# Patient Record
Sex: FEMALE | Race: WHITE | Employment: OTHER | URBAN - METROPOLITAN AREA
[De-identification: names, ages, dates, MRNs, and addresses within clinical notes are randomized per-mention and may not be internally consistent; named-entity substitution may affect disease eponyms.]

---

## 2017-07-10 ENCOUNTER — TELEPHONE (OUTPATIENT)
Dept: CARDIOLOGY CLINIC | Age: 72
End: 2017-07-10

## 2017-07-12 ENCOUNTER — OFFICE VISIT (OUTPATIENT)
Dept: CARDIOLOGY CLINIC | Age: 72
End: 2017-07-12

## 2017-07-12 VITALS
HEART RATE: 119 BPM | BODY MASS INDEX: 20.73 KG/M2 | WEIGHT: 140 LBS | HEIGHT: 69 IN | DIASTOLIC BLOOD PRESSURE: 66 MMHG | SYSTOLIC BLOOD PRESSURE: 118 MMHG

## 2017-07-12 DIAGNOSIS — M25.472 BILATERAL SWELLING OF FEET AND ANKLES: ICD-10-CM

## 2017-07-12 DIAGNOSIS — M25.471 BILATERAL SWELLING OF FEET AND ANKLES: ICD-10-CM

## 2017-07-12 DIAGNOSIS — M25.474 BILATERAL SWELLING OF FEET AND ANKLES: ICD-10-CM

## 2017-07-12 DIAGNOSIS — I48.91 ATRIAL FIBRILLATION, UNSPECIFIED TYPE (HCC): Primary | ICD-10-CM

## 2017-07-12 DIAGNOSIS — R53.83 FATIGUE, UNSPECIFIED TYPE: ICD-10-CM

## 2017-07-12 DIAGNOSIS — M25.475 BILATERAL SWELLING OF FEET AND ANKLES: ICD-10-CM

## 2017-07-12 DIAGNOSIS — R06.02 SOB (SHORTNESS OF BREATH): ICD-10-CM

## 2017-07-12 PROCEDURE — 93000 ELECTROCARDIOGRAM COMPLETE: CPT | Performed by: INTERNAL MEDICINE

## 2017-07-12 PROCEDURE — 99204 OFFICE O/P NEW MOD 45 MIN: CPT | Performed by: INTERNAL MEDICINE

## 2017-07-12 RX ORDER — METOPROLOL SUCCINATE 25 MG/1
25 TABLET, EXTENDED RELEASE ORAL DAILY
Qty: 30 TABLET | Refills: 11 | Status: SHIPPED | OUTPATIENT
Start: 2017-07-12 | End: 2017-07-26

## 2017-07-26 ENCOUNTER — OFFICE VISIT (OUTPATIENT)
Dept: CARDIOLOGY CLINIC | Age: 72
End: 2017-07-26

## 2017-07-26 ENCOUNTER — HOSPITAL ENCOUNTER (OUTPATIENT)
Dept: NON INVASIVE DIAGNOSTICS | Age: 72
Discharge: HOME | End: 2017-07-26
Attending: INTERNAL MEDICINE | Admitting: INTERNAL MEDICINE

## 2017-07-26 VITALS
WEIGHT: 143 LBS | BODY MASS INDEX: 21.18 KG/M2 | HEIGHT: 69 IN | SYSTOLIC BLOOD PRESSURE: 126 MMHG | DIASTOLIC BLOOD PRESSURE: 86 MMHG | HEART RATE: 86 BPM

## 2017-07-26 DIAGNOSIS — I48.91 ATRIAL FIBRILLATION, UNSPECIFIED TYPE (HCC): Chronic | ICD-10-CM

## 2017-07-26 DIAGNOSIS — M25.475 BILATERAL SWELLING OF FEET AND ANKLES: Chronic | ICD-10-CM

## 2017-07-26 DIAGNOSIS — I49.9 IRREGULAR HEART RHYTHM: Chronic | ICD-10-CM

## 2017-07-26 DIAGNOSIS — M25.471 BILATERAL SWELLING OF FEET AND ANKLES: Chronic | ICD-10-CM

## 2017-07-26 DIAGNOSIS — R06.02 SOB (SHORTNESS OF BREATH): Primary | Chronic | ICD-10-CM

## 2017-07-26 DIAGNOSIS — M25.472 BILATERAL SWELLING OF FEET AND ANKLES: Chronic | ICD-10-CM

## 2017-07-26 DIAGNOSIS — M25.474 BILATERAL SWELLING OF FEET AND ANKLES: Chronic | ICD-10-CM

## 2017-07-26 DIAGNOSIS — I42.9 CARDIOMYOPATHY (HCC): ICD-10-CM

## 2017-07-26 DIAGNOSIS — I48.91 ATRIAL FIBRILLATION (HCC): ICD-10-CM

## 2017-07-26 LAB
LV EF: 18 %
LV EF: 24 %
LVEF MODALITY: NORMAL
LVEF MODALITY: NORMAL

## 2017-07-26 PROCEDURE — 99214 OFFICE O/P EST MOD 30 MIN: CPT | Performed by: INTERNAL MEDICINE

## 2017-07-26 RX ORDER — CARVEDILOL 6.25 MG/1
6.25 TABLET ORAL 2 TIMES DAILY WITH MEALS
Qty: 60 TABLET | Refills: 11 | Status: SHIPPED | OUTPATIENT
Start: 2017-07-26 | End: 2017-08-11 | Stop reason: SDUPTHER

## 2017-07-26 RX ORDER — SPIRONOLACTONE 25 MG/1
25 TABLET ORAL DAILY
Qty: 30 TABLET | Refills: 11 | Status: SHIPPED | OUTPATIENT
Start: 2017-07-26 | End: 2017-09-11 | Stop reason: SDUPTHER

## 2017-07-26 RX ORDER — LISINOPRIL 5 MG/1
5 TABLET ORAL DAILY
Qty: 30 TABLET | Refills: 11 | Status: SHIPPED | OUTPATIENT
Start: 2017-07-26 | End: 2017-09-11 | Stop reason: SDUPTHER

## 2017-07-27 ENCOUNTER — TELEPHONE (OUTPATIENT)
Dept: CARDIOLOGY CLINIC | Age: 72
End: 2017-07-27

## 2017-08-04 ENCOUNTER — TELEPHONE (OUTPATIENT)
Dept: CARDIOLOGY CLINIC | Age: 72
End: 2017-08-04

## 2017-08-04 RX ORDER — FUROSEMIDE 20 MG/1
20 TABLET ORAL DAILY
Qty: 30 TABLET | Refills: 2 | Status: SHIPPED | OUTPATIENT
Start: 2017-08-04 | End: 2017-09-11 | Stop reason: SDUPTHER

## 2017-08-11 ENCOUNTER — OFFICE VISIT (OUTPATIENT)
Dept: CARDIOLOGY CLINIC | Age: 72
End: 2017-08-11

## 2017-08-11 ENCOUNTER — HOSPITAL ENCOUNTER (OUTPATIENT)
Dept: OTHER | Age: 72
Discharge: HOME | End: 2017-08-11
Attending: INTERNAL MEDICINE | Admitting: INTERNAL MEDICINE

## 2017-08-11 VITALS
SYSTOLIC BLOOD PRESSURE: 98 MMHG | BODY MASS INDEX: 18.81 KG/M2 | HEIGHT: 69 IN | WEIGHT: 127 LBS | HEART RATE: 72 BPM | DIASTOLIC BLOOD PRESSURE: 60 MMHG

## 2017-08-11 DIAGNOSIS — R06.02 SOB (SHORTNESS OF BREATH): Chronic | ICD-10-CM

## 2017-08-11 DIAGNOSIS — I48.20 CHRONIC ATRIAL FIBRILLATION (HCC): Chronic | ICD-10-CM

## 2017-08-11 DIAGNOSIS — M25.474 BILATERAL SWELLING OF FEET AND ANKLES: Chronic | ICD-10-CM

## 2017-08-11 DIAGNOSIS — M25.471 BILATERAL SWELLING OF FEET AND ANKLES: Chronic | ICD-10-CM

## 2017-08-11 DIAGNOSIS — M25.472 BILATERAL SWELLING OF FEET AND ANKLES: Chronic | ICD-10-CM

## 2017-08-11 DIAGNOSIS — I50.22 SYSTOLIC CHF, CHRONIC (HCC): ICD-10-CM

## 2017-08-11 DIAGNOSIS — I50.22 SYSTOLIC CHF, CHRONIC (HCC): Primary | ICD-10-CM

## 2017-08-11 DIAGNOSIS — I42.9 CARDIOMYOPATHY, IDIOPATHIC (HCC): ICD-10-CM

## 2017-08-11 DIAGNOSIS — M25.475 BILATERAL SWELLING OF FEET AND ANKLES: Chronic | ICD-10-CM

## 2017-08-11 LAB
ANION GAP SERPL CALCULATED.3IONS-SCNC: 13 MMOL/L (ref 3–16)
BUN BLDV-MCNC: 9 MG/DL (ref 7–20)
CALCIUM SERPL-MCNC: 9.5 MG/DL (ref 8.3–10.6)
CHLORIDE BLD-SCNC: 98 MMOL/L (ref 99–110)
CO2: 29 MMOL/L (ref 21–32)
CREAT SERPL-MCNC: 0.9 MG/DL (ref 0.6–1.2)
GFR AFRICAN AMERICAN: >60
GFR NON-AFRICAN AMERICAN: >60
GLUCOSE BLD-MCNC: 82 MG/DL (ref 70–99)
POTASSIUM SERPL-SCNC: 5.1 MMOL/L (ref 3.5–5.1)
PRO-BNP: ABNORMAL PG/ML (ref 0–124)
SODIUM BLD-SCNC: 140 MMOL/L (ref 136–145)

## 2017-08-11 PROCEDURE — 99204 OFFICE O/P NEW MOD 45 MIN: CPT | Performed by: INTERNAL MEDICINE

## 2017-08-11 RX ORDER — DIPHENHYDRAMINE HCL 25 MG
25 TABLET ORAL EVERY 8 HOURS PRN
Status: ON HOLD | COMMUNITY
End: 2018-04-03

## 2017-08-11 RX ORDER — CARVEDILOL 12.5 MG/1
12.5 TABLET ORAL 2 TIMES DAILY WITH MEALS
Qty: 60 TABLET | Refills: 11 | Status: SHIPPED | OUTPATIENT
Start: 2017-08-11 | End: 2017-08-24 | Stop reason: SDUPTHER

## 2017-08-14 ENCOUNTER — NURSE ONLY (OUTPATIENT)
Dept: CARDIOLOGY CLINIC | Age: 72
End: 2017-08-14

## 2017-08-14 DIAGNOSIS — I48.20 CHRONIC ATRIAL FIBRILLATION (HCC): Primary | Chronic | ICD-10-CM

## 2017-08-14 DIAGNOSIS — I48.20 CHRONIC ATRIAL FIBRILLATION (HCC): Primary | ICD-10-CM

## 2017-08-21 PROCEDURE — 93224 XTRNL ECG REC UP TO 48 HRS: CPT | Performed by: INTERNAL MEDICINE

## 2017-08-24 ENCOUNTER — TELEPHONE (OUTPATIENT)
Dept: CARDIOLOGY CLINIC | Age: 72
End: 2017-08-24

## 2017-08-24 ENCOUNTER — HOSPITAL ENCOUNTER (OUTPATIENT)
Dept: OTHER | Age: 72
Discharge: HOME | End: 2017-08-24
Attending: INTERNAL MEDICINE | Admitting: INTERNAL MEDICINE

## 2017-08-24 ENCOUNTER — OFFICE VISIT (OUTPATIENT)
Dept: CARDIOLOGY CLINIC | Age: 72
End: 2017-08-24

## 2017-08-24 VITALS
HEIGHT: 69 IN | HEART RATE: 72 BPM | WEIGHT: 126 LBS | DIASTOLIC BLOOD PRESSURE: 38 MMHG | SYSTOLIC BLOOD PRESSURE: 96 MMHG | BODY MASS INDEX: 18.66 KG/M2

## 2017-08-24 DIAGNOSIS — I50.22 SYSTOLIC CHF, CHRONIC (HCC): Chronic | ICD-10-CM

## 2017-08-24 DIAGNOSIS — I42.9 CARDIOMYOPATHY, IDIOPATHIC (HCC): Chronic | ICD-10-CM

## 2017-08-24 DIAGNOSIS — I48.20 CHRONIC ATRIAL FIBRILLATION (HCC): Chronic | ICD-10-CM

## 2017-08-24 DIAGNOSIS — M25.474 BILATERAL SWELLING OF FEET AND ANKLES: Chronic | ICD-10-CM

## 2017-08-24 DIAGNOSIS — R06.02 SOB (SHORTNESS OF BREATH): Chronic | ICD-10-CM

## 2017-08-24 DIAGNOSIS — M25.472 BILATERAL SWELLING OF FEET AND ANKLES: Chronic | ICD-10-CM

## 2017-08-24 DIAGNOSIS — I50.22 SYSTOLIC CHF, CHRONIC (HCC): Primary | Chronic | ICD-10-CM

## 2017-08-24 DIAGNOSIS — M25.475 BILATERAL SWELLING OF FEET AND ANKLES: Chronic | ICD-10-CM

## 2017-08-24 DIAGNOSIS — M25.471 BILATERAL SWELLING OF FEET AND ANKLES: Chronic | ICD-10-CM

## 2017-08-24 LAB
ANION GAP SERPL CALCULATED.3IONS-SCNC: 15 MMOL/L (ref 3–16)
BUN BLDV-MCNC: 15 MG/DL (ref 7–20)
CALCIUM SERPL-MCNC: 9.5 MG/DL (ref 8.3–10.6)
CHLORIDE BLD-SCNC: 102 MMOL/L (ref 99–110)
CO2: 27 MMOL/L (ref 21–32)
CREAT SERPL-MCNC: 0.7 MG/DL (ref 0.6–1.2)
GFR AFRICAN AMERICAN: >60
GFR NON-AFRICAN AMERICAN: >60
GLUCOSE BLD-MCNC: 91 MG/DL (ref 70–99)
POTASSIUM SERPL-SCNC: 4.9 MMOL/L (ref 3.5–5.1)
PRO-BNP: ABNORMAL PG/ML (ref 0–124)
SODIUM BLD-SCNC: 144 MMOL/L (ref 136–145)

## 2017-08-24 PROCEDURE — 99214 OFFICE O/P EST MOD 30 MIN: CPT | Performed by: INTERNAL MEDICINE

## 2017-08-24 RX ORDER — CARVEDILOL 25 MG/1
25 TABLET ORAL 2 TIMES DAILY WITH MEALS
Qty: 180 TABLET | Refills: 3 | Status: SHIPPED | OUTPATIENT
Start: 2017-08-24 | End: 2018-01-31 | Stop reason: DRUGHIGH

## 2017-09-11 RX ORDER — FUROSEMIDE 20 MG/1
10 TABLET ORAL DAILY
Qty: 30 TABLET | Refills: 2 | Status: SHIPPED | OUTPATIENT
Start: 2017-09-11 | End: 2018-01-09 | Stop reason: ALTCHOICE

## 2017-09-11 RX ORDER — LISINOPRIL 5 MG/1
5 TABLET ORAL DAILY
Qty: 90 TABLET | Refills: 2 | Status: SHIPPED | OUTPATIENT
Start: 2017-09-11 | End: 2017-12-01 | Stop reason: ALTCHOICE

## 2017-09-11 RX ORDER — SPIRONOLACTONE 25 MG/1
25 TABLET ORAL DAILY
Qty: 90 TABLET | Refills: 2 | Status: SHIPPED | OUTPATIENT
Start: 2017-09-11 | End: 2018-08-16 | Stop reason: SDUPTHER

## 2017-09-13 DIAGNOSIS — I48.91 ATRIAL FIBRILLATION, UNSPECIFIED TYPE (HCC): Chronic | ICD-10-CM

## 2017-10-04 ENCOUNTER — OFFICE VISIT (OUTPATIENT)
Dept: FAMILY MEDICINE CLINIC | Age: 72
End: 2017-10-04
Payer: MEDICARE

## 2017-10-04 VITALS
HEIGHT: 69 IN | SYSTOLIC BLOOD PRESSURE: 105 MMHG | RESPIRATION RATE: 14 BRPM | BODY MASS INDEX: 19.55 KG/M2 | DIASTOLIC BLOOD PRESSURE: 56 MMHG | HEART RATE: 51 BPM | WEIGHT: 132 LBS

## 2017-10-04 DIAGNOSIS — Z13.820 SCREENING FOR OSTEOPOROSIS: ICD-10-CM

## 2017-10-04 DIAGNOSIS — Z13.1 SCREENING FOR DIABETES MELLITUS: ICD-10-CM

## 2017-10-04 DIAGNOSIS — Z12.11 SCREEN FOR COLON CANCER: ICD-10-CM

## 2017-10-04 DIAGNOSIS — F32.A DEPRESSION, UNSPECIFIED DEPRESSION TYPE: ICD-10-CM

## 2017-10-04 DIAGNOSIS — I48.91 ATRIAL FIBRILLATION, UNSPECIFIED TYPE (HCC): Chronic | ICD-10-CM

## 2017-10-04 DIAGNOSIS — I42.9 CARDIOMYOPATHY, IDIOPATHIC (HCC): Chronic | ICD-10-CM

## 2017-10-04 DIAGNOSIS — I10 ESSENTIAL HYPERTENSION: ICD-10-CM

## 2017-10-04 DIAGNOSIS — Z13.220 SCREENING CHOLESTEROL LEVEL: ICD-10-CM

## 2017-10-04 DIAGNOSIS — I50.22 SYSTOLIC CHF, CHRONIC (HCC): Primary | Chronic | ICD-10-CM

## 2017-10-04 DIAGNOSIS — Z23 NEED FOR STREPTOCOCCUS PNEUMONIAE VACCINATION: ICD-10-CM

## 2017-10-04 PROCEDURE — 99203 OFFICE O/P NEW LOW 30 MIN: CPT | Performed by: NURSE PRACTITIONER

## 2017-10-04 PROCEDURE — 90670 PCV13 VACCINE IM: CPT | Performed by: NURSE PRACTITIONER

## 2017-10-04 PROCEDURE — G0009 ADMIN PNEUMOCOCCAL VACCINE: HCPCS | Performed by: NURSE PRACTITIONER

## 2017-10-04 ASSESSMENT — PATIENT HEALTH QUESTIONNAIRE - PHQ9
SUM OF ALL RESPONSES TO PHQ QUESTIONS 1-9: 0
2. FEELING DOWN, DEPRESSED OR HOPELESS: 0
SUM OF ALL RESPONSES TO PHQ9 QUESTIONS 1 & 2: 0
1. LITTLE INTEREST OR PLEASURE IN DOING THINGS: 0

## 2017-10-04 ASSESSMENT — ENCOUNTER SYMPTOMS
COUGH: 0
SHORTNESS OF BREATH: 0
RHINORRHEA: 0
NAUSEA: 0
ABDOMINAL PAIN: 0
VOMITING: 0
CONSTIPATION: 0
BACK PAIN: 0
DIARRHEA: 0

## 2017-10-04 NOTE — PATIENT INSTRUCTIONS
your doctor tells you to. Effects of medicines and food on warfarin  · Don't start or stop taking any medicines, vitamins, or natural remedies unless you first talk to your doctor. Many medicines can affect how warfarin works. These include aspirin and other pain relievers, over-the-counter medicines, multivitamins, dietary supplements, and herbal products. · Tell all of your doctors and pharmacists that you take warfarin. Some prescription medicines can affect how warfarin works. · Keep the amount of vitamin K in your diet about the same from day to day. Do not suddenly eat a lot more or a lot less food that is rich in vitamin K than you usually do. Vitamin K affects how warfarin works and how your blood clots. Talk with your doctor before making big changes in your diet. Foods that have a lot of vitamin K include cooked green vegetables, such as:  ¨ Kale, spinach, turnip greens, lizet greens, Swiss chard, and mustard greens. ¨ [place] sprouts, broccoli, and asparagus. · Limit your use of alcohol. Avoid bleeding by preventing falls and injuries  · Wear slippers or shoes with nonskid soles. · Remove throw rugs and clutter. · Rearrange furniture and electrical cords to keep them out of walking paths. · Keep stairways, porches, and outside walkways well lit. Use night-lights in hallways and bathrooms. · Be extra careful when you work with sharp tools or knives. When should you call for help? Call 911 anytime you think you may need emergency care. For example, call if:  · You have a sudden, severe headache that is different from past headaches. Call your doctor now or seek immediate medical care if:  · You have any abnormal bleeding, such as:  ¨ Nosebleeds. ¨ Vaginal bleeding that is different (heavier, more frequent, at a different time of the month) than what you are used to. ¨ Bloody or black stools, or rectal bleeding. ¨ Bloody or pink urine.   Watch closely for changes in your health, and be sure bones, muscles, and joints strong. Being fit lets you do more physical activity. And it lets you work out harder without as much effort. How can you make physical activity part of your life? Get at least 30 minutes of exercise on most days of the week. Walking is a good choice. You also may want to do other activities, such as running, swimming, cycling, or playing tennis or team sports. Pick activities that you like--ones that make your heart beat faster, your muscles stronger, and your muscles and joints more flexible. If you find more than one thing you like doing, do them all. You don't have to do the same thing every day. Get your heart pumping every day. Any activity that makes your heart beat faster and keeps it at that rate for a while counts. Here are some great ways to get your heart beating faster:  · Go for a brisk walk, run, or bike ride. · Go for a hike or swim. · Go in-line skating. · Play a game of touch football, basketball, or soccer. · Ride a bike. · Play tennis or racquetball. · Climb stairs. Even some household chores can be aerobic--just do them at a faster pace. Vacuuming, raking or mowing the lawn, sweeping the garage, and washing and waxing the car all can help get your heart rate up. Strengthen your muscles during the week. You don't have to lift heavy weights or grow big, bulky muscles to get stronger. Doing a few simple activities that make your muscles work against, or \"resist,\" something can help you get stronger. For example, you can:  · Do push-ups or sit-ups, which use your own body weight as resistance. · Lift weights or dumbbells or use stretch bands at home or in a gym or community center. Stretch your muscles often. Stretching will help you as you become more active. It can help you stay flexible, loosen tight muscles, and avoid injury. It can also help improve your balance and posture and can be a great way to relax.   Be sure to stretch the muscles you'll be using when you work out. It's best to warm your muscles slightly before you stretch them. Walk or do some other light aerobic activity for a few minutes, and then start stretching. When you stretch your muscles:  · Do it slowly. Stretching is not about going fast or making sudden movements. · Don't push or bounce during a stretch. · Hold each stretch for at least 15 to 30 seconds, if you can. You should feel a stretch in the muscle, but not pain. · Breathe out as you do the stretch. Then breathe in as you hold the stretch. Don't hold your breath. If you're worried about how more activity might affect your health, have a checkup before you start. Follow any special advice your doctor gives you for getting a smart start. Where can you learn more? Go to https://Trifecta Investment Partnerspavan.DataKraft. org and sign in to your Story[employer] Media account. Enter O666 in the [employer] box to learn more about \"Learning About Physical Activity. \"     If you do not have an account, please click on the \"Sign Up Now\" link. Current as of: March 13, 2017  Content Version: 11.3  © 7794-1668 [employer]. Care instructions adapted under license by [facility] ([facility]). If you have questions about a medical condition or this instruction, always ask your healthcare professional. [address] any warranty or liability for your use of this information.

## 2017-10-04 NOTE — PROGRESS NOTES
[address] [place] [address]  Dept: [phone]  Dept Fax: [phone]    Shiva Butler is a 71 y.o. female who presents today for her medical conditions/complaints as noted below. Shiva Butler is c/o of Established New Doctor (would like order for blood work to check INR) and Referral - General (for cardiology )        HPI:     HPI Warden Palacio is here to establish care. She has recently moved to the area from [facility] near [place]. Last time seen by a PCP: Within past few months . Past Medical History:    She has significant past cardiac history including congestive heart failure, cardiomyopathy, and atrial fibrillation. She has had a history of hypertension. She is on numerous cardiac medications including lisinopril, Aldactone, Lasix, Coreg Coumadin and digoxin. He denies any intolerances or adverse effects associated usage. She was previously followed cardiology In [place] [id number] and [facility]ai , she needs to re-establish with cardiology in this area. Denies any current chest pain or pressure, Shortness of breath, leg swelling, or easy bruising/bleeding. In regards to her Coumadin she takes MWF 5mg then the rest of the week half a tablet. She's had a history of depression she is currently on Zoloft 50mg she denies any intolerances or adverse effects associated usage. Denies suicidal/homicidal ideations or plans. Exercise: Denies ever exercise. Diet: She denies following specific diet  Tobacco:Denies  Alcohol: A small glass of wine at night  Illicit Drugs: Denies  Health Maintenance: She had a mammogram completed in August 2017. She is unsure of her last colonoscopy, she denies any significant bowel habit changes or blood in her stool. Denies unintentional weight loss. She does not wish 3 had DEXA scan. She states she has never had pneumonia vaccination.   She says her last tetanus or influenza vaccination she declines these today. Diffuse his shingles vaccination. Refuses hepatitis C screening. Patient Concerns/Questions: Denies         Past Medical History:   Diagnosis Date    Atrial fibrillation [facility]) Sept., 2012    Alvin Dorman    Other screening mammogram March 6, 2012    Negative      Past Surgical History:   Procedure Laterality Date    COLONOSCOPY  Nov. 2001 ( 2011 )     Dr. Brandin Eaton History   Problem Relation Age of Onset    Adopted: Yes       Social History   Substance Use Topics    Smoking status: Former Smoker     Quit date: 10/4/1987    Smokeless tobacco: Not on file    Alcohol use Yes      Current Outpatient Prescriptions   Medication Sig Dispense Refill    lisinopril (PRINIVIL;ZESTRIL) 5 MG tablet Take 1 tablet by mouth daily 90 tablet 2    spironolactone (ALDACTONE) 25 MG tablet Take 1 tablet by mouth daily 90 tablet 2    furosemide (LASIX) 20 MG tablet Take 0.5 tablets by mouth daily Monday Wednesday friday 30 tablet 2    carvedilol (COREG) 25 MG tablet Take 1 tablet by mouth 2 times daily (with meals) 180 tablet 3    diphenhydrAMINE (BENADRYL) 25 MG tablet Take 25 mg by mouth every 8 hours as needed for Itching      sertraline (ZOLOFT) 50 MG tablet Take 50 mg by mouth daily       ascorbic acid (VITAMIN C) 500 MG tablet Take 500 mg by mouth daily       Vitamin D (CHOLECALCIFEROL) 1000 UNITS CAPS capsule Take 1,000 Units by mouth daily.  warfarin (COUMADIN) 5 MG tablet Once daily (Patient taking differently: Take by mouth 5mg MWF, 10mg all other days. Managed by PCP) 30 tablet 3    digoxin (LANOXIN) 0.125 MG tablet TAKE ONE TABLET BY MOUTH EVERY DAY 90 tablet 3     No current facility-administered medications for this visit.       Allergies   Allergen Reactions    Contrast [Barium-Containing Compounds]      Unknown     Vancomycin        Health Maintenance   Topic Date Due    Lipid screen breath sounds normal. No respiratory distress. She has no wheezes. Abdominal: Soft. Bowel sounds are normal. There is no tenderness. There is no CVA tenderness. Musculoskeletal: Normal range of motion. Lymphadenopathy:     She has no cervical adenopathy. Neurological: She is alert and oriented to person, place, and time. No cranial nerve deficit. Skin: Skin is warm and dry. No rash noted. She is not diaphoretic. Psychiatric: She has a normal mood and affect. Her behavior is normal. Judgment and thought content normal.   Nursing note and vitals reviewed. BP (!) 105/56  Pulse 51  Resp 14  Ht 5' 9\" (1.753 m)  Wt 132 lb (59.9 kg)  BMI 19.49 kg/m2    Assessment:      1. Systolic CHF, chronic ([facility]  Comprehensive Metabolic Panel    [address], MD*    CBC   2. Cardiomyopathy, idiopathic ([facility]  Comprehensive Metabolic Panel    [address], MD*    CBC   3. Atrial fibrillation, unspecified type [facility])  [address], [address] Medication Management (Anticoagulation) - [place]    CBC   4. Essential hypertension  Comprehensive Metabolic Panel    [address], MD*    CBC   5. Depression, unspecified depression type     6. Screening cholesterol level  Lipid Panel   7. Screening for diabetes mellitus  Comprehensive Metabolic Panel   8. Need for Streptococcus pneumoniae vaccination  Pneumococcal conjugate vaccine 13-valent IM (PREVNAR 13)   9. Screening for osteoporosis  DEXA Bone Density 2 Sites   10. Screen for colon cancer         Plan:      Tello Boone is a pleasant 69-year-old female presented the office today to establish care. In regards to her health maintenance screening labs ordered as above. DEXA scan ordered.   Will update mammogram.  She additionally is unsure of her last colon cancer screening will Kishore Sampson to contact or bruits PCP to materials - see patient instructions. Discussed use, benefit, and side effects of prescribed medications. All patient questions answered. Pt voiced understanding. Reviewed health maintenance. Instructed to continue current medications, diet and exercise. Patient agreed with treatment plan. Follow up as directed. Electronically signed by Ngoc Vee CNP on 10/4/2017 at 2:35 PM       Of the 30 minute duration appointment visit, Ngoc Vee CNP spent at least 50% of the face-to-face time in counseling, explanation of diagnosis, planning of further management, and answering all questions.

## 2017-10-04 NOTE — MR AVS SNAPSHOT
How can you make physical activity part of your life? Get at least 30 minutes of exercise on most days of the week. Walking is a good choice. You also may want to do other activities, such as running, swimming, cycling, or playing tennis or team sports. Pick activities that you likeones that make your heart beat faster, your muscles stronger, and your muscles and joints more flexible. If you find more than one thing you like doing, do them all. You don't have to do the same thing every day. Get your heart pumping every day. Any activity that makes your heart beat faster and keeps it at that rate for a while counts. Here are some great ways to get your heart beating faster:  · Go for a brisk walk, run, or bike ride. · Go for a hike or swim. · Go in-line skating. · Play a game of touch football, basketball, or soccer. · Ride a bike. · Play tennis or racquetball. · Climb stairs. Even some household chores can be aerobicjust do them at a faster pace. Vacuuming, raking or mowing the lawn, sweeping the garage, and washing and waxing the car all can help get your heart rate up. Strengthen your muscles during the week. You don't have to lift heavy weights or grow big, bulky muscles to get stronger. Doing a few simple activities that make your muscles work against, or \"resist,\" something can help you get stronger. For example, you can:  · Do push-ups or sit-ups, which use your own body weight as resistance. · Lift weights or dumbbells or use stretch bands at home or in a gym or community center. Stretch your muscles often. Stretching will help you as you become more active. It can help you stay flexible, loosen tight muscles, and avoid injury. It can also help improve your balance and posture and can be a great way to relax. Be sure to stretch the muscles you'll be using when you work out. It's best to warm your muscles slightly before you stretch them.  Walk or do some other light aerobic activity for a few minutes, and then start stretching. When you stretch your muscles:  · Do it slowly. Stretching is not about going fast or making sudden movements. · Don't push or bounce during a stretch. · Hold each stretch for at least 15 to 30 seconds, if you can. You should feel a stretch in the muscle, but not pain. · Breathe out as you do the stretch. Then breathe in as you hold the stretch. Don't hold your breath. If you're worried about how more activity might affect your health, have a checkup before you start. Follow any special advice your doctor gives you for getting a smart start. Where can you learn more? Go to https://[employer] Co.peCar Advisory Networkeweb.Sport Telegram. org and sign in to your BioSTL account. Enter A708 in the PredicSis box to learn more about \"Learning About Physical Activity. \"     If you do not have an account, please click on the \"Sign Up Now\" link. Current as of: March 13, 2017  Content Version: 11.3  © 8110-6571 [employer] and [employer]. Care instructions adapted under license by [facility] ([facility]). If you have questions about a medical condition or this instruction, always ask your healthcare professional. [place] [address] any warranty or liability for your use of this information.               Medications and Orders      Your Current Medications Are              lisinopril (PRINIVIL;ZESTRIL) 5 MG tablet Take 1 tablet by mouth daily    spironolactone (ALDACTONE) 25 MG tablet Take 1 tablet by mouth daily    furosemide (LASIX) 20 MG tablet Take 0.5 tablets by mouth daily Monday Wednesday friday    carvedilol (COREG) 25 MG tablet Take 1 tablet by mouth 2 times daily (with meals)    diphenhydrAMINE (BENADRYL) 25 MG tablet Take 25 mg by mouth every 8 hours as needed for Itching    sertraline (ZOLOFT) 50 MG tablet Take 50 mg by mouth daily     ascorbic acid (VITAMIN C) 500 MG tablet Take 500 mg by mouth daily Vitamin D (CHOLECALCIFEROL) 1000 UNITS CAPS capsule Take 1,000 Units by mouth daily. warfarin (COUMADIN) 5 MG tablet Once daily    digoxin (LANOXIN) 0.125 MG tablet TAKE ONE TABLET BY MOUTH EVERY DAY      Allergies              Contrast [Barium-containing Compounds]     Unknown     Vancomycin       We Ordered/Performed the following           [address], MD*     Scheduling Instructions: [address], [facility], [address] [facility]. [address], [place], [address] [facility]    [place] # [id number]    [address] [facility]  [place], [address]  [phone]    [address] [facility] [address] [facility], [address] [facility] [address]  [phone]    [address] [facility] [address]  [place], [address] [facility] 20 [facility], [address] [place] [address]  [address] [facility], [address]  [phone]    [address]  [phone]    Comments: The patient can be scheduled with any member of the group, including the provider with the first available appointments. [facility] (Anticoagulation) [facility]     Scheduling Instructions:    [address] [facility]  [address]   [place], [address]  [phone]    Comments: The patient can be scheduled with any member of the group, including the provider with the first available appointments.           Additional Information        Basic Information     Date Of Birth Sex Race Ethnicity Preferred Language    1945 Female White Non-/Non  English      Problem List as of 10/4/2017  Date Reviewed: 10/4/2017                Systolic CHF, chronic (HCC) (Chronic)    Cardiomyopathy, idiopathic (HCC) (Chronic)    Bilateral swelling of feet and ankles (Chronic)    SOB (shortness of breath) (Chronic)    Irregular heart rhythm (Chronic)    Pain in lower jaw    Ataxia Atrial fibrillation ([facility] (Chronic)      Preventive Care        Date Due    Cholesterol Screening 5/18/1986    Zoster Vaccine 5/18/2006    Osteoporosis screening or a bone density scan (Dexa) is recommended once at age 72. Based upon the results and risk factors for bone loss, your provider will recommend whether this needs to be repeated. 5/18/2011    Pneumococcal Vaccines (two) for all adults aged 72 and over (1 of 2 - PCV13) 5/18/2011    Colonoscopy 11/1/2011    Mammograms are recommended every 2 years for low/average risk patients aged 48 - 69, and every year for high risk patients per updated national guidelines. However these guidelines can be individualized by your provider. 3/6/2014    Yearly Flu Vaccine (1) 9/1/2017    Tetanus Combination Vaccine (1 - Tdap) 12/31/2017 (Originally 5/18/1964)    Hepatitis C screening is recommended for all adults regardless of risk factors born between [facility] at least once (lifetime) who have never been tested. 12/31/2017 (Originally 1945)            [address] [place] [address] records indicate that you have declined [facility] signup.

## 2017-10-05 ENCOUNTER — TELEPHONE (OUTPATIENT)
Dept: FAMILY MEDICINE CLINIC | Age: 72
End: 2017-10-05

## 2017-10-05 ENCOUNTER — TELEPHONE (OUTPATIENT)
Dept: PHARMACY | Age: 72
End: 2017-10-05

## 2017-10-05 DIAGNOSIS — I48.91 ATRIAL FIBRILLATION, UNSPECIFIED TYPE (HCC): Primary | Chronic | ICD-10-CM

## 2017-10-05 LAB
INR BLD: 2.4
PROTHROMBIN TIME: 24.8 SEC

## 2017-10-06 DIAGNOSIS — N18.3 CKD (CHRONIC KIDNEY DISEASE), STAGE 3 (MODERATE): Primary | ICD-10-CM

## 2017-10-06 PROBLEM — N18.9 CKD (CHRONIC KIDNEY DISEASE): Status: ACTIVE | Noted: 2017-10-06

## 2017-10-06 LAB
ABSOLUTE BASO #: 0.1 K/UL (ref 0–0.1)
ABSOLUTE EOS #: 0.1 K/UL (ref 0.1–0.4)
ABSOLUTE LYMPH #: 1.8 K/UL (ref 0.8–5.2)
ABSOLUTE MONO #: 0.9 K/UL (ref 0.1–0.9)
ABSOLUTE NEUT #: 6.5 K/UL (ref 1.3–9.1)
ALBUMIN SERPL-MCNC: 4.2 G/DL (ref 3.2–5.3)
ALK PHOSPHATASE: 47 IU/L (ref 35–121)
ALT SERPL-CCNC: 12 IU/L (ref 5–59)
ANION GAP SERPL CALCULATED.3IONS-SCNC: 12 MMOL/L
AST SERPL-CCNC: 17 IU/L (ref 10–42)
BASOPHILS RELATIVE PERCENT: 0.7 %
BILIRUB SERPL-MCNC: 0.8 MG/DL (ref 0.2–1.3)
BUN BLDV-MCNC: 20 MG/DL (ref 9–24)
CALCIUM SERPL-MCNC: 9.6 MG/DL (ref 8.7–10.8)
CHLORIDE BLD-SCNC: 100 MMOL/L (ref 95–111)
CHOLESTEROL/HDL RATIO: 2.5
CHOLESTEROL: 191 MG/DL
CO2: 31 MMOL/L (ref 21–32)
CREAT SERPL-MCNC: 1 MG/DL (ref 0.5–1.3)
EGFR AFRICAN AMERICAN: 66
EGFR IF NONAFRICAN AMERICAN: 55
EOSINOPHILS RELATIVE PERCENT: 1 %
GLUCOSE: 91 MG/DL (ref 70–100)
HCT VFR BLD CALC: 45.3 % (ref 36–48)
HDLC SERPL-MCNC: 77 MG/DL (ref 40–60)
HEMOGLOBIN: 15.4 G/DL (ref 12–16)
LDL CHOLESTEROL CALCULATED: 77 MG/DL
LDL/HDL RATIO: 1
LYMPHOCYTE %: 18.9 %
MCH RBC QN AUTO: 31.3 PG (ref 27–34)
MCHC RBC AUTO-ENTMCNC: 34 G/DL (ref 31–36)
MCV RBC AUTO: 92.1 FL (ref 80–100)
MONOCYTES # BLD: 9.3 %
NEUTROPHILS RELATIVE PERCENT: 69.7 %
PDW BLD-RTO: 13 % (ref 10.8–14.8)
PLATELETS: 235 K/UL (ref 150–450)
POTASSIUM SERPL-SCNC: 5.1 MMOL/L (ref 3.5–5.4)
RBC: 4.92 M/UL (ref 4–5.5)
SODIUM BLD-SCNC: 138 MMOL/L (ref 134–147)
TOTAL PROTEIN: 6.7 G/DL (ref 5.8–8)
TRIGL SERPL-MCNC: 186 MG/DL
VLDLC SERPL CALC-MCNC: 37 MG/DL
WBC: 9.4 K/UL (ref 3.7–10.8)

## 2017-10-10 DIAGNOSIS — I48.91 ATRIAL FIBRILLATION, UNSPECIFIED TYPE (HCC): Chronic | ICD-10-CM

## 2017-10-10 LAB
INR BLD: 2.4
PROTIME: 24.8 SECONDS

## 2017-10-13 ENCOUNTER — TELEPHONE (OUTPATIENT)
Dept: PHARMACY | Age: 72
End: 2017-10-13

## 2017-10-16 ENCOUNTER — TELEPHONE (OUTPATIENT)
Dept: FAMILY MEDICINE CLINIC | Age: 72
End: 2017-10-16

## 2017-10-16 NOTE — TELEPHONE ENCOUNTER
Pt came back into the office asking about coumadin therapy. I called the coumadin clinic and they said they are pretty sure we take that and transferred me to the pre-service scheduling. I spoke to Zahra and she checked her \"gird\" and said she should be able to go to the coumadin clinic with her insurance. Called pt insurance and spoke to  Ravin through [employer] she states it has to go through the pharmacy and transferred me to [phone] (ref:[id number]), spoke to chery she couldn't help because she only does medication not the clinic. Transferred me to  [address]  And she said she wasn't sure if she has coverage and apologized. Pt notified and given information in office.

## 2017-10-16 NOTE — TELEPHONE ENCOUNTER
Pt came into the office and said her insurance doesn't cover the coumadin clinic. She has an appointment with the cardiologist next week and will see if they will follow her coumadin. Can you order the labs for one more week? Pt wants to know if she should have the coumadin checked again today. Please advise.

## 2017-10-17 DIAGNOSIS — N18.30 STAGE 3 CHRONIC KIDNEY DISEASE (HCC): Primary | ICD-10-CM

## 2017-10-17 LAB
ALBUMIN SERPL-MCNC: 4.5 G/DL (ref 3.2–5.3)
ANION GAP SERPL CALCULATED.3IONS-SCNC: 19 MMOL/L
BUN BLDV-MCNC: 22 MG/DL (ref 9–24)
CALCIUM SERPL-MCNC: 9.5 MG/DL (ref 8.7–10.8)
CHLORIDE BLD-SCNC: 101 MMOL/L (ref 95–111)
CO2: 26 MMOL/L (ref 21–32)
CREAT SERPL-MCNC: 1.1 MG/DL (ref 0.5–1.3)
EGFR AFRICAN AMERICAN: 59
EGFR IF NONAFRICAN AMERICAN: 49
GLUCOSE: 101 MG/DL (ref 70–100)
PHOSPHORUS: 3.1 MG/DL (ref 2.5–4.7)
POTASSIUM SERPL-SCNC: 5 MMOL/L (ref 3.5–5.4)
SODIUM BLD-SCNC: 141 MMOL/L (ref 134–147)

## 2017-10-20 ENCOUNTER — TELEPHONE (OUTPATIENT)
Dept: PHARMACY | Age: 72
End: 2017-10-20

## 2017-10-23 ENCOUNTER — TELEPHONE (OUTPATIENT)
Dept: FAMILY MEDICINE CLINIC | Age: 72
End: 2017-10-23

## 2017-10-23 NOTE — TELEPHONE ENCOUNTER
Patient states her hair started falling out when brushing hair. No changes in shampoo/soap/etc. No new meds. No bald spots, but noticing a lot more in comb/brush. Thinks it may be stress related. She lost her  in June and just moved here in the last month. Asking if there is anything she can take/do for this? Please advise.

## 2017-10-30 ENCOUNTER — TELEPHONE (OUTPATIENT)
Dept: FAMILY MEDICINE CLINIC | Age: 72
End: 2017-10-30

## 2017-10-30 DIAGNOSIS — N18.30 STAGE 3 CHRONIC KIDNEY DISEASE (HCC): Primary | ICD-10-CM

## 2017-10-30 NOTE — TELEPHONE ENCOUNTER
Patients son called in requesting a new referral to Dr. Santos Haque, nephrologist located at [facility] in [facility]. Insurance did not cover the previos provider pt was referred to.  Please advise

## 2017-10-31 ENCOUNTER — TELEPHONE (OUTPATIENT)
Dept: PHARMACY | Age: 72
End: 2017-10-31

## 2017-11-01 ENCOUNTER — OFFICE VISIT (OUTPATIENT)
Dept: FAMILY MEDICINE CLINIC | Age: 72
End: 2017-11-01
Payer: MEDICARE

## 2017-11-01 VITALS
RESPIRATION RATE: 16 BRPM | DIASTOLIC BLOOD PRESSURE: 67 MMHG | SYSTOLIC BLOOD PRESSURE: 105 MMHG | BODY MASS INDEX: 19.49 KG/M2 | WEIGHT: 132 LBS | HEART RATE: 67 BPM | TEMPERATURE: 98.9 F

## 2017-11-01 DIAGNOSIS — L65.9 HAIR LOSS: ICD-10-CM

## 2017-11-01 DIAGNOSIS — R19.4 BOWEL HABIT CHANGES: Primary | ICD-10-CM

## 2017-11-01 PROCEDURE — 1036F TOBACCO NON-USER: CPT | Performed by: NURSE PRACTITIONER

## 2017-11-01 PROCEDURE — 3017F COLORECTAL CA SCREEN DOC REV: CPT | Performed by: NURSE PRACTITIONER

## 2017-11-01 PROCEDURE — 99213 OFFICE O/P EST LOW 20 MIN: CPT | Performed by: NURSE PRACTITIONER

## 2017-11-01 PROCEDURE — G8484 FLU IMMUNIZE NO ADMIN: HCPCS | Performed by: NURSE PRACTITIONER

## 2017-11-01 PROCEDURE — 1123F ACP DISCUSS/DSCN MKR DOCD: CPT | Performed by: NURSE PRACTITIONER

## 2017-11-01 PROCEDURE — G8400 PT W/DXA NO RESULTS DOC: HCPCS | Performed by: NURSE PRACTITIONER

## 2017-11-01 PROCEDURE — 4040F PNEUMOC VAC/ADMIN/RCVD: CPT | Performed by: NURSE PRACTITIONER

## 2017-11-01 PROCEDURE — G8427 DOCREV CUR MEDS BY ELIG CLIN: HCPCS | Performed by: NURSE PRACTITIONER

## 2017-11-01 PROCEDURE — 1090F PRES/ABSN URINE INCON ASSESS: CPT | Performed by: NURSE PRACTITIONER

## 2017-11-01 PROCEDURE — 3014F SCREEN MAMMO DOC REV: CPT | Performed by: NURSE PRACTITIONER

## 2017-11-01 PROCEDURE — G8420 CALC BMI NORM PARAMETERS: HCPCS | Performed by: NURSE PRACTITIONER

## 2017-11-01 ASSESSMENT — ENCOUNTER SYMPTOMS
NAUSEA: 0
ANAL BLEEDING: 0
COUGH: 0
BLOOD IN STOOL: 0
SHORTNESS OF BREATH: 0
DIARRHEA: 1
RECTAL PAIN: 0
BLOATING: 0
FLATUS: 1
ABDOMINAL PAIN: 0
CONSTIPATION: 0
VOMITING: 0

## 2017-11-01 NOTE — PROGRESS NOTES
Visit Information    Have you changed or started any medications since your last visit including any over-the-counter medicines, vitamins, or herbal medicines? no   Have you stopped taking any of your medications? Is so, why? -  no  Are you having any side effects from any of your medications? - no    Have you seen any other physician or provider since your last visit?  no   Have you had any other diagnostic tests since your last visit?  no   Have you been seen in the emergency room and/or had an admission in a hospital since we last saw you?  no   Have you had your routine dental cleaning in the past 6 months?  yes      Do you have an active MyChart account? If no, what is the barrier?   No declined    Patient Care Team:  Santiago Garcia CNP as PCP - General (Family Nurse Practitioner)  Harinder San MD as Consulting Physician (Cardiology)    Medical History Review  Past Medical, Family, and Social History reviewed and does contribute to the patient presenting condition    Health Maintenance   Topic Date Due    DEXA (modify frequency per FRAX score)  05/18/2011    Colon cancer screen colonoscopy  11/01/2011    Zostavax vaccine  12/31/2017 (Originally 5/18/2006)    DTaP/Tdap/Td vaccine (1 - Tdap) 12/31/2017 (Originally [birth year]18/1965)    Flu vaccine (1) 12/31/2017 (Originally 9/1/2017)    Hepatitis C screen  12/31/2017 (Originally 1945)    Pneumococcal low/med risk (2 of 2 - PPSV23) 10/04/2018    Breast cancer screen  08/29/2019    Lipid screen  10/05/2022
after she recently has moved to the area. She is wondering if stress could be a cause or factor. At this time and like to check labs as above. Encouraged her continue antidiarrheal once a day increase fluids avoid irritants such as caffeine provided information regards to diarrhea. Provided information regards to durable bowel diet. I've encouraged to start probiotic daily. If symptoms persist would consider further evaluation possibly stool cultures. She has no significant abdominal pain denies any constitutional symptoms such as fevers chills or unexplained weight loss. She has a follow-up scheduled next week already for her concern of her hair loss acute his appointment will discuss lab work at that office visit and her symptoms. Return in about 8 days (around 11/9/2017) for follow up symptoms labs. Orders Placed This Encounter   Procedures    CBC     Standing Status:   Future     Standing Expiration Date:   11/1/2018    Basic Metabolic Panel     Standing Status:   Future     Standing Expiration Date:   11/1/2018    TSH with Reflex     Standing Status:   Future     Standing Expiration Date:   11/1/2018         Patient given educational materials - see patient instructions. Discussed use, benefit, and side effects of prescribed medications. All patient questions answered. Pt voiced understanding. Reviewed health maintenance. Instructed to continue current medications, diet and exercise. Patient agreed with treatment plan. Follow up as directed. Electronically signed by Deb Perera CNP on 11/1/2017 at 2:52 PM       Patient given educational materials - see patient instructions. Discussed use, benefit, and side effects of prescribed medications. All patient questions answered. Pt voiced understanding. Reviewed health maintenance. Instructed to continue current medications, diet and exercise. Patient agreed with treatment plan. Follow up as directed.       Electronically

## 2017-11-03 LAB
ABSOLUTE BASO #: 0.1 K/UL (ref 0–0.1)
ABSOLUTE EOS #: 0.2 K/UL (ref 0.1–0.4)
ABSOLUTE LYMPH #: 2.4 K/UL (ref 0.8–5.2)
ABSOLUTE MONO #: 0.8 K/UL (ref 0.1–0.9)
ABSOLUTE NEUT #: 4.8 K/UL (ref 1.3–9.1)
ANION GAP SERPL CALCULATED.3IONS-SCNC: 11 MMOL/L
BASOPHILS RELATIVE PERCENT: 0.8 %
BUN BLDV-MCNC: 24 MG/DL (ref 9–24)
CALCIUM SERPL-MCNC: 9.6 MG/DL (ref 8.7–10.8)
CHLORIDE BLD-SCNC: 102 MMOL/L (ref 95–111)
CO2: 31 MMOL/L (ref 21–32)
CREAT SERPL-MCNC: 0.9 MG/DL (ref 0.5–1.3)
EGFR AFRICAN AMERICAN: 75
EGFR IF NONAFRICAN AMERICAN: 62
EOSINOPHILS RELATIVE PERCENT: 1.9 %
GLUCOSE: 81 MG/DL (ref 70–100)
HCT VFR BLD CALC: 41.3 % (ref 36–48)
HEMOGLOBIN: 14.2 G/DL (ref 12–16)
LYMPHOCYTE %: 29.1 %
MCH RBC QN AUTO: 31.9 PG (ref 27–34)
MCHC RBC AUTO-ENTMCNC: 34.4 G/DL (ref 31–36)
MCV RBC AUTO: 92.8 FL (ref 80–100)
MONOCYTES # BLD: 9.8 %
NEUTROPHILS RELATIVE PERCENT: 57.9 %
PDW BLD-RTO: 14.7 % (ref 10.8–14.8)
PLATELETS: 251 K/UL (ref 150–450)
POTASSIUM SERPL-SCNC: 4.6 MMOL/L (ref 3.5–5.4)
RBC: 4.45 M/UL (ref 4–5.5)
SODIUM BLD-SCNC: 139 MMOL/L (ref 134–147)
TSH SERPL DL<=0.05 MIU/L-ACNC: 1.09 UIU/ML (ref 0.4–4.4)
WBC: 8.4 K/UL (ref 3.7–10.8)

## 2017-11-09 ENCOUNTER — OFFICE VISIT (OUTPATIENT)
Dept: FAMILY MEDICINE CLINIC | Age: 72
End: 2017-11-09
Payer: MEDICARE

## 2017-11-09 VITALS
SYSTOLIC BLOOD PRESSURE: 92 MMHG | HEART RATE: 55 BPM | DIASTOLIC BLOOD PRESSURE: 53 MMHG | BODY MASS INDEX: 20.04 KG/M2 | RESPIRATION RATE: 16 BRPM | HEIGHT: 69 IN | WEIGHT: 135.3 LBS

## 2017-11-09 DIAGNOSIS — R19.7 DIARRHEA, UNSPECIFIED TYPE: Primary | ICD-10-CM

## 2017-11-09 DIAGNOSIS — L65.9 ALOPECIA: ICD-10-CM

## 2017-11-09 PROCEDURE — G8427 DOCREV CUR MEDS BY ELIG CLIN: HCPCS | Performed by: NURSE PRACTITIONER

## 2017-11-09 PROCEDURE — G8400 PT W/DXA NO RESULTS DOC: HCPCS | Performed by: NURSE PRACTITIONER

## 2017-11-09 PROCEDURE — 1036F TOBACCO NON-USER: CPT | Performed by: NURSE PRACTITIONER

## 2017-11-09 PROCEDURE — 1123F ACP DISCUSS/DSCN MKR DOCD: CPT | Performed by: NURSE PRACTITIONER

## 2017-11-09 PROCEDURE — 1090F PRES/ABSN URINE INCON ASSESS: CPT | Performed by: NURSE PRACTITIONER

## 2017-11-09 PROCEDURE — G8484 FLU IMMUNIZE NO ADMIN: HCPCS | Performed by: NURSE PRACTITIONER

## 2017-11-09 PROCEDURE — 99213 OFFICE O/P EST LOW 20 MIN: CPT | Performed by: NURSE PRACTITIONER

## 2017-11-09 PROCEDURE — G8420 CALC BMI NORM PARAMETERS: HCPCS | Performed by: NURSE PRACTITIONER

## 2017-11-09 PROCEDURE — 3017F COLORECTAL CA SCREEN DOC REV: CPT | Performed by: NURSE PRACTITIONER

## 2017-11-09 PROCEDURE — 3014F SCREEN MAMMO DOC REV: CPT | Performed by: NURSE PRACTITIONER

## 2017-11-09 PROCEDURE — 4040F PNEUMOC VAC/ADMIN/RCVD: CPT | Performed by: NURSE PRACTITIONER

## 2017-11-09 NOTE — PROGRESS NOTES
Subjective:  Carlee Pedroza is in for continued evaluation and management. Hermedical problems include the following;  Diarrhea and hair loss. In regards to her diarrhea she was seen in the office last week with complaints of diarrhea for the past few weeks. She stated it was occurring approximately 1-2 times a day but gradually worsening pain. She denied any blood or mucus. Do not awake her from sleep. She was also having increased flatulence. She denied any fever/chills, nausea/vomiting, abdominal pain and bloating unexplained weight loss or upper respiratory type symptoms. She states that she said has noticed it was worse by stress or certain food she ate. She had tried antimotility medication which did seem to help although she was only taking it rarely. She no history of inflammatory bowel disease, Crohn's or ulcerative colitis. We had ran CBC, BMP, TSH which were all unremarkable. She states her symptoms have improved significantly she has stopped her excessive coffee intake and has been using the antimotility drug as needed. She additionally has been having what she explains hair loss the past month. She states it typically occurs when she is brushing her hair shoulder noticed hair in her comb or brush where this has not previously been present. She denies noting clumps falling out her hair feeling very thin or brittle. She denies palpitations. Her only medication change was that she was switched to Eliquis from Coumadin. I do not see alopecia as a adverse reaction for this drug. Review of systems per HPI, otherwise negative. Allergies; medications; past medical, surgical, family, and social history; and problem list reviewed as indicated in this encounter. Objective:  Vitals: Blood pressure (!) 92/53, pulse 55, resp. rate 16, height 5' 9.02\" (1.753 m), weight 135 lb 4.8 oz (61.4 kg), not currently breastfeeding. Constitutional: She is oriented to person, place, and time.   She appears well-developed and well-nourished and in no acute distress. Cardiovascular: Normal rate and regular rhythm, no murmur, rub, or gallop    Pulmonary/Chest: Effort normal and breath sounds normal. No rales or wheezes. No chest retraction. Abdomen: Soft, nontender  Extremities: no clubbing, cyanosis, or edema  Neurological:  CN II - XII grossly intact; no focal neurological deficits  Psychiatric:  Well groomed, well dressed. The patient maintains appropriate eye contact and does not appear to be responding to internal stimuli. No agitation    Lab Results   Component Value Date    WBC 8.4 11/02/2017    HGB 14.2 11/02/2017    HCT 41.3 11/02/2017    MCV 92.8 11/02/2017     11/02/2017     Lab Results   Component Value Date     11/02/2017    K 4.6 11/02/2017     11/02/2017    CO2 31 11/02/2017    BUN 24 11/02/2017    CREATININE 0.9 11/02/2017    GLUCOSE 81 11/02/2017    CALCIUM 9.6 11/02/2017      Lab Results   Component Value Date    TSH 1.090 11/02/2017         Assessment/ Plan / Medical Decision Making  1. Diarrhea, unspecified type     2. Alopecia  GIOVANNY Screen with Reflex    Durga Acosta MD, [facility] [place]           Medications, laboratory testing, imaging, consultation, and follow up as documented in this encounter. Diarrhea-improved continue to avoid irritants, continue to monitor continue antidiarrheal as needed. Notify us if her symptoms are worsening again or return with consider referral to gastroenterology. In addition complete fit test that was present provided. Alopecia-she had a normal TSH, CBC, and BMP. We discussed this could be numerous causes to this at this point we'll check an GIOVANNY and refer her to dermatology. Follow up as planned in January for chronic medical conditions, sooner if needed.     Fernando Grissom received counseling on the following healthy behaviors: medication adherence    Patient given educational materials on diarrhea    Was a

## 2017-11-09 NOTE — PROGRESS NOTES
Visit Information    Have you changed or started any medications since your last visit including any over-the-counter medicines, vitamins, or herbal medicines? no   Are you having any side effects from any of your medications? -  no  Have you stopped taking any of your medications? Is so, why? -  no    Have you seen any other physician or provider since your last visit? No  Have you had any other diagnostic tests since your last visit? No  Have you been seen in the emergency room and/or had an admission to a hospital since we last saw you? No  Have you had your routine dental cleaning in the past 6 months? no    Have you activated your [employer] account?  If not, what are your barriers? no     Patient Care Team:  Kari Paredes CNP as PCP - General (Family Nurse Practitioner)  Sabino Vazquez MD as Consulting Physician (Cardiology)    Medical History Review  Past Medical, Family, and Social History reviewed and does contribute to the patient presenting condition    Health Maintenance   Topic Date Due    DEXA (modify frequency per FRAX score)  05/18/2011    Colon cancer screen colonoscopy  11/01/2011    Zostavax vaccine  12/31/2017 (Originally 5/18/2006)    DTaP/Tdap/Td vaccine (1 - Tdap) 12/31/2017 (Originally [birth year]18/1964)    Flu vaccine (1) 12/31/2017 (Originally 9/1/2017)    Hepatitis C screen  12/31/2017 (Originally 1945)    Pneumococcal low/med risk (2 of 2 - PPSV23) 10/04/2018    Breast cancer screen  08/29/2019    Lipid screen  10/05/2022

## 2017-11-09 NOTE — PATIENT INSTRUCTIONS
Patient Education        Diarrhea: Care Instructions  Your Care Instructions    Diarrhea is loose, watery stools (bowel movements). The exact cause is often hard to find. Sometimes diarrhea is your body's way of getting rid of what caused an upset stomach. Viruses, food poisoning, and many medicines can cause diarrhea. Some people get diarrhea in response to emotional stress, anxiety, or certain foods. Almost everyone has diarrhea now and then. It usually isn't serious, and your stools will return to normal soon. The important thing to do is replace the fluids you have lost, so you can prevent dehydration. The doctor has checked you carefully, but problems can develop later. If you notice any problems or new symptoms, get medical treatment right away. Follow-up care is a key part of your treatment and safety. Be sure to make and go to all appointments, and call your doctor if you are having problems. It's also a good idea to know your test results and keep a list of the medicines you take. How can you care for yourself at home? · Watch for signs of dehydration, which means your body has lost too much water. Dehydration is a serious condition and should be treated right away. Signs of dehydration are:  ¨ Increasing thirst and dry eyes and mouth. ¨ Feeling faint or lightheaded. ¨ Darker urine, and a smaller amount of urine than normal.  · To prevent dehydration, drink plenty of fluids, enough so that your urine is light yellow or clear like water. Choose water and other caffeine-free clear liquids until you feel better. If you have kidney, heart, or liver disease and have to limit fluids, talk with your doctor before you increase the amount of fluids you drink. · Begin eating small amounts of mild foods the next day, if you feel like it. ¨ Try yogurt that has live cultures of Lactobacillus.  (Check the label.)  ¨ Avoid spicy foods, fruits, alcohol, and caffeine until 48 hours after all symptoms are gone.  ¨ Avoid chewing gum that contains sorbitol. ¨ Avoid dairy products (except for yogurt with Lactobacillus) while you have diarrhea and for 3 days after symptoms are gone. · The doctor may recommend that you take over-the-counter medicine, such as loperamide (Imodium), if you still have diarrhea after 6 hours. Read and follow all instructions on the label. Do not use this medicine if you have bloody diarrhea, a high fever, or other signs of serious illness. Call your doctor if you think you are having a problem with your medicine. When should you call for help? Call 911 anytime you think you may need emergency care. For example, call if:  · You passed out (lost consciousness). · Your stools are maroon or very bloody. Call your doctor now or seek immediate medical care if:  · You are dizzy or lightheaded, or you feel like you may faint. · Your stools are black and look like tar, or they have streaks of blood. · You have new or worse belly pain. · You have symptoms of dehydration, such as:  ¨ Dry eyes and a dry mouth. ¨ Passing only a little dark urine. ¨ Feeling thirstier than usual.  · You have a new or higher fever. Watch closely for changes in your health, and be sure to contact your doctor if:  · Your diarrhea is getting worse. · You see pus in the diarrhea. · You are not getting better after 2 days (48 hours). Where can you learn more? Go to https://Tout.[employer] TV. org and sign in to your [employer] account. Enter T578 in the Curbside box to learn more about \"Diarrhea: Care Instructions. \"     If you do not have an account, please click on the \"Sign Up Now\" link. Current as of: March 20, 2017  Content Version: 11.3  © 6765-7139 [employer]. Care instructions adapted under license by [facility] ([facility]).  If you have questions about a medical condition or this instruction, always ask your healthcare professional. Hanane Hanna disclaims any warranty

## 2017-11-14 DIAGNOSIS — R19.5 POSITIVE FIT (FECAL IMMUNOCHEMICAL TEST): Primary | ICD-10-CM

## 2017-11-14 LAB
CONTROL: PRESENT
HEMOCCULT STL QL: POSITIVE

## 2017-12-01 ENCOUNTER — TELEPHONE (OUTPATIENT)
Dept: FAMILY MEDICINE CLINIC | Age: 72
End: 2017-12-01

## 2017-12-01 ENCOUNTER — OFFICE VISIT (OUTPATIENT)
Dept: FAMILY MEDICINE CLINIC | Age: 72
End: 2017-12-01
Payer: MEDICARE

## 2017-12-01 VITALS
SYSTOLIC BLOOD PRESSURE: 90 MMHG | HEART RATE: 79 BPM | BODY MASS INDEX: 19.93 KG/M2 | WEIGHT: 135 LBS | RESPIRATION RATE: 16 BRPM | DIASTOLIC BLOOD PRESSURE: 60 MMHG

## 2017-12-01 DIAGNOSIS — R20.2 PARESTHESIAS: Primary | ICD-10-CM

## 2017-12-01 PROCEDURE — 1090F PRES/ABSN URINE INCON ASSESS: CPT | Performed by: NURSE PRACTITIONER

## 2017-12-01 PROCEDURE — G8427 DOCREV CUR MEDS BY ELIG CLIN: HCPCS | Performed by: NURSE PRACTITIONER

## 2017-12-01 PROCEDURE — 3017F COLORECTAL CA SCREEN DOC REV: CPT | Performed by: NURSE PRACTITIONER

## 2017-12-01 PROCEDURE — 1123F ACP DISCUSS/DSCN MKR DOCD: CPT | Performed by: NURSE PRACTITIONER

## 2017-12-01 PROCEDURE — G8484 FLU IMMUNIZE NO ADMIN: HCPCS | Performed by: NURSE PRACTITIONER

## 2017-12-01 PROCEDURE — 3014F SCREEN MAMMO DOC REV: CPT | Performed by: NURSE PRACTITIONER

## 2017-12-01 PROCEDURE — G8400 PT W/DXA NO RESULTS DOC: HCPCS | Performed by: NURSE PRACTITIONER

## 2017-12-01 PROCEDURE — 1036F TOBACCO NON-USER: CPT | Performed by: NURSE PRACTITIONER

## 2017-12-01 PROCEDURE — 99213 OFFICE O/P EST LOW 20 MIN: CPT | Performed by: NURSE PRACTITIONER

## 2017-12-01 PROCEDURE — 4040F PNEUMOC VAC/ADMIN/RCVD: CPT | Performed by: NURSE PRACTITIONER

## 2017-12-01 PROCEDURE — G8420 CALC BMI NORM PARAMETERS: HCPCS | Performed by: NURSE PRACTITIONER

## 2017-12-01 NOTE — PATIENT INSTRUCTIONS
Patient Education        Numbness and Tingling: Care Instructions  Your Care Instructions  Many things can cause numbness or tingling. Swelling may put pressure on a nerve. This could cause you to lose feeling or have a pins-and-needles sensation on part of your body. Nerves may be damaged from trauma, toxins, or diseases, such as diabetes or multiple sclerosis (MS). Sometimes, though, the cause is not clear. If there is no clear reason for your symptoms, and you are not having any other symptoms, your doctor may suggest watching and waiting for a while to see if the numbness or tingling goes away on its own. Your doctor may want you to have blood or nerve tests to find the cause of your symptoms. Follow-up care is a key part of your treatment and safety. Be sure to make and go to all appointments, and call your doctor if you are having problems. It's also a good idea to know your test results and keep a list of the medicines you take. How can you care for yourself at home? · If your doctor prescribes medicine, take it exactly as directed. Call your doctor if you think you are having a problem with your medicine. · If you have any swelling, put ice or a cold pack on the area for 10 to 20 minutes at a time. Put a thin cloth between the ice and your skin. When should you call for help? Call 911 anytime you think you may need emergency care. For example, call if:  · You have weakness, numbness, or tingling in both legs. · You lose bowel or bladder control. · You have symptoms of a stroke. These may include:  ¨ Sudden numbness, tingling, weakness, or loss of movement in your face, arm, or leg, especially on only one side of your body. ¨ Sudden vision changes. ¨ Sudden trouble speaking. ¨ Sudden confusion or trouble understanding simple statements. ¨ Sudden problems with walking or balance. ¨ A sudden, severe headache that is different from past headaches.   Watch closely for changes in your health, and be sure to contact your doctor if you have any problems, or if:  · You do not get better as expected. Where can you learn more? Go to https://chpepiceweb.[employer]. org and sign in to your [employer] account. Enter J538 in the Gazoob box to learn more about \"Numbness and Tingling: Care Instructions. \"     If you do not have an account, please click on the \"Sign Up Now\" link. Current as of: October 14, 2016  Content Version: 11.3  © 1853-5135 [employer]. Care instructions adapted under license by [facility] ([facility]). If you have questions about a medical condition or this instruction, always ask your healthcare professional. [address] any warranty or liability for your use of this information.

## 2017-12-01 NOTE — PROGRESS NOTES
internal stimuli. No agitation    Lab Results   Component Value Date     11/02/2017    K 4.6 11/02/2017     11/02/2017    CO2 31 11/02/2017    BUN 24 11/02/2017    CREATININE 0.9 11/02/2017    GLUCOSE 81 11/02/2017    CALCIUM 9.6 11/02/2017      Lab Results   Component Value Date    TSH 1.090 11/02/2017           Assessment/ Plan / Medical Decision Making  1. Paresthesias  TSH with Reflex    Vitamin M38    Basic Metabolic Panel    Sedimentation rate, automated    EMG           Medications, laboratory testing, imaging, consultation, and follow up as documented in this encounter. Intermittent paresthesias-she has no focal deficits and no weakness to extremities no unilateral complaints. She is alert and oriented has steady gait. We'll recheck labs as above in addition check a vitamin B. She has an order for an GIOVANNY have asked her have this completed. I ordered an EMG. We discussed numerous possible causes of this. We'll continue to monitor if she would develop any unilateral weakness headache facial droop and slurred speech chest pain pressure or shortness of breath seek emergent care she verbalized understanding and agreeable to this plan. Follow-up as planned in January for chronic medical conditions, sooner if needed. Jesus Rice received counseling on the following healthy behaviors: medication adherence    Patient given educational materials on Paresthesias    Was a self-tracking handout given in paper form or via [employer]? No  If yes, see orders or list here. Discussed use, benefit, and side effects of prescribed medications. Barriers to medication compliance addressed. All patient questions answered. Pt voiced understanding.      This note is created with the assistance of a speech-recognition program.  While intending to generate a document that actually reflects the content of the visit, no guarantees can be provided that every mistake has been identified and corrected by editing. Of the 15 minute duration appointment visit, Francy Bowden CNP spent at least 50% of the face-to-face time in counseling, explanation of diagnosis, planning of further management, and answering all questions.

## 2017-12-02 LAB
ANION GAP SERPL CALCULATED.3IONS-SCNC: 14 MMOL/L
BUN BLDV-MCNC: 23 MG/DL (ref 9–24)
CALCIUM SERPL-MCNC: 9.6 MG/DL (ref 8.7–10.8)
CHLORIDE BLD-SCNC: 102 MMOL/L (ref 95–111)
CO2: 26 MMOL/L (ref 21–32)
CREAT SERPL-MCNC: 1 MG/DL (ref 0.5–1.3)
EGFR AFRICAN AMERICAN: 66
EGFR IF NONAFRICAN AMERICAN: 55
GLUCOSE: 95 MG/DL (ref 70–100)
POTASSIUM SERPL-SCNC: 5.5 MMOL/L (ref 3.5–5.4)
SEDIMENTATION RATE, ERYTHROCYTE: 1 MM/HR (ref 0–20)
SODIUM BLD-SCNC: 136 MMOL/L (ref 134–147)
TSH SERPL DL<=0.05 MIU/L-ACNC: 1.33 UIU/ML (ref 0.4–4.4)
VITAMIN B-12: 159 PG/ML (ref 211–911)

## 2017-12-04 LAB — ANTI-NUCLEAR ANTIBODY (ANA): NORMAL

## 2017-12-05 ENCOUNTER — NURSE ONLY (OUTPATIENT)
Dept: FAMILY MEDICINE CLINIC | Age: 72
End: 2017-12-05
Payer: MEDICARE

## 2017-12-05 VITALS — HEIGHT: 69 IN | WEIGHT: 136.1 LBS | BODY MASS INDEX: 20.16 KG/M2

## 2017-12-05 DIAGNOSIS — E53.8 VITAMIN B 12 DEFICIENCY: Primary | ICD-10-CM

## 2017-12-05 PROBLEM — E53.9 VITAMIN B DEFICIENCY: Status: ACTIVE | Noted: 2017-12-05

## 2017-12-05 PROCEDURE — 96372 THER/PROPH/DIAG INJ SC/IM: CPT | Performed by: NURSE PRACTITIONER

## 2017-12-05 RX ORDER — CYANOCOBALAMIN 1000 UG/ML
1000 INJECTION INTRAMUSCULAR; SUBCUTANEOUS ONCE
Status: COMPLETED | OUTPATIENT
Start: 2017-12-05 | End: 2017-12-05

## 2017-12-05 RX ADMIN — CYANOCOBALAMIN 1000 MCG: 1000 INJECTION INTRAMUSCULAR; SUBCUTANEOUS at 15:21

## 2017-12-05 NOTE — PROGRESS NOTES
Vaccine administered, VSI declined, Pt tolerated vaccine well. Pt was in for B12 injection. Wanted to know if she will need to continue to come in for these or if there was a Rx she could take. If she needs to have her labs rechecked.

## 2017-12-05 NOTE — PROGRESS NOTES
We can recheck in 1 month I will place order and she can have it done prior to her next visit with me ON January 9th

## 2017-12-14 ENCOUNTER — HOSPITAL ENCOUNTER (OUTPATIENT)
Dept: MAMMOGRAPHY | Age: 72
Discharge: HOME | End: 2017-12-14
Payer: MEDICARE

## 2017-12-14 DIAGNOSIS — Z13.820 SCREENING FOR OSTEOPOROSIS: ICD-10-CM

## 2017-12-14 PROBLEM — M85.80 OSTEOPENIA: Status: ACTIVE | Noted: 2017-12-14

## 2017-12-14 PROCEDURE — 77080 DXA BONE DENSITY AXIAL: CPT

## 2017-12-18 PROBLEM — I42.8 CARDIOMYOPATHY, NONISCHEMIC (HCC): Status: ACTIVE | Noted: 2017-07-26

## 2017-12-26 ENCOUNTER — TELEPHONE (OUTPATIENT)
Dept: FAMILY MEDICINE CLINIC | Age: 72
End: 2017-12-26

## 2018-01-09 ENCOUNTER — OFFICE VISIT (OUTPATIENT)
Dept: FAMILY MEDICINE CLINIC | Age: 73
End: 2018-01-09
Payer: MEDICARE

## 2018-01-09 VITALS
WEIGHT: 141 LBS | SYSTOLIC BLOOD PRESSURE: 100 MMHG | HEIGHT: 69 IN | HEART RATE: 65 BPM | DIASTOLIC BLOOD PRESSURE: 65 MMHG | BODY MASS INDEX: 20.88 KG/M2 | RESPIRATION RATE: 16 BRPM

## 2018-01-09 DIAGNOSIS — M16.11 ARTHRITIS OF RIGHT HIP: ICD-10-CM

## 2018-01-09 DIAGNOSIS — I50.22 SYSTOLIC CHF, CHRONIC (HCC): Primary | Chronic | ICD-10-CM

## 2018-01-09 DIAGNOSIS — E53.9 VITAMIN B DEFICIENCY: ICD-10-CM

## 2018-01-09 DIAGNOSIS — N18.30 STAGE 3 CHRONIC KIDNEY DISEASE (HCC): ICD-10-CM

## 2018-01-09 DIAGNOSIS — I48.91 ATRIAL FIBRILLATION, UNSPECIFIED TYPE (HCC): Chronic | ICD-10-CM

## 2018-01-09 DIAGNOSIS — I42.9 CARDIOMYOPATHY, UNSPECIFIED TYPE (HCC): ICD-10-CM

## 2018-01-09 DIAGNOSIS — F32.A DEPRESSION, UNSPECIFIED DEPRESSION TYPE: ICD-10-CM

## 2018-01-09 DIAGNOSIS — M85.80 OSTEOPENIA, UNSPECIFIED LOCATION: ICD-10-CM

## 2018-01-09 PROCEDURE — G8420 CALC BMI NORM PARAMETERS: HCPCS | Performed by: NURSE PRACTITIONER

## 2018-01-09 PROCEDURE — 3017F COLORECTAL CA SCREEN DOC REV: CPT | Performed by: NURSE PRACTITIONER

## 2018-01-09 PROCEDURE — 99214 OFFICE O/P EST MOD 30 MIN: CPT | Performed by: NURSE PRACTITIONER

## 2018-01-09 PROCEDURE — 1036F TOBACCO NON-USER: CPT | Performed by: NURSE PRACTITIONER

## 2018-01-09 PROCEDURE — 1123F ACP DISCUSS/DSCN MKR DOCD: CPT | Performed by: NURSE PRACTITIONER

## 2018-01-09 PROCEDURE — G8399 PT W/DXA RESULTS DOCUMENT: HCPCS | Performed by: NURSE PRACTITIONER

## 2018-01-09 PROCEDURE — 1090F PRES/ABSN URINE INCON ASSESS: CPT | Performed by: NURSE PRACTITIONER

## 2018-01-09 PROCEDURE — G8427 DOCREV CUR MEDS BY ELIG CLIN: HCPCS | Performed by: NURSE PRACTITIONER

## 2018-01-09 PROCEDURE — 4040F PNEUMOC VAC/ADMIN/RCVD: CPT | Performed by: NURSE PRACTITIONER

## 2018-01-09 PROCEDURE — 3014F SCREEN MAMMO DOC REV: CPT | Performed by: NURSE PRACTITIONER

## 2018-01-09 PROCEDURE — G8484 FLU IMMUNIZE NO ADMIN: HCPCS | Performed by: NURSE PRACTITIONER

## 2018-01-09 NOTE — PATIENT INSTRUCTIONS
obsessive-compulsive disorder, panic disorder, anxiety disorders, post-traumatic stress disorder (PTSD), and premenstrual dysphoric disorder (PMDD). Sertraline may also be used for purposes not listed in this medication guide. What should I discuss with my healthcare provider before taking sertraline? You should not use sertraline if you are allergic to it, if you also take pimozide, or if you are being treated with methylene blue injection. Do not use sertraline if you have taken an MAO inhibitor in the past 14 days. A dangerous drug interaction could occur. MAO inhibitors include isocarboxazid, linezolid, phenelzine, rasagiline, selegiline, and tranylcypromine. After you stop taking sertraline, you must wait at least 14 days before you start taking an MAOI. To make sure sertraline is safe for you, tell your doctor if you have:  · liver or kidney disease;  · seizures or epilepsy;  · a bleeding or blood clotting disorder;  · bipolar disorder (manic depression); or  · a history of drug abuse or suicidal thoughts. Some young people have thoughts about suicide when first taking an antidepressant. Your doctor should check your progress at regular visits. Your family or other caregivers should also be alert to changes in your mood or symptoms. Taking an SSRI antidepressant during pregnancy may cause serious lung problems or other complications in the baby. However, you may have a relapse of depression if you stop taking your antidepressant. Tell your doctor right away if you become pregnant. Do not start or stop taking this medicine during pregnancy without your doctor's advice. It is not known whether sertraline passes into breast milk or if it could harm a nursing baby. Tell your doctor if you are breast-feeding a baby. Do not give sertraline to anyone younger than 25years old without the advice of a doctor. Sertraline is FDA-approved for children with obsessive-compulsive disorder (OCD).  It is not approved

## 2018-01-09 NOTE — PROGRESS NOTES
6. Vitamin B deficiency     7. Osteopenia, unspecified location     8. Arthritis of right hip       Quality & Risk Score Accuracy - MEDICARE ADVANTAGE    Visit Dx: Heart failure, systolic, chronic (HCC)  Stable based upon symptoms and exam. Continue current treatment plan and follow up at least yearly. Visit Dx:  Cardiomyopathy, unspecified type ([address]  Stable based upon symptoms and exam. Continue current treatment plan and follow up at least yearly. Visit Dx:  Atrial fibrillation, unspecified type ([address]  Stable based upon review of recent symptoms and physical exam. Continue current treatment plan and follow up at least yearly. Last edited 01/09/18 13:19 EST by Aftab Ma CNP           Medications, laboratory testing, imaging, consultation, and follow up as documented in this encounter. Cardiac issues including CHF cardiomyopathy and atrial fibrillation-appears stable at this time she denies any symptoms at this time. Continue follow cardiology and medications as planned. [id number] as planned with nephrology encourage good blood pressure control. Depression-stable continue Zoloft as directed. Osteopenia-provided information regards to preventing osteoporosis continue multivitamin and calcium. T to monitor. Vitamin B deficiency we discussed taking vitamin B supplement over-the-counter in addition recheck vitamin B level in approximately 1 month if needs additional injection will return to office. Right hip arthritis-at this point she declines further evaluation such as x-ray or physical therapy. We did discuss if symptoms persist consider physical therapy to 3 times a week for the next 4-6 weeks. Follow-up 3 months for chronic issues, sooner if needed.     Sana Juarez received counseling on the following healthy behaviors: nutrition, exercise and medication adherence    Patient given educational materials on vitamin B 12, zoloft, and prevent osteoporis    Was a self-tracking

## 2018-01-10 DIAGNOSIS — E53.9 VITAMIN B DEFICIENCY: Primary | ICD-10-CM

## 2018-01-10 LAB — VITAMIN B-12: 297 PG/ML (ref 211–911)

## 2018-01-31 PROBLEM — N18.30 CKD (CHRONIC KIDNEY DISEASE), STAGE III (HCC): Status: ACTIVE | Noted: 2017-10-06

## 2018-02-09 LAB — VITAMIN B-12: 662 PG/ML (ref 211–911)

## 2018-02-20 ENCOUNTER — OFFICE VISIT (OUTPATIENT)
Dept: GASTROENTEROLOGY | Age: 73
End: 2018-02-20
Payer: MEDICARE

## 2018-02-20 VITALS
DIASTOLIC BLOOD PRESSURE: 103 MMHG | SYSTOLIC BLOOD PRESSURE: 135 MMHG | WEIGHT: 140 LBS | HEIGHT: 69 IN | HEART RATE: 111 BPM | BODY MASS INDEX: 20.73 KG/M2

## 2018-02-20 DIAGNOSIS — R19.5 POSITIVE FIT (FECAL IMMUNOCHEMICAL TEST): Primary | ICD-10-CM

## 2018-02-20 PROCEDURE — 1090F PRES/ABSN URINE INCON ASSESS: CPT | Performed by: INTERNAL MEDICINE

## 2018-02-20 PROCEDURE — 3017F COLORECTAL CA SCREEN DOC REV: CPT | Performed by: INTERNAL MEDICINE

## 2018-02-20 PROCEDURE — 99204 OFFICE O/P NEW MOD 45 MIN: CPT | Performed by: INTERNAL MEDICINE

## 2018-02-20 PROCEDURE — 3014F SCREEN MAMMO DOC REV: CPT | Performed by: INTERNAL MEDICINE

## 2018-02-20 PROCEDURE — G8484 FLU IMMUNIZE NO ADMIN: HCPCS | Performed by: INTERNAL MEDICINE

## 2018-02-20 PROCEDURE — G8427 DOCREV CUR MEDS BY ELIG CLIN: HCPCS | Performed by: INTERNAL MEDICINE

## 2018-02-20 PROCEDURE — 4040F PNEUMOC VAC/ADMIN/RCVD: CPT | Performed by: INTERNAL MEDICINE

## 2018-02-20 PROCEDURE — 1036F TOBACCO NON-USER: CPT | Performed by: INTERNAL MEDICINE

## 2018-02-20 PROCEDURE — G8399 PT W/DXA RESULTS DOCUMENT: HCPCS | Performed by: INTERNAL MEDICINE

## 2018-02-20 PROCEDURE — 1123F ACP DISCUSS/DSCN MKR DOCD: CPT | Performed by: INTERNAL MEDICINE

## 2018-02-20 PROCEDURE — G8420 CALC BMI NORM PARAMETERS: HCPCS | Performed by: INTERNAL MEDICINE

## 2018-02-20 ASSESSMENT — ENCOUNTER SYMPTOMS
ABDOMINAL DISTENTION: 0
BLOOD IN STOOL: 1
DIARRHEA: 1
RECTAL PAIN: 0
NAUSEA: 0
CONSTIPATION: 0
ANAL BLEEDING: 0
SHORTNESS OF BREATH: 1
BACK PAIN: 0
ABDOMINAL PAIN: 0
VOMITING: 0
TROUBLE SWALLOWING: 0

## 2018-02-20 NOTE — PROGRESS NOTES
INITIAL NOTE    HISTORY OF PRESENT ILLNESS: Ms. Aracely Rogers is a 71 y.o. female referred for evaluation of FIT+. She reports no gross bleeding. No abdominal pain. No nausea or vomiting. No change in bowel habits. She lost some weight loss somewhat after the death of her . She gained some weight back. She reports colonoscopy 5 or 10 years ago. She is not sure about the results. She is on Eliquis for atrial fibrillation. Past Medical, Family, and Social History reviewed and does contribute to the patient presenting condition. Patient's PMH/PSH,SH,PSYCH Hx, MEDs, ALLERGIES, and ROS were all reviewed and updated in the appropriate sections.     PAST MEDICAL HISTORY:  Past Medical History:   Diagnosis Date    Atrial fibrillation [facility]) Sept., 2012    Filomena Buiadore    Other screening mammogram March 6, 2012    Negative       Past Surgical History:   Procedure Laterality Date    COLONOSCOPY  Nov. 2001 ( 2011 )     Dr. Yuliana Reeves - hemorrhoids   Deena Bernal:    Current Outpatient Prescriptions:     Multiple Vitamins-Minerals (THERAPEUTIC MULTIVITAMIN-MINERALS) tablet, Take 1 tablet by mouth daily, Disp: , Rfl:     calcium carbonate (OSCAL) 500 MG TABS tablet, Take 500 mg by mouth daily, Disp: , Rfl:     Biotin (BIOTIN 5000) 5 MG CAPS, Take 5,000 mcg by mouth daily, Disp: , Rfl:     carvedilol (COREG) 25 MG tablet, Take 0.5 tablets by mouth 2 times daily (with meals), Disp: 90 tablet, Rfl: 0    apixaban (ELIQUIS) 5 MG TABS tablet, Take 5 mg by mouth 2 times daily, Disp: , Rfl:     spironolactone (ALDACTONE) 25 MG tablet, Take 1 tablet by mouth daily (Patient taking differently: Take 12.5 mg by mouth daily ), Disp: 90 tablet, Rfl: 2    diphenhydrAMINE (BENADRYL) 25 MG tablet, Take 25 mg by mouth every 8 hours as needed for Itching, Disp: , Rfl:     sertraline (ZOLOFT) 50 MG tablet, Take 50 mg by mouth daily , Disp: , Rfl:     ascorbic acid (VITAMIN C) 500 MG tablet, Take 500 mg by mouth daily , Disp: , Rfl:     ALLERGIES:   Allergies   Allergen Reactions    Contrast [Barium-Containing Compounds]      Unknown     Vancomycin        FAMILY HISTORY:       Problem Relation Age of Onset    Adopted: Yes     Adopted. SOCIAL HISTORY:   Social History     Social History    Marital status:      Spouse name: N/A    Number of children: N/A    Years of education: N/A     Occupational History    Not on file. Social History Main Topics    Smoking status: Former Smoker     Quit date: 10/4/1987    Smokeless tobacco: Never Used    Alcohol use Yes    Drug use: No    Sexual activity: Not on file     Other Topics Concern    Not on file     Social History Narrative    No narrative on file       REVIEW OF SYSTEMS: A 12-point review of systems was obtained and pertinent positives and negatives were enumerated above in the history of present illness. All other reviewed systems / symptoms were negative. Review of Systems   Constitutional: Negative for chills, diaphoresis, fatigue, fever and unexpected weight change. HENT: Negative for congestion and trouble swallowing. Eyes: Positive for visual disturbance (glasses). Respiratory: Positive for shortness of breath. Cardiovascular: Negative for chest pain. Gastrointestinal: Positive for blood in stool (pos FIT) and diarrhea. Negative for abdominal distention, abdominal pain, anal bleeding, constipation, nausea, rectal pain and vomiting. Endocrine: Negative. Genitourinary: Negative for difficulty urinating. Musculoskeletal: Negative for arthralgias and back pain. Skin: Negative. Allergic/Immunologic: Negative for environmental allergies and food allergies. Neurological: Negative for dizziness, light-headedness and headaches. Hematological: Does not bruise/bleed easily. Psychiatric/Behavioral: Negative for sleep disturbance.        PHYSICAL EXAMINATION: Vital signs reviewed per the

## 2018-03-05 ENCOUNTER — HOSPITAL ENCOUNTER (OUTPATIENT)
Dept: NEUROLOGY | Age: 73
Discharge: HOME | End: 2018-03-05
Payer: MEDICARE

## 2018-03-05 DIAGNOSIS — R20.2 PARESTHESIAS: ICD-10-CM

## 2018-03-05 PROCEDURE — 95886 MUSC TEST DONE W/N TEST COMP: CPT | Performed by: PHYSICAL MEDICINE & REHABILITATION

## 2018-03-05 PROCEDURE — 95912 NRV CNDJ TEST 11-12 STUDIES: CPT | Performed by: PHYSICAL MEDICINE & REHABILITATION

## 2018-03-08 ENCOUNTER — TELEPHONE (OUTPATIENT)
Dept: FAMILY MEDICINE CLINIC | Age: 73
End: 2018-03-08

## 2018-03-08 DIAGNOSIS — M25.551 RIGHT HIP PAIN: Primary | ICD-10-CM

## 2018-03-08 RX ORDER — TRAMADOL HYDROCHLORIDE 50 MG/1
50 TABLET ORAL EVERY 6 HOURS PRN
Qty: 12 TABLET | Refills: 0 | Status: SHIPPED | OUTPATIENT
Start: 2018-03-08 | End: 2018-03-09 | Stop reason: SDUPTHER

## 2018-03-08 NOTE — TELEPHONE ENCOUNTER
I would suggest  We check an xray as I don't have one on file in addition in her history of renal dysfunction I will provide her a very small supply of tramadol this can cause drowsiness please notify her this on a print her some hip exercises to try. This will not be a long-term prescription this point. I would suggest she schedule follow-up after the x-ray and trial the medication and exercises. I have printed her hip arthritis exercises and information about tramadol please mail both to her. Controlled Substances Monitoring: The Prescription Monitoring Report for this patient was reviewed today. (Ольга Baird, MARIA E)    No signs of potential drug abuse or diversion identified.  (Ольга Baird, MARIA E)

## 2018-03-09 ENCOUNTER — TELEPHONE (OUTPATIENT)
Dept: FAMILY MEDICINE CLINIC | Age: 73
End: 2018-03-09

## 2018-03-09 DIAGNOSIS — M25.551 RIGHT HIP PAIN: ICD-10-CM

## 2018-03-09 RX ORDER — TRAMADOL HYDROCHLORIDE 50 MG/1
50 TABLET ORAL EVERY 8 HOURS PRN
Qty: 20 TABLET | Refills: 0 | Status: SHIPPED | OUTPATIENT
Start: 2018-03-09 | End: 2018-03-16

## 2018-03-09 NOTE — TELEPHONE ENCOUNTER
It appears as though a small quantity was prescribed for the patient yesterday. Please call the patient and clarify.

## 2018-03-12 ENCOUNTER — TELEPHONE (OUTPATIENT)
Dept: FAMILY MEDICINE CLINIC | Age: 73
End: 2018-03-12

## 2018-03-12 NOTE — TELEPHONE ENCOUNTER
Please advise her to discontinue the tramadol and try Tylenol arthritis. Schedule a follow-up appointment with Michael Mckinney.

## 2018-03-15 ENCOUNTER — TELEPHONE (OUTPATIENT)
Dept: FAMILY MEDICINE CLINIC | Age: 73
End: 2018-03-15

## 2018-03-15 DIAGNOSIS — M25.551 RIGHT HIP PAIN: ICD-10-CM

## 2018-03-15 DIAGNOSIS — M16.11 ARTHRITIS OF RIGHT HIP: Primary | ICD-10-CM

## 2018-03-15 PROBLEM — G56.03 BILATERAL CARPAL TUNNEL SYNDROME: Status: ACTIVE | Noted: 2018-03-15

## 2018-03-15 NOTE — TELEPHONE ENCOUNTER
Spoke to patient about her hip x-ray showed moderate to severe arthritis. She was unable tolerate the tramadol she has been taking Tylenol arthritis and doing the hip arthritis stretches I provided her she states that her pain is tolerable at this point. We discussed further evaluation by orthopedics if pain persist or worsen she'll let me know at her upcoming appointment. We discussed her most recent EMG due to her intermittent paresthesias she was having. It appears she may have some bilateral carpal tunnel in her wrist but there is also some abnormalities in regards to a sensory greater than motor peripheral polyneuropathy we discussed this could be related to her cool extremities although I have suggested referral to neurology. She states her symptoms have improved significantly.  She will decide on following with neurology

## 2018-03-26 ENCOUNTER — TELEPHONE (OUTPATIENT)
Dept: GASTROENTEROLOGY | Age: 73
End: 2018-03-26

## 2018-04-03 ENCOUNTER — APPOINTMENT (OUTPATIENT)
Dept: GENERAL RADIOLOGY | Age: 73
DRG: 226 | End: 2018-04-03
Attending: INTERNAL MEDICINE
Payer: MEDICARE

## 2018-04-03 ENCOUNTER — HOSPITAL ENCOUNTER (INPATIENT)
Age: 73
LOS: 6 days | Discharge: HOME HEALTH | DRG: 226 | End: 2018-04-09
Attending: INTERNAL MEDICINE | Admitting: INTERNAL MEDICINE
Payer: MEDICARE

## 2018-04-03 DIAGNOSIS — N18.30 CKD (CHRONIC KIDNEY DISEASE), STAGE III (HCC): ICD-10-CM

## 2018-04-03 DIAGNOSIS — Z95.810 AICD (AUTOMATIC CARDIOVERTER/DEFIBRILLATOR) PRESENT: ICD-10-CM

## 2018-04-03 DIAGNOSIS — I42.8 CARDIOMYOPATHY, NONISCHEMIC (HCC): Primary | ICD-10-CM

## 2018-04-03 PROBLEM — I11.0 CONGESTIVE HEART FAILURE DUE TO HIGH BLOOD PRESSURE (HCC): Status: ACTIVE | Noted: 2018-04-03

## 2018-04-03 PROBLEM — I50.21: Status: ACTIVE | Noted: 2018-04-03

## 2018-04-03 LAB
ANION GAP SERPL CALCULATED.3IONS-SCNC: 13 MMOL/L (ref 9–17)
BNP INTERPRETATION: ABNORMAL
BUN BLDV-MCNC: 31 MG/DL (ref 8–23)
BUN/CREAT BLD: ABNORMAL (ref 9–20)
CALCIUM SERPL-MCNC: 8.7 MG/DL (ref 8.6–10.4)
CHLORIDE BLD-SCNC: 106 MMOL/L (ref 98–107)
CO2: 17 MMOL/L (ref 20–31)
CREAT SERPL-MCNC: 1.03 MG/DL (ref 0.5–0.9)
GFR AFRICAN AMERICAN: >60 ML/MIN
GFR NON-AFRICAN AMERICAN: 53 ML/MIN
GFR SERPL CREATININE-BSD FRML MDRD: ABNORMAL ML/MIN/{1.73_M2}
GFR SERPL CREATININE-BSD FRML MDRD: ABNORMAL ML/MIN/{1.73_M2}
GLUCOSE BLD-MCNC: 95 MG/DL (ref 70–99)
HCT VFR BLD CALC: 48.1 % (ref 36.3–47.1)
HEMOGLOBIN: 14.9 G/DL (ref 11.9–15.1)
MCH RBC QN AUTO: 33.1 PG (ref 25.2–33.5)
MCHC RBC AUTO-ENTMCNC: 31 G/DL (ref 28.4–34.8)
MCV RBC AUTO: 106.9 FL (ref 82.6–102.9)
NRBC AUTOMATED: 0 PER 100 WBC
PDW BLD-RTO: 14.3 % (ref 11.8–14.4)
PLATELET # BLD: 193 K/UL (ref 138–453)
PMV BLD AUTO: 10.6 FL (ref 8.1–13.5)
POTASSIUM SERPL-SCNC: 4.6 MMOL/L (ref 3.7–5.3)
PRO-BNP: ABNORMAL PG/ML
RBC # BLD: 4.5 M/UL (ref 3.95–5.11)
SODIUM BLD-SCNC: 136 MMOL/L (ref 135–144)
TSH SERPL DL<=0.05 MIU/L-ACNC: 2.74 MIU/L (ref 0.3–5)
WBC # BLD: 6.5 K/UL (ref 3.5–11.3)

## 2018-04-03 PROCEDURE — 83880 ASSAY OF NATRIURETIC PEPTIDE: CPT

## 2018-04-03 PROCEDURE — 84443 ASSAY THYROID STIM HORMONE: CPT

## 2018-04-03 PROCEDURE — 85027 COMPLETE CBC AUTOMATED: CPT

## 2018-04-03 PROCEDURE — 36415 COLL VENOUS BLD VENIPUNCTURE: CPT

## 2018-04-03 PROCEDURE — 80048 BASIC METABOLIC PNL TOTAL CA: CPT

## 2018-04-03 PROCEDURE — 6370000000 HC RX 637 (ALT 250 FOR IP): Performed by: STUDENT IN AN ORGANIZED HEALTH CARE EDUCATION/TRAINING PROGRAM

## 2018-04-03 PROCEDURE — 2580000003 HC RX 258: Performed by: STUDENT IN AN ORGANIZED HEALTH CARE EDUCATION/TRAINING PROGRAM

## 2018-04-03 PROCEDURE — 93005 ELECTROCARDIOGRAM TRACING: CPT

## 2018-04-03 PROCEDURE — 71045 X-RAY EXAM CHEST 1 VIEW: CPT

## 2018-04-03 PROCEDURE — 1200000000 HC SEMI PRIVATE

## 2018-04-03 PROCEDURE — 6360000002 HC RX W HCPCS: Performed by: STUDENT IN AN ORGANIZED HEALTH CARE EDUCATION/TRAINING PROGRAM

## 2018-04-03 RX ORDER — FAMOTIDINE 20 MG/1
20 TABLET, FILM COATED ORAL 2 TIMES DAILY
Status: DISCONTINUED | OUTPATIENT
Start: 2018-04-03 | End: 2018-04-09 | Stop reason: HOSPADM

## 2018-04-03 RX ORDER — FUROSEMIDE 10 MG/ML
40 INJECTION INTRAMUSCULAR; INTRAVENOUS 2 TIMES DAILY
Status: DISCONTINUED | OUTPATIENT
Start: 2018-04-03 | End: 2018-04-04

## 2018-04-03 RX ORDER — MAGNESIUM SULFATE 1 G/100ML
1 INJECTION INTRAVENOUS PRN
Status: DISCONTINUED | OUTPATIENT
Start: 2018-04-03 | End: 2018-04-09 | Stop reason: HOSPADM

## 2018-04-03 RX ORDER — POTASSIUM CHLORIDE 20 MEQ/1
40 TABLET, EXTENDED RELEASE ORAL PRN
Status: DISCONTINUED | OUTPATIENT
Start: 2018-04-03 | End: 2018-04-09 | Stop reason: HOSPADM

## 2018-04-03 RX ORDER — POTASSIUM CHLORIDE 20MEQ/15ML
40 LIQUID (ML) ORAL PRN
Status: DISCONTINUED | OUTPATIENT
Start: 2018-04-03 | End: 2018-04-09 | Stop reason: HOSPADM

## 2018-04-03 RX ORDER — ACETAMINOPHEN 325 MG/1
650 TABLET ORAL EVERY 4 HOURS PRN
Status: DISCONTINUED | OUTPATIENT
Start: 2018-04-03 | End: 2018-04-07

## 2018-04-03 RX ORDER — SODIUM CHLORIDE 0.9 % (FLUSH) 0.9 %
10 SYRINGE (ML) INJECTION PRN
Status: DISCONTINUED | OUTPATIENT
Start: 2018-04-03 | End: 2018-04-09 | Stop reason: HOSPADM

## 2018-04-03 RX ORDER — POTASSIUM CHLORIDE 7.45 MG/ML
10 INJECTION INTRAVENOUS PRN
Status: DISCONTINUED | OUTPATIENT
Start: 2018-04-03 | End: 2018-04-09 | Stop reason: HOSPADM

## 2018-04-03 RX ORDER — ONDANSETRON 2 MG/ML
4 INJECTION INTRAMUSCULAR; INTRAVENOUS EVERY 6 HOURS PRN
Status: DISCONTINUED | OUTPATIENT
Start: 2018-04-03 | End: 2018-04-09 | Stop reason: HOSPADM

## 2018-04-03 RX ORDER — CALCIUM CARBONATE 200(500)MG
500 TABLET,CHEWABLE ORAL DAILY
Status: DISCONTINUED | OUTPATIENT
Start: 2018-04-03 | End: 2018-04-09 | Stop reason: HOSPADM

## 2018-04-03 RX ORDER — SODIUM CHLORIDE 0.9 % (FLUSH) 0.9 %
10 SYRINGE (ML) INJECTION EVERY 12 HOURS SCHEDULED
Status: DISCONTINUED | OUTPATIENT
Start: 2018-04-03 | End: 2018-04-09 | Stop reason: HOSPADM

## 2018-04-03 RX ADMIN — FUROSEMIDE 40 MG: 10 INJECTION, SOLUTION INTRAMUSCULAR; INTRAVENOUS at 20:38

## 2018-04-03 RX ADMIN — APIXABAN 5 MG: 5 TABLET, FILM COATED ORAL at 20:33

## 2018-04-03 RX ADMIN — FAMOTIDINE 20 MG: 20 TABLET, FILM COATED ORAL at 20:32

## 2018-04-03 RX ADMIN — METOPROLOL TARTRATE 25 MG: 25 TABLET ORAL at 20:32

## 2018-04-03 RX ADMIN — ANTACID TABLETS 500 MG: 500 TABLET, CHEWABLE ORAL at 20:33

## 2018-04-03 RX ADMIN — Medication 10 ML: at 20:43

## 2018-04-04 LAB
ANION GAP SERPL CALCULATED.3IONS-SCNC: 11 MMOL/L (ref 9–17)
BUN BLDV-MCNC: 32 MG/DL (ref 8–23)
BUN/CREAT BLD: ABNORMAL (ref 9–20)
CALCIUM SERPL-MCNC: 9.2 MG/DL (ref 8.6–10.4)
CHLORIDE BLD-SCNC: 105 MMOL/L (ref 98–107)
CO2: 24 MMOL/L (ref 20–31)
CREAT SERPL-MCNC: 1.18 MG/DL (ref 0.5–0.9)
EKG ATRIAL RATE: 102 BPM
EKG ATRIAL RATE: 41 BPM
EKG Q-T INTERVAL: 332 MS
EKG Q-T INTERVAL: 364 MS
EKG QRS DURATION: 80 MS
EKG QRS DURATION: 90 MS
EKG QTC CALCULATION (BAZETT): 444 MS
EKG QTC CALCULATION (BAZETT): 483 MS
EKG R AXIS: -106 DEGREES
EKG R AXIS: -107 DEGREES
EKG T AXIS: -102 DEGREES
EKG T AXIS: 141 DEGREES
EKG VENTRICULAR RATE: 106 BPM
EKG VENTRICULAR RATE: 108 BPM
GFR AFRICAN AMERICAN: 55 ML/MIN
GFR NON-AFRICAN AMERICAN: 45 ML/MIN
GFR SERPL CREATININE-BSD FRML MDRD: ABNORMAL ML/MIN/{1.73_M2}
GFR SERPL CREATININE-BSD FRML MDRD: ABNORMAL ML/MIN/{1.73_M2}
GLUCOSE BLD-MCNC: 101 MG/DL (ref 70–99)
HCT VFR BLD CALC: 49.1 % (ref 36.3–47.1)
HEMOGLOBIN: 15.3 G/DL (ref 11.9–15.1)
MAGNESIUM: 1.9 MG/DL (ref 1.6–2.6)
MCH RBC QN AUTO: 32.6 PG (ref 25.2–33.5)
MCHC RBC AUTO-ENTMCNC: 31.2 G/DL (ref 28.4–34.8)
MCV RBC AUTO: 104.5 FL (ref 82.6–102.9)
NRBC AUTOMATED: 0 PER 100 WBC
PARTIAL THROMBOPLASTIN TIME: 26.4 SEC (ref 20.5–30.5)
PARTIAL THROMBOPLASTIN TIME: 49.9 SEC (ref 20.5–30.5)
PDW BLD-RTO: 14.5 % (ref 11.8–14.4)
PLATELET # BLD: 196 K/UL (ref 138–453)
PMV BLD AUTO: 10.5 FL (ref 8.1–13.5)
POTASSIUM SERPL-SCNC: 4.6 MMOL/L (ref 3.7–5.3)
RBC # BLD: 4.7 M/UL (ref 3.95–5.11)
SODIUM BLD-SCNC: 140 MMOL/L (ref 135–144)
WBC # BLD: 6.8 K/UL (ref 3.5–11.3)

## 2018-04-04 PROCEDURE — 1200000000 HC SEMI PRIVATE

## 2018-04-04 PROCEDURE — 80048 BASIC METABOLIC PNL TOTAL CA: CPT

## 2018-04-04 PROCEDURE — 85027 COMPLETE CBC AUTOMATED: CPT

## 2018-04-04 PROCEDURE — 85730 THROMBOPLASTIN TIME PARTIAL: CPT

## 2018-04-04 PROCEDURE — 94762 N-INVAS EAR/PLS OXIMTRY CONT: CPT

## 2018-04-04 PROCEDURE — 2580000003 HC RX 258: Performed by: STUDENT IN AN ORGANIZED HEALTH CARE EDUCATION/TRAINING PROGRAM

## 2018-04-04 PROCEDURE — 2500000003 HC RX 250 WO HCPCS: Performed by: INTERNAL MEDICINE

## 2018-04-04 PROCEDURE — 6370000000 HC RX 637 (ALT 250 FOR IP): Performed by: STUDENT IN AN ORGANIZED HEALTH CARE EDUCATION/TRAINING PROGRAM

## 2018-04-04 PROCEDURE — 36415 COLL VENOUS BLD VENIPUNCTURE: CPT

## 2018-04-04 PROCEDURE — 6360000002 HC RX W HCPCS: Performed by: INTERNAL MEDICINE

## 2018-04-04 PROCEDURE — 6360000002 HC RX W HCPCS: Performed by: STUDENT IN AN ORGANIZED HEALTH CARE EDUCATION/TRAINING PROGRAM

## 2018-04-04 PROCEDURE — 93005 ELECTROCARDIOGRAM TRACING: CPT

## 2018-04-04 PROCEDURE — 83735 ASSAY OF MAGNESIUM: CPT

## 2018-04-04 RX ORDER — HEPARIN SODIUM 1000 [USP'U]/ML
4000 INJECTION, SOLUTION INTRAVENOUS; SUBCUTANEOUS ONCE
Status: COMPLETED | OUTPATIENT
Start: 2018-04-04 | End: 2018-04-04

## 2018-04-04 RX ORDER — ZOLPIDEM TARTRATE 5 MG/1
5 TABLET ORAL NIGHTLY PRN
Status: DISCONTINUED | OUTPATIENT
Start: 2018-04-03 | End: 2018-04-09 | Stop reason: HOSPADM

## 2018-04-04 RX ORDER — ZOLPIDEM TARTRATE 5 MG/1
5 TABLET ORAL NIGHTLY PRN
Status: DISCONTINUED | OUTPATIENT
Start: 2018-04-04 | End: 2018-04-04

## 2018-04-04 RX ORDER — BUMETANIDE 0.25 MG/ML
1 INJECTION, SOLUTION INTRAMUSCULAR; INTRAVENOUS 2 TIMES DAILY
Status: DISCONTINUED | OUTPATIENT
Start: 2018-04-04 | End: 2018-04-07

## 2018-04-04 RX ORDER — SENNOSIDES 8.6 MG
1300 CAPSULE ORAL 2 TIMES DAILY
Status: DISCONTINUED | OUTPATIENT
Start: 2018-04-04 | End: 2018-04-06

## 2018-04-04 RX ORDER — HEPARIN SODIUM 10000 [USP'U]/100ML
12 INJECTION, SOLUTION INTRAVENOUS CONTINUOUS
Status: DISCONTINUED | OUTPATIENT
Start: 2018-04-04 | End: 2018-04-06

## 2018-04-04 RX ORDER — HEPARIN SODIUM 1000 [USP'U]/ML
4000 INJECTION, SOLUTION INTRAVENOUS; SUBCUTANEOUS PRN
Status: DISCONTINUED | OUTPATIENT
Start: 2018-04-04 | End: 2018-04-06 | Stop reason: ALTCHOICE

## 2018-04-04 RX ORDER — HEPARIN SODIUM 1000 [USP'U]/ML
2000 INJECTION, SOLUTION INTRAVENOUS; SUBCUTANEOUS PRN
Status: DISCONTINUED | OUTPATIENT
Start: 2018-04-04 | End: 2018-04-06 | Stop reason: ALTCHOICE

## 2018-04-04 RX ADMIN — BUMETANIDE 1 MG: 0.25 INJECTION INTRAMUSCULAR; INTRAVENOUS at 15:10

## 2018-04-04 RX ADMIN — FAMOTIDINE 20 MG: 20 TABLET, FILM COATED ORAL at 20:22

## 2018-04-04 RX ADMIN — HEPARIN SODIUM 2000 UNITS: 1000 INJECTION, SOLUTION INTRAVENOUS; SUBCUTANEOUS at 23:41

## 2018-04-04 RX ADMIN — FAMOTIDINE 20 MG: 20 TABLET, FILM COATED ORAL at 09:23

## 2018-04-04 RX ADMIN — Medication 10 ML: at 09:24

## 2018-04-04 RX ADMIN — ZOLPIDEM TARTRATE 5 MG: 5 TABLET ORAL at 01:29

## 2018-04-04 RX ADMIN — APIXABAN 5 MG: 5 TABLET, FILM COATED ORAL at 09:24

## 2018-04-04 RX ADMIN — BUMETANIDE 1 MG: 0.25 INJECTION INTRAMUSCULAR; INTRAVENOUS at 20:22

## 2018-04-04 RX ADMIN — HEPARIN SODIUM 4000 UNITS: 1000 INJECTION, SOLUTION INTRAVENOUS; SUBCUTANEOUS at 15:12

## 2018-04-04 RX ADMIN — FUROSEMIDE 40 MG: 10 INJECTION, SOLUTION INTRAMUSCULAR; INTRAVENOUS at 09:23

## 2018-04-04 RX ADMIN — ZOLPIDEM TARTRATE 5 MG: 5 TABLET ORAL at 23:23

## 2018-04-04 RX ADMIN — METOPROLOL TARTRATE 25 MG: 25 TABLET ORAL at 09:23

## 2018-04-04 RX ADMIN — HEPARIN SODIUM AND DEXTROSE 894 UNITS/HR: 10000; 5 INJECTION INTRAVENOUS at 15:13

## 2018-04-04 RX ADMIN — Medication 1300 MG: at 23:27

## 2018-04-04 RX ADMIN — ANTACID TABLETS 500 MG: 500 TABLET, CHEWABLE ORAL at 09:23

## 2018-04-04 ASSESSMENT — PAIN SCALES - GENERAL
PAINLEVEL_OUTOF10: 0
PAINLEVEL_OUTOF10: 0

## 2018-04-04 NOTE — H&P
Attestation signed by      Attending Physician Statement:    I have discussed the care of  Alicia Costello , including pertinent history and exam findings, with the Cardiology fellow/resident. I have seen and examined the patient and the key elements of all parts of the encounter have been performed by me. I agree with the assessment, plan and orders as documented by the fellow/resident, after I modified exam findings and plan of treatments, and the final version is my approved version of the assessment. Additional Comments: Ac on Chr Sys HF, NIDCM  - diurese with iv bumex  - AICD potentially during this admission  - continue BB, no ACE/ARB due to RAO  - on heparin drip for Afib    Thank you for allowing me to participate in the care of this patient, please do not hesitate to call if you have any questions. Ann Salcido DO, [address] [facility]  [facility] [employer]  [phone]                [facility]iology    H&P               Today's Date: 4/4/2018  Patient Name: Alicia Costello  Date of admission: 4/3/2018  5:05 PM  Patient's age: 71 y.o., 1945  Admission Dx: Congestive heart failure, NYHA class 4, acute, systolic (HCC) [E68.35]  Congestive heart failure due to high blood pressure ([address] [I11.0]    Reason for Consult:  Cardiac evaluation    Requesting Physician: oCry Granger MD    CHIEF COMPLAINT:  Dyspnea    History Obtained From:  patient, electronic medical record    HISTORY OF PRESENT ILLNESS:      The patient is a 71 y.o. female who is admitted to the hospital for acute on chronic systolic heart failure. Developed dyspnea over the past 2 weeks which has been progressing. Abdomen bloated, severe LE edema. Has been taking Torsemide at home with worsening edema. Still able to lie flat and uses only 1 pillow. Scheduled for outpatient ICD placement next week. Past Medical History:   has a past medical history of Atrial fibrillation ([address]  Chronic kidney memory, mentation, behavior. · Psychiatric: No anxiety, or depression. · Endocrine: No temperature intolerance. No excessive thirst, fluid intake, or urination. No tremor. · Hematologic/Lymphatic: No abnormal bruising or bleeding, blood clots or swollen lymph nodes. · Allergic/Immunologic: No nasal congestion or hives. PHYSICAL EXAM:      BP 91/65   Pulse 87   Temp 97.9 °F (36.6 °C) (Oral)   Resp 16   Ht 5' 9\" (1.753 m)   Wt 164 lb 4 oz (74.5 kg)   SpO2 97%   BMI 24.26 kg/m²    Constitutional and General Appearance: alert, cooperative, no distress and appears stated age  HEENT: PERRL, no cervical lymphadenopathy. No masses palpable. Normal oral mucosa  Respiratory:  · Normal excursion and expansion without use of accessory muscles  · Resp Auscultation: Good respiratory effort. No for increased work of breathing. On auscultation: coarse to auscultation bilaterally  Cardiovascular:  · The apical impulse is not displaced  · Heart tones are crisp and normal. regular S1 and S2.  · Jugular venous pulsation Normal  · Peripheral pulses are symmetrical and full   Abdomen:   · No masses or tenderness  · Bowel sounds present  Extremities:  ·  No Cyanosis or Clubbing  ·  Lower extremity edema: Yes  ·  Skin: Warm and dry  Neurological:  · Alert and oriented. · Moves all extremities well  · No abnormalities of mood, affect, memory, mentation, or behavior are noted    DATA:    Diagnostics:      EKG:   Afib. STRESS 7/26/17: Neg. EF 24%     CATH 8/04/17: Normal cors. EF 20%.      TTE 11/10/17: EF 20%. Mod MR. Mild to mod AI. Mild TR. Segmental WMA. Labs:     CBC:   Recent Labs      04/03/18 1944   WBC  6.5   HGB  14.9   HCT  48.1*   PLT  193     BMP:   Recent Labs      04/03/18 1944 04/04/18   0614   NA  136  140   K  4.6  4.6   CO2  17*  24   BUN  31*  32*   CREATININE  1.03*  1.18*   LABGLOM  53*  45*   GLUCOSE  95  101*     BNP: No results for input(s): BNP in the last 72 hours.   PT/INR: No results for input(s): PROTIME, INR in the last 72 hours. APTT:No results for input(s): APTT in the last 72 hours. CARDIAC ENZYMES:No results for input(s): CKTOTAL, CKMB, CKMBINDEX, TROPONINI in the last 72 hours. FASTING LIPID PANEL:  Lab Results   Component Value Date    HDL 77 10/05/2017    LDLCALC 77 10/05/2017    TRIG 186 10/05/2017     LIVER PROFILE:No results for input(s): AST, ALT, LABALBU in the last 72 hours. IMPRESSION:    1. Nonischemic cardiomyopathy (LVEF ~ 20%)  2. Acute on chronic systolic heart failure  3. Afib with RVR with h/o permanent Afib on Eliquis  4. Holter in 8/2017 revealed an average heart rate of 102, lowest heart rate of 54, highest heart rate of 178, frequent PVCs, bigeminy and nonsustained VT. 5. Hypertension. 6. Dyslipidemia. 7. Echocardiogram on 11/10/2017: LV dilated, EF 20%, moderate mitral regurge, mild to moderate aortic insufficiency, mild tricuspid regurgitation. 8. CKD 3  9. Unable to take ACE inhibitor and Entresto, as per nephrology. RECOMMENDATIONS:  1. Presented with acute systolic heart failure, will switch to IV Bumex BID  2. CXR showed RT effusion, will consider Thoracentesis if no improvement  3. Ace wraps to legs  4. Stop Eliquis and start on Heparin infusion. Will need ICD (scheduled on Monday as an outpatient)    Will discuss with rounding attending Dr. Radha Fontanez for final recommendations.     Aren Randall MD  Cardiology Fellow

## 2018-04-04 NOTE — PROGRESS NOTES
Nutrition Assessment    Type and Reason for Visit: Initial, Consult    Nutrition Recommendations: Continue to monitor and encourage adequate intake. Supplements available as needed. Malnutrition Assessment:  · Malnutrition Status: No malnutrition  · Context: Acute illness or injury  · Findings of the 6 clinical characteristics of malnutrition (Minimum of 2 out of 6 clinical characteristics is required to make the diagnosis of moderate or severe Protein Calorie Malnutrition based on AND/ASPEN Guidelines):  1. Energy Intake-51% to 75%, greater than or equal to 5 days    2. Weight Loss-No significant weight loss,    3. Fat Loss-Unable to assess,    4. Muscle Loss-Unable to assess,    5. Fluid Accumulation-Moderate to severe fluid accumulation, Extremities  6.  Strength-Not measured    Nutrition Diagnosis:   · Problem: Inadequate oral intake  · Etiology: related to Catabolic illness     Signs and symptoms:  as evidenced by Intake 25-50%, Intake 50-75%    Nutrition Assessment:  · Subjective Assessment: Received consult for diet education, reviewed low sodium diet with patient and provided with handout. No significant weight loss noted-per weights in EPIC-pt showed weight gain. Tolerating diet. · Nutrition-Focused Physical Findings: +3 edema LE  · Wound Type: None  · Current Nutrition Therapies:  · Oral Diet Orders: Carb Control 4 Carbs/Meal, Cardiac   · Oral Diet intake: 26-50%, 51-75%  · Oral Nutrition Supplement (ONS) Orders: None  · Anthropometric Measures:  · Ht: 5' 9\" (175.3 cm)   · Current Body Wt: 164 lb 3.9 oz (74.5 kg)     · Ideal Body Wt: 145 lb 8.1 oz (66 kg), % Ideal Body 113%  · BMI Classification: BMI 18.5 - 24.9 Normal Weight  · Comparative Standards (Estimated Nutrition Needs):  · Estimated Daily Total Kcal: 4620-9883 kcal  · Estimated Daily Protein (g): 70-90 gm    Estimated Intake vs Estimated Needs: Intake Meets Needs    Nutrition Risk Level:  Moderate    Nutrition Interventions:   Continue

## 2018-04-04 NOTE — CONSULTS
mentation, behavior. · Psychiatric: No anxiety, or depression. · Endocrine: No temperature intolerance. No excessive thirst, fluid intake, or urination. No tremor. · Hematologic/Lymphatic: No abnormal bruising or bleeding, blood clots or swollen lymph nodes. · Allergic/Immunologic: No nasal congestion or hives. PHYSICAL EXAM:      BP 91/65   Pulse 87   Temp 97.9 °F (36.6 °C) (Oral)   Resp 16   Ht 5' 9\" (1.753 m)   Wt 164 lb 4 oz (74.5 kg)   SpO2 97%   BMI 24.26 kg/m²    Constitutional and General Appearance: alert, cooperative, no distress and appears stated age  HEENT: PERRL, no cervical lymphadenopathy. No masses palpable. Normal oral mucosa  Respiratory:  · Normal excursion and expansion without use of accessory muscles  · Resp Auscultation: Good respiratory effort. No for increased work of breathing. On auscultation: clear to auscultation bilaterally  Cardiovascular:  · The apical impulse is not displaced  · Heart tones are crisp and normal. regular S1 and S2.  · Jugular venous pulsation Normal  · The carotid upstroke is normal in amplitude and contour without delay or bruit  · Peripheral pulses are symmetrical and full   Abdomen:   · No masses or tenderness  · Bowel sounds present  Extremities:  ·  No Cyanosis or Clubbing  ·  Lower extremity edema: No  ·  Skin: Warm and dry  Neurological:  · Alert and oriented. · Moves all extremities well  · No abnormalities of mood, affect, memory, mentation, or behavior are noted    DATA:    Diagnostics:      EKG: (04/03/2017)  Atrial fibrillation with rapid ventricular response  Right superior axis deviation  Right ventricular hypertrophy  Cannot rule out Anteroseptal infarct , age undetermined  Abnormal ECG  No previous ECGs available    ECHO:   TTE 11/10/17: EF 20%. Mod MR. Mild to mod AI. Mild TR. Segmental WMA. Stress Test:   STRESS 7/26/17: Neg. EF 24%    Cardiac Angiography:  CATH 8/04/17: Normal cors. EF 20%.     Labs:     CBC:   Recent Labs

## 2018-04-04 NOTE — PROGRESS NOTES
Cardiac Testing:    STRESS 7/26/17: Neg. EF 24%    CATH 8/04/17: Normal cors. EF 20%. TTE 11/10/17: EF 20%. Mod MR. Mild to mod AI. Mild TR. Segmental WMA. 1. Nonischemic cardiomyopathy, initially diagnosed in 7/2017 on nuclear stress test.   2. Status post cardiac catheterization in 8/2017 that revealed no coronary artery disease, EF of 27%. Right heart catheterization showed a mean PA of 28, wedge pressure of 18, cardiac output of 2.8, cardiac index of 1.6. 3. Holter in 8/2017 revealed an average heart rate of 102, lowest heart rate of 54, highest heart rate of 178, frequent PVCs, bigeminy and nonsustained VT. 4. Hypertension. 5. Dyslipidemia. 6. Permanent atrial fibrillation, on Eliquis. 7. Echocardiogram on 11/10/2017: LV dilated, EF 20%, moderate mitral regurge, mild to moderate aortic insufficiency, mild tricuspid regurgitation. 8. Unable to take ACE inhibitor and Entresto, as per nephrology. 9. Patient was scheduled for AICD next week. 10. Worsening of CHF by gaining 23 pounds.

## 2018-04-05 LAB
ANION GAP SERPL CALCULATED.3IONS-SCNC: 12 MMOL/L (ref 9–17)
BILIRUBIN URINE: NEGATIVE
BUN BLDV-MCNC: 39 MG/DL (ref 8–23)
BUN/CREAT BLD: ABNORMAL (ref 9–20)
CALCIUM SERPL-MCNC: 8.5 MG/DL (ref 8.6–10.4)
CHLORIDE BLD-SCNC: 104 MMOL/L (ref 98–107)
CO2: 25 MMOL/L (ref 20–31)
COLOR: YELLOW
COMMENT UA: NORMAL
CREAT SERPL-MCNC: 1.65 MG/DL (ref 0.5–0.9)
CREATININE URINE: 38.2 MG/DL (ref 28–217)
GFR AFRICAN AMERICAN: 37 ML/MIN
GFR NON-AFRICAN AMERICAN: 31 ML/MIN
GFR SERPL CREATININE-BSD FRML MDRD: ABNORMAL ML/MIN/{1.73_M2}
GFR SERPL CREATININE-BSD FRML MDRD: ABNORMAL ML/MIN/{1.73_M2}
GLUCOSE BLD-MCNC: 108 MG/DL (ref 70–99)
GLUCOSE URINE: NEGATIVE
KETONES, URINE: NEGATIVE
LEUKOCYTE ESTERASE, URINE: NEGATIVE
MAGNESIUM: 1.9 MG/DL (ref 1.6–2.6)
NITRITE, URINE: NEGATIVE
PARTIAL THROMBOPLASTIN TIME: 105.1 SEC (ref 20.5–30.5)
PARTIAL THROMBOPLASTIN TIME: 42.9 SEC (ref 20.5–30.5)
PH UA: 5 (ref 5–8)
POTASSIUM SERPL-SCNC: 4.3 MMOL/L (ref 3.7–5.3)
PROTEIN UA: NEGATIVE
SODIUM BLD-SCNC: 141 MMOL/L (ref 135–144)
SODIUM,UR: 79 MMOL/L
SPECIFIC GRAVITY UA: 1.01 (ref 1–1.03)
TOTAL PROTEIN, URINE: 12 MG/DL
TURBIDITY: CLEAR
URINE HGB: NEGATIVE
UROBILINOGEN, URINE: NORMAL

## 2018-04-05 PROCEDURE — 83735 ASSAY OF MAGNESIUM: CPT

## 2018-04-05 PROCEDURE — 36415 COLL VENOUS BLD VENIPUNCTURE: CPT

## 2018-04-05 PROCEDURE — 1200000000 HC SEMI PRIVATE

## 2018-04-05 PROCEDURE — 85730 THROMBOPLASTIN TIME PARTIAL: CPT

## 2018-04-05 PROCEDURE — 2500000003 HC RX 250 WO HCPCS: Performed by: INTERNAL MEDICINE

## 2018-04-05 PROCEDURE — 80048 BASIC METABOLIC PNL TOTAL CA: CPT

## 2018-04-05 PROCEDURE — 6370000000 HC RX 637 (ALT 250 FOR IP): Performed by: STUDENT IN AN ORGANIZED HEALTH CARE EDUCATION/TRAINING PROGRAM

## 2018-04-05 PROCEDURE — 82570 ASSAY OF URINE CREATININE: CPT

## 2018-04-05 PROCEDURE — 94762 N-INVAS EAR/PLS OXIMTRY CONT: CPT

## 2018-04-05 PROCEDURE — 84300 ASSAY OF URINE SODIUM: CPT

## 2018-04-05 PROCEDURE — 84156 ASSAY OF PROTEIN URINE: CPT

## 2018-04-05 PROCEDURE — 81003 URINALYSIS AUTO W/O SCOPE: CPT

## 2018-04-05 PROCEDURE — 2580000003 HC RX 258: Performed by: STUDENT IN AN ORGANIZED HEALTH CARE EDUCATION/TRAINING PROGRAM

## 2018-04-05 PROCEDURE — 6360000002 HC RX W HCPCS: Performed by: INTERNAL MEDICINE

## 2018-04-05 RX ADMIN — ZOLPIDEM TARTRATE 5 MG: 5 TABLET ORAL at 23:17

## 2018-04-05 RX ADMIN — BUMETANIDE 1 MG: 0.25 INJECTION INTRAMUSCULAR; INTRAVENOUS at 12:21

## 2018-04-05 RX ADMIN — Medication 1300 MG: at 08:03

## 2018-04-05 RX ADMIN — BUMETANIDE 1 MG: 0.25 INJECTION INTRAMUSCULAR; INTRAVENOUS at 20:34

## 2018-04-05 RX ADMIN — FAMOTIDINE 20 MG: 20 TABLET, FILM COATED ORAL at 08:03

## 2018-04-05 RX ADMIN — METOPROLOL TARTRATE 25 MG: 25 TABLET ORAL at 20:33

## 2018-04-05 RX ADMIN — ANTACID TABLETS 500 MG: 500 TABLET, CHEWABLE ORAL at 08:03

## 2018-04-05 RX ADMIN — METOPROLOL TARTRATE 25 MG: 25 TABLET ORAL at 08:03

## 2018-04-05 RX ADMIN — HEPARIN SODIUM 2000 UNITS: 1000 INJECTION, SOLUTION INTRAVENOUS; SUBCUTANEOUS at 15:27

## 2018-04-05 RX ADMIN — HEPARIN SODIUM AND DEXTROSE 9.96 UNITS/KG/HR: 10000; 5 INJECTION INTRAVENOUS at 07:58

## 2018-04-05 RX ADMIN — Medication 1300 MG: at 20:32

## 2018-04-05 RX ADMIN — FAMOTIDINE 20 MG: 20 TABLET, FILM COATED ORAL at 20:33

## 2018-04-05 RX ADMIN — Medication 10 ML: at 20:35

## 2018-04-05 ASSESSMENT — PAIN SCALES - GENERAL
PAINLEVEL_OUTOF10: 2
PAINLEVEL_OUTOF10: 0

## 2018-04-05 NOTE — PROGRESS NOTES
[place] [facility]   Progress Note                   Date:   4/5/2018  Patient name: Kashmir Rodriguez  Date of admission:  4/3/2018  5:05 PM  MRN:   [id number]  YOB: 1945  PCP: Rogerio Garcia CNP    Reason for Admission: Congestive heart failure, NYHA class 4, acute, systolic (HCC) [U84.01]  Congestive heart failure due to high blood pressure ([facility] [address] [I11.0]    Subjective:       Clinical Changes / Abnormalities: Patient seen & examined lying flat in bed. Denies CP. States SOB is \"not much different but maybe a little better. \" States she only becomes SOB with activity and has been trying to just rest and not do much. Denies difficulties sleeping. Again patient lying completely flat in bed and holding conversation without distress.    -737ml since discharge but states she did \"go pee\" a couple of times that she does not think were recorded. ROS    Medications:   Scheduled Meds:   bumetanide  1 mg Intravenous BID    acetaminophen  1,300 mg Oral BID    calcium carbonate  500 mg Oral Daily    sodium chloride flush  10 mL Intravenous 2 times per day    famotidine  20 mg Oral BID    metoprolol tartrate  25 mg Oral BID     Continuous Infusions:   heparin (porcine) 9.96 Units/kg/hr (04/05/18 0758)     CBC:   Recent Labs      04/03/18 1944 04/04/18   1121   WBC  6.5  6.8   HGB  14.9  15.3*   PLT  193  196     BMP:    Recent Labs      04/03/18 1944 04/04/18   0614  04/05/18   0532   NA  136  140  141   K  4.6  4.6  4.3   CL  106  105  104   CO2  17*  24  25   BUN  31*  32*  39*   CREATININE  1.03*  1.18*  1.65*   GLUCOSE  95  101*  108*     Hepatic: No results for input(s): AST, ALT, ALB, BILITOT, ALKPHOS in the last 72 hours. Troponin: No results for input(s): TROPONINI in the last 72 hours. BNP: No results for input(s): BNP in the last 72 hours. Lipids: No results for input(s): CHOL, HDL in the last 72 hours.     Invalid input(s): LDLCALCU  INR: No results for input(s): INR in

## 2018-04-05 NOTE — CARE COORDINATION
Discharge planning-talked with patient about home care-not wanting any home care at this time. List given if she changes her mind.

## 2018-04-06 ENCOUNTER — APPOINTMENT (OUTPATIENT)
Dept: CARDIAC CATH/INVASIVE PROCEDURES | Age: 73
DRG: 226 | End: 2018-04-06
Attending: INTERNAL MEDICINE
Payer: MEDICARE

## 2018-04-06 ENCOUNTER — APPOINTMENT (OUTPATIENT)
Dept: GENERAL RADIOLOGY | Age: 73
DRG: 226 | End: 2018-04-06
Attending: INTERNAL MEDICINE
Payer: MEDICARE

## 2018-04-06 PROBLEM — G93.41 METABOLIC ENCEPHALOPATHY: Status: ACTIVE | Noted: 2018-04-06

## 2018-04-06 PROBLEM — F10.20 ALCOHOLISM (HCC): Status: ACTIVE | Noted: 2018-04-06

## 2018-04-06 PROBLEM — I13.10 CARDIORENAL SYNDROME: Status: ACTIVE | Noted: 2018-04-06

## 2018-04-06 PROBLEM — Z95.810 AICD (AUTOMATIC CARDIOVERTER/DEFIBRILLATOR) PRESENT: Status: ACTIVE | Noted: 2018-04-06

## 2018-04-06 LAB
ANION GAP SERPL CALCULATED.3IONS-SCNC: 15 MMOL/L (ref 9–17)
BUN BLDV-MCNC: 39 MG/DL (ref 8–23)
BUN/CREAT BLD: ABNORMAL (ref 9–20)
CALCIUM SERPL-MCNC: 8.6 MG/DL (ref 8.6–10.4)
CHLORIDE BLD-SCNC: 101 MMOL/L (ref 98–107)
CO2: 21 MMOL/L (ref 20–31)
CREAT SERPL-MCNC: 1.46 MG/DL (ref 0.5–0.9)
GFR AFRICAN AMERICAN: 43 ML/MIN
GFR NON-AFRICAN AMERICAN: 35 ML/MIN
GFR SERPL CREATININE-BSD FRML MDRD: ABNORMAL ML/MIN/{1.73_M2}
GFR SERPL CREATININE-BSD FRML MDRD: ABNORMAL ML/MIN/{1.73_M2}
GLUCOSE BLD-MCNC: 104 MG/DL (ref 70–99)
MAGNESIUM: 2 MG/DL (ref 1.6–2.6)
PARTIAL THROMBOPLASTIN TIME: 44.8 SEC (ref 20.5–30.5)
PARTIAL THROMBOPLASTIN TIME: 52.7 SEC (ref 20.5–30.5)
PLATELET # BLD: 189 K/UL (ref 138–453)
POTASSIUM SERPL-SCNC: 3.6 MMOL/L (ref 3.7–5.3)
SODIUM BLD-SCNC: 137 MMOL/L (ref 135–144)

## 2018-04-06 PROCEDURE — 71045 X-RAY EXAM CHEST 1 VIEW: CPT

## 2018-04-06 PROCEDURE — 2580000003 HC RX 258: Performed by: INTERNAL MEDICINE

## 2018-04-06 PROCEDURE — 6370000000 HC RX 637 (ALT 250 FOR IP): Performed by: INTERNAL MEDICINE

## 2018-04-06 PROCEDURE — 2500000003 HC RX 250 WO HCPCS

## 2018-04-06 PROCEDURE — 80076 HEPATIC FUNCTION PANEL: CPT

## 2018-04-06 PROCEDURE — 02HK3KZ INSERTION OF DEFIBRILLATOR LEAD INTO RIGHT VENTRICLE, PERCUTANEOUS APPROACH: ICD-10-PCS | Performed by: INTERNAL MEDICINE

## 2018-04-06 PROCEDURE — C1894 INTRO/SHEATH, NON-LASER: HCPCS

## 2018-04-06 PROCEDURE — 6370000000 HC RX 637 (ALT 250 FOR IP): Performed by: STUDENT IN AN ORGANIZED HEALTH CARE EDUCATION/TRAINING PROGRAM

## 2018-04-06 PROCEDURE — 82746 ASSAY OF FOLIC ACID SERUM: CPT

## 2018-04-06 PROCEDURE — 2500000003 HC RX 250 WO HCPCS: Performed by: INTERNAL MEDICINE

## 2018-04-06 PROCEDURE — 82607 VITAMIN B-12: CPT

## 2018-04-06 PROCEDURE — 2720000010 HC SURG SUPPLY STERILE

## 2018-04-06 PROCEDURE — 80048 BASIC METABOLIC PNL TOTAL CA: CPT

## 2018-04-06 PROCEDURE — 1200000000 HC SEMI PRIVATE

## 2018-04-06 PROCEDURE — C1777 LEAD, AICD, ENDO SINGLE COIL: HCPCS

## 2018-04-06 PROCEDURE — 83921 ORGANIC ACID SINGLE QUANT: CPT

## 2018-04-06 PROCEDURE — 85049 AUTOMATED PLATELET COUNT: CPT

## 2018-04-06 PROCEDURE — 33249 INSJ/RPLCMT DEFIB W/LEAD(S): CPT | Performed by: INTERNAL MEDICINE

## 2018-04-06 PROCEDURE — C1722 AICD, SINGLE CHAMBER: HCPCS

## 2018-04-06 PROCEDURE — 6360000002 HC RX W HCPCS: Performed by: INTERNAL MEDICINE

## 2018-04-06 PROCEDURE — 6360000002 HC RX W HCPCS

## 2018-04-06 PROCEDURE — 0JH608Z INSERTION OF DEFIBRILLATOR GENERATOR INTO CHEST SUBCUTANEOUS TISSUE AND FASCIA, OPEN APPROACH: ICD-10-PCS | Performed by: INTERNAL MEDICINE

## 2018-04-06 PROCEDURE — 94762 N-INVAS EAR/PLS OXIMTRY CONT: CPT

## 2018-04-06 PROCEDURE — 36415 COLL VENOUS BLD VENIPUNCTURE: CPT

## 2018-04-06 PROCEDURE — 83735 ASSAY OF MAGNESIUM: CPT

## 2018-04-06 PROCEDURE — 85730 THROMBOPLASTIN TIME PARTIAL: CPT

## 2018-04-06 RX ORDER — SODIUM CHLORIDE 0.9 % (FLUSH) 0.9 %
10 SYRINGE (ML) INJECTION EVERY 12 HOURS SCHEDULED
Status: DISCONTINUED | OUTPATIENT
Start: 2018-04-06 | End: 2018-04-08

## 2018-04-06 RX ORDER — POTASSIUM CHLORIDE 20 MEQ/1
40 TABLET, EXTENDED RELEASE ORAL ONCE
Status: COMPLETED | OUTPATIENT
Start: 2018-04-06 | End: 2018-04-06

## 2018-04-06 RX ORDER — ALPRAZOLAM 0.25 MG/1
0.5 TABLET ORAL NIGHTLY PRN
Status: DISCONTINUED | OUTPATIENT
Start: 2018-04-06 | End: 2018-04-06

## 2018-04-06 RX ORDER — SODIUM CHLORIDE 0.9 % (FLUSH) 0.9 %
10 SYRINGE (ML) INJECTION PRN
Status: DISCONTINUED | OUTPATIENT
Start: 2018-04-06 | End: 2018-04-09 | Stop reason: HOSPADM

## 2018-04-06 RX ORDER — ACETAMINOPHEN 325 MG/1
650 TABLET ORAL EVERY 4 HOURS PRN
Status: DISCONTINUED | OUTPATIENT
Start: 2018-04-06 | End: 2018-04-06

## 2018-04-06 RX ORDER — LORAZEPAM 1 MG/1
1 TABLET ORAL
Status: DISCONTINUED | OUTPATIENT
Start: 2018-04-06 | End: 2018-04-08

## 2018-04-06 RX ORDER — ACETAMINOPHEN 325 MG/1
650 TABLET ORAL EVERY 4 HOURS PRN
Status: DISCONTINUED | OUTPATIENT
Start: 2018-04-06 | End: 2018-04-06 | Stop reason: SDUPTHER

## 2018-04-06 RX ORDER — LORAZEPAM 2 MG/ML
4 INJECTION INTRAMUSCULAR
Status: DISCONTINUED | OUTPATIENT
Start: 2018-04-06 | End: 2018-04-06

## 2018-04-06 RX ORDER — LORAZEPAM 2 MG/ML
1 INJECTION INTRAMUSCULAR
Status: DISCONTINUED | OUTPATIENT
Start: 2018-04-06 | End: 2018-04-08

## 2018-04-06 RX ORDER — THIAMINE MONONITRATE (VIT B1) 100 MG
100 TABLET ORAL DAILY
Status: DISCONTINUED | OUTPATIENT
Start: 2018-04-07 | End: 2018-04-09 | Stop reason: HOSPADM

## 2018-04-06 RX ORDER — FOLIC ACID 1 MG/1
1 TABLET ORAL DAILY
Status: DISCONTINUED | OUTPATIENT
Start: 2018-04-07 | End: 2018-04-09 | Stop reason: HOSPADM

## 2018-04-06 RX ORDER — ALPRAZOLAM 0.25 MG/1
0.5 TABLET ORAL NIGHTLY PRN
Status: DISCONTINUED | OUTPATIENT
Start: 2018-04-06 | End: 2018-04-08

## 2018-04-06 RX ORDER — LORAZEPAM 1 MG/1
4 TABLET ORAL
Status: DISCONTINUED | OUTPATIENT
Start: 2018-04-06 | End: 2018-04-06

## 2018-04-06 RX ORDER — LORAZEPAM 2 MG/ML
2 INJECTION INTRAMUSCULAR
Status: DISCONTINUED | OUTPATIENT
Start: 2018-04-06 | End: 2018-04-08

## 2018-04-06 RX ORDER — LORAZEPAM 1 MG/1
3 TABLET ORAL
Status: DISCONTINUED | OUTPATIENT
Start: 2018-04-06 | End: 2018-04-06

## 2018-04-06 RX ORDER — LORAZEPAM 2 MG/ML
3 INJECTION INTRAMUSCULAR
Status: DISCONTINUED | OUTPATIENT
Start: 2018-04-06 | End: 2018-04-06

## 2018-04-06 RX ORDER — LORAZEPAM 1 MG/1
2 TABLET ORAL
Status: DISCONTINUED | OUTPATIENT
Start: 2018-04-06 | End: 2018-04-08

## 2018-04-06 RX ADMIN — ALPRAZOLAM 0.5 MG: 0.25 TABLET ORAL at 01:54

## 2018-04-06 RX ADMIN — FAMOTIDINE 20 MG: 20 TABLET, FILM COATED ORAL at 20:45

## 2018-04-06 RX ADMIN — BUMETANIDE 1 MG: 0.25 INJECTION INTRAMUSCULAR; INTRAVENOUS at 23:02

## 2018-04-06 RX ADMIN — ANTACID TABLETS 500 MG: 500 TABLET, CHEWABLE ORAL at 07:55

## 2018-04-06 RX ADMIN — METOPROLOL TARTRATE 25 MG: 25 TABLET ORAL at 07:55

## 2018-04-06 RX ADMIN — Medication 1300 MG: at 07:55

## 2018-04-06 RX ADMIN — Medication 10 ML: at 20:52

## 2018-04-06 RX ADMIN — CEFAZOLIN SODIUM 1 G: 1 INJECTION, SOLUTION INTRAVENOUS at 13:00

## 2018-04-06 RX ADMIN — METOPROLOL TARTRATE 25 MG: 25 TABLET ORAL at 20:45

## 2018-04-06 RX ADMIN — ALPRAZOLAM 0.5 MG: 0.25 TABLET ORAL at 18:57

## 2018-04-06 RX ADMIN — HEPARIN SODIUM 2000 UNITS: 1000 INJECTION, SOLUTION INTRAVENOUS; SUBCUTANEOUS at 01:03

## 2018-04-06 RX ADMIN — ACETAMINOPHEN 650 MG: 325 TABLET ORAL at 18:57

## 2018-04-06 RX ADMIN — FAMOTIDINE 20 MG: 20 TABLET, FILM COATED ORAL at 07:55

## 2018-04-06 RX ADMIN — SODIUM CHLORIDE 50000 UNITS: 900 IRRIGANT IRRIGATION at 14:00

## 2018-04-06 RX ADMIN — POTASSIUM CHLORIDE 40 MEQ: 1500 TABLET, EXTENDED RELEASE ORAL at 17:50

## 2018-04-06 RX ADMIN — Medication 10 ML: at 20:47

## 2018-04-06 ASSESSMENT — PAIN SCALES - GENERAL
PAINLEVEL_OUTOF10: 6
PAINLEVEL_OUTOF10: 3
PAINLEVEL_OUTOF10: 6

## 2018-04-06 NOTE — PROGRESS NOTES
Renal Progress Note    Patient :  Asya Donohue; 71 y.o. MRN# [id number]  Location:  2007/2007-01  Attending:  Mariely Durham MD  Admit Date:  4/3/2018   Hospital Day: 3      Subjective:       BP stable. 1650 ml urine output in 24 hours, net -457. Dyspnea and pedal edema improved. Patient did not require supplemental O2 overnight. Maintaining saturations on room air.   AICD placement planned for 1pm.      Outpatient Medications:     Prescriptions Prior to Admission: torsemide (DEMADEX) 20 MG tablet, Take one half tablet three times weekly  vitamin B-12 (CYANOCOBALAMIN) 1000 MCG tablet, Take 1,000 mcg by mouth daily  Multiple Vitamins-Minerals (THERAPEUTIC MULTIVITAMIN-MINERALS) tablet, Take 1 tablet by mouth daily  calcium carbonate (OSCAL) 500 MG TABS tablet, Take 500 mg by mouth daily  Biotin (BIOTIN 5000) 5 MG CAPS, Take 5,000 mcg by mouth daily  carvedilol (COREG) 25 MG tablet, Take 0.5 tablets by mouth 2 times daily (with meals)  apixaban (ELIQUIS) 5 MG TABS tablet, Take 5 mg by mouth 2 times daily  spironolactone (ALDACTONE) 25 MG tablet, Take 1 tablet by mouth daily (Patient taking differently: Take 12.5 mg by mouth daily )  sertraline (ZOLOFT) 50 MG tablet, Take 50 mg by mouth daily   ascorbic acid (VITAMIN C) 500 MG tablet, Take 500 mg by mouth daily   [DISCONTINUED] diphenhydrAMINE (BENADRYL) 25 MG tablet, Take 25 mg by mouth every 8 hours as needed for Itching    Current Medications:     Scheduled Meds:    bumetanide  1 mg Intravenous BID    acetaminophen  1,300 mg Oral BID    calcium carbonate  500 mg Oral Daily    sodium chloride flush  10 mL Intravenous 2 times per day    famotidine  20 mg Oral BID    metoprolol tartrate  25 mg Oral BID     Continuous Infusions:    heparin (porcine) 14 Units/kg/hr (04/06/18 0109)     PRN Meds:  ALPRAZolam, zolpidem, heparin (porcine), heparin (porcine), sodium chloride flush, acetaminophen, magnesium hydroxide, ondansetron, potassium chloride **OR** potassium

## 2018-04-06 NOTE — PROGRESS NOTES
Patient admitted, consent signed and questions answered. 2% Chlorhexidine cloths used to prep site. Patient ready for procedure. Call light to reach with side rails up 2 of 2. Family at bedside with patient. History and physical complete. Heparin gtt turned off after speaking with Dr Ирина White.

## 2018-04-06 NOTE — PLAN OF CARE
Problem: Falls - Risk of  Goal: Absence of falls  Outcome: Ongoing      Problem: SAFETY  Goal: Free from accidental physical injury  Outcome: Ongoing    Goal: Free from intentional harm  Outcome: Ongoing      Problem: DAILY CARE  Goal: Daily care needs are met  Outcome: Ongoing      Problem: PAIN  Goal: Patient's pain/discomfort is manageable  Outcome: Ongoing      Problem: SKIN INTEGRITY  Goal: Skin integrity is maintained or improved  Outcome: Ongoing

## 2018-04-06 NOTE — PLAN OF CARE
Problem: Falls - Risk of  Goal: Absence of falls  Outcome: Ongoing      Problem: SAFETY  Goal: Free from accidental physical injury  Outcome: Ongoing    Goal: Free from intentional harm  Outcome: Ongoing      Problem: DAILY CARE  Goal: Daily care needs are met  Outcome: Ongoing

## 2018-04-06 NOTE — FLOWSHEET NOTE
Visit:  rounding on unit. Patient's son, Monica Gutierrez, arrived not long after . Assessment: Patient was removing her blood pressure cuff and it was unclear if she understood what she was doing. When  asked, patient stated, \"I take my blood pressure a couple times a day. \"  Patient did not seem worried about today's procedure. Patient's son expressed hope that this procedure would be what patient needs to feel better. Intervention:  provided a listening and supportive presence. Patient lost her  in June of 2017 and recently moved to the area with her son. Patient asked about synagogues and  shared what she knows about the area synagogues.  assured patient of the support and prayers of the spiritual care staff. Plan: Chaplains to remain available for continued support as needed. 04/06/18 1125   Encounter Summary   Services provided to: Patient and family together   Referral/Consult From: [address] [facility] nONE   Continue Visiting (4/6/18)   Complexity of Encounter Moderate   Length of Encounter 30 minutes   Spiritual Assessment Completed Yes   Spiritual/Yazdanism   Type Spiritual support   Assessment Calm; Approachable   Intervention Active listening;Explored feelings, thoughts, concerns; Discussed belief system/Lutheran practices/mercedes   Outcome Expressed gratitude;Engaged in conversation;Expressed feelings/needs/concerns;Receptive

## 2018-04-06 NOTE — PROGRESS NOTES
RN contacted Dr Paige Griffith. Shaq Blake about pt request for \"something to help fall asleep. \" Pt had already received PRN order of Mary. Dr Lewis Monday . 5 Xanax Night PRN.

## 2018-04-07 ENCOUNTER — APPOINTMENT (OUTPATIENT)
Dept: GENERAL RADIOLOGY | Age: 73
DRG: 226 | End: 2018-04-07
Attending: INTERNAL MEDICINE
Payer: MEDICARE

## 2018-04-07 LAB
ALBUMIN SERPL-MCNC: 3.3 G/DL (ref 3.5–5.2)
ALBUMIN/GLOBULIN RATIO: 1.3 (ref 1–2.5)
ALP BLD-CCNC: 109 U/L (ref 35–104)
ALT SERPL-CCNC: 77 U/L (ref 5–33)
ANION GAP SERPL CALCULATED.3IONS-SCNC: 19 MMOL/L (ref 9–17)
AST SERPL-CCNC: 102 U/L
BILIRUB SERPL-MCNC: 0.92 MG/DL (ref 0.3–1.2)
BILIRUBIN DIRECT: 0.33 MG/DL
BILIRUBIN, INDIRECT: 0.59 MG/DL (ref 0–1)
BUN BLDV-MCNC: 34 MG/DL (ref 8–23)
BUN/CREAT BLD: ABNORMAL (ref 9–20)
CALCIUM SERPL-MCNC: 9.1 MG/DL (ref 8.6–10.4)
CHLORIDE BLD-SCNC: 104 MMOL/L (ref 98–107)
CO2: 21 MMOL/L (ref 20–31)
CREAT SERPL-MCNC: 1.27 MG/DL (ref 0.5–0.9)
FOLATE: >20 NG/ML
GFR AFRICAN AMERICAN: 50 ML/MIN
GFR NON-AFRICAN AMERICAN: 41 ML/MIN
GFR SERPL CREATININE-BSD FRML MDRD: ABNORMAL ML/MIN/{1.73_M2}
GFR SERPL CREATININE-BSD FRML MDRD: ABNORMAL ML/MIN/{1.73_M2}
GLOBULIN: ABNORMAL G/DL (ref 1.5–3.8)
GLUCOSE BLD-MCNC: 99 MG/DL (ref 70–99)
MAGNESIUM: 1.7 MG/DL (ref 1.6–2.6)
POTASSIUM SERPL-SCNC: 3.7 MMOL/L (ref 3.7–5.3)
SODIUM BLD-SCNC: 144 MMOL/L (ref 135–144)
TOTAL PROTEIN: 5.9 G/DL (ref 6.4–8.3)
VITAMIN B-12: >2000 PG/ML (ref 232–1245)

## 2018-04-07 PROCEDURE — 80048 BASIC METABOLIC PNL TOTAL CA: CPT

## 2018-04-07 PROCEDURE — 71045 X-RAY EXAM CHEST 1 VIEW: CPT

## 2018-04-07 PROCEDURE — 6370000000 HC RX 637 (ALT 250 FOR IP): Performed by: STUDENT IN AN ORGANIZED HEALTH CARE EDUCATION/TRAINING PROGRAM

## 2018-04-07 PROCEDURE — 83735 ASSAY OF MAGNESIUM: CPT

## 2018-04-07 PROCEDURE — 6360000002 HC RX W HCPCS: Performed by: STUDENT IN AN ORGANIZED HEALTH CARE EDUCATION/TRAINING PROGRAM

## 2018-04-07 PROCEDURE — 6360000002 HC RX W HCPCS: Performed by: INTERNAL MEDICINE

## 2018-04-07 PROCEDURE — 6370000000 HC RX 637 (ALT 250 FOR IP): Performed by: INTERNAL MEDICINE

## 2018-04-07 PROCEDURE — 2580000003 HC RX 258: Performed by: STUDENT IN AN ORGANIZED HEALTH CARE EDUCATION/TRAINING PROGRAM

## 2018-04-07 PROCEDURE — 99221 1ST HOSP IP/OBS SF/LOW 40: CPT | Performed by: INTERNAL MEDICINE

## 2018-04-07 PROCEDURE — 36415 COLL VENOUS BLD VENIPUNCTURE: CPT

## 2018-04-07 PROCEDURE — 94762 N-INVAS EAR/PLS OXIMTRY CONT: CPT

## 2018-04-07 PROCEDURE — 2060000000 HC ICU INTERMEDIATE R&B

## 2018-04-07 PROCEDURE — 2500000003 HC RX 250 WO HCPCS: Performed by: INTERNAL MEDICINE

## 2018-04-07 RX ORDER — HYDROCODONE BITARTRATE AND ACETAMINOPHEN 5; 325 MG/1; MG/1
1 TABLET ORAL EVERY 6 HOURS PRN
Status: DISCONTINUED | OUTPATIENT
Start: 2018-04-07 | End: 2018-04-09 | Stop reason: HOSPADM

## 2018-04-07 RX ORDER — BUMETANIDE 0.25 MG/ML
2 INJECTION, SOLUTION INTRAMUSCULAR; INTRAVENOUS EVERY 12 HOURS
Status: DISCONTINUED | OUTPATIENT
Start: 2018-04-08 | End: 2018-04-08

## 2018-04-07 RX ORDER — SPIRONOLACTONE 25 MG/1
25 TABLET ORAL DAILY
Status: DISCONTINUED | OUTPATIENT
Start: 2018-04-08 | End: 2018-04-09 | Stop reason: HOSPADM

## 2018-04-07 RX ORDER — HYDROCODONE BITARTRATE AND ACETAMINOPHEN 5; 325 MG/1; MG/1
1 TABLET ORAL EVERY 6 HOURS PRN
Qty: 20 TABLET | Refills: 0 | Status: SHIPPED | OUTPATIENT
Start: 2018-04-07 | End: 2018-04-09 | Stop reason: HOSPADM

## 2018-04-07 RX ORDER — METOPROLOL TARTRATE 5 MG/5ML
5 INJECTION INTRAVENOUS ONCE
Status: DISCONTINUED | OUTPATIENT
Start: 2018-04-07 | End: 2018-04-09 | Stop reason: HOSPADM

## 2018-04-07 RX ORDER — MAGNESIUM SULFATE 1 G/100ML
1 INJECTION INTRAVENOUS ONCE
Status: COMPLETED | OUTPATIENT
Start: 2018-04-07 | End: 2018-04-07

## 2018-04-07 RX ADMIN — LORAZEPAM 2 MG: 2 INJECTION INTRAMUSCULAR; INTRAVENOUS at 14:54

## 2018-04-07 RX ADMIN — ANTACID TABLETS 500 MG: 500 TABLET, CHEWABLE ORAL at 10:20

## 2018-04-07 RX ADMIN — FAMOTIDINE 20 MG: 20 TABLET, FILM COATED ORAL at 10:19

## 2018-04-07 RX ADMIN — MAGNESIUM SULFATE HEPTAHYDRATE 1 G: 1 INJECTION, SOLUTION INTRAVENOUS at 12:26

## 2018-04-07 RX ADMIN — METOPROLOL TARTRATE 25 MG: 25 TABLET ORAL at 10:19

## 2018-04-07 RX ADMIN — HYDROCODONE BITARTRATE AND ACETAMINOPHEN 1 TABLET: 5; 325 TABLET ORAL at 12:27

## 2018-04-07 RX ADMIN — BUMETANIDE 1 MG: 0.25 INJECTION INTRAMUSCULAR; INTRAVENOUS at 15:54

## 2018-04-07 RX ADMIN — Medication 10 ML: at 22:00

## 2018-04-07 RX ADMIN — FOLIC ACID 1 MG: 1 TABLET ORAL at 10:19

## 2018-04-07 RX ADMIN — APIXABAN 5 MG: 5 TABLET, FILM COATED ORAL at 10:19

## 2018-04-07 RX ADMIN — Medication 100 MG: at 10:19

## 2018-04-07 ASSESSMENT — PAIN SCALES - GENERAL: PAINLEVEL_OUTOF10: 5

## 2018-04-07 NOTE — CONSULTS
DR Joselin Cat COLONOSCOPY  Nov. 2001 ( 2011 )     Dr. Shaneka Sanchez - hemorrhoids   Ronald Cuba TRANSESOPHAGEAL ECHOCARDIOGRAM  11/10/2017    EF 20%. Mod MR. Mild to mod AI. Mild TR. Segmental WMA. Allergies     Allergies   Allergen Reactions    Contrast [Barium-Containing Compounds]      Unknown     Vancomycin          Home Meds:      Prior to Admission medications    Medication Sig Start Date End Date Taking? Authorizing Provider   torsemide (DEMADEX) 20 MG tablet Take one half tablet three times weekly 3/28/18  Yes Giovanni Angel, MD   vitamin B-12 (CYANOCOBALAMIN) 1000 MCG tablet Take 1,000 mcg by mouth daily   Yes Historical Provider, MD   Multiple Vitamins-Minerals (THERAPEUTIC MULTIVITAMIN-MINERALS) tablet Take 1 tablet by mouth daily   Yes Historical Provider, MD   calcium carbonate (OSCAL) 500 MG TABS tablet Take 500 mg by mouth daily   Yes Historical Provider, MD   Biotin (BIOTIN 5000) 5 MG CAPS Take 5,000 mcg by mouth daily   Yes Historical Provider, MD   carvedilol (COREG) 25 MG tablet Take 0.5 tablets by mouth 2 times daily (with meals) 1/31/18  Yes Giovanni Angel MD   apixaban (ELIQUIS) 5 MG TABS tablet Take 5 mg by mouth 2 times daily   Yes Historical Provider, MD   spironolactone (ALDACTONE) 25 MG tablet Take 1 tablet by mouth daily  Patient taking differently: Take 12.5 mg by mouth daily  9/11/17  Yes Tacho Grey MD   sertraline (ZOLOFT) 50 MG tablet Take 50 mg by mouth daily  7/21/17  Yes Historical Provider, MD   ascorbic acid (VITAMIN C) 500 MG tablet Take 500 mg by mouth daily    Yes Historical Provider, MD       Social History:      TOBACCO:   reports that she quit smoking about 30 years ago. She has never used smokeless tobacco.  ETOH:   reports that she drinks alcohol.      FH         Problem Relation Age of Onset    Adopted: Yes       ROS     General ROS: negative No  Psychological ROS: negative  Ophthalmic ROS: negative  ENT ROS: negative  Allergy and Immunology ROS: index is 23.64 kg/m². General: Patient seems to be clinically stable and in no immediate distress. AAO x2   HEENT: NC/AT  Heart: S1 S2 present, RRR, no audible rubs or murmurs heard  Lungs: Bilateral air entry noted, normal breath sounds and no additional sounds heard  Abdomen: Soft, non tender and no masses noted. Bowel sounds were normal. Moderately distended. + shifting dullness. Extremities: +2 pedal edema; no calf tenderness  Skin: No obvious skin rashes   Neurologic: Seems oriented.  No obvious abnormality of cranial nerves, motor system noted    Current Medication      Scheduled Meds:   sodium chloride flush  10 mL Intravenous 2 times per day    sodium chloride flush  10 mL Intravenous 2 times per day    apixaban  5 mg Oral BID    sodium chloride flush  10 mL Intravenous 2 times per day    sodium chloride flush  10 mL Intravenous 2 times per day    [START ON 4/7/2018] thiamine  100 mg Oral Daily    [START ON 2/0/0810] folic acid  1 mg Oral Daily    bumetanide  1 mg Intravenous BID    acetaminophen  1,300 mg Oral BID    calcium carbonate  500 mg Oral Daily    sodium chloride flush  10 mL Intravenous 2 times per day    famotidine  20 mg Oral BID    metoprolol tartrate  25 mg Oral BID     Continuous Infusions:  PRN Meds:ALPRAZolam, sodium chloride flush, acetaminophen, sodium chloride flush, sodium chloride flush, sodium chloride flush, LORazepam **OR** LORazepam **OR** LORazepam **OR** LORazepam **OR** LORazepam **OR** LORazepam **OR** LORazepam **OR** LORazepam, zolpidem, sodium chloride flush, acetaminophen, magnesium hydroxide, ondansetron, potassium chloride **OR** potassium chloride **OR** potassium chloride, magnesium sulfate    Labs     CBC:   Recent Labs      04/04/18   1121  04/06/18   0608   WBC  6.8   --    HGB  15.3*   --    PLT  196  189     BMP:  Recent Labs      04/04/18   0614  04/05/18   0532  04/06/18   0608   NA  140  141  137   K  4.6  4.3  3.6*   CL  105  104  101   CO2 24  25  21   BUN  32*  39*  39*   CREATININE  1.18*  1.65*  1.46*   GLUCOSE  101*  108*  104*     S. Calcium:  Recent Labs      04/06/18   0608   CALCIUM  8.6     S. Ionized Calcium:No results for input(s): IONCA in the last 72 hours. S. Magnesium:  Recent Labs      04/06/18   0608   MG  2.0     S. Phosphorus:No results for input(s): PHOS in the last 72 hours. S. Glucose:No results for input(s): POCGLU in the last 72 hours. Glycosylated hemoglobin A1C: No results for input(s): LABA1C in the last 72 hours. INR: No results for input(s): INR in the last 72 hours. Hepatic functions: No results for input(s): ALKPHOS, ALT, AST, PROT, BILITOT, BILIDIR, LABALBU in the last 72 hours. Pancreatic functions:No results for input(s): LACTA, AMYLASE in the last 72 hours. S. Lactic Acid: No results for input(s): LACTA in the last 72 hours. Cardiac enzymes:No results for input(s): CKTOTAL, CKMB, CKMBINDEX, TROPONINI in the last 72 hours. BNP:No results for input(s): BNP in the last 72 hours. Lipid profile: No results for input(s): CHOL, TRIG, HDL, LDLCALC in the last 72 hours.     Invalid input(s): LDL  Blood Gases: No results found for: PH, PCO2, PO2, HCO3, O2SAT  Thyroid functions:   Lab Results   Component Value Date    TSH 2.74 04/03/2018      Urinalysis: Color, UA   Date Value Ref Range Status   04/05/2018 YELLOW YEL Final     pH, UA   Date Value Ref Range Status   04/05/2018 5.0 5.0 - 8.0 Final     Specific Gravity, UA   Date Value Ref Range Status   04/05/2018 1.010 1.005 - 1.030 Final     Protein, UA   Date Value Ref Range Status   04/05/2018 NEGATIVE NEG Final     Nitrite, Urine   Date Value Ref Range Status   04/05/2018 NEGATIVE NEG Final     Leukocyte Esterase, Urine   Date Value Ref Range Status   04/05/2018 NEGATIVE NEG Final     Glucose, Ur   Date Value Ref Range Status   04/05/2018 NEGATIVE NEG Final     Bilirubin Urine   Date Value Ref Range Status   04/05/2018 NEGATIVE NEG Final       Imaging       Xr Chest Portable    Result Date: 4/6/2018    1. Placement of left-sided subclavian approach pacemaker device as above. No obvious pneumothorax or mediastinal shift. 2. Otherwise, stable portable chest as detailed above when compared with 04/03/2018, 2243 hours. Xr Chest Portable    Result Date: 4/4/2018  1. Findings suggestive of pulmonary edema and bilateral pleural effusions. Underlying consolidations cannot be excluded. 2. Enlarged cardiomediastinal silhouette. Assessment and Plan      Principal Problem:    AICD (automatic cardioverter/defibrillator) Implant  Active Problems:    Atrial fibrillation (HCC)    Cardiomyopathy, nonischemic (HCC)    CKD (chronic kidney disease), stage III    Positive FIT (fecal immunochemical test)    Congestive heart failure, NYHA class 4, acute, systolic (HCC)    Alcoholism (HCC)    Metabolic encephalopathy    Cardiorenal syndrome type 1  Resolved Problems:    * No resolved hospital problems. *    - Alcoholism drinking 3 gabriela daily. Last time 7 days ago. Consulted for concern about alcohol withdrawal. On CIWA protocol. Start B1 and B9.   - portal HTN related to above. Will need paracentesis eventually. Hold tylenol till we check LFT  - Afib, rate controlled with BB. On eliquis. - dilated non ischemic alcoholic systolic HF on IV diuretics managed by Nephrology. - CKD due to HTN with baseline Cr of 1.1. Developing CRS type 1. Discharge planning:   Activity: as tolerated  Disposition: home/SNF   PO intake: DIET CARDIAC; Carb Control: 4 carb choices  (60 gms)/meal; Low Sodium (2 GM)  Anderson: No    Electronically signed by Yissel Rainey MD on 4/6/2018 at 11:29 PM

## 2018-04-07 NOTE — PROGRESS NOTES
Pt ' to 120's and in afib. Pt had 5 beat run of VTACH. Cardiology notified. Orders for IV lopressor. On assessment HR 80's to 90's. Lopressor held.

## 2018-04-07 NOTE — PLAN OF CARE
Problem: Falls - Risk of  Goal: Absence of falls  Patient free of falls this shift. Patient given call light with bedside table at reach. Bed alarm on and hourly rounding in place. Fall risk visual posted. Patient room kept free from clutter. Patient instructed to call out for help and up with assistance. Will continue to monitor.

## 2018-04-07 NOTE — PROGRESS NOTES
Pulse 108   Temp 98.7 °F (37.1 °C) (Oral)   Resp 24   Ht 5' 9\" (1.753 m)   Wt 160 lb 1.6 oz (72.6 kg)   SpO2 98%   BMI 23.64 kg/m²   General appearance: alert and cooperative with exam  HEENT: Head: Normocephalic, no lesions, without obvious abnormality. Neck: no JVD, trachea midline, no adenopathy  Lungs: Clear to auscultation throughout. Very slightly diminished RLL but no audible rales/rhonchi/wheezing. On RA  Heart: Irregular rate and rhythm, s1/s2 auscultated, no murmurs. Afib rate 80-90  Abdomen: soft, non-tender, bowel sounds active  Extremities: +2 b/l LE pitting edema edema  Neurologic: not done    STRESS 7/26/17: Neg. EF 24%     CATH 8/04/17: Normal cors. EF 20%.      TTE 11/10/17: EF 20%. Mod MR. Mild to mod AI. Mild TR. Segmental WMA. Assessment / Acute Cardiac Problems:   1. Non-ischemic dilated CMP, last EF 20%  2. Chronic Afib on Eliquis  3. Acute on Chronic systolic CHF  4. RAO on CKD 2-3 with baseline creat 0.9-1.1    Patient Active Problem List:     Irregular heart rhythm     Pain in lower jaw     Ataxia     Atrial fibrillation (HCC)     Bilateral swelling of feet and ankles     SOB (shortness of breath)     Cardiomyopathy, nonischemic (HCC)     Systolic CHF, chronic (HCC)     CKD (chronic kidney disease), stage III     Positive FIT (fecal immunochemical test)     Vitamin B deficiency     Osteopenia     Arthritis of right hip     Bilateral carpal tunnel syndrome     Congestive heart failure, NYHA class 4, acute, systolic (HCC)     Congestive heart failure due to high blood pressure (HCC)      Plan of Treatment:   1.  HFrEF exacerbation - On IV Bumex per Nephrology. Monitor Is and Os. Continue PO BB (changed to Lopressor from Coreg for better rate control on admission). No ACE d/t CKD. 2. Mild RAO on CKD2/3 - Creat trending down. Nephrology on board. On IV Bumex. 3.  Hypertension - stable. Avoid hypotension with history of CKD. 4. Chronic Afib - rate controlled.  Continue PO BB.

## 2018-04-07 NOTE — PROGRESS NOTES
Renal Progress Note    Patient :  Jesús Valdivia; 71 y.o. MRN# [id number]  Location:  2007/2007-01  Attending:  Nii Santiago MD  Admit Date:  4/3/2018   Hospital Day: 4      Subjective:       Patient is seen and examined. S/p AICD placement yesterday. She had significant SOB and high respiratory rate earlier today, given Ativan and IV Bumex and eventually fell asleep. Chest x-ray repeated showed peristent CHF with pulmonary vascular congestion and bilateral pleural effusions. BP relatively stable. 550 ml urine output in 24 hours, net -340 in 24 hours, net -1500 since admission. Only evening dose of IV bumex administered yesterday as patient in procedure. Patient denies SOB, chest pain, nausea, vomiting, fever or chills. Leg swelling improved some.       Outpatient Medications:     Prescriptions Prior to Admission: torsemide (DEMADEX) 20 MG tablet, Take one half tablet three times weekly  vitamin B-12 (CYANOCOBALAMIN) 1000 MCG tablet, Take 1,000 mcg by mouth daily  Multiple Vitamins-Minerals (THERAPEUTIC MULTIVITAMIN-MINERALS) tablet, Take 1 tablet by mouth daily  calcium carbonate (OSCAL) 500 MG TABS tablet, Take 500 mg by mouth daily  Biotin (BIOTIN 5000) 5 MG CAPS, Take 5,000 mcg by mouth daily  carvedilol (COREG) 25 MG tablet, Take 0.5 tablets by mouth 2 times daily (with meals)  apixaban (ELIQUIS) 5 MG TABS tablet, Take 5 mg by mouth 2 times daily  spironolactone (ALDACTONE) 25 MG tablet, Take 1 tablet by mouth daily (Patient taking differently: Take 12.5 mg by mouth daily )  sertraline (ZOLOFT) 50 MG tablet, Take 50 mg by mouth daily   ascorbic acid (VITAMIN C) 500 MG tablet, Take 500 mg by mouth daily   [DISCONTINUED] diphenhydrAMINE (BENADRYL) 25 MG tablet, Take 25 mg by mouth every 8 hours as needed for Itching    Current Medications:     Scheduled Meds:    metoprolol  5 mg Intravenous Once    magnesium sulfate  1 g Intravenous Once    sodium chloride flush  10 mL Intravenous 2 times per day    sodium chloride flush  10 mL Intravenous 2 times per day    apixaban  5 mg Oral BID    sodium chloride flush  10 mL Intravenous 2 times per day    sodium chloride flush  10 mL Intravenous 2 times per day    thiamine  100 mg Oral Daily    folic acid  1 mg Oral Daily    bumetanide  1 mg Intravenous BID    calcium carbonate  500 mg Oral Daily    sodium chloride flush  10 mL Intravenous 2 times per day    famotidine  20 mg Oral BID    metoprolol tartrate  25 mg Oral BID     Continuous Infusions:     PRN Meds:  ALPRAZolam, sodium chloride flush, sodium chloride flush, sodium chloride flush, sodium chloride flush, LORazepam **OR** LORazepam **OR** LORazepam **OR** LORazepam **OR** [DISCONTINUED] LORazepam **OR** [DISCONTINUED] LORazepam **OR** [DISCONTINUED] LORazepam **OR** [DISCONTINUED] LORazepam, zolpidem, sodium chloride flush, magnesium hydroxide, ondansetron, potassium chloride **OR** potassium chloride **OR** potassium chloride, magnesium sulfate    Input/Output:       I/O last 3 completed shifts: In: 210 [P.O.:200; I.V.:10]  Out: 550 [Urine:550]. Patient Vitals for the past 96 hrs (Last 3 readings):   Weight   18 0600 160 lb 1.6 oz (72.6 kg)   18 1141 160 lb 3.2 oz (72.7 kg)   18 1842 164 lb 4 oz (74.5 kg)       Vital Signs:   Temperature:  Temp: 97.2 °F (36.2 °C)  TMax:   Temp (24hrs), Av.1 °F (36.7 °C), Min:97.2 °F (36.2 °C), Max:98.7 °F (37.1 °C)    Respirations:  Resp: 29  Pulse:   Pulse: 90  BP:    BP: 134/87  BP Range: Systolic (15EUD), NYR:769 , Min:94 , QRX:484       Diastolic (51STC), OUP:84, Min:43, Max:94      Physical Examination:     General:  Sleeping soundly after 2 mg of IV Ativan given  HEENT: Atraumatic, normocephalic  Chest:   Fair air entry, scattered rales and wheezes with decreased breath sounds in the lung bases  Cardiac:  Irregularly irregular with no rubs  Abdomen: Soft, BS audible. Neuro:  Awake and alert, No FND.   SKIN:  No rashes, good skin turgor. Extremities:  1+-2+ pitting pretibial and pedal edema bilaterally, improved some compared to yesterday. Labs:       Recent Labs      04/04/18   1121  04/06/18   0608   WBC  6.8   --    RBC  4.70   --    HGB  15.3*   --    HCT  49.1*   --    MCV  104.5*   --    MCH  32.6   --    MCHC  31.2   --    RDW  14.5*   --    PLT  196  189   MPV  10.5   --       BMP:   Recent Labs      04/05/18   0532  04/06/18   0608  04/07/18   0633   NA  141  137  144   K  4.3  3.6*  3.7   CL  104  101  104   CO2  25  21  21   BUN  39*  39*  34*   CREATININE  1.65*  1.46*  1.27*   GLUCOSE  108*  104*  99   CALCIUM  8.5*  8.6  9.1      Phosphorus:   No results for input(s): PHOS in the last 72 hours.   Magnesium:    Recent Labs      04/05/18   0532  04/06/18   0608  04/07/18   0633   MG  1.9  2.0  1.7     Albumin:    Recent Labs      04/07/18   0003   LABALBU  3.3*     BNP:    No results found for: BNP  GIOVANNY:      Lab Results   Component Value Date    GIOVANNY NEGATIVE AT 1:80 12/01/2017     SPEP:  Lab Results   Component Value Date    PROT 5.9 04/07/2018    ALBPCT 62 02/01/2018    LABALPH 0.3 02/01/2018    LABALPH 0.6 02/01/2018    A1PCT 5 02/01/2018    A2PCT 9 02/01/2018    BETAPCT 11 02/01/2018    GGPCT 14 02/01/2018     UPEP:   No results found for: LABPE  C3:     Lab Results   Component Value Date    C3 112 02/01/2018     C4:     Lab Results   Component Value Date    C4 24 02/01/2018     MPO ANCA:   No results found for: MPO  PR3 ANCA:   No results found for: PR3  Anti-GBM:   No results found for: GBMABIGG  Hep BsAg:       No results found for: HEPBSAG  Hep C AB:        No results found for: HEPCAB    Urinalysis/Chemistries:      Lab Results   Component Value Date    NITRU NEGATIVE 04/05/2018    COLORU YELLOW 04/05/2018    PHUR 5.0 04/05/2018    SPECGRAV 1.010 04/05/2018    LEUKOCYTESUR NEGATIVE 04/05/2018    UROBILINOGEN Normal 04/05/2018    BILIRUBINUR NEGATIVE 04/05/2018    GLUCOSEU NEGATIVE 04/05/2018    KETUA NEGATIVE 04/05/2018     Urine Sodium:     Lab Results   Component Value Date    DARIEL 79 04/05/2018     Urine Potassium:  No results found for: KUR  Urine Chloride:  No results found for: CLUR  Urine Osmolarity: No results found for: OSMOU  Urine Protein:   No components found for: TOTALPROTEIN, URINE   Urine Creatinine:     Lab Results   Component Value Date    LABCREA 38.2 04/05/2018     Urine Eosinophils:  No components found for: UEOS    Radiology:        Renal US 2/18/18  No acute findings     CXR 4/3/18  Impression   1. Findings suggestive of pulmonary edema and bilateral pleural effusions. Underlying consolidations cannot be excluded.       2. Enlarged cardiomediastinal silhouette.      ECHO 7/2017   Summary   Normal left ventricular wall thickness.   Left ventricular size is mildly increased.   The left ventricular systolic function is severely reduced with an   ejection fraction of 15-20 %.  Mild to moderate mitral regurgitation is present.   Mild aortic regurgitation is present.  Sp [place] is mild-moderate tricuspid regurgitation with RVSP estimated at 35   mmHg.   Trivial pulmonic regurgitation present.   Trivial pericardial effusion noted.   Pleural effusion noted.   Biatrial enlargement. Assessment:     1. RAO secondary to hypotension and ischemic ATN in setting of acute decompensation chronic systolic CHF and has been off Entresto for some time which is when the decompensation in CHF occurred  2. Chronic kidney disease secondary to ischemic nephrosclerosis from cardiorenal syndrome with baseline creatinine  0.9-1.1 USS reveals Rt Kidney 9.2cm and left Kidney 10.0 cm. Serologies negative in February of 2018. GIOVANNY negative in past.  3. Severe non-ischemic cardiomyopathy causing chronic CHF with EF 15-20% s/p AICD placement 4/6/18  4. HTN, currently with low normal BP  5. A Fib on BB and eliquis  6. Peripheral neuropathy  7. Still with decompensated CHF  8.   Sedated and sleeping with Ativan 2 mg IV    Plan:

## 2018-04-07 NOTE — PROGRESS NOTES
[address] [place] [address]     Department of Internal Medicine - Staff Internal Medicine Service     DAILY PROGRESS NOTE - Torrey Oviedo    Patient - Kendra Rivera  Patient's YOB: 1945  Medical Record Number: [id number]  Acct: [de-identified]   Room and Bed Number -     Date of Evaluation -  2018  Hospital Day - 4    Age: 71 y.o.  female    CHIEF COMPLAINT : ADHF   On  4/3/2018  5:05 PM    Admitting Diagnosis: AICD (automatic cardioverter/defibrillator) present    Subjective:   Pt seen and Chart reviewed. No issue overnight. Temp (24hrs), Av.1 °F (36.7 °C), Min:97.2 °F (36.2 °C), Max:98.7 °F (81.8 °C)    Systolic (37BWT), KCW:594 , Min:94 , YHE:549     Diastolic (01VUT), QSK:56, Min:65, Max:92    Did not require any ativan overnight. Intermittent confusion. Alert and oriented to place and person. Review of Systems -  General ROS: negative for - chills, fatigue, fever or weight loss  ENT ROS: negative for - headaches, oral lesions or sore throat  Cardiovascular ROS: no chest pain or dyspnea on exertion  Gastrointestinal ROS: no abdominal pain, nausea, change in bowel habits, or black or bloody stools  Skin - no rash   msk - no jt tenderness or swelling     Objective:   Temperature:  Temp: 97.2 °F (36.2 °C)  Respirations:  Resp: 29  Pulse:   Pulse: 90  BP:    BP: 134/87    General: Patient seems to be clinically stable and in no immediate distress. AAO x2   HEENT: NC/AT  Heart: S1 S2 present, RRR, no audible rubs or murmurs heard  Lungs: Bilateral air entry noted, normal breath sounds and no additional sounds heard  Abdomen: Soft, non tender and no masses noted. Bowel sounds were normal.   Extremities: +2 pedal edema; no calf tenderness  Skin: No obvious skin rashes. Acrocyanosis. Neurologic: Seems oriented.  No obvious abnormality of cranial nerves, motor system noted      Intake/Output Summary (Last 24 hours) at 18 1049  Last data filed at 18 0877   Gross per 24 hour   Intake              210 ml   Output              550 ml   Net             -340 ml       Medications:      metoprolol  5 mg Intravenous Once    magnesium sulfate  1 g Intravenous Once    sodium chloride flush  10 mL Intravenous 2 times per day    sodium chloride flush  10 mL Intravenous 2 times per day    apixaban  5 mg Oral BID    sodium chloride flush  10 mL Intravenous 2 times per day    sodium chloride flush  10 mL Intravenous 2 times per day    thiamine  100 mg Oral Daily    folic acid  1 mg Oral Daily    bumetanide  1 mg Intravenous BID    calcium carbonate  500 mg Oral Daily    sodium chloride flush  10 mL Intravenous 2 times per day    famotidine  20 mg Oral BID    metoprolol tartrate  25 mg Oral BID       Diagnostic Labs   CBC: Recent Labs      04/04/18   1121  04/06/18   0608   WBC  6.8   --    RBC  4.70   --    HGB  15.3*   --    HCT  49.1*   --    MCV  104.5*   --    RDW  14.5*   --    PLT  196  189     BMP: Recent Labs      04/05/18   0532  04/06/18   0608  04/07/18   0633   NA  141  137  144   K  4.3  3.6*  3.7   CL  104  101  104   CO2  25  21  21   BUN  39*  39*  34*   CREATININE  1.65*  1.46*  1.27*     BNP: No results for input(s): BNP in the last 72 hours. PT/INR: No results for input(s): PROTIME, INR in the last 72 hours. APTT:   Recent Labs      04/05/18   1418  04/05/18   2249  04/06/18   0608   APTT  42.9*  44.8*  52.7*     CARDIAC ENZYMES: No results for input(s): CKMB, CKMBINDEX, TROPONINI in the last 72 hours.     Invalid input(s): CKTOTAL;3    Imaging    Xr Chest Portable    Result Date: 4/6/2018  EXAMINATION: SINGLE VIEW OF THE CHEST 4/6/2018 5:41 pm COMPARISON: 04/03/2018, 2243 hours HISTORY: ORDERING SYSTEM PROVIDED HISTORY: Pacemaker placement and rule out pneumothorax TECHNOLOGIST PROVIDED HISTORY: Reason for exam:->Pacemaker placement and rule out pneumothorax 70-year-old female with pacemaker placement ; rule out pneumothorax FINDINGS: Portable upright

## 2018-04-07 NOTE — DISCHARGE SUMMARY
[facility]  Discharge Note                 Name:  Emmer Goldmann  YOB: 1945  Social Security Number:  [id number]  Medical Record Number:  [id number]    Date of Admission:  4/3/2018  Date of Discharge:  04/09/18.      Admitting physician: Mo Alanis MD    Discharge Attending: Dr. Nishant Guzman  Primary Care Physician: Davie Restrepo CNP  Consultants: None  Discharge to [address] [place] [address] LIST:  Patient Active Problem List   Diagnosis    Irregular heart rhythm    Pain in lower jaw    Ataxia    Atrial fibrillation (HCC)    Bilateral swelling of feet and ankles    SOB (shortness of breath)    Cardiomyopathy, nonischemic ([address]    Systolic CHF, chronic ([address]    CKD (chronic kidney disease), stage III    Positive FIT (fecal immunochemical test)    Vitamin B deficiency    Osteopenia    Arthritis of right hip    Bilateral carpal tunnel syndrome    Congestive heart failure, NYHA class 4, acute, systolic ([address]    AICD (automatic cardioverter/defibrillator) Implant    Alcoholism ([address]    Metabolic encephalopathy    Cardiorenal syndrome type 1         Procedures: [address] :   The patient was admitted for: HFreEF 15-20%. AICD was placed. Eliquis restarted on discharge for Afib. Chest XRay did not show any pneumo. Hospital Procedures if any: AICD  Medications changes recommendation: As per discharge list   Follow Up Plan: 1 weeks as outpatient       Discharge exam:   Vitals:    04/07/18 0820   BP: 134/87   Pulse: 90   Resp: 29   Temp: 97.2 °F (36.2 °C)   SpO2: 98%       Patient is feeling much better, denies any chest pain,k dyspnea, orthopnea or palpitations. Neuro: normal  Chest: Clear to ausculation. No wheezing.    Cardiac: Cor RRR  Abdomen/groin: soft, non-tender, without masses or organomegaly  Lower extremity edema: none     Follow up with primary care provider 1 week  Follow up with cardiology 4 weeks  Follow up with

## 2018-04-07 NOTE — FLOWSHEET NOTE
Pt states she can't breathe. O2sats 90's, RR 30's 's. Pt states she feels anxious. 2mg IV Ativan given . Dr. Cesario Oswald notified.

## 2018-04-08 LAB
ABSOLUTE EOS #: 0.1 K/UL (ref 0–0.44)
ABSOLUTE IMMATURE GRANULOCYTE: <0.03 K/UL (ref 0–0.3)
ABSOLUTE LYMPH #: 2.61 K/UL (ref 1.1–3.7)
ABSOLUTE MONO #: 0.85 K/UL (ref 0.1–1.2)
ANION GAP SERPL CALCULATED.3IONS-SCNC: 15 MMOL/L (ref 9–17)
BASOPHILS # BLD: 1 % (ref 0–2)
BASOPHILS ABSOLUTE: 0.05 K/UL (ref 0–0.2)
BUN BLDV-MCNC: 31 MG/DL (ref 8–23)
BUN/CREAT BLD: ABNORMAL (ref 9–20)
CALCIUM SERPL-MCNC: 9.1 MG/DL (ref 8.6–10.4)
CHLORIDE BLD-SCNC: 104 MMOL/L (ref 98–107)
CO2: 24 MMOL/L (ref 20–31)
CREAT SERPL-MCNC: 1.11 MG/DL (ref 0.5–0.9)
DIFFERENTIAL TYPE: ABNORMAL
EOSINOPHILS RELATIVE PERCENT: 2 % (ref 1–4)
GFR AFRICAN AMERICAN: 59 ML/MIN
GFR NON-AFRICAN AMERICAN: 48 ML/MIN
GFR SERPL CREATININE-BSD FRML MDRD: ABNORMAL ML/MIN/{1.73_M2}
GFR SERPL CREATININE-BSD FRML MDRD: ABNORMAL ML/MIN/{1.73_M2}
GLUCOSE BLD-MCNC: 101 MG/DL (ref 70–99)
HCT VFR BLD CALC: 48.1 % (ref 36.3–47.1)
HEMOGLOBIN: 15 G/DL (ref 11.9–15.1)
IMMATURE GRANULOCYTES: 0 %
LYMPHOCYTES # BLD: 40 % (ref 24–43)
MAGNESIUM: 2 MG/DL (ref 1.6–2.6)
MCH RBC QN AUTO: 32.4 PG (ref 25.2–33.5)
MCHC RBC AUTO-ENTMCNC: 31.2 G/DL (ref 28.4–34.8)
MCV RBC AUTO: 103.9 FL (ref 82.6–102.9)
MONOCYTES # BLD: 13 % (ref 3–12)
NRBC AUTOMATED: 0 PER 100 WBC
PDW BLD-RTO: 14.1 % (ref 11.8–14.4)
PHOSPHORUS: 3.8 MG/DL (ref 2.6–4.5)
PLATELET # BLD: 161 K/UL (ref 138–453)
PLATELET ESTIMATE: ABNORMAL
PMV BLD AUTO: 10.6 FL (ref 8.1–13.5)
POTASSIUM SERPL-SCNC: 4.1 MMOL/L (ref 3.7–5.3)
RBC # BLD: 4.63 M/UL (ref 3.95–5.11)
RBC # BLD: ABNORMAL 10*6/UL
SEG NEUTROPHILS: 44 % (ref 36–65)
SEGMENTED NEUTROPHILS ABSOLUTE COUNT: 2.96 K/UL (ref 1.5–8.1)
SODIUM BLD-SCNC: 143 MMOL/L (ref 135–144)
WBC # BLD: 6.6 K/UL (ref 3.5–11.3)
WBC # BLD: ABNORMAL 10*3/UL

## 2018-04-08 PROCEDURE — 2500000003 HC RX 250 WO HCPCS: Performed by: INTERNAL MEDICINE

## 2018-04-08 PROCEDURE — 84100 ASSAY OF PHOSPHORUS: CPT

## 2018-04-08 PROCEDURE — 2500000003 HC RX 250 WO HCPCS

## 2018-04-08 PROCEDURE — 6370000000 HC RX 637 (ALT 250 FOR IP): Performed by: STUDENT IN AN ORGANIZED HEALTH CARE EDUCATION/TRAINING PROGRAM

## 2018-04-08 PROCEDURE — 85025 COMPLETE CBC W/AUTO DIFF WBC: CPT

## 2018-04-08 PROCEDURE — 83735 ASSAY OF MAGNESIUM: CPT

## 2018-04-08 PROCEDURE — 99232 SBSQ HOSP IP/OBS MODERATE 35: CPT | Performed by: INTERNAL MEDICINE

## 2018-04-08 PROCEDURE — 6370000000 HC RX 637 (ALT 250 FOR IP): Performed by: INTERNAL MEDICINE

## 2018-04-08 PROCEDURE — 2580000003 HC RX 258: Performed by: INTERNAL MEDICINE

## 2018-04-08 PROCEDURE — 94762 N-INVAS EAR/PLS OXIMTRY CONT: CPT

## 2018-04-08 PROCEDURE — 2060000000 HC ICU INTERMEDIATE R&B

## 2018-04-08 PROCEDURE — 36430 TRANSFUSION BLD/BLD COMPNT: CPT

## 2018-04-08 PROCEDURE — 36415 COLL VENOUS BLD VENIPUNCTURE: CPT

## 2018-04-08 PROCEDURE — 80048 BASIC METABOLIC PNL TOTAL CA: CPT

## 2018-04-08 RX ORDER — BUMETANIDE 0.25 MG/ML
INJECTION, SOLUTION INTRAMUSCULAR; INTRAVENOUS
Status: COMPLETED
Start: 2018-04-08 | End: 2018-04-08

## 2018-04-08 RX ORDER — BUMETANIDE 0.25 MG/ML
2 INJECTION, SOLUTION INTRAMUSCULAR; INTRAVENOUS EVERY 12 HOURS
Status: DISCONTINUED | OUTPATIENT
Start: 2018-04-08 | End: 2018-04-09

## 2018-04-08 RX ORDER — SPIRONOLACTONE 25 MG/1
25 TABLET ORAL DAILY
Qty: 30 TABLET | Refills: 6 | Status: SHIPPED | OUTPATIENT
Start: 2018-04-09 | End: 2018-04-08 | Stop reason: HOSPADM

## 2018-04-08 RX ORDER — BUMETANIDE 0.25 MG/ML
INJECTION, SOLUTION INTRAMUSCULAR; INTRAVENOUS
Status: DISPENSED
Start: 2018-04-08 | End: 2018-04-09

## 2018-04-08 RX ORDER — BUMETANIDE 0.25 MG/ML
INJECTION, SOLUTION INTRAMUSCULAR; INTRAVENOUS
Status: DISPENSED
Start: 2018-04-08 | End: 2018-04-08

## 2018-04-08 RX ORDER — TORSEMIDE 20 MG/1
20 TABLET ORAL EVERY OTHER DAY
Status: DISCONTINUED | OUTPATIENT
Start: 2018-04-09 | End: 2018-04-09

## 2018-04-08 RX ADMIN — FAMOTIDINE 20 MG: 20 TABLET, FILM COATED ORAL at 09:45

## 2018-04-08 RX ADMIN — Medication 100 MG: at 09:45

## 2018-04-08 RX ADMIN — Medication 10 ML: at 19:53

## 2018-04-08 RX ADMIN — ZOLPIDEM TARTRATE 5 MG: 5 TABLET ORAL at 22:25

## 2018-04-08 RX ADMIN — SODIUM CHLORIDE, PRESERVATIVE FREE 10 ML: 5 INJECTION INTRAVENOUS at 20:00

## 2018-04-08 RX ADMIN — FOLIC ACID 1 MG: 1 TABLET ORAL at 09:45

## 2018-04-08 RX ADMIN — BUMETANIDE 2 MG: 0.25 INJECTION, SOLUTION INTRAMUSCULAR; INTRAVENOUS at 19:59

## 2018-04-08 RX ADMIN — ANTACID TABLETS 500 MG: 500 TABLET, CHEWABLE ORAL at 09:45

## 2018-04-08 RX ADMIN — BUMETANIDE 2 MG: 0.25 INJECTION, SOLUTION INTRAMUSCULAR; INTRAVENOUS at 07:48

## 2018-04-08 RX ADMIN — HYDROCODONE BITARTRATE AND ACETAMINOPHEN 1 TABLET: 5; 325 TABLET ORAL at 13:37

## 2018-04-08 RX ADMIN — BUMETANIDE 2 MG: 0.25 INJECTION INTRAMUSCULAR; INTRAVENOUS at 07:48

## 2018-04-08 RX ADMIN — FAMOTIDINE 20 MG: 20 TABLET, FILM COATED ORAL at 19:53

## 2018-04-08 RX ADMIN — APIXABAN 5 MG: 5 TABLET, FILM COATED ORAL at 19:53

## 2018-04-08 RX ADMIN — APIXABAN 5 MG: 5 TABLET, FILM COATED ORAL at 09:45

## 2018-04-08 RX ADMIN — Medication 10 ML: at 09:49

## 2018-04-08 RX ADMIN — SPIRONOLACTONE 25 MG: 25 TABLET ORAL at 09:45

## 2018-04-08 RX ADMIN — METOPROLOL TARTRATE 25 MG: 25 TABLET ORAL at 09:45

## 2018-04-08 ASSESSMENT — PAIN SCALES - GENERAL
PAINLEVEL_OUTOF10: 0
PAINLEVEL_OUTOF10: 2

## 2018-04-08 NOTE — PROGRESS NOTES
[address] [place] [address]     Department of Internal Medicine - Staff Internal Medicine Service     DAILY PROGRESS NOTE - René Job    Patient - Trena Siu  Patient's YOB: 1945  Medical Record Number: [id number]  Acct: [de-identified]   Room and Bed Number -  0632/5097-01   Date of Evaluation -  2018  Hospital Day - 5    Age: 71 y.o.  female    CHIEF COMPLAINT : ADHF   On  4/3/2018  5:05 PM    Admitting Diagnosis: AICD (automatic cardioverter/defibrillator) present    Subjective:   Pt seen and Chart reviewed. No issue overnight. Temp (24hrs), Av.6 °F (36.4 °C), Min:97.2 °F (36.2 °C), Max:98.4 °F (96.0 °C)    Systolic (28FVM), VNL:877 , Min:96 , XNB:384     Diastolic (60TQK), XGI:37, Min:57, Max:77    Did not require any ativan overnight. Intermittent confusion. Alert and oriented to place and person. s/p bumex 2 mg this am   Switched to PO torsemide   Discharge was delayed yesterday due to resp distress. Review of Systems -  General ROS: negative for - chills, fatigue, fever or weight loss  ENT ROS: negative for - headaches, oral lesions or sore throat  Cardiovascular ROS: no chest pain or dyspnea on exertion  Gastrointestinal ROS: no abdominal pain, nausea, change in bowel habits, or black or bloody stools  Skin - no rash   msk - no jt tenderness or swelling     Objective:   Temperature:  Temp: 97.5 °F (36.4 °C)  Respirations:  Resp: 21  Pulse:   Pulse: 98  BP:    BP: 110/77    General: Patient seems to be clinically stable and in no immediate distress. AAO x2   HEENT: NC/AT  Heart: S1 S2 present, RRR, no audible rubs or murmurs heard  Lungs: Bilateral air entry noted, normal breath sounds and no additional sounds heard  Abdomen: Soft, non tender and no masses noted. Bowel sounds were normal.   Extremities: +2 pedal edema; no calf tenderness  Skin: No obvious skin rashes. Acrocyanosis. Neurologic: Seems oriented.  No obvious abnormality of cranial nerves, motor system HISTORY: Reason for exam:->Pacemaker placement and rule out pneumothorax 29-year-old female with pacemaker placement ; rule out pneumothorax FINDINGS: Portable upright view of the chest. No obvious pneumothorax or mediastinal shift. Left-sided subclavian approach cardiac pacemaker device has been placed with distal lead tip overlying the apex of the right ventricle. Atherosclerotic calcification of the aortic knob. Trachea midline. Stable mild cardiomegaly. Bibasilar airspace consolidation and small bilateral pleural effusions, similar to the prior study. Stable mild pulmonary vascular congestion. Visualized osseous structures remain unchanged. 1. Placement of left-sided subclavian approach pacemaker device as above. No obvious pneumothorax or mediastinal shift. 2. Otherwise, stable portable chest as detailed above when compared with 04/03/2018, 2243 hours. Xr Chest Portable    Result Date: 4/4/2018  EXAMINATION: SINGLE VIEW OF THE CHEST 4/3/2018 10:55 pm COMPARISON: None HISTORY: ORDERING SYSTEM PROVIDED HISTORY: FOR CHF TECHNOLOGIST PROVIDED HISTORY: Reason for exam:->FOR CHF FINDINGS: Cardiac and mediastinal contours are enlarged. Increased perihilar markings with prominence of the interstitial lung markings and bilateral pleural effusions. Underlying consolidation cannot be excluded. No evidence of pneumothorax. 1. Findings suggestive of pulmonary edema and bilateral pleural effusions. Underlying consolidations cannot be excluded. 2. Enlarged cardiomediastinal silhouette.        Assessment and Plan:   Principal Problem:    AICD (automatic cardioverter/defibrillator) Implant  Active Problems:    Atrial fibrillation (HCC)    Cardiomyopathy, nonischemic (HCC)    CKD (chronic kidney disease), stage III    Positive FIT (fecal immunochemical test)    Congestive heart failure, NYHA class 4, acute, systolic (HCC)    Alcoholism (HCC)    Metabolic encephalopathy    Cardiorenal syndrome type 1  Resolved Problems:    * No resolved hospital problems. *    - Alcoholism drinking 3 gabriela daily. Last time 8 days ago. Consulted for concern about alcohol withdrawal (NOT in DT at this point). cont B1 and B9.   - portal HTN related to above. Will need paracentesis eventually. Hold tylenol due to transaminitis   - Afib, rate controlled with BB. On eliquis. - dilated non ischemic alcoholic systolic HF on IV diuretics managed by Nephrology. - CKD due to HTN with baseline Cr of 1.1.      Discharge planning:   Activity: as tolerated  Disposition: home/SNF   PO intake: DIET CARDIAC; Carb Control: 4 carb choices  (60 gms)/meal; Low Sodium (2 GM)  Anderson: Velia Og, PGY-3, Internal Medicine Resident  [facility]

## 2018-04-08 NOTE — PROGRESS NOTES
10 mL Intravenous 2 times per day    apixaban  5 mg Oral BID    sodium chloride flush  10 mL Intravenous 2 times per day    sodium chloride flush  10 mL Intravenous 2 times per day    thiamine  100 mg Oral Daily    folic acid  1 mg Oral Daily    calcium carbonate  500 mg Oral Daily    sodium chloride flush  10 mL Intravenous 2 times per day    famotidine  20 mg Oral BID    metoprolol tartrate  25 mg Oral BID     Continuous Infusions:     PRN Meds:  HYDROcodone 5 mg - acetaminophen, sodium chloride flush, sodium chloride flush, sodium chloride flush, sodium chloride flush, zolpidem, sodium chloride flush, magnesium hydroxide, ondansetron, potassium chloride **OR** potassium chloride **OR** potassium chloride, magnesium sulfate    Input/Output:       I/O last 3 completed shifts: In: 450 [P.O.:450]  Out: 2250 [Urine:2250]. Patient Vitals for the past 96 hrs (Last 3 readings):   Weight   18 0600 160 lb 1.6 oz (72.6 kg)   18 1141 160 lb 3.2 oz (72.7 kg)       Vital Signs:   Temperature:  Temp: 97.5 °F (36.4 °C)  TMax:   Temp (24hrs), Av.7 °F (36.5 °C), Min:97.5 °F (36.4 °C), Max:98.4 °F (36.9 °C)    Respirations:  Resp: 21  Pulse:   Pulse: 102  BP:    BP: (!) 95/59  BP Range: Systolic (20NWX), BFC:357 , Min:95 , EMV:244       Diastolic (45XPL), QSG:15, Min:57, Max:77      Physical Examination:     General:  A & O X3  HEENT: Atraumatic, normocephalic  Chest:   Diminished in bases R >left  Cardiac:  Irregularly irregular rhythm with no rubs  Abdomen: Distended, + BS  SKIN:  No rashes, good skin turgor. Extremities:  Trace to 1+ pitting pretibial lower extremity edema, much improved.     Labs:       Recent Labs      18   0608  18   0713   WBC   --   6.6   RBC   --   4.63   HGB   --   15.0   HCT   --   48.1*   MCV   --   103.9*   MCH   --   32.4   MCHC   --   31.2   RDW   --   14.1   PLT  189  161   MPV   --   10.6      BMP:   Recent Labs      18   0608  18   8771 pneumothorax.          ECHO 7/2017   Summary   Normal left ventricular wall thickness.   Left ventricular size is mildly increased.   The left ventricular systolic function is severely reduced with an   ejection fraction of 15-20 %.  Mild to moderate mitral regurgitation is present.   Mild aortic regurgitation is present.  Dali Kinnier is mild-moderate tricuspid regurgitation with RVSP estimated at 35   mmHg.   Trivial pulmonic regurgitation present.   Trivial pericardial effusion noted.   Pleural effusion noted.   Biatrial enlargement. Assessment:     1. RAO secondary to hypotension and ischemic ATN in setting of acute decompensation chronic systolic CHF and has been off Entresto for some time which is when the decompensation in CHF occurred  2. Chronic kidney disease secondary to ischemic nephrosclerosis from cardiorenal syndrome with baseline creatinine  0.9-1.1 USS reveals Rt Kidney 9.2cm and left Kidney 10.0 cm. Serologies negative in February of 2018. GIOVANNY negative in past.  3. Severe non-ischemic cardiomyopathy causing chronic CHF with EF 15-20% s/p AICD placement 4/6/18  4. HTN, currently with low normal BP  5. A Fib on BB and Eliquis  6. Peripheral neuropathy  7. ETOH Abuse    Plan:     1. Possible home tomorrow  2. Fluid restriction/salt restriction  3. Continue spironolactone 25 mg oral twice daily  4. Consider adding back Entresto back in the future as the patient was thriving on that medicine per the family report, before it was stopped. 5. Continue Bumex 2 mg IV every 12 hours for diuresis    Nutrition   Please ensure that patient is on a renal diet/TF. Avoid nephrotoxic drugs/contrast exposure. We will continue to follow along with you.      Alvarez Gaines MD  [facility]

## 2018-04-08 NOTE — PROGRESS NOTES
[facility]                 Progress Note      Date:  4/8/2018  Patient: Wil Barker  Admission:  4/3/2018  5:05 PM  Admit DX: Congestive heart failure, NYHA class 4, acute, systolic ([facility]) [I26.83]  Congestive heart failure due to high blood pressure ([facility] [address] [I11.0]  Age:  71 y.o., 1945     LOS: 5 days            SUBJECTIVE:     The patient was seen and examined. Notes and labs reviewed. Patient's cardiac review of systems: positive for tachypnea. Had dyspnea and tachycardia last night. Feels better today. OBJECTIVE:    Telemetry: Atrial fibrillation  /77   Pulse 98   Temp 97.5 °F (36.4 °C) (Oral)   Resp 21   Ht 5' 9\" (1.753 m)   Wt 160 lb 1.6 oz (72.6 kg)   SpO2 98%   BMI 23.64 kg/m²     EXAM:  CONSTITUTIONAL:  awake, alert, no apparent distress, and appears stated age. HEENT: Normal jugular venous pulsations, no carotid bruits. Head is atraumatic, normocephalic. Eyes and oral mucosa are normal.  LUNGS: Good respiratory effort. No for increased work of breathing. On auscultation: clear to auscultation bilaterally and rales bibasilar  CARDIOVASCULAR:  Normal apical impulse, irregular rate and rhythm, no s3 or new murmur  ABDOMEN: Soft, nontender, nondistended. Bowel sounds present. No masses or tenderness. SKIN: Warm and dry. EXTREMITIES: No lower extremity edema. Motor movement grossly intact. No cyanosis or clubbing.     Current Inpatient Medications:   bumetanide        metoprolol  5 mg Intravenous Once    bumetanide  2 mg Intravenous Q12H    spironolactone  25 mg Oral Daily    sodium chloride flush  10 mL Intravenous 2 times per day    sodium chloride flush  10 mL Intravenous 2 times per day    apixaban  5 mg Oral BID    sodium chloride flush  10 mL Intravenous 2 times per day    sodium chloride flush  10 mL Intravenous 2 times per day    thiamine  100 mg Oral Daily    folic acid  1 mg Oral Daily    calcium carbonate  500 mg Oral Daily    sodium

## 2018-04-09 ENCOUNTER — HOSPITAL ENCOUNTER (OUTPATIENT)
Dept: CARDIAC CATH/INVASIVE PROCEDURES | Age: 73
Discharge: HOME | End: 2018-04-09
Payer: MEDICARE

## 2018-04-09 VITALS
OXYGEN SATURATION: 99 % | RESPIRATION RATE: 18 BRPM | WEIGHT: 160.1 LBS | TEMPERATURE: 97.7 F | DIASTOLIC BLOOD PRESSURE: 81 MMHG | BODY MASS INDEX: 23.71 KG/M2 | HEIGHT: 69 IN | HEART RATE: 112 BPM | SYSTOLIC BLOOD PRESSURE: 110 MMHG

## 2018-04-09 LAB
ABSOLUTE EOS #: 0.1 K/UL (ref 0–0.44)
ABSOLUTE IMMATURE GRANULOCYTE: <0.03 K/UL (ref 0–0.3)
ABSOLUTE LYMPH #: 1.91 K/UL (ref 1.1–3.7)
ABSOLUTE MONO #: 0.81 K/UL (ref 0.1–1.2)
ALBUMIN SERPL-MCNC: 3.5 G/DL (ref 3.5–5.2)
ALBUMIN/GLOBULIN RATIO: 1.7 (ref 1–2.5)
ALP BLD-CCNC: 109 U/L (ref 35–104)
ALT SERPL-CCNC: 66 U/L (ref 5–33)
ANION GAP SERPL CALCULATED.3IONS-SCNC: 17 MMOL/L (ref 9–17)
AST SERPL-CCNC: 58 U/L
BASOPHILS # BLD: 1 % (ref 0–2)
BASOPHILS ABSOLUTE: 0.06 K/UL (ref 0–0.2)
BILIRUB SERPL-MCNC: 0.89 MG/DL (ref 0.3–1.2)
BILIRUBIN DIRECT: 0.45 MG/DL
BILIRUBIN, INDIRECT: 0.44 MG/DL (ref 0–1)
BUN BLDV-MCNC: 28 MG/DL (ref 8–23)
CALCIUM SERPL-MCNC: 9.1 MG/DL (ref 8.6–10.4)
CHLORIDE BLD-SCNC: 98 MMOL/L (ref 98–107)
CO2: 27 MMOL/L (ref 20–31)
CREAT SERPL-MCNC: 1.19 MG/DL (ref 0.5–0.9)
DIFFERENTIAL TYPE: ABNORMAL
EOSINOPHILS RELATIVE PERCENT: 1 % (ref 1–4)
GFR AFRICAN AMERICAN: 54 ML/MIN
GFR NON-AFRICAN AMERICAN: 45 ML/MIN
GFR SERPL CREATININE-BSD FRML MDRD: ABNORMAL ML/MIN/{1.73_M2}
GFR SERPL CREATININE-BSD FRML MDRD: ABNORMAL ML/MIN/{1.73_M2}
GLUCOSE BLD-MCNC: 135 MG/DL (ref 70–99)
HCT VFR BLD CALC: 47.7 % (ref 36.3–47.1)
HEMOGLOBIN: 14.8 G/DL (ref 11.9–15.1)
IMMATURE GRANULOCYTES: 0 %
INR BLD: 1.2
LYMPHOCYTES # BLD: 26 % (ref 24–43)
MCH RBC QN AUTO: 32.7 PG (ref 25.2–33.5)
MCHC RBC AUTO-ENTMCNC: 31 G/DL (ref 28.4–34.8)
MCV RBC AUTO: 105.3 FL (ref 82.6–102.9)
MONOCYTES # BLD: 11 % (ref 3–12)
NRBC AUTOMATED: 0 PER 100 WBC
PARTIAL THROMBOPLASTIN TIME: 25.8 SEC (ref 20.5–30.5)
PDW BLD-RTO: 14.4 % (ref 11.8–14.4)
PLATELET # BLD: 147 K/UL (ref 138–453)
PLATELET ESTIMATE: ABNORMAL
PMV BLD AUTO: 11 FL (ref 8.1–13.5)
POTASSIUM SERPL-SCNC: 3.7 MMOL/L (ref 3.7–5.3)
PROTHROMBIN TIME: 12.8 SEC (ref 9–12)
RBC # BLD: 4.53 M/UL (ref 3.95–5.11)
RBC # BLD: ABNORMAL 10*6/UL
SEG NEUTROPHILS: 61 % (ref 36–65)
SEGMENTED NEUTROPHILS ABSOLUTE COUNT: 4.51 K/UL (ref 1.5–8.1)
SODIUM BLD-SCNC: 142 MMOL/L (ref 135–144)
TOTAL PROTEIN: 5.6 G/DL (ref 6.4–8.3)
WBC # BLD: 7.4 K/UL (ref 3.5–11.3)
WBC # BLD: ABNORMAL 10*3/UL

## 2018-04-09 PROCEDURE — 99232 SBSQ HOSP IP/OBS MODERATE 35: CPT | Performed by: INTERNAL MEDICINE

## 2018-04-09 PROCEDURE — 6370000000 HC RX 637 (ALT 250 FOR IP): Performed by: INTERNAL MEDICINE

## 2018-04-09 PROCEDURE — 36415 COLL VENOUS BLD VENIPUNCTURE: CPT

## 2018-04-09 PROCEDURE — 6370000000 HC RX 637 (ALT 250 FOR IP): Performed by: STUDENT IN AN ORGANIZED HEALTH CARE EDUCATION/TRAINING PROGRAM

## 2018-04-09 PROCEDURE — 85610 PROTHROMBIN TIME: CPT

## 2018-04-09 PROCEDURE — 6360000002 HC RX W HCPCS: Performed by: NURSE PRACTITIONER

## 2018-04-09 PROCEDURE — 94762 N-INVAS EAR/PLS OXIMTRY CONT: CPT

## 2018-04-09 PROCEDURE — 85730 THROMBOPLASTIN TIME PARTIAL: CPT

## 2018-04-09 PROCEDURE — 85025 COMPLETE CBC W/AUTO DIFF WBC: CPT

## 2018-04-09 PROCEDURE — 82248 BILIRUBIN DIRECT: CPT

## 2018-04-09 PROCEDURE — 2580000003 HC RX 258: Performed by: STUDENT IN AN ORGANIZED HEALTH CARE EDUCATION/TRAINING PROGRAM

## 2018-04-09 PROCEDURE — 80053 COMPREHEN METABOLIC PANEL: CPT

## 2018-04-09 RX ORDER — DIGOXIN 125 MCG
125 TABLET ORAL DAILY
Status: DISCONTINUED | OUTPATIENT
Start: 2018-04-10 | End: 2018-04-09 | Stop reason: HOSPADM

## 2018-04-09 RX ORDER — TORSEMIDE 20 MG/1
20 TABLET ORAL DAILY
Qty: 30 TABLET | Refills: 3 | Status: SHIPPED | OUTPATIENT
Start: 2018-04-10 | End: 2018-06-13

## 2018-04-09 RX ORDER — DIGOXIN 125 MCG
125 TABLET ORAL DAILY
Qty: 30 TABLET | Refills: 3 | Status: SHIPPED | OUTPATIENT
Start: 2018-04-10 | End: 2018-06-13

## 2018-04-09 RX ORDER — TORSEMIDE 20 MG/1
20 TABLET ORAL DAILY
Status: DISCONTINUED | OUTPATIENT
Start: 2018-04-10 | End: 2018-04-09 | Stop reason: HOSPADM

## 2018-04-09 RX ORDER — DIGOXIN 0.25 MG/ML
250 INJECTION INTRAMUSCULAR; INTRAVENOUS ONCE
Status: COMPLETED | OUTPATIENT
Start: 2018-04-09 | End: 2018-04-09

## 2018-04-09 RX ADMIN — SPIRONOLACTONE 25 MG: 25 TABLET ORAL at 09:48

## 2018-04-09 RX ADMIN — DIGOXIN 250 MCG: 0.25 INJECTION INTRAMUSCULAR; INTRAVENOUS at 12:10

## 2018-04-09 RX ADMIN — METOPROLOL TARTRATE 25 MG: 25 TABLET ORAL at 09:48

## 2018-04-09 RX ADMIN — APIXABAN 5 MG: 5 TABLET, FILM COATED ORAL at 09:48

## 2018-04-09 RX ADMIN — ANTACID TABLETS 500 MG: 500 TABLET, CHEWABLE ORAL at 09:49

## 2018-04-09 RX ADMIN — Medication 100 MG: at 09:48

## 2018-04-09 RX ADMIN — FOLIC ACID 1 MG: 1 TABLET ORAL at 09:48

## 2018-04-09 RX ADMIN — FAMOTIDINE 20 MG: 20 TABLET, FILM COATED ORAL at 09:48

## 2018-04-09 RX ADMIN — TORSEMIDE 20 MG: 20 TABLET ORAL at 09:48

## 2018-04-09 RX ADMIN — Medication 10 ML: at 12:12

## 2018-04-09 ASSESSMENT — PAIN SCALES - GENERAL: PAINLEVEL_OUTOF10: 0

## 2018-04-09 NOTE — PROGRESS NOTES
Nutrition Assessment    Type and Reason for Visit: Reassess    Nutrition Recommendations:   -Continue current diet  -Continue to monitor/encourage po intakes as tolerated  -Monitor need of ONS    Malnutrition Assessment:  · Malnutrition Status: No malnutrition  · Context: Acute illness or injury  · Findings of the 6 clinical characteristics of malnutrition (Minimum of 2 out of 6 clinical characteristics is required to make the diagnosis of moderate or severe Protein Calorie Malnutrition based on AND/ASPEN Guidelines):  1. Energy Intake-51% to 75%, greater than or equal to 5 days    2. Weight Loss-No significant weight loss,    3. Fat Loss-Unable to assess,    4. Muscle Loss-Unable to assess,    5. Fluid Accumulation-Moderate to severe fluid accumulation, Extremities  6.  Strength-Not measured    Nutrition Diagnosis:   · Problem: Inadequate oral intake  · Etiology: related to Catabolic illness     Signs and symptoms:  as evidenced by Intake 25-50%, Intake 50-75%    Nutrition Assessment:  · Subjective Assessment: Pt appetite reported below baseline- per nursing po intake documentation pt taking >75% of meals in the past (4/5). No other po intakes recorded in chart. Low appetite reported this morning. Pt currently is eating lunch. Pt has been educated on low sodium diet.    · Nutrition-Focused Physical Findings: +3 edema LE  · Wound Type: None  · Current Nutrition Therapies:  · Oral Diet Orders: Carb Control 4 Carbs/Meal, Cardiac   · Oral Diet intake: 51-75%  · Oral Nutrition Supplement (ONS) Orders: None  · Anthropometric Measures:  · Ht: 5' 9\" (175.3 cm)   · Current Body Wt: 160 lb (72.6 kg)  · Ideal Body Wt: 145 lb 8.1 oz (66 kg)   ·  % Ideal Body 113%   · BMI Classification: BMI 18.5 - 24.9 Normal Weight  · Comparative Standards (Estimated Nutrition Needs):  · Estimated Daily Total Kcal: 9291-1142 kcal  · Estimated Daily Protein (g): 70-90 gm    Estimated Intake vs Estimated Needs: Intake Improving    Nutrition Risk Level: Moderate    Nutrition Interventions:   Continue current diet  Continued Inpatient Monitoring, Education Completed    Nutrition Evaluation:   · Evaluation: Progressing toward goals   · Goals: Meet 50% of estimated nutrition needs    · Monitoring: Meal Intake, Diet Tolerance, Weight, Comparative Standards, Pertinent Labs, Ascites/Edema    See Adult Nutrition Doc Flowsheet for more detail.      Electronically signed by Fuentes Vergara DTR on 4/9/18 at 3:45 PM    Contact Number: [phone]

## 2018-04-09 NOTE — CARE COORDINATION
Discharge [facility] Case Management Department  Written by: Nicolas Borja RN    Patient Name: Leonidas Hendrickson  Attending Provider: Davi Arevalo MD  Admit Date: 4/3/2018  5:05 PM  MRN: [id number]  Account: [de-identified]                     : 1945  Discharge Date:       Disposition: home with [address] [facility]   Faxed JENNA and home care order     Nicolas Borja RN

## 2018-04-09 NOTE — PROGRESS NOTES
[facility]   Progress Note                   Date:   4/9/2018  Patient name: Yumi Norris  Date of admission:  4/3/2018  5:05 PM  MRN:   [id number]  YOB: 1945  PCP: Kandy Echavarria CNP    Reason for Admission: Congestive heart failure, NYHA class 4, acute, systolic (HCC) [T96.87]  Congestive heart failure due to high blood pressure ([facility] [address] [I11.0]    Subjective:       Clinical Changes / Abnormalities: Pt seen and examined. Pt denies any chest pain or shortness of breath. She remains in afib, rate 110's. Pt had 11 beat run of VT overnight. Per RN, pt fell last night, not witnessed. Pt states she did not hit her head. Son in room. Medications:   Scheduled Meds:   torsemide  20 mg Oral Every Other Day    bumetanide  2 mg Intravenous Q12H    metoprolol  5 mg Intravenous Once    spironolactone  25 mg Oral Daily    apixaban  5 mg Oral BID    thiamine  100 mg Oral Daily    folic acid  1 mg Oral Daily    calcium carbonate  500 mg Oral Daily    sodium chloride flush  10 mL Intravenous 2 times per day    famotidine  20 mg Oral BID    metoprolol tartrate  25 mg Oral BID     Continuous Infusions:  CBC:   Recent Labs      04/08/18   0713  04/09/18   0734   WBC  6.6  7.4   HGB  15.0  14.8   PLT  161  147     BMP:    Recent Labs      04/07/18   0633  04/08/18   0713  04/09/18   0556   NA  144  143  142   K  3.7  4.1  3.7   CL  104  104  98   CO2  21  24  27   BUN  34*  31*  28*   CREATININE  1.27*  1.11*  1.19*   GLUCOSE  99  101*  135*     Hepatic:   Recent Labs      04/07/18   0003  04/09/18   0556   AST  102*  58*   ALT  77*  66*   BILITOT  0.92  0.89   ALKPHOS  109*  109*     Troponin: No results for input(s): TROPONINI in the last 72 hours. BNP: No results for input(s): BNP in the last 72 hours. Lipids: No results for input(s): CHOL, HDL in the last 72 hours.     Invalid input(s): LDLCALCU  INR:   Recent Labs      04/09/18   1014   INR  1.2     AICD placement 4/6/18    Objective:   Vitals: /81   Pulse 112   Temp 97.7 °F (36.5 °C) (Oral)   Resp 18   Ht 5' 9\" (1.753 m)   Wt 160 lb 1.6 oz (72.6 kg)   SpO2 99%   BMI 23.64 kg/m²   General appearance: alert and cooperative with exam  HEENT: Head: Normocephalic, no lesions, without obvious abnormality. Neck: no JVD, trachea midline, no adenopathy  Lungs: Clear to auscultation  Heart: irregular rate and rhythm, s1/s2 auscultated, no murmurs, afib. AICD site intact with staples. Abdomen: soft, non-tender, bowel sounds active  Extremities: trace bilateral  Edema, left elbow steri-strips intact. Neurologic: not done        Assessment / Acute Cardiac Problems:   1. Chronic Afib on Eliquis  2. Acute on Chronic systolic CHF, Non-ischemic dilated CMP, last EF 20%  3. RAO on CKD 2-3 with baseline creat 0.9-1.1  4. Moderate MR  5. MILD-MODERATE AI  6. MILD TR    Patient Active Problem List:     Irregular heart rhythm     Pain in lower jaw     Ataxia     Atrial fibrillation (HCC)     Bilateral swelling of feet and ankles     SOB (shortness of breath)     Cardiomyopathy, nonischemic (HCC)     Systolic CHF, chronic (HCC)     CKD (chronic kidney disease), stage III     Positive FIT (fecal immunochemical test)     Vitamin B deficiency     Osteopenia     Arthritis of right hip     Bilateral carpal tunnel syndrome     Congestive heart failure, NYHA class 4, acute, systolic (HCC)     AICD (automatic cardioverter/defibrillator) Implant     Alcoholism ([address]     Metabolic encephalopathy     Cardiorenal syndrome type 1      Plan of Treatment:   1. VT- 11 beat run overnight. Keep K >4 and Mag > 2. Continue BB   2. Afib- Rate 110's. Will continue BB and start on digoxin. Continue eliquis. Discussed with patient and son risks of falls with eliquis. Pt states she has no history of falls. Will get PT consult. 3. Discussed d/c plan with son. He is planning staying with patient for a few days once discharged.     4. Possible d/c later today if HR controlled and ok with nephrology.       Electronically signed by Arben Patel CNP on 4/9/2018 at [facility].  [phone]

## 2018-04-09 NOTE — PROGRESS NOTES
regurgitation is present.  Cecilia Canavan is mild-moderate tricuspid regurgitation with RVSP estimated at 35   mmHg.   Trivial pulmonic regurgitation present.   Trivial pericardial effusion noted.   Pleural effusion noted.   Biatrial enlargement. Assessment:     1. RAO secondary to hypotension and ischemic ATN in setting of acute decompensation chronic systolic CHF and has been off Entresto for some time which is when the decompensation in CHF occurred. Resolved. 2. Chronic kidney disease secondary to ischemic nephrosclerosis from cardiorenal syndrome with baseline creatinine  0.9-1.1 USS reveals Rt Kidney 9.2cm and left Kidney 10.0 cm. Serologies negative in February of 2018. GIOVANNY negative in past.  3. Severe non-ischemic cardiomyopathy causing chronic CHF with EF 15-20% s/p AICD placement 4/6/18  4. HTN, currently with low normal BP  5. A Fib on BB and Eliquis  6. Peripheral neuropathy  7. ETOH Abuse    Plan:     1. Fluid restriction/salt restriction  2. Continue spironolactone 25 mg oral daily  3. PO Torsemide 20 mg daily. Hold Enteresto till the time she follows up with Dr Debra Martin  4. Outpatient BMP and follow up as instructed    Nutrition   Please ensure that patient is on a renal diet/TF. Avoid nephrotoxic drugs/contrast exposure. We will continue to follow along with you. Lexii Theodore MD  Internal Medicine, PGY2    [facility]    Attending Physician Statement  I have discussed the care of [facility], including pertinent history and exam findings,  with the Fellow/Residentt. I have reviewed the key elements of all parts of the encounter with the Fellow/ Resident. I agree with the assessment, plan and orders as documented by the resident.     Elvira Chisholm MD, MRCP Tameka Vee, FACP   4/9/2018 2:25 PM    Nephrology [address] [place] [address]

## 2018-04-10 ENCOUNTER — HOSPITAL ENCOUNTER (INPATIENT)
Age: 73
LOS: 1 days | Discharge: HOME HEALTH | DRG: 226 | End: 2018-04-11
Attending: EMERGENCY MEDICINE | Admitting: INTERNAL MEDICINE
Payer: MEDICARE

## 2018-04-10 ENCOUNTER — APPOINTMENT (OUTPATIENT)
Dept: GENERAL RADIOLOGY | Age: 73
DRG: 226 | End: 2018-04-10
Payer: MEDICARE

## 2018-04-10 ENCOUNTER — TELEPHONE (OUTPATIENT)
Dept: GASTROENTEROLOGY | Age: 73
End: 2018-04-10

## 2018-04-10 DIAGNOSIS — I50.9 ACUTE ON CHRONIC CONGESTIVE HEART FAILURE, UNSPECIFIED CONGESTIVE HEART FAILURE TYPE: Primary | ICD-10-CM

## 2018-04-10 DIAGNOSIS — R94.31 EKG ABNORMALITIES: ICD-10-CM

## 2018-04-10 DIAGNOSIS — I48.91 ATRIAL FIBRILLATION WITH RVR (HCC): ICD-10-CM

## 2018-04-10 LAB
ABSOLUTE EOS #: 0.06 K/UL (ref 0–0.44)
ABSOLUTE IMMATURE GRANULOCYTE: <0.03 K/UL (ref 0–0.3)
ABSOLUTE LYMPH #: 1 K/UL (ref 1.1–3.7)
ABSOLUTE MONO #: 1.02 K/UL (ref 0.1–1.2)
ANION GAP SERPL CALCULATED.3IONS-SCNC: 18 MMOL/L (ref 9–17)
BASOPHILS # BLD: 1 % (ref 0–2)
BASOPHILS ABSOLUTE: 0.04 K/UL (ref 0–0.2)
BNP INTERPRETATION: ABNORMAL
BUN BLDV-MCNC: 23 MG/DL (ref 8–23)
BUN/CREAT BLD: ABNORMAL (ref 9–20)
CALCIUM SERPL-MCNC: 8.9 MG/DL (ref 8.6–10.4)
CHLORIDE BLD-SCNC: 97 MMOL/L (ref 98–107)
CO2: 28 MMOL/L (ref 20–31)
CREAT SERPL-MCNC: 0.91 MG/DL (ref 0.5–0.9)
DIFFERENTIAL TYPE: ABNORMAL
EOSINOPHILS RELATIVE PERCENT: 1 % (ref 1–4)
GFR AFRICAN AMERICAN: >60 ML/MIN
GFR NON-AFRICAN AMERICAN: >60 ML/MIN
GFR SERPL CREATININE-BSD FRML MDRD: ABNORMAL ML/MIN/{1.73_M2}
GFR SERPL CREATININE-BSD FRML MDRD: ABNORMAL ML/MIN/{1.73_M2}
GLUCOSE BLD-MCNC: 98 MG/DL (ref 70–99)
HCT VFR BLD CALC: 46.3 % (ref 36.3–47.1)
HEMOGLOBIN: 14.9 G/DL (ref 11.9–15.1)
IMMATURE GRANULOCYTES: 0 %
LYMPHOCYTES # BLD: 14 % (ref 24–43)
MCH RBC QN AUTO: 32.6 PG (ref 25.2–33.5)
MCHC RBC AUTO-ENTMCNC: 32.2 G/DL (ref 28.4–34.8)
MCV RBC AUTO: 101.3 FL (ref 82.6–102.9)
MONOCYTES # BLD: 15 % (ref 3–12)
NRBC AUTOMATED: 0 PER 100 WBC
PDW BLD-RTO: 13.9 % (ref 11.8–14.4)
PLATELET # BLD: ABNORMAL K/UL (ref 138–453)
PLATELET ESTIMATE: ABNORMAL
PLATELET, FLUORESCENCE: 125 K/UL (ref 138–453)
PLATELET, IMMATURE FRACTION: 2.7 % (ref 1.1–10.3)
PMV BLD AUTO: ABNORMAL FL (ref 8.1–13.5)
POC TROPONIN I: 0.01 NG/ML (ref 0–0.1)
POC TROPONIN I: 0.02 NG/ML (ref 0–0.1)
POC TROPONIN INTERP: NORMAL
POC TROPONIN INTERP: NORMAL
POTASSIUM SERPL-SCNC: 4 MMOL/L (ref 3.7–5.3)
PRO-BNP: ABNORMAL PG/ML
RBC # BLD: 4.57 M/UL (ref 3.95–5.11)
RBC # BLD: ABNORMAL 10*6/UL
SEG NEUTROPHILS: 69 % (ref 36–65)
SEGMENTED NEUTROPHILS ABSOLUTE COUNT: 4.82 K/UL (ref 1.5–8.1)
SODIUM BLD-SCNC: 143 MMOL/L (ref 135–144)
WBC # BLD: 7 K/UL (ref 3.5–11.3)
WBC # BLD: ABNORMAL 10*3/UL

## 2018-04-10 PROCEDURE — 6370000000 HC RX 637 (ALT 250 FOR IP): Performed by: STUDENT IN AN ORGANIZED HEALTH CARE EDUCATION/TRAINING PROGRAM

## 2018-04-10 PROCEDURE — 6370000000 HC RX 637 (ALT 250 FOR IP): Performed by: EMERGENCY MEDICINE

## 2018-04-10 PROCEDURE — 96374 THER/PROPH/DIAG INJ IV PUSH: CPT

## 2018-04-10 PROCEDURE — 84484 ASSAY OF TROPONIN QUANT: CPT

## 2018-04-10 PROCEDURE — 1200000000 HC SEMI PRIVATE

## 2018-04-10 PROCEDURE — 85025 COMPLETE CBC W/AUTO DIFF WBC: CPT

## 2018-04-10 PROCEDURE — 2500000003 HC RX 250 WO HCPCS: Performed by: EMERGENCY MEDICINE

## 2018-04-10 PROCEDURE — 83880 ASSAY OF NATRIURETIC PEPTIDE: CPT

## 2018-04-10 PROCEDURE — 85055 RETICULATED PLATELET ASSAY: CPT

## 2018-04-10 PROCEDURE — 71046 X-RAY EXAM CHEST 2 VIEWS: CPT

## 2018-04-10 PROCEDURE — 80048 BASIC METABOLIC PNL TOTAL CA: CPT

## 2018-04-10 PROCEDURE — 99284 EMERGENCY DEPT VISIT MOD MDM: CPT

## 2018-04-10 PROCEDURE — 6360000002 HC RX W HCPCS: Performed by: EMERGENCY MEDICINE

## 2018-04-10 PROCEDURE — 96375 TX/PRO/DX INJ NEW DRUG ADDON: CPT

## 2018-04-10 PROCEDURE — 93005 ELECTROCARDIOGRAM TRACING: CPT

## 2018-04-10 RX ORDER — SODIUM CHLORIDE 0.9 % (FLUSH) 0.9 %
10 SYRINGE (ML) INJECTION PRN
Status: DISCONTINUED | OUTPATIENT
Start: 2018-04-10 | End: 2018-04-11 | Stop reason: HOSPADM

## 2018-04-10 RX ORDER — THIAMINE MONONITRATE (VIT B1) 100 MG
100 TABLET ORAL DAILY
Status: DISCONTINUED | OUTPATIENT
Start: 2018-04-11 | End: 2018-04-11 | Stop reason: HOSPADM

## 2018-04-10 RX ORDER — LORAZEPAM 1 MG/1
2 TABLET ORAL
Status: DISCONTINUED | OUTPATIENT
Start: 2018-04-10 | End: 2018-04-11

## 2018-04-10 RX ORDER — FOLIC ACID 1 MG/1
1 TABLET ORAL DAILY
Status: DISCONTINUED | OUTPATIENT
Start: 2018-04-11 | End: 2018-04-11 | Stop reason: HOSPADM

## 2018-04-10 RX ORDER — DIGOXIN 125 MCG
125 TABLET ORAL DAILY
Status: DISCONTINUED | OUTPATIENT
Start: 2018-04-11 | End: 2018-04-11 | Stop reason: HOSPADM

## 2018-04-10 RX ORDER — DIPHENHYDRAMINE HYDROCHLORIDE 50 MG/ML
25 INJECTION INTRAMUSCULAR; INTRAVENOUS ONCE
Status: COMPLETED | OUTPATIENT
Start: 2018-04-10 | End: 2018-04-10

## 2018-04-10 RX ORDER — LORAZEPAM 2 MG/ML
3 INJECTION INTRAMUSCULAR
Status: DISCONTINUED | OUTPATIENT
Start: 2018-04-10 | End: 2018-04-11

## 2018-04-10 RX ORDER — ASCORBIC ACID 500 MG
500 TABLET ORAL DAILY
Status: DISCONTINUED | OUTPATIENT
Start: 2018-04-11 | End: 2018-04-11 | Stop reason: HOSPADM

## 2018-04-10 RX ORDER — ACETAMINOPHEN 325 MG/1
650 TABLET ORAL EVERY 4 HOURS PRN
Status: DISCONTINUED | OUTPATIENT
Start: 2018-04-10 | End: 2018-04-11 | Stop reason: HOSPADM

## 2018-04-10 RX ORDER — TORSEMIDE 20 MG/1
20 TABLET ORAL DAILY
Status: DISCONTINUED | OUTPATIENT
Start: 2018-04-11 | End: 2018-04-11 | Stop reason: HOSPADM

## 2018-04-10 RX ORDER — CHOLECALCIFEROL (VITAMIN D3) 125 MCG
1000 CAPSULE ORAL DAILY
Status: DISCONTINUED | OUTPATIENT
Start: 2018-04-11 | End: 2018-04-11 | Stop reason: HOSPADM

## 2018-04-10 RX ORDER — LORAZEPAM 2 MG/ML
2 INJECTION INTRAMUSCULAR
Status: DISCONTINUED | OUTPATIENT
Start: 2018-04-10 | End: 2018-04-11

## 2018-04-10 RX ORDER — LORAZEPAM 1 MG/1
3 TABLET ORAL
Status: DISCONTINUED | OUTPATIENT
Start: 2018-04-10 | End: 2018-04-11

## 2018-04-10 RX ORDER — LORAZEPAM 0.5 MG/1
0.5 TABLET ORAL ONCE
Status: COMPLETED | OUTPATIENT
Start: 2018-04-10 | End: 2018-04-10

## 2018-04-10 RX ORDER — LORAZEPAM 1 MG/1
1 TABLET ORAL
Status: DISCONTINUED | OUTPATIENT
Start: 2018-04-10 | End: 2018-04-11 | Stop reason: HOSPADM

## 2018-04-10 RX ORDER — ONDANSETRON 2 MG/ML
4 INJECTION INTRAMUSCULAR; INTRAVENOUS EVERY 6 HOURS PRN
Status: DISCONTINUED | OUTPATIENT
Start: 2018-04-10 | End: 2018-04-11 | Stop reason: HOSPADM

## 2018-04-10 RX ORDER — LORAZEPAM 1 MG/1
4 TABLET ORAL
Status: DISCONTINUED | OUTPATIENT
Start: 2018-04-10 | End: 2018-04-11

## 2018-04-10 RX ORDER — SPIRONOLACTONE 25 MG/1
25 TABLET ORAL DAILY
Status: DISCONTINUED | OUTPATIENT
Start: 2018-04-11 | End: 2018-04-11 | Stop reason: HOSPADM

## 2018-04-10 RX ORDER — SODIUM CHLORIDE 0.9 % (FLUSH) 0.9 %
10 SYRINGE (ML) INJECTION EVERY 12 HOURS SCHEDULED
Status: DISCONTINUED | OUTPATIENT
Start: 2018-04-10 | End: 2018-04-11 | Stop reason: HOSPADM

## 2018-04-10 RX ORDER — BUMETANIDE 0.25 MG/ML
1 INJECTION, SOLUTION INTRAMUSCULAR; INTRAVENOUS ONCE
Status: COMPLETED | OUTPATIENT
Start: 2018-04-10 | End: 2018-04-10

## 2018-04-10 RX ORDER — METOPROLOL TARTRATE 5 MG/5ML
5 INJECTION INTRAVENOUS ONCE
Status: COMPLETED | OUTPATIENT
Start: 2018-04-10 | End: 2018-04-10

## 2018-04-10 RX ORDER — DIPHENHYDRAMINE HYDROCHLORIDE 25 MG/1
5000 TABLET ORAL DAILY
Status: DISCONTINUED | OUTPATIENT
Start: 2018-04-11 | End: 2018-04-11 | Stop reason: HOSPADM

## 2018-04-10 RX ORDER — M-VIT,TX,IRON,MINS/CALC/FOLIC 27MG-0.4MG
1 TABLET ORAL DAILY
Status: DISCONTINUED | OUTPATIENT
Start: 2018-04-11 | End: 2018-04-11 | Stop reason: HOSPADM

## 2018-04-10 RX ORDER — LORAZEPAM 2 MG/ML
4 INJECTION INTRAMUSCULAR
Status: DISCONTINUED | OUTPATIENT
Start: 2018-04-10 | End: 2018-04-11

## 2018-04-10 RX ORDER — ASPIRIN 81 MG/1
324 TABLET, CHEWABLE ORAL ONCE
Status: DISCONTINUED | OUTPATIENT
Start: 2018-04-10 | End: 2018-04-10

## 2018-04-10 RX ORDER — LORAZEPAM 2 MG/ML
1 INJECTION INTRAMUSCULAR
Status: DISCONTINUED | OUTPATIENT
Start: 2018-04-10 | End: 2018-04-11 | Stop reason: HOSPADM

## 2018-04-10 RX ORDER — METOPROLOL TARTRATE 50 MG/1
25 TABLET, FILM COATED ORAL 2 TIMES DAILY
Status: DISCONTINUED | OUTPATIENT
Start: 2018-04-10 | End: 2018-04-11 | Stop reason: HOSPADM

## 2018-04-10 RX ORDER — CALCIUM CARBONATE 500(1250)
500 TABLET ORAL DAILY
Status: DISCONTINUED | OUTPATIENT
Start: 2018-04-11 | End: 2018-04-11 | Stop reason: HOSPADM

## 2018-04-10 RX ADMIN — LORAZEPAM 0.5 MG: 0.5 TABLET ORAL at 21:16

## 2018-04-10 RX ADMIN — APIXABAN 5 MG: 5 TABLET, FILM COATED ORAL at 23:21

## 2018-04-10 RX ADMIN — DIPHENHYDRAMINE HYDROCHLORIDE 25 MG: 50 INJECTION, SOLUTION INTRAMUSCULAR; INTRAVENOUS at 19:17

## 2018-04-10 RX ADMIN — METOROPROLOL TARTRATE 5 MG: 5 INJECTION, SOLUTION INTRAVENOUS at 20:21

## 2018-04-10 RX ADMIN — BUMETANIDE 1 MG: 0.25 INJECTION INTRAMUSCULAR; INTRAVENOUS at 17:16

## 2018-04-10 RX ADMIN — METOPROLOL TARTRATE 25 MG: 50 TABLET, FILM COATED ORAL at 21:11

## 2018-04-10 RX ADMIN — LORAZEPAM 1 MG: 1 TABLET ORAL at 23:21

## 2018-04-10 ASSESSMENT — ENCOUNTER SYMPTOMS
COUGH: 0
NAUSEA: 0
RHINORRHEA: 0
SHORTNESS OF BREATH: 0
BACK PAIN: 0
VOMITING: 0
ABDOMINAL PAIN: 0

## 2018-04-10 NOTE — ED PROVIDER NOTES
[facility]. 2  Emergency Department Encounter  Emergency Medicine Resident     Pt Name: Asya Donohue  MRN: [id number]  Donnagfnicholas 1945  Date of evaluation: 4/10/18  PCP:  oBby Chery 1221       Chief Complaint   Patient presents with    Leg Swelling     pt d/c'd yesterday from hospital. today, pt has swelling to her ankles and feet that was not present yesterday at time of discharge. HISTORY OF PRESENT ILLNESS  (Location/Symptom, Timing/Onset, Context/Setting, Quality, Duration, Modifying Factors, Severity.)      Asya Donohue is a 71 y.o. female who presents With complaints of lower extremity swelling. Patient was just discharged yesterday from hospital after CHF exacerbation, significant leg swelling, AICD implantation. Patient was discharged home at 7 PM and was walking around a lot and then was on her feet this morning and son noted a large amount of swelling to her lower extremities. Patient is not wearing compression stockings. Patient denies any shortness of breath, fever, chills, nausea, vomiting, chest pain, shortness of breath, abdominal pain or any other complaints. Otherwise feels well. Patient is on diuresis as well as digoxin. They called into their cardiologist who instructed him to come to the ER. REVIEW OF SYSTEMS    (2-9 systems for level 4, 10 or more for level 5)      Review of Systems   Constitutional: Negative for chills and fever. HENT: Negative for congestion and rhinorrhea. Respiratory: Negative for cough and shortness of breath. Cardiovascular: Positive for leg swelling. Negative for chest pain. Gastrointestinal: Negative for abdominal pain, nausea and vomiting. Genitourinary: Negative for difficulty urinating and dysuria. Musculoskeletal: Negative for back pain. Skin: Negative for rash and wound. Neurological: Negative for light-headedness and headaches. Hematological: Negative for adenopathy.  Does not bruise/bleed easily. Psychiatric/Behavioral: Negative for behavioral problems and confusion. PAST MEDICAL / SURGICAL / SOCIAL / FAMILY HISTORY      has a past medical history of Atrial fibrillation ([facility] [address] Chronic kidney disease; H/O cardiovascular stress test; Hx of long term use of blood thinners; Nonischemic dilated cardiomyopathy ([facility] [address] and Other screening mammogram.       has a past surgical history that includes knee surgery; Colonoscopy (Nov. 2001 ( 2011 ) ); Cardiac catheterization (08/2017); Cardiac defibrillator placement (04/06/2018); and transesophageal echocardiogram (11/10/2017). Social History     Social History    Marital status:      Spouse name: N/A    Number of children: N/A    Years of education: N/A     Occupational History    Not on file. Social History Main Topics    Smoking status: Former Smoker     Quit date: 10/4/1987    Smokeless tobacco: Never Used    Alcohol use Yes    Drug use: No    Sexual activity: Not on file     Other Topics Concern    Not on file     Social History Narrative    No narrative on file         Family History   Problem Relation Age of Onset    Adopted: Yes       Portions of the past medical history, surgical history, social history, and family history were discussed and reviewed with the patient/family and is included here or in HPI if pertinent. ALLERGIES / [facility] / HOME MEDICATIONS       Allergies:  Contrast [barium-containing compounds] and Vancomycin      IMMUNIZATIONS    Immunization History   Administered Date(s) Administered    Pneumococcal 13-valent Conjugate ([facility] [place]) 10/04/2017         Home Medications:  Prior to Admission medications    Medication Sig Start Date End Date Taking?  Authorizing Provider   digoxin (LANOXIN) 125 MCG tablet Take 1 tablet by mouth daily 4/10/18  Yes Baljit Boinlla CNP   torsemide (DEMADEX) 20 MG tablet Take 1 tablet by mouth daily 4/10/18  Yes Baljit Bonilla CNP   metoprolol tartrate (LOPRESSOR) 25 MG tablet Take 1 tablet by mouth 2 times daily 4/7/18  Yes Darlene Hi MD   vitamin B-12 (CYANOCOBALAMIN) 1000 MCG tablet Take 1,000 mcg by mouth daily   Yes Historical Provider, MD   Multiple Vitamins-Minerals (THERAPEUTIC MULTIVITAMIN-MINERALS) tablet Take 1 tablet by mouth daily   Yes Historical Provider, MD   calcium carbonate (OSCAL) 500 MG TABS tablet Take 500 mg by mouth daily   Yes Historical Provider, MD   Biotin (BIOTIN 5000) 5 MG CAPS Take 5,000 mcg by mouth daily   Yes Historical Provider, MD   apixaban (ELIQUIS) 5 MG TABS tablet Take 5 mg by mouth 2 times daily   Yes Historical Provider, MD   spironolactone (ALDACTONE) 25 MG tablet Take 1 tablet by mouth daily  Patient taking differently: Take 12.5 mg by mouth daily  9/11/17  Yes Michelle Kirby MD   sertraline (ZOLOFT) 50 MG tablet Take 50 mg by mouth daily  7/21/17  Yes Historical Provider, MD   ascorbic acid (VITAMIN C) 500 MG tablet Take 500 mg by mouth daily    Yes Historical Provider, MD         PHYSICAL EXAM   (up to 7 for level 4, 8 or more for level 5)      INITIAL VITALS:    weight is 145 lb (65.8 kg). Her oral temperature is 97.7 °F (36.5 °C). Her blood pressure is 104/76 and her pulse is 112. Her respiration is 19 and oxygen saturation is 99%. Physical Exam   Constitutional: She is oriented to person, place, and time. She appears well-developed and well-nourished. No distress. HENT:   Head: Normocephalic and atraumatic. Eyes: Conjunctivae and EOM are normal.   Neck: Normal range of motion. Neck supple. Cardiovascular: Normal rate, regular rhythm, normal heart sounds and intact distal pulses. Pulmonary/Chest: Effort normal and breath sounds normal. No respiratory distress. Abdominal: Soft. She exhibits no distension. There is no tenderness. Musculoskeletal: Normal range of motion. She exhibits edema (2-3+ pitting edema up to the knees bilaterally). She exhibits no tenderness.    Neurological: She is alert and Notable for the following:     CREATININE 0.91 (*)     Chloride 97 (*)     Anion Gap 18 (*)     All other components within normal limits   BRAIN NATRIURETIC PEPTIDE - Abnormal; Notable for the following:     Pro-BNP 34,841 (*)     All other components within normal limits   IMMATURE PLATELET FRACTION - Abnormal; Notable for the following:     Platelet, Fluorescence 125 (*)     All other components within normal limits   POCT TROPONIN   POCT TROPONIN   POCT TROPONIN   POCT TROPONIN       RADIOLOGY:  Xr Chest Standard (2 Vw)    Result Date: 4/10/2018  EXAMINATION: TWO VIEWS OF THE CHEST, 4/10/2018 2:38 pm COMPARISON: 04/07/2018 HISTORY: ORDERING SYSTEM PROVIDED HISTORY: Increased swelling, crackles at bases TECHNOLOGIST PROVIDED HISTORY: Reason for exam:->Increased swelling, crackles at bases FINDINGS: Left chest cardiac defibrillator device is stable in position. Mild cardiomegaly is unchanged. Vascular congestion appears to be slightly improved. Small pleural effusions persist.  No pneumothorax. No new airspace consolidation. No evidence of acute osseous abnormality. Mildly improved vascular congestion with persistent small pleural effusions. Xr Chest Portable    Result Date: 4/7/2018  EXAMINATION: SINGLE VIEW OF THE CHEST 4/7/2018 6:00 pm COMPARISON: 04/06/2018. HISTORY: ORDERING SYSTEM PROVIDED HISTORY: Acute CHF TECHNOLOGIST PROVIDED HISTORY: Reason for exam:->Acute CHF Initial evaluation. FINDINGS: A left-sided single lead cardiac AICD is unchanged in position. There is persistent enlargement of the cardiac silhouette with prominence of the pulmonary vasculature. Small bilateral pleural effusions with bibasilar atelectasis. No pneumothorax. 1. Persistent enlargement of the cardiac silhouette with prominence of the pulmonary vasculature. 2. Bilateral pleural effusions with bibasilar atelectasis. 3. No evidence of a pneumothorax.      Xr Chest Portable    Result Date: 4/6/2018  EXAMINATION: T-wave abnormalities, new inversions in V5 and V6  Q Waves: none    Clinical Impression: Atrial fibrillation with incomplete right bundle-branch block and right axis deviation with nonspecific ST and T-wave abnormalities and new T-wave inversions in V5 and V6 concerning for lateral ischemia compared to EKG from 4/4/2018    All EKG's are interpreted by the Emergency Department Physician who either signs or Co-signs this chart in the absence of a cardiologist.    ED BEDSIDE ULTRASOUND:   Not indicated      DIFFERENTIAL DX / [facility] / [facility]     DIFFERENTIAL DIAGNOSIS:  Myocardial ischemia, angina, DVT, PE, pneumonia, dissection, CHF exacerbation, fluid overload    EMERGENCY DEPARTMENT COURSE/[facility]  Patient presents with lower extremity swelling. On exam patient resting calmly, normal vitals, afebrile. Abdomen soft nontender, heart irregularly irregular with normal rate. Patient atrial fibrillation. Lungs clear to auscultation though mild crackles noted at the bases. Otherwise benign physical exam.  EKG shows atrial fibrillation with new T-wave inversion in V5 and V6. We'll obtain chest pain workup, plan on cardiac consultation to discuss management. BNP elevated. Cardiology consulted. Spoke with cardiology about admission given EKG changes as well as fluid overload and CHF exacerbation. I recommended 1 mg of Bumex now and then twice a day. They recommended admission to [facility]. Spoke with [facility] he stated that internal medicine saw the patient on most recent admission and that this should go to them. Spoke with internal medicine who agreed with admission to [facility] 5. For continued diuresis. Awaiting bed upstairs at 550. While awaiting bed upstairs patient became tachycardic into the 130s after fibrillation with RVR.   Given 5 mg of Lopressor as well as her dose of 25 mg of Lopressor nightly oral.  Tachycardia mildly improved though patient was also very fidgety and son related fact that she has been a chronic drinker of alcohol at night for his long as he has known her. States she may be going through some withdrawal issues. Patient given 0.5 mg of Ativan. Patient admitted upstairs around 9:30 PM.        PROCEDURES:  Procedures    CONSULTS:  IP CONSULT TO CARDIOLOGY  IP CONSULT TO INTERNAL MEDICINE  IP CONSULT TO NEPHROLOGY    CRITICAL CARE:  See Attending note    FINAL IMPRESSION      1. Acute on chronic congestive heart failure, unspecified congestive heart failure type ([facility]    2. EKG abnormalities    3.  Atrial fibrillation with RVR ([facility]              DISPOSITION / PLAN     DISPOSITION Admitted 04/10/2018 05:14:15 PM          PATIENT REFERRED TO:  Juan Frank [address]  [facility] [address] [facility]          Rodo Whyte, [address] Joana Ramos [address] [facility].  [phone]            DISCHARGE MEDICATIONS:  Current Discharge Medication List          Lilian Berry DO  Emergency Medicine Resident    (Please note that portions of this note were completed with a voice recognition program.  Efforts were made to edit the dictations but occasionally words are mis-transcribed.)        Lilian Berry DO  04/10/18 [id number]

## 2018-04-10 NOTE — PROGRESS NOTES
Cardiac Testing:     STRESS 7/26/17: Neg. EF 24%     CATH 8/04/17: Normal cors. EF 20%.      TTE 11/10/17: EF 20%. Mod MR. Mild to mod AI. Mild TR. Segmental WMA. ICD 4/6/18: [employer]. ([place])     1. Nonischemic cardiomyopathy, initially diagnosed in 7/2017 on nuclear stress test.   2. Status post cardiac catheterization in 8/2017 that revealed no coronary artery disease, EF of 27%. Right heart catheterization showed a mean PA of 28, wedge pressure of 18, cardiac output of 2.8, cardiac index of 1.6. 3. Holter in 8/2017 revealed an average heart rate of 102, lowest heart rate of 54, highest heart rate of 178, frequent PVCs, bigeminy and nonsustained VT. 4. Hypertension. 5. Dyslipidemia. 6. Permanent atrial fibrillation, on Eliquis. 7. Echocardiogram on 11/10/2017: LV dilated, EF 20%, moderate mitral regurge, mild to moderate aortic insufficiency, mild tricuspid regurgitation. 8. Unable to take ACE inhibitor and Entresto, as per nephrology. 9. Patient was scheduled for AICD next week.    10. Worsening of CHF by gaining 23 pounds.

## 2018-04-10 NOTE — ED NOTES
Took report from Renetta alford. Pt has rash to bilateral arms and back w/ pruritus. Resident alerted and pt medicated. Pt denies any other symptoms of allergic reaction at this time, also states its been going on \"for awhile\". Will continue to monitor. Pt in NAD and on monitor.      Michell Baca, RN  04/10/18 [id number]

## 2018-04-10 NOTE — ED PROVIDER NOTES
[facility]     Emergency Department     Faculty Attestation    I performed a history and physical examination of the patient and discussed management with the resident. I have reviewed and agree with the residents findings including all diagnostic interpretations, and treatment plans as written. Any areas of disagreement are noted on the chart. I was personally present for the key portions of any procedures. I have documented in the chart those procedures where I was not present during the key portions. I have reviewed the emergency nurses triage note. I agree with the chief complaint, past medical history, past surgical history, allergies, medications, social and family history as documented unless otherwise noted below. Documentation of the HPI, Physical Exam and Medical Decision Making performed by scribemeka is based on my personal performance of the HPI, PE and MDM. For Physician Assistant/ Nurse Practitioner cases/documentation I have personally evaluated this patient and have completed at least one if not all key elements of the E/M (history, physical exam, and MDM). Additional findings are as noted. Primary Care Physician: Myra Whittington CNP    History: This is a 71 y.o. female who presents to the Emergency Department with complaint of Edema. Patient was discharged last night after admission for similar complaints. Went home and has been up and moving about more than usual. This morning they noted an increase edema in the lower extremities and called the doctor and was told to come to the emergency department for further evaluation. Patient denies any chest pain or shortness of breath. The patient denies any nausea vomiting or abdominal pain. Physical:   weight is 145 lb (65.8 kg). Her oral temperature is 97.7 °F (36.5 °C). Her blood pressure is 99/60 and her pulse is 94. Her respiration is 18 and oxygen saturation is 99%.   Lungs show some

## 2018-04-11 ENCOUNTER — APPOINTMENT (OUTPATIENT)
Dept: ULTRASOUND IMAGING | Age: 73
DRG: 226 | End: 2018-04-11
Payer: MEDICARE

## 2018-04-11 VITALS
SYSTOLIC BLOOD PRESSURE: 108 MMHG | HEIGHT: 69 IN | DIASTOLIC BLOOD PRESSURE: 66 MMHG | BODY MASS INDEX: 22.22 KG/M2 | TEMPERATURE: 97.5 F | HEART RATE: 102 BPM | RESPIRATION RATE: 20 BRPM | WEIGHT: 150 LBS | OXYGEN SATURATION: 98 %

## 2018-04-11 PROBLEM — M25.475 BILATERAL SWELLING OF FEET AND ANKLES: Chronic | Status: RESOLVED | Noted: 2017-07-12 | Resolved: 2018-04-11

## 2018-04-11 PROBLEM — R06.02 SOB (SHORTNESS OF BREATH): Chronic | Status: RESOLVED | Noted: 2017-07-12 | Resolved: 2018-04-11

## 2018-04-11 PROBLEM — I50.21: Status: RESOLVED | Noted: 2018-04-03 | Resolved: 2018-04-11

## 2018-04-11 PROBLEM — M25.474 BILATERAL SWELLING OF FEET AND ANKLES: Chronic | Status: RESOLVED | Noted: 2017-07-12 | Resolved: 2018-04-11

## 2018-04-11 PROBLEM — I42.8 CARDIOMYOPATHY, NONISCHEMIC (HCC): Status: RESOLVED | Noted: 2017-07-26 | Resolved: 2018-04-11

## 2018-04-11 PROBLEM — M25.472 BILATERAL SWELLING OF FEET AND ANKLES: Chronic | Status: RESOLVED | Noted: 2017-07-12 | Resolved: 2018-04-11

## 2018-04-11 PROBLEM — Z95.810 AICD (AUTOMATIC CARDIOVERTER/DEFIBRILLATOR) PRESENT: Status: RESOLVED | Noted: 2018-04-06 | Resolved: 2018-04-11

## 2018-04-11 PROBLEM — R60.0 BILATERAL LEG EDEMA: Status: ACTIVE | Noted: 2018-04-10

## 2018-04-11 PROBLEM — I13.10 CARDIORENAL SYNDROME: Status: RESOLVED | Noted: 2018-04-06 | Resolved: 2018-04-11

## 2018-04-11 PROBLEM — M25.471 BILATERAL SWELLING OF FEET AND ANKLES: Chronic | Status: RESOLVED | Noted: 2017-07-12 | Resolved: 2018-04-11

## 2018-04-11 LAB
ABSOLUTE EOS #: 0.37 K/UL (ref 0–0.44)
ABSOLUTE IMMATURE GRANULOCYTE: 0.03 K/UL (ref 0–0.3)
ABSOLUTE LYMPH #: 1.12 K/UL (ref 1.1–3.7)
ABSOLUTE MONO #: 0.91 K/UL (ref 0.1–1.2)
ALBUMIN SERPL-MCNC: 3.2 G/DL (ref 3.5–5.2)
ALBUMIN/GLOBULIN RATIO: 1.6 (ref 1–2.5)
ALP BLD-CCNC: 91 U/L (ref 35–104)
ALT SERPL-CCNC: 41 U/L (ref 5–33)
ANION GAP SERPL CALCULATED.3IONS-SCNC: 15 MMOL/L (ref 9–17)
AST SERPL-CCNC: 43 U/L
BASOPHILS # BLD: 0 % (ref 0–2)
BASOPHILS ABSOLUTE: <0.03 K/UL (ref 0–0.2)
BILIRUB SERPL-MCNC: 1.61 MG/DL (ref 0.3–1.2)
BUN BLDV-MCNC: 25 MG/DL (ref 8–23)
BUN/CREAT BLD: ABNORMAL (ref 9–20)
CALCIUM SERPL-MCNC: 8.8 MG/DL (ref 8.6–10.4)
CHLORIDE BLD-SCNC: 96 MMOL/L (ref 98–107)
CO2: 27 MMOL/L (ref 20–31)
CREAT SERPL-MCNC: 1.19 MG/DL (ref 0.5–0.9)
DIFFERENTIAL TYPE: ABNORMAL
EKG ATRIAL RATE: 133 BPM
EKG ATRIAL RATE: 94 BPM
EKG Q-T INTERVAL: 328 MS
EKG Q-T INTERVAL: 380 MS
EKG QRS DURATION: 86 MS
EKG QRS DURATION: 94 MS
EKG QTC CALCULATION (BAZETT): 469 MS
EKG QTC CALCULATION (BAZETT): 496 MS
EKG R AXIS: -102 DEGREES
EKG R AXIS: -150 DEGREES
EKG T AXIS: -113 DEGREES
EKG T AXIS: 23 DEGREES
EKG VENTRICULAR RATE: 138 BPM
EKG VENTRICULAR RATE: 92 BPM
EOSINOPHILS RELATIVE PERCENT: 6 % (ref 1–4)
GFR AFRICAN AMERICAN: 54 ML/MIN
GFR NON-AFRICAN AMERICAN: 45 ML/MIN
GFR SERPL CREATININE-BSD FRML MDRD: ABNORMAL ML/MIN/{1.73_M2}
GFR SERPL CREATININE-BSD FRML MDRD: ABNORMAL ML/MIN/{1.73_M2}
GLUCOSE BLD-MCNC: 96 MG/DL (ref 70–99)
HCT VFR BLD CALC: 45.2 % (ref 36.3–47.1)
HEMOGLOBIN: 14.3 G/DL (ref 11.9–15.1)
IMMATURE GRANULOCYTES: 1 %
LYMPHOCYTES # BLD: 17 % (ref 24–43)
MCH RBC QN AUTO: 32.1 PG (ref 25.2–33.5)
MCHC RBC AUTO-ENTMCNC: 31.6 G/DL (ref 28.4–34.8)
MCV RBC AUTO: 101.3 FL (ref 82.6–102.9)
METHYLMALONIC ACID: 0.45 UMOL/L (ref 0–0.4)
MONOCYTES # BLD: 14 % (ref 3–12)
NRBC AUTOMATED: 0 PER 100 WBC
PDW BLD-RTO: 14.3 % (ref 11.8–14.4)
PLATELET # BLD: 150 K/UL (ref 138–453)
PLATELET ESTIMATE: ABNORMAL
PMV BLD AUTO: 11.2 FL (ref 8.1–13.5)
POTASSIUM SERPL-SCNC: 3.9 MMOL/L (ref 3.7–5.3)
RBC # BLD: 4.46 M/UL (ref 3.95–5.11)
RBC # BLD: ABNORMAL 10*6/UL
SEG NEUTROPHILS: 62 % (ref 36–65)
SEGMENTED NEUTROPHILS ABSOLUTE COUNT: 4.19 K/UL (ref 1.5–8.1)
SODIUM BLD-SCNC: 138 MMOL/L (ref 135–144)
TOTAL PROTEIN: 5.2 G/DL (ref 6.4–8.3)
TROPONIN INTERP: NORMAL
TROPONIN INTERP: NORMAL
TROPONIN T: <0.03 NG/ML
TROPONIN T: <0.03 NG/ML
WBC # BLD: 6.6 K/UL (ref 3.5–11.3)
WBC # BLD: ABNORMAL 10*3/UL

## 2018-04-11 PROCEDURE — 2580000003 HC RX 258: Performed by: STUDENT IN AN ORGANIZED HEALTH CARE EDUCATION/TRAINING PROGRAM

## 2018-04-11 PROCEDURE — 6370000000 HC RX 637 (ALT 250 FOR IP): Performed by: STUDENT IN AN ORGANIZED HEALTH CARE EDUCATION/TRAINING PROGRAM

## 2018-04-11 PROCEDURE — 84484 ASSAY OF TROPONIN QUANT: CPT

## 2018-04-11 PROCEDURE — 76705 ECHO EXAM OF ABDOMEN: CPT

## 2018-04-11 PROCEDURE — 6360000002 HC RX W HCPCS: Performed by: STUDENT IN AN ORGANIZED HEALTH CARE EDUCATION/TRAINING PROGRAM

## 2018-04-11 PROCEDURE — 85025 COMPLETE CBC W/AUTO DIFF WBC: CPT

## 2018-04-11 PROCEDURE — 93970 EXTREMITY STUDY: CPT

## 2018-04-11 PROCEDURE — 36415 COLL VENOUS BLD VENIPUNCTURE: CPT

## 2018-04-11 PROCEDURE — 94762 N-INVAS EAR/PLS OXIMTRY CONT: CPT

## 2018-04-11 PROCEDURE — 80053 COMPREHEN METABOLIC PANEL: CPT

## 2018-04-11 PROCEDURE — 93923 UPR/LXTR ART STDY 3+ LVLS: CPT

## 2018-04-11 PROCEDURE — 99223 1ST HOSP IP/OBS HIGH 75: CPT | Performed by: INTERNAL MEDICINE

## 2018-04-11 PROCEDURE — 99220 PR INITIAL OBSERVATION CARE/DAY 70 MINUTES: CPT | Performed by: INTERNAL MEDICINE

## 2018-04-11 RX ORDER — DIAPER,BRIEF,INFANT-TODD,DISP
EACH MISCELLANEOUS 2 TIMES DAILY
Status: DISCONTINUED | OUTPATIENT
Start: 2018-04-11 | End: 2018-04-11 | Stop reason: HOSPADM

## 2018-04-11 RX ORDER — FOLIC ACID 1 MG/1
1 TABLET ORAL DAILY
Qty: 30 TABLET | Refills: 3 | Status: SHIPPED | OUTPATIENT
Start: 2018-04-12 | End: 2018-06-13

## 2018-04-11 RX ORDER — DIPHENHYDRAMINE HCL 25 MG
25 TABLET ORAL EVERY 6 HOURS PRN
Status: DISCONTINUED | OUTPATIENT
Start: 2018-04-11 | End: 2018-04-11 | Stop reason: HOSPADM

## 2018-04-11 RX ORDER — LANOLIN ALCOHOL/MO/W.PET/CERES
100 CREAM (GRAM) TOPICAL DAILY
Qty: 30 TABLET | Refills: 3 | Status: SHIPPED | OUTPATIENT
Start: 2018-04-12

## 2018-04-11 RX ADMIN — SERTRALINE 50 MG: 50 TABLET, FILM COATED ORAL at 10:05

## 2018-04-11 RX ADMIN — Medication 500 MG: at 10:06

## 2018-04-11 RX ADMIN — DIGOXIN 125 MCG: 125 TABLET ORAL at 10:07

## 2018-04-11 RX ADMIN — CALCIUM 500 MG: 500 TABLET ORAL at 10:08

## 2018-04-11 RX ADMIN — SPIRONOLACTONE 25 MG: 25 TABLET ORAL at 10:06

## 2018-04-11 RX ADMIN — HYDROCORTISONE: 10 CREAM TOPICAL at 10:06

## 2018-04-11 RX ADMIN — METOPROLOL TARTRATE 25 MG: 50 TABLET, FILM COATED ORAL at 10:06

## 2018-04-11 RX ADMIN — APIXABAN 5 MG: 5 TABLET, FILM COATED ORAL at 10:05

## 2018-04-11 RX ADMIN — LORAZEPAM 1 MG: 2 INJECTION INTRAMUSCULAR; INTRAVENOUS at 03:01

## 2018-04-11 RX ADMIN — DIPHENHYDRAMINE HCL 25 MG: 25 TABLET ORAL at 10:06

## 2018-04-11 RX ADMIN — Medication 100 MG: at 10:06

## 2018-04-11 RX ADMIN — Medication 10 ML: at 10:09

## 2018-04-11 RX ADMIN — MULTIPLE VITAMINS W/ MINERALS TAB 1 TABLET: TAB at 10:06

## 2018-04-11 RX ADMIN — CYANOCOBALAMIN TAB 500 MCG 1000 MCG: 500 TAB at 12:12

## 2018-04-11 RX ADMIN — FOLIC ACID 1 MG: 1 TABLET ORAL at 10:05

## 2018-04-11 RX ADMIN — Medication 10 ML: at 00:17

## 2018-04-11 RX ADMIN — TORSEMIDE 20 MG: 20 TABLET ORAL at 10:10

## 2018-04-11 NOTE — H&P
11/10/2017    EF 20%. Mod MR. Mild to mod AI. Mild TR. Segmental WMA. Allergies: Allergies   Allergen Reactions    Contrast [Barium-Containing Compounds]      Unknown     Vancomycin          Home Meds:   Prior to Admission medications    Medication Sig Start Date End Date Taking? Authorizing Provider   digoxin (LANOXIN) 125 MCG tablet Take 1 tablet by mouth daily 4/10/18  Yes Rodo Sylvester CNP   torsemide (DEMADEX) 20 MG tablet Take 1 tablet by mouth daily 4/10/18  Yes Rodo Sylvester CNP   metoprolol tartrate (LOPRESSOR) 25 MG tablet Take 1 tablet by mouth 2 times daily 4/7/18  Yes Isauro Rabago MD   vitamin B-12 (CYANOCOBALAMIN) 1000 MCG tablet Take 1,000 mcg by mouth daily   Yes Historical Provider, MD   Multiple Vitamins-Minerals (THERAPEUTIC MULTIVITAMIN-MINERALS) tablet Take 1 tablet by mouth daily   Yes Historical Provider, MD   calcium carbonate (OSCAL) 500 MG TABS tablet Take 500 mg by mouth daily   Yes Historical Provider, MD   Biotin (BIOTIN 5000) 5 MG CAPS Take 5,000 mcg by mouth daily   Yes Historical Provider, MD   apixaban (ELIQUIS) 5 MG TABS tablet Take 5 mg by mouth 2 times daily   Yes Historical Provider, MD   spironolactone (ALDACTONE) 25 MG tablet Take 1 tablet by mouth daily  Patient taking differently: Take 12.5 mg by mouth daily  9/11/17  Yes Ezra Ayala MD   sertraline (ZOLOFT) 50 MG tablet Take 50 mg by mouth daily  7/21/17  Yes Historical Provider, MD   ascorbic acid (VITAMIN C) 500 MG tablet Take 500 mg by mouth daily    Yes Historical Provider, MD       Social History:   TOBACCO:   reports that she quit smoking about 30 years ago. She has never used smokeless tobacco.  ETOH:   reports that she drinks alcohol.     Family History:       Problem Relation Age of Onset    Adopted: Yes       REVIEW OF SYSTEMS:   Constitutional: negative fever, negative chills  HEENT: negative visual disturbances, negative headaches  Respiratory: negative dyspnea, negative NEGATIVE NEG Final     Glucose, Ur   Date Value Ref Range Status   04/05/2018 NEGATIVE NEG Final     Bilirubin Urine   Date Value Ref Range Status   04/05/2018 NEGATIVE NEG Final       -----------------------------------------------------------------  Diagnostic Testing:  Xr Chest Standard (2 Vw)    Result Date: 4/10/2018  EXAMINATION: TWO VIEWS OF THE CHEST, 4/10/2018 2:38 pm COMPARISON: 04/07/2018 HISTORY: ORDERING SYSTEM PROVIDED HISTORY: Increased swelling, crackles at bases TECHNOLOGIST PROVIDED HISTORY: Reason for exam:->Increased swelling, crackles at bases FINDINGS: Left chest cardiac defibrillator device is stable in position. Mild cardiomegaly is unchanged. Vascular congestion appears to be slightly improved. Small pleural effusions persist.  No pneumothorax. No new airspace consolidation. No evidence of acute osseous abnormality. Mildly improved vascular congestion with persistent small pleural effusions. Xr Chest Portable    Result Date: 4/7/2018  EXAMINATION: SINGLE VIEW OF THE CHEST 4/7/2018 6:00 pm COMPARISON: 04/06/2018. HISTORY: ORDERING SYSTEM PROVIDED HISTORY: Acute CHF TECHNOLOGIST PROVIDED HISTORY: Reason for exam:->Acute CHF Initial evaluation. FINDINGS: A left-sided single lead cardiac AICD is unchanged in position. There is persistent enlargement of the cardiac silhouette with prominence of the pulmonary vasculature. Small bilateral pleural effusions with bibasilar atelectasis. No pneumothorax. 1. Persistent enlargement of the cardiac silhouette with prominence of the pulmonary vasculature. 2. Bilateral pleural effusions with bibasilar atelectasis. 3. No evidence of a pneumothorax.      Xr Chest Portable    Result Date: 4/6/2018  EXAMINATION: SINGLE VIEW OF THE CHEST 4/6/2018 5:41 pm COMPARISON: 04/03/2018, 2243 hours HISTORY: ORDERING SYSTEM PROVIDED HISTORY: Pacemaker placement and rule out pneumothorax TECHNOLOGIST PROVIDED HISTORY: Reason for exam:->Pacemaker placement Cr stable 0.91    EtOH abuse    - Will obtain liver US to evaluate for cirrhosis     Patient Active Problem List   Diagnosis    Irregular heart rhythm    Pain in lower jaw    Ataxia    Atrial fibrillation (HCC)    Bilateral swelling of feet and ankles    SOB (shortness of breath)    Cardiomyopathy, nonischemic (HCC)    Systolic CHF, chronic (HCC)    CKD (chronic kidney disease), stage III    Positive FIT (fecal immunochemical test)    Vitamin B deficiency    Osteopenia    Arthritis of right hip    Bilateral carpal tunnel syndrome    Congestive heart failure, NYHA class 4, acute, systolic (HCC)    AICD (automatic cardioverter/defibrillator) Implant    Alcoholism ([facility] [address]    Metabolic encephalopathy    Cardiorenal syndrome type 1    Congestive heart failure of unknown etiology ([facility] [address]         Christina Garcia DO  Resident  Pager: 25 [facility], [address]  4/10/2018, 8:29 PM

## 2018-04-11 NOTE — CONSULTS
Attestation signed by      Attending Physician Statement:    I have discussed the care of  Suzan Herzog , including pertinent history and exam findings, with the Cardiology fellow/resident. I have seen and examined the patient and the key elements of all parts of the encounter have been performed by me. I agree with the assessment, plan and orders as documented by the fellow/resident, after I modified exam findings and plan of treatments, and the final version is my approved version of the assessment. Additional Comments: D/W PT AND SON NEED FOR MULTIDISCIPLINARY   CARE FOR HER SEVERE HEART FAILURE INCLUDING SUPPORT STOCKINGS. F/U AS OP    Dianna Song MD                 [facility]    Consult               Today's Date: 4/11/2018  Patient Name: Suzan Herzog  Date of admission: 4/10/2018  1:07 PM  Patient's age: 71 y.o., 1945  Admission Dx: Congestive heart failure of unknown etiology ([facility] [I50.9]    Reason for Consult:  Cardiac evaluation    Requesting Physician: Anahi Ceron MD    CHIEF COMPLAINT:  Dyspnea, LE edema    History Obtained From:  patient, electronic medical record    HISTORY OF PRESENT ILLNESS:      The patient is a 71 y.o. female who is admitted to the hospital for LE edema. She was discharged on 4/9/18 after being treated for acute CHF and had ICD placement on 4/6/18. She went to bed feeling fine but woke up with significant B/L LE edema. She denies any chest pain, dyspnea, palpitations, lightheadedness or dizziness. Past Medical History:   has a past medical history of Atrial fibrillation ([address] Chronic kidney disease; H/O cardiovascular stress test; Hx of long term use of blood thinners; Nonischemic dilated cardiomyopathy ([address] and Other screening mammogram.    Past Surgical History:   has a past surgical history that includes knee surgery; Colonoscopy (Nov. 2001 ( 2011 ) ); Cardiac catheterization (08/2017);  Cardiac defibrillator placement Oral, Daily  sertraline (ZOLOFT) tablet 50 mg, 50 mg, Oral, Daily  torsemide (DEMADEX) tablet 20 mg, 20 mg, Oral, Daily  vitamin B-12 (CYANOCOBALAMIN) tablet 1,000 mcg, 1,000 mcg, Oral, Daily  sodium chloride flush 0.9 % injection 10 mL, 10 mL, Intravenous, 2 times per day  sodium chloride flush 0.9 % injection 10 mL, 10 mL, Intravenous, PRN  acetaminophen (TYLENOL) tablet 650 mg, 650 mg, Oral, Q4H PRN  magnesium hydroxide (MILK OF MAGNESIA) 400 MG/5ML suspension 30 mL, 30 mL, Oral, Daily PRN  ondansetron (ZOFRAN) injection 4 mg, 4 mg, Intravenous, Q6H PRN  spironolactone (ALDACTONE) tablet 25 mg, 25 mg, Oral, Daily  sodium chloride flush 0.9 % injection 10 mL, 10 mL, Intravenous, 2 times per day  sodium chloride flush 0.9 % injection 10 mL, 10 mL, Intravenous, PRN  folic acid (FOLVITE) tablet 1 mg, 1 mg, Oral, Daily  vitamin B-1 (THIAMINE) tablet 100 mg, 100 mg, Oral, Daily  LORazepam (ATIVAN) tablet 1 mg, 1 mg, Oral, Q1H PRN **OR** LORazepam (ATIVAN) injection 1 mg, 1 mg, Intravenous, Q1H PRN **OR** [DISCONTINUED] LORazepam (ATIVAN) tablet 2 mg, 2 mg, Oral, Q1H PRN **OR** [DISCONTINUED] LORazepam (ATIVAN) injection 2 mg, 2 mg, Intravenous, Q1H PRN **OR** [DISCONTINUED] LORazepam (ATIVAN) tablet 3 mg, 3 mg, Oral, Q1H PRN **OR** [DISCONTINUED] LORazepam (ATIVAN) injection 3 mg, 3 mg, Intravenous, Q1H PRN **OR** [DISCONTINUED] LORazepam (ATIVAN) tablet 4 mg, 4 mg, Oral, Q1H PRN **OR** [DISCONTINUED] LORazepam (ATIVAN) injection 4 mg, 4 mg, Intravenous, Q1H PRN    Allergies:  Contrast [barium-containing compounds] and Vancomycin    Social History:   reports that she quit smoking about 30 years ago. She has never used smokeless tobacco. She reports that she drinks alcohol. She reports that she does not use drugs. Family History: family history is not on file. She was adopted. No h/o sudden cardiac death. No for premature CAD    REVIEW OF SYSTEMS:    · Constitutional: there has been no unanticipated weight loss. There's been No change in energy level, No change in activity level. · Eyes: No visual changes or diplopia. No scleral icterus. · ENT: No Headaches  · Cardiovascular: non-ischemic cardiomyopathy  · Respiratory: No previous pulmonary problems, No cough  · Gastrointestinal: No abdominal pain. No change in bowel or bladder habits. · Genitourinary: No dysuria, trouble voiding, or hematuria. · Musculoskeletal:  No gait disturbance, No weakness or joint complaints. · Integumentary: No rash or pruritis. · Neurological: No headache, diplopia, change in muscle strength, numbness or tingling. No change in gait, balance, coordination, mood, affect, memory, mentation, behavior. · Psychiatric: No anxiety, or depression. · Endocrine: No temperature intolerance. No excessive thirst, fluid intake, or urination. No tremor. · Hematologic/Lymphatic: No abnormal bruising or bleeding, blood clots or swollen lymph nodes. · Allergic/Immunologic: No nasal congestion or hives. PHYSICAL EXAM:      BP (!) 110/57   Pulse 122   Temp 98.2 °F (36.8 °C) (Oral)   Resp 18   Ht 5' 9\" (1.753 m)   Wt 150 lb (68 kg)   SpO2 100%   BMI 22.15 kg/m²    Constitutional and General Appearance: alert, cooperative, no distress and appears stated age  HEENT: PERRL, no cervical lymphadenopathy. No masses palpable. Normal oral mucosa  Respiratory:  · Normal excursion and expansion without use of accessory muscles  · Resp Auscultation: Good respiratory effort. No for increased work of breathing.  On auscultation: clear to auscultation bilaterally  Cardiovascular:  · The apical impulse is not displaced  · Heart tones are crisp and normal. regular S1 and S2.  · Jugular venous pulsation Normal  · The carotid upstroke is normal in amplitude and contour without delay or bruit  · Peripheral pulses are symmetrical and full   Abdomen:   · No masses or tenderness  · Bowel sounds present  Extremities:  ·  No Cyanosis or Clubbing  ·  Lower extremity

## 2018-04-11 NOTE — CARE COORDINATION
Consult received this date re: patient has history of alcoholism  Chart reviewed  Patient is a 71year old female present with LE edema, dyspnea  Attempted to see patient this date, however was asleep. Son Fany Berger at bedside advised writer to come back later this afternoon.   Insurance is [facility]

## 2018-04-11 NOTE — PLAN OF CARE
Problem: Falls - Risk of  Goal: Absence of falls  Outcome: Met This Shift  Free of injury  Continue to monitor  Continue plan of care  High fall prevention protocol in place

## 2018-04-11 NOTE — CONSULTS
 KNEE SURGERY      TRANSESOPHAGEAL ECHOCARDIOGRAM  11/10/2017    EF 20%. Mod MR. Mild to mod AI. Mild TR. Segmental WMA. Allergies: Allergies   Allergen Reactions    Contrast [Barium-Containing Compounds]      Unknown     Vancomycin          Home Meds:   Prior to Admission medications    Medication Sig Start Date End Date Taking? Authorizing Provider   vitamin B-1 100 MG tablet Take 1 tablet by mouth daily 4/12/18  Yes Shayla Arellano DO   folic acid (FOLVITE) 1 MG tablet Take 1 tablet by mouth daily 4/12/18  Yes Shayla Arellano DO   digoxin (LANOXIN) 125 MCG tablet Take 1 tablet by mouth daily 4/10/18  Yes Laquita Green CNP   torsemide (DEMADEX) 20 MG tablet Take 1 tablet by mouth daily 4/10/18  Yes Laquita Green CNP   metoprolol tartrate (LOPRESSOR) 25 MG tablet Take 1 tablet by mouth 2 times daily 4/7/18  Yes Bronwyn Hu MD   vitamin B-12 (CYANOCOBALAMIN) 1000 MCG tablet Take 1,000 mcg by mouth daily   Yes Historical Provider, MD   Multiple Vitamins-Minerals (THERAPEUTIC MULTIVITAMIN-MINERALS) tablet Take 1 tablet by mouth daily   Yes Historical Provider, MD   calcium carbonate (OSCAL) 500 MG TABS tablet Take 500 mg by mouth daily   Yes Historical Provider, MD   Biotin (BIOTIN 5000) 5 MG CAPS Take 5,000 mcg by mouth daily   Yes Historical Provider, MD   apixaban (ELIQUIS) 5 MG TABS tablet Take 5 mg by mouth 2 times daily   Yes Historical Provider, MD   spironolactone (ALDACTONE) 25 MG tablet Take 1 tablet by mouth daily  Patient taking differently: Take 12.5 mg by mouth daily  9/11/17  Yes David Sterling MD   ascorbic acid (VITAMIN C) 500 MG tablet Take 500 mg by mouth daily    Yes Historical Provider, MD   sertraline (ZOLOFT) 50 MG tablet Take 50 mg by mouth daily  7/21/17   Historical Provider, MD       Family History:       Problem Relation Age of Onset    Adopted: Yes         Social History:   TOBACCO:   reports that she quit smoking about 30 years ago.  She has never Labs      04/09/18   0556  04/10/18   1411  04/11/18   0721   NA  142  143  138   K  3.7  4.0  3.9   CL  98  97*  96*   CO2  27  28  27   BUN  28*  23  25*   CREATININE  1.19*  0.91*  1.19*   GLUCOSE  135*  98  96     S. Calcium:  Recent Labs      04/11/18   0721   CALCIUM  8.8     S. Ionized Calcium:No results for input(s): IONCA in the last 72 hours. S. Magnesium:No results for input(s): MG in the last 72 hours. S. Phosphorus:No results for input(s): PHOS in the last 72 hours. S. Glucose:No results for input(s): POCGLU in the last 72 hours. Glycosylated hemoglobin A1C: No results for input(s): LABA1C in the last 72 hours. INR:   Recent Labs      04/09/18   1014   INR  1.2     Hepatic functions: Recent Labs      04/09/18   0556  04/11/18   0721   ALKPHOS  109*  91   ALT  66*  41*   AST  58*  43*   PROT  5.6*  5.2*   BILITOT  0.89  1.61*   BILIDIR  0.45*   --    LABALBU  3.5  3.2*     Pancreatic functions:No results for input(s): LACTA, AMYLASE in the last 72 hours. S. Lactic Acid: No results for input(s): LACTA in the last 72 hours. Cardiac enzymes:  Recent Labs      04/10/18   1406  04/10/18   1603   TROPONINI  0.02  0.01     BNP:No results for input(s): BNP in the last 72 hours. Lipid profile: No results for input(s): CHOL, TRIG, HDL, LDLCALC in the last 72 hours.     Invalid input(s): LDL  Blood Gases: No results found for: PH, PCO2, PO2, HCO3, O2SAT  Thyroid functions:   Lab Results   Component Value Date    TSH 2.74 04/03/2018            Microbiology:    Cultures during this admission:     Blood cultures:      [] None drawn      [] Negative             []  Positive (Details:  )  Urine Culture:        [] None drawn      [] Negative             []  Positive (Details:  )  Sputum Culture:   [] None drawn       [] Negative             []  Positive (Details:  )         -----------------------------------------------------------------  Pulmonary Functions Testing Results:    No results found for: FEV1, FVC,

## 2018-04-12 ENCOUNTER — CARE COORDINATION (OUTPATIENT)
Dept: CASE MANAGEMENT | Age: 73
End: 2018-04-12

## 2018-04-12 DIAGNOSIS — I50.22 SYSTOLIC CHF, CHRONIC (HCC): Primary | Chronic | ICD-10-CM

## 2018-04-12 PROCEDURE — 1111F DSCHRG MED/CURRENT MED MERGE: CPT | Performed by: NURSE PRACTITIONER

## 2018-04-13 ENCOUNTER — TELEPHONE (OUTPATIENT)
Dept: PRIMARY CARE CLINIC | Age: 73
End: 2018-04-13

## 2018-04-14 NOTE — DISCHARGE SUMMARY
Internal Medicine Discharge Summary    Patient Identification:  Elias Agrawal is a 71 y.o. female. :  1945  MRN: [id number]     Acct: [de-identified]   Admit Date:  4/10/2018    Discharge Date: 18  Attending Provider: No att. providers found                                   PCP: Bo Chandler CNP    Admission Diagnoses:   Congestive heart failure of unknown etiology ([facility] [address] [I50.9]    Discharge Diagnoses:   Patient Active Problem List   Diagnosis    Ataxia    Atrial fibrillation ([facility] [facility]    Systolic CHF, chronic ([facility] [address]    CKD (chronic kidney disease), stage III    Positive FIT (fecal immunochemical test)    Vitamin B deficiency    Osteopenia    Arthritis of right hip    Bilateral carpal tunnel syndrome    [facility]    Metabolic encephalopathy    Bilateral leg edema    Acute on chronic congestive heart failure ([facility]        Consults: cardiology, pulmonology     Brief Inpatient course: The patient is a 71 y.o. female with PMH of nonischemic cardiomyopathy, chronic systolic CHF, Afib, S/P AICD placement on , CKD and EtOH abuse who presented to the ED with bilateral leg swelling. Patient had been discharged from the hospital one day prior where she was admitted under the cardiology service for acute CHF exacerbation and irregular heart rhythm. She was discharged on Torsemide 20 mg daily, Aldactone 25 mg daily, and Digoxin 125 mg daily. The patient complained of significant bilateral leg swelling that developed overnight since discharge. Her son called her nephrologist and was instructed to bring her to the ED for evaluation. The patient denied CP or SOB on admission.       On ED arrival, CXR was negative for any new abnormalities. EKG was remarkable for new T-wave inversions in leads V5 and V6 concerning for lateral ischemia compared to EKG from 18. The ED resident contacted cardiology and they recommended giving Bumex 1mg IV and admission. The patient was admitted.  Cardiology recommended continuing current medications and for the patient to start wearing compression stockings. They were concerned for color change in lower extremities and bilateral venous and arterial dopplers were ordered and were WNL. The patient was noted to have a rash on the trunk. The patient admitted that this was not a new rash and was instructed to follow up with her established dermatologist. Pulmonology was consulted and recommended continued gentle diuresis. Patient was cleared by all services as stable for DC to home with multidisciplinary follow up.      Procedures:  none    Any Hospital Acquired Infections: No    Discharge Functional Status:  Stable     Disposition: home    Patient Instructions:  Discharge Meds:    Brianna Wilburn, [address] Medication Instructions VXO:[id number]    Printed on:04/14/18 2603   Medication Information                      apixaban (ELIQUIS) 5 MG TABS tablet  Take 5 mg by mouth 2 times daily             ascorbic acid (VITAMIN C) 500 MG tablet  Take 500 mg by mouth daily              Biotin (BIOTIN 5000) 5 MG CAPS  Take 5,000 mcg by mouth daily             calcium carbonate (OSCAL) 500 MG TABS tablet  Take 500 mg by mouth daily             digoxin (LANOXIN) 125 MCG tablet  Take 1 tablet by mouth daily             folic acid (FOLVITE) 1 MG tablet  Take 1 tablet by mouth daily             metoprolol tartrate (LOPRESSOR) 25 MG tablet  Take 1 tablet by mouth 2 times daily             Multiple Vitamins-Minerals (THERAPEUTIC MULTIVITAMIN-MINERALS) tablet  Take 1 tablet by mouth daily             sertraline (ZOLOFT) 50 MG tablet  Take 50 mg by mouth daily              spironolactone (ALDACTONE) 25 MG tablet  Take 1 tablet by mouth daily             torsemide (DEMADEX) 20 MG tablet  Take 1 tablet by mouth daily             vitamin B-1 100 MG tablet  Take 1 tablet by mouth daily             vitamin B-12 (CYANOCOBALAMIN) 1000 MCG tablet  Take 1,000 mcg by mouth daily Activity: activity as tolerated  Diet: cardiac diet and renal diet    Follow-up:   cardiology call to schedule appointment   Dermatology call to schedule appointment     Frankey Guardian, DO  Resident  Pager: [phone]  [facility], [place]  4/14/2018, 6:30 PM

## 2018-04-16 ENCOUNTER — CARE COORDINATION (OUTPATIENT)
Dept: CASE MANAGEMENT | Age: 73
End: 2018-04-16

## 2018-04-16 ENCOUNTER — TELEPHONE (OUTPATIENT)
Dept: PRIMARY CARE CLINIC | Age: 73
End: 2018-04-16

## 2018-04-19 ENCOUNTER — CARE COORDINATION (OUTPATIENT)
Dept: CASE MANAGEMENT | Age: 73
End: 2018-04-19

## 2018-04-20 ENCOUNTER — OFFICE VISIT (OUTPATIENT)
Dept: PRIMARY CARE CLINIC | Age: 73
End: 2018-04-20
Payer: MEDICARE

## 2018-04-20 VITALS
WEIGHT: 142 LBS | TEMPERATURE: 97 F | BODY MASS INDEX: 20.97 KG/M2 | DIASTOLIC BLOOD PRESSURE: 70 MMHG | RESPIRATION RATE: 16 BRPM | HEART RATE: 74 BPM | SYSTOLIC BLOOD PRESSURE: 130 MMHG

## 2018-04-20 DIAGNOSIS — R16.1 SPLENOMEGALY: ICD-10-CM

## 2018-04-20 DIAGNOSIS — I50.22 CHRONIC SYSTOLIC CONGESTIVE HEART FAILURE (HCC): Primary | ICD-10-CM

## 2018-04-20 DIAGNOSIS — F10.20 ALCOHOLISM (HCC): ICD-10-CM

## 2018-04-20 DIAGNOSIS — R04.0 EPISTAXIS: ICD-10-CM

## 2018-04-20 DIAGNOSIS — R74.8 ABNORMAL TRANSAMINASES: ICD-10-CM

## 2018-04-20 DIAGNOSIS — N18.30 CKD (CHRONIC KIDNEY DISEASE), STAGE III (HCC): ICD-10-CM

## 2018-04-20 DIAGNOSIS — R18.8 OTHER ASCITES: ICD-10-CM

## 2018-04-20 DIAGNOSIS — F41.9 ANXIETY AND DEPRESSION: ICD-10-CM

## 2018-04-20 DIAGNOSIS — I42.9 CARDIOMYOPATHY, UNSPECIFIED TYPE (HCC): ICD-10-CM

## 2018-04-20 DIAGNOSIS — I48.91 ATRIAL FIBRILLATION, UNSPECIFIED TYPE (HCC): Chronic | ICD-10-CM

## 2018-04-20 DIAGNOSIS — R60.0 BILATERAL LEG EDEMA: ICD-10-CM

## 2018-04-20 DIAGNOSIS — F32.A ANXIETY AND DEPRESSION: ICD-10-CM

## 2018-04-20 DIAGNOSIS — I50.9 ACUTE ON CHRONIC CONGESTIVE HEART FAILURE, UNSPECIFIED CONGESTIVE HEART FAILURE TYPE: ICD-10-CM

## 2018-04-20 PROCEDURE — 1111F DSCHRG MED/CURRENT MED MERGE: CPT | Performed by: NURSE PRACTITIONER

## 2018-04-20 PROCEDURE — 99495 TRANSJ CARE MGMT MOD F2F 14D: CPT | Performed by: NURSE PRACTITIONER

## 2018-04-20 RX ORDER — ISOSORBIDE MONONITRATE 30 MG/1
30 TABLET, EXTENDED RELEASE ORAL DAILY
COMMUNITY
End: 2019-06-12 | Stop reason: ALTCHOICE

## 2018-04-20 ASSESSMENT — ENCOUNTER SYMPTOMS
SHORTNESS OF BREATH: 0
BLOOD IN STOOL: 0
ORTHOPNEA: 0
COUGH: 0
WHEEZING: 0
ABDOMINAL PAIN: 0
NAUSEA: 0
VOMITING: 0

## 2018-04-24 ENCOUNTER — CARE COORDINATION (OUTPATIENT)
Dept: CASE MANAGEMENT | Age: 73
End: 2018-04-24

## 2018-04-25 ENCOUNTER — TELEPHONE (OUTPATIENT)
Dept: PRIMARY CARE CLINIC | Age: 73
End: 2018-04-25

## 2018-05-01 ENCOUNTER — OFFICE VISIT (OUTPATIENT)
Dept: GASTROENTEROLOGY | Age: 73
End: 2018-05-01
Payer: MEDICARE

## 2018-05-01 VITALS
SYSTOLIC BLOOD PRESSURE: 126 MMHG | HEART RATE: 107 BPM | WEIGHT: 140 LBS | DIASTOLIC BLOOD PRESSURE: 77 MMHG | OXYGEN SATURATION: 98 % | BODY MASS INDEX: 20.67 KG/M2

## 2018-05-01 DIAGNOSIS — Z11.59 ENCOUNTER FOR SCREENING FOR OTHER VIRAL DISEASES (CODE): ICD-10-CM

## 2018-05-01 DIAGNOSIS — R79.89 ELEVATED LIVER FUNCTION TESTS: ICD-10-CM

## 2018-05-01 DIAGNOSIS — K92.1 BLOOD IN STOOL: Primary | ICD-10-CM

## 2018-05-01 PROCEDURE — 1036F TOBACCO NON-USER: CPT | Performed by: INTERNAL MEDICINE

## 2018-05-01 PROCEDURE — 99214 OFFICE O/P EST MOD 30 MIN: CPT | Performed by: INTERNAL MEDICINE

## 2018-05-01 PROCEDURE — G8427 DOCREV CUR MEDS BY ELIG CLIN: HCPCS | Performed by: INTERNAL MEDICINE

## 2018-05-01 PROCEDURE — 1123F ACP DISCUSS/DSCN MKR DOCD: CPT | Performed by: INTERNAL MEDICINE

## 2018-05-01 PROCEDURE — 3017F COLORECTAL CA SCREEN DOC REV: CPT | Performed by: INTERNAL MEDICINE

## 2018-05-01 PROCEDURE — 1090F PRES/ABSN URINE INCON ASSESS: CPT | Performed by: INTERNAL MEDICINE

## 2018-05-01 PROCEDURE — G8420 CALC BMI NORM PARAMETERS: HCPCS | Performed by: INTERNAL MEDICINE

## 2018-05-01 PROCEDURE — 1111F DSCHRG MED/CURRENT MED MERGE: CPT | Performed by: INTERNAL MEDICINE

## 2018-05-01 PROCEDURE — G8399 PT W/DXA RESULTS DOCUMENT: HCPCS | Performed by: INTERNAL MEDICINE

## 2018-05-01 PROCEDURE — 4040F PNEUMOC VAC/ADMIN/RCVD: CPT | Performed by: INTERNAL MEDICINE

## 2018-05-01 ASSESSMENT — ENCOUNTER SYMPTOMS
DIARRHEA: 1
BACK PAIN: 0
VOMITING: 0
NAUSEA: 0
RECTAL PAIN: 0
BLOOD IN STOOL: 1
CONSTIPATION: 0
ANAL BLEEDING: 0
TROUBLE SWALLOWING: 0
SHORTNESS OF BREATH: 1
ABDOMINAL DISTENTION: 0
ABDOMINAL PAIN: 0

## 2018-05-03 ENCOUNTER — OFFICE VISIT (OUTPATIENT)
Dept: PRIMARY CARE CLINIC | Age: 73
End: 2018-05-03
Payer: MEDICARE

## 2018-05-03 VITALS
HEART RATE: 86 BPM | RESPIRATION RATE: 16 BRPM | TEMPERATURE: 98 F | SYSTOLIC BLOOD PRESSURE: 101 MMHG | WEIGHT: 130 LBS | DIASTOLIC BLOOD PRESSURE: 60 MMHG | BODY MASS INDEX: 19.2 KG/M2

## 2018-05-03 DIAGNOSIS — T14.8XXA SKIN ABRASION: ICD-10-CM

## 2018-05-03 DIAGNOSIS — S51.011A LACERATION OF RIGHT ELBOW, INITIAL ENCOUNTER: Primary | ICD-10-CM

## 2018-05-03 DIAGNOSIS — T14.8XXD DELAYED WOUND HEALING: ICD-10-CM

## 2018-05-03 DIAGNOSIS — L03.114 CELLULITIS OF LEFT UPPER EXTREMITY: ICD-10-CM

## 2018-05-03 PROCEDURE — G8427 DOCREV CUR MEDS BY ELIG CLIN: HCPCS | Performed by: NURSE PRACTITIONER

## 2018-05-03 PROCEDURE — G8420 CALC BMI NORM PARAMETERS: HCPCS | Performed by: NURSE PRACTITIONER

## 2018-05-03 PROCEDURE — 4040F PNEUMOC VAC/ADMIN/RCVD: CPT | Performed by: NURSE PRACTITIONER

## 2018-05-03 PROCEDURE — 1036F TOBACCO NON-USER: CPT | Performed by: NURSE PRACTITIONER

## 2018-05-03 PROCEDURE — 3017F COLORECTAL CA SCREEN DOC REV: CPT | Performed by: NURSE PRACTITIONER

## 2018-05-03 PROCEDURE — G8399 PT W/DXA RESULTS DOCUMENT: HCPCS | Performed by: NURSE PRACTITIONER

## 2018-05-03 PROCEDURE — 1090F PRES/ABSN URINE INCON ASSESS: CPT | Performed by: NURSE PRACTITIONER

## 2018-05-03 PROCEDURE — 1123F ACP DISCUSS/DSCN MKR DOCD: CPT | Performed by: NURSE PRACTITIONER

## 2018-05-03 PROCEDURE — 1111F DSCHRG MED/CURRENT MED MERGE: CPT | Performed by: NURSE PRACTITIONER

## 2018-05-03 PROCEDURE — 99213 OFFICE O/P EST LOW 20 MIN: CPT | Performed by: NURSE PRACTITIONER

## 2018-05-03 RX ORDER — MUPIROCIN CALCIUM 20 MG/G
CREAM TOPICAL 3 TIMES DAILY
Qty: 15 G | Refills: 0 | Status: SHIPPED | OUTPATIENT
Start: 2018-05-03 | End: 2018-05-03 | Stop reason: SDUPTHER

## 2018-05-03 RX ORDER — CEPHALEXIN 500 MG/1
500 CAPSULE ORAL 4 TIMES DAILY
Qty: 28 CAPSULE | Refills: 0 | Status: SHIPPED | OUTPATIENT
Start: 2018-05-03 | End: 2018-05-10

## 2018-05-03 RX ORDER — MUPIROCIN CALCIUM 20 MG/G
CREAM TOPICAL 2 TIMES DAILY
Qty: 15 G | Refills: 0 | Status: SHIPPED | OUTPATIENT
Start: 2018-05-03 | End: 2019-02-19 | Stop reason: ALTCHOICE

## 2018-05-03 ASSESSMENT — ENCOUNTER SYMPTOMS
NAUSEA: 0
COLOR CHANGE: 1
SHORTNESS OF BREATH: 0
VOMITING: 0

## 2018-05-10 ENCOUNTER — OFFICE VISIT (OUTPATIENT)
Dept: PRIMARY CARE CLINIC | Age: 73
End: 2018-05-10
Payer: MEDICARE

## 2018-05-10 VITALS
BODY MASS INDEX: 19.21 KG/M2 | RESPIRATION RATE: 14 BRPM | SYSTOLIC BLOOD PRESSURE: 125 MMHG | WEIGHT: 130.07 LBS | HEART RATE: 76 BPM | DIASTOLIC BLOOD PRESSURE: 68 MMHG

## 2018-05-10 DIAGNOSIS — T14.8XXA ABRASION OF SKIN: Primary | ICD-10-CM

## 2018-05-10 DIAGNOSIS — S51.012D LACERATION OF LEFT ELBOW, SUBSEQUENT ENCOUNTER: ICD-10-CM

## 2018-05-10 PROCEDURE — 99213 OFFICE O/P EST LOW 20 MIN: CPT | Performed by: NURSE PRACTITIONER

## 2018-05-10 PROCEDURE — 4040F PNEUMOC VAC/ADMIN/RCVD: CPT | Performed by: NURSE PRACTITIONER

## 2018-05-10 PROCEDURE — 1123F ACP DISCUSS/DSCN MKR DOCD: CPT | Performed by: NURSE PRACTITIONER

## 2018-05-10 PROCEDURE — 1111F DSCHRG MED/CURRENT MED MERGE: CPT | Performed by: NURSE PRACTITIONER

## 2018-05-10 PROCEDURE — 3017F COLORECTAL CA SCREEN DOC REV: CPT | Performed by: NURSE PRACTITIONER

## 2018-05-10 PROCEDURE — G8427 DOCREV CUR MEDS BY ELIG CLIN: HCPCS | Performed by: NURSE PRACTITIONER

## 2018-05-10 PROCEDURE — G8420 CALC BMI NORM PARAMETERS: HCPCS | Performed by: NURSE PRACTITIONER

## 2018-05-10 PROCEDURE — 1090F PRES/ABSN URINE INCON ASSESS: CPT | Performed by: NURSE PRACTITIONER

## 2018-05-10 PROCEDURE — 1036F TOBACCO NON-USER: CPT | Performed by: NURSE PRACTITIONER

## 2018-05-10 PROCEDURE — G8399 PT W/DXA RESULTS DOCUMENT: HCPCS | Performed by: NURSE PRACTITIONER

## 2018-05-10 ASSESSMENT — ENCOUNTER SYMPTOMS
SHORTNESS OF BREATH: 0
VOMITING: 0
NAUSEA: 0

## 2018-05-21 ENCOUNTER — CARE COORDINATION (OUTPATIENT)
Dept: CARE COORDINATION | Age: 73
End: 2018-05-21

## 2018-06-27 ENCOUNTER — CARE COORDINATION (OUTPATIENT)
Dept: CARE COORDINATION | Age: 73
End: 2018-06-27

## 2018-07-17 ENCOUNTER — CARE COORDINATION (OUTPATIENT)
Dept: CARE COORDINATION | Age: 73
End: 2018-07-17

## 2018-07-17 NOTE — CARE COORDINATION
Attempt to contact patient today regarding care coordination, unable to reach. Recent encounters and notes reviewed. How is wound? Attempt to enroll in CC.  + fall with injury this year. [place] Steele RN    See Dr. Trenda Ormond notes below:    PLAN      1. Based on available labs patients renal function is stable. patient's volume status is  acceptable. Importance of tight blood pressure, glycemic control, lipid control was outlined to patient . 2. Start low-dose lisinopril 2.5 daily, continue same dose of torsemide, Aldactone and Coreg    3. Follow up labs ordered : bmp, cbc,phos, intact pth, vitaminD 25 OH,albumen. 4. Urine for random protein and creat to be done in 3 months.   5. follow up in the office in 6 months     Patient was asked to avoid NSAIDS.     Please do not hesitate to call with questions.     This note is created with the assistance of a speech-recognition program. While intending to generate a document that actually reflects the content of the visit, no guarantees can be provided that every mistake has been identified and corrected by editing     Electronically signed by Mireya Palacios MD on 6/13/2018 at 3:04 PM  [facility]

## 2018-07-17 NOTE — CARE COORDINATION
Ambulatory Care Coordination Note  7/17/2018  CM Risk Score: 10  Jake Mortality Risk Score: 19.55    ACC: NIKI HORNER, RN     Plan of Care    1. Complete initial assessement  2. Identify needs, barriers  3. Continue education on CHF  4. Collaboration with specialists    Summary Note: spoke with patient states she is doing well, she is out of the Thiamine and Folic Acid, instructed her to call Dr. Olga Sterling to see if this is something he would like her to be on. Ambulatory Care Coordination Assessment    Care Coordination Protocol  Program Enrollment:  Complex Care  Referral from Primary Care Provider:  No  Week 1 - Initial Assessment     Do you have all of your prescriptions and are they filled?:  No (Comment: Thiamine and Folic acid sent  to mail away pharmacy)  Barriers to medication adherence:  Complexity of regimen  Are you able to afford your medications?:  Yes                          Suggested Interventions and Community Resources                  Prior to Admission medications    Medication Sig Start Date End Date Taking?  Authorizing Provider   carvedilol (COREG) 12.5 MG tablet Take 12.5 mg by mouth 2 times daily (with meals)    Historical Provider, MD   torsemide (DEMADEX) 20 MG tablet Take 20 mg by mouth daily    Historical Provider, MD   folic acid (FOLVITE) 1 MG tablet Take 1 mg by mouth daily    Historical Provider, MD   digoxin (LANOXIN) 125 MCG tablet Take 125 mcg by mouth daily    Historical Provider, MD   lisinopril (ZESTRIL) 2.5 MG tablet Take 1 tablet by mouth daily 6/13/18   Jess Crawford MD   mupirocin (BACTROBAN) 2 % cream Apply topically 2 times daily 5/3/18   Nidia Life, APRN - CNP   isosorbide mononitrate (IMDUR) 30 MG extended release tablet Take 30 mg by mouth daily    Historical Provider, MD   sertraline (ZOLOFT) 50 MG tablet Take 1 tablet by mouth daily 4/20/18   Nidia Life, APRN - CNP   vitamin B-1 100 MG tablet Take 1 tablet by mouth daily 4/12/18   Herbert Arguelles DO Eileen   vitamin B-12 (CYANOCOBALAMIN) 1000 MCG tablet Take 1,000 mcg by mouth daily    Historical Provider, MD   Multiple Vitamins-Minerals (THERAPEUTIC MULTIVITAMIN-MINERALS) tablet Take 1 tablet by mouth daily    Historical Provider, MD   calcium carbonate (OSCAL) 500 MG TABS tablet Take 500 mg by mouth daily    Historical Provider, MD   Biotin (BIOTIN 5000) 5 MG CAPS Take 5,000 mcg by mouth daily    Historical Provider, MD   apixaban (ELIQUIS) 5 MG TABS tablet Take 5 mg by mouth 2 times daily    Historical Provider, MD   spironolactone (ALDACTONE) 25 MG tablet Take 1 tablet by mouth daily  Patient taking differently: Take 12.5 mg by mouth daily  9/11/17   Jimmy Berg MD   ascorbic acid (VITAMIN C) 500 MG tablet Take 500 mg by mouth daily     Historical Provider, MD       Future Appointments  Date Time Provider Delfino Marrufo   12/5/2018 3:10  [address], MD [facility] None          Care Coordination Interventions    Program Enrollment:  Complex Care  Referral from Primary Care Provider:  No  Suggested Interventions and Community Resources         Goals Addressed     None          Prior to Admission medications    Medication Sig Start Date End Date Taking?  Authorizing Provider   carvedilol (COREG) 12.5 MG tablet Take 12.5 mg by mouth 2 times daily (with meals)    Historical Provider, MD   torsemide (DEMADEX) 20 MG tablet Take 20 mg by mouth daily    Historical Provider, MD   folic acid (FOLVITE) 1 MG tablet Take 1 mg by mouth daily    Historical Provider, MD   digoxin (LANOXIN) 125 MCG tablet Take 125 mcg by mouth daily    Historical Provider, MD   lisinopril (ZESTRIL) 2.5 MG tablet Take 1 tablet by mouth daily 6/13/18   [address] [place] [address] MD Anastacio   mupirocin (BACTROBAN) 2 % cream Apply topically 2 times daily 5/3/18   Nidia Life, APRN - CNP   isosorbide mononitrate (IMDUR) 30 MG extended release tablet Take 30 mg by mouth daily    Historical Provider, MD   sertraline (ZOLOFT) 50 MG

## 2018-07-24 ENCOUNTER — CARE COORDINATION (OUTPATIENT)
Dept: CARE COORDINATION | Age: 73
End: 2018-07-24

## 2018-07-31 ENCOUNTER — CARE COORDINATION (OUTPATIENT)
Dept: CARE COORDINATION | Age: 73
End: 2018-07-31

## 2018-07-31 NOTE — LETTER
7/31/2018    [address]  [facility].    Ron Dee     I have enjoyed working with you to improve your health. I would like to continue to provide you support. However, I have been unable to reach you at, [phone] (home) [phone] (work) . Please let me know if I can help schedule an office visit for you or answer any questions. JOSÉ Silvestre CNP is interested in your health and hopes to hear from you soon. If you would like continued access to your Nurse Care Coordinator, Rex Ho RN, you can reach me at [phone]. I will wait to hear from you and will no longer reach out to you. JOSÉ Silvestre CNP and his/her team will continue to provide care and be available for questions.       In good health,     Rex Ho RN

## 2018-08-13 DIAGNOSIS — F41.9 ANXIETY AND DEPRESSION: ICD-10-CM

## 2018-08-13 DIAGNOSIS — F32.A ANXIETY AND DEPRESSION: ICD-10-CM

## 2018-08-13 NOTE — TELEPHONE ENCOUNTER
Last OV 5/10/18    Health Maintenance   Topic Date Due    Hepatitis C screen  1945    Shingles Vaccine (1 of 2 - 2 Dose Series) 05/18/1996    Flu vaccine (1) 09/01/2018    Pneumococcal low/med risk (2 of 2 - PPSV23) 10/04/2018    Colon Cancer Screen FIT/FOBT  11/12/2018    Potassium monitoring  06/14/2019    Creatinine monitoring  06/14/2019    Breast cancer screen  08/29/2019    Lipid screen  10/05/2022    DTaP/Tdap/Td vaccine (2 - Td) 05/03/2028    DEXA (modify frequency per FRAX score)  Completed             (applicable per patient's age: Cancer Screenings, Depression Screening, Fall Risk Screening, Immunizations)    LDL Calculated (mg/dL)   Date Value   10/05/2017 77     AST (U/L)   Date Value   04/11/2018 43 (H)     ALT (U/L)   Date Value   04/11/2018 41 (H)     BUN (mg/dL)   Date Value   06/14/2018 25 (H)      (goal A1C is < 7)   (goal LDL is <100) need 30-50% reduction from baseline     BP Readings from Last 3 Encounters:   06/13/18 120/60   05/10/18 125/68   05/03/18 101/60    (goal /80)      All Future Testing planned in [facility]:  Lab Frequency Next Occurrence   Lipid Panel Once 01/04/2018   Comprehensive Metabolic Panel Once 33/44/0505   CBC Once 01/04/2018   CBC Once 12/10/0746   Basic Metabolic Panel Once 90/01/0006   TSH with Reflex Once 11/01/2018   GIOVANNY Screen with Reflex Once 12/09/2017   Vitamin B12 Once 86/62/5079   Basic Metabolic Panel Once 20/17/8408   Basic Metabolic Panel Once 65/98/4867   Basic Metabolic Panel Once 08/82/0752   Platelet function test Once 07/11/2018   CBC Once 10/30/2018   FL BARIUM ENEMA W AIR CONTRAST Once 05/01/2018   Liver Fibrosis, Chronic Viral Hepatitis Once 05/01/2018   Hepatitis A Antibody, Total Once 08/01/2018   Hepatitis B Core Antibody, Total Once 08/01/2018   Hepatitis B Surface Antibody Once 08/01/2018   Hepatitis C Antibody Once 08/01/2018   Hepatitis B Surface Antigen Once 08/01/2018   GIOVANNY Once 08/01/2018   Mitochondrial antibodies, M2 Once 07/30/2018   IgM Once 07/30/2018   Anti-smooth muscle antibody Once 07/30/2018   IgG Once 07/30/2018   Iron Profile Once 07/30/2018   Ferritin Once 07/30/2018   CBC     Albumin     Basic Metabolic Panel     Phosphorus     PTH, Intact     Creatinine, Random Urine     Protein, urine, random         Next Visit Date:  Future Appointments  Date Time Provider Delfino Marrufo   12/5/2018 3:10 PM Swapna Pa MD [facility] None            Patient Active Problem List:     Ataxia     Atrial fibrillation ([address]     Nonischemic cardiomyopathy ([address]     Systolic CHF, chronic ([address]     CKD (chronic kidney disease), stage III     Positive FIT (fecal immunochemical test)     Vitamin B deficiency     Osteopenia     Arthritis of right hip     Bilateral carpal tunnel syndrome     Alcoholism ([address]     Metabolic encephalopathy     Bilateral leg edema     Acute on chronic congestive heart failure (HCC)     Elevated liver function tests

## 2018-08-16 RX ORDER — TORSEMIDE 20 MG/1
20 TABLET ORAL DAILY
Qty: 30 TABLET | Refills: 1 | Status: SHIPPED | OUTPATIENT
Start: 2018-08-16

## 2018-08-16 RX ORDER — SPIRONOLACTONE 25 MG/1
25 TABLET ORAL DAILY
Qty: 90 TABLET | Refills: 2 | Status: SHIPPED | OUTPATIENT
Start: 2018-08-16 | End: 2018-12-05 | Stop reason: DRUGHIGH

## 2018-08-21 ENCOUNTER — CARE COORDINATION (OUTPATIENT)
Dept: CARE COORDINATION | Age: 73
End: 2018-08-21

## 2018-08-21 NOTE — CARE COORDINATION
Letter of Unable to contact was sent 7/31/18. No contact from patient noted.   At this time will d/c from Care Coordination due to unengaged    [address]

## 2018-10-03 ENCOUNTER — HOSPITAL ENCOUNTER (OUTPATIENT)
Dept: PREADMISSION TESTING | Facility: CLINIC | Age: 73
Discharge: HOME | End: 2018-10-07
Payer: MEDICARE

## 2018-10-03 VITALS
HEIGHT: 69 IN | DIASTOLIC BLOOD PRESSURE: 60 MMHG | RESPIRATION RATE: 16 BRPM | OXYGEN SATURATION: 99 % | HEART RATE: 70 BPM | BODY MASS INDEX: 20.73 KG/M2 | SYSTOLIC BLOOD PRESSURE: 91 MMHG | WEIGHT: 140 LBS

## 2018-10-03 LAB
ANION GAP SERPL CALCULATED.3IONS-SCNC: 14 MMOL/L (ref 9–17)
BUN BLDV-MCNC: 34 MG/DL (ref 8–23)
CHLORIDE BLD-SCNC: 94 MMOL/L (ref 98–107)
CO2: 28 MMOL/L (ref 20–31)
CREAT SERPL-MCNC: 1.37 MG/DL (ref 0.5–0.9)
EKG ATRIAL RATE: 326 BPM
EKG Q-T INTERVAL: 374 MS
EKG QRS DURATION: 76 MS
EKG QTC CALCULATION (BAZETT): 364 MS
EKG R AXIS: -97 DEGREES
EKG T AXIS: -118 DEGREES
EKG VENTRICULAR RATE: 57 BPM
GFR AFRICAN AMERICAN: 46 ML/MIN
GFR NON-AFRICAN AMERICAN: 38 ML/MIN
GFR SERPL CREATININE-BSD FRML MDRD: ABNORMAL ML/MIN/{1.73_M2}
GFR SERPL CREATININE-BSD FRML MDRD: ABNORMAL ML/MIN/{1.73_M2}
GLUCOSE BLD-MCNC: 97 MG/DL (ref 70–99)
HCT VFR BLD CALC: 43.3 % (ref 36.3–47.1)
HEMOGLOBIN: 14 G/DL (ref 11.9–15.1)
MCH RBC QN AUTO: 34.4 PG (ref 25.2–33.5)
MCHC RBC AUTO-ENTMCNC: 32.3 G/DL (ref 28.4–34.8)
MCV RBC AUTO: 106.4 FL (ref 82.6–102.9)
NRBC AUTOMATED: 0 PER 100 WBC
PDW BLD-RTO: 11.9 % (ref 11.8–14.4)
PLATELET # BLD: 212 K/UL (ref 138–453)
PMV BLD AUTO: 10.3 FL (ref 8.1–13.5)
POTASSIUM SERPL-SCNC: 4.8 MMOL/L (ref 3.7–5.3)
RBC # BLD: 4.07 M/UL (ref 3.95–5.11)
SODIUM BLD-SCNC: 136 MMOL/L (ref 135–144)
WBC # BLD: 8.8 K/UL (ref 3.5–11.3)

## 2018-10-03 PROCEDURE — 85027 COMPLETE CBC AUTOMATED: CPT

## 2018-10-03 PROCEDURE — 82565 ASSAY OF CREATININE: CPT

## 2018-10-03 PROCEDURE — 82947 ASSAY GLUCOSE BLOOD QUANT: CPT

## 2018-10-03 PROCEDURE — 93005 ELECTROCARDIOGRAM TRACING: CPT

## 2018-10-03 PROCEDURE — 80051 ELECTROLYTE PANEL: CPT

## 2018-10-03 PROCEDURE — 84520 ASSAY OF UREA NITROGEN: CPT

## 2018-10-03 NOTE — FLOWSHEET NOTE
Spoke to Dr. Jos Murphy about patient's EKG from today and one from April. No new orders received. Patient denies any chest pain, SOB, numbness, tingling, headache or nausea at this time. Will transmit to [facility]'s Cardiac department for final read.

## 2018-10-29 ENCOUNTER — ANESTHESIA EVENT (OUTPATIENT)
Dept: OPERATING ROOM | Age: 73
End: 2018-10-29
Payer: MEDICARE

## 2018-10-30 ENCOUNTER — HOSPITAL ENCOUNTER (OUTPATIENT)
Age: 73
Setting detail: OUTPATIENT SURGERY
Discharge: HOME | End: 2018-10-30
Attending: PLASTIC SURGERY | Admitting: PLASTIC SURGERY
Payer: MEDICARE

## 2018-10-30 ENCOUNTER — ANESTHESIA (OUTPATIENT)
Dept: OPERATING ROOM | Age: 73
End: 2018-10-30
Payer: MEDICARE

## 2018-10-30 VITALS
RESPIRATION RATE: 18 BRPM | SYSTOLIC BLOOD PRESSURE: 92 MMHG | TEMPERATURE: 96.8 F | DIASTOLIC BLOOD PRESSURE: 59 MMHG | OXYGEN SATURATION: 97 %

## 2018-10-30 VITALS
OXYGEN SATURATION: 97 % | TEMPERATURE: 98.1 F | BODY MASS INDEX: 20.73 KG/M2 | SYSTOLIC BLOOD PRESSURE: 109 MMHG | HEART RATE: 52 BPM | RESPIRATION RATE: 17 BRPM | HEIGHT: 69 IN | WEIGHT: 140 LBS | DIASTOLIC BLOOD PRESSURE: 50 MMHG

## 2018-10-30 DIAGNOSIS — C44.310 BASAL CELL EPITHELIOMA, FACE: Primary | ICD-10-CM

## 2018-10-30 LAB — POC POTASSIUM: 4.2 MMOL/L (ref 3.5–4.5)

## 2018-10-30 PROCEDURE — 88305 TISSUE EXAM BY PATHOLOGIST: CPT

## 2018-10-30 PROCEDURE — 3600000012 HC SURGERY LEVEL 2 ADDTL 15MIN: Performed by: PLASTIC SURGERY

## 2018-10-30 PROCEDURE — 7100000041 HC SPAR PHASE II RECOVERY - ADDTL 15 MIN: Performed by: PLASTIC SURGERY

## 2018-10-30 PROCEDURE — 2500000003 HC RX 250 WO HCPCS: Performed by: ANESTHESIOLOGY

## 2018-10-30 PROCEDURE — 3700000000 HC ANESTHESIA ATTENDED CARE: Performed by: PLASTIC SURGERY

## 2018-10-30 PROCEDURE — 84132 ASSAY OF SERUM POTASSIUM: CPT

## 2018-10-30 PROCEDURE — 7100000040 HC SPAR PHASE II RECOVERY - FIRST 15 MIN: Performed by: PLASTIC SURGERY

## 2018-10-30 PROCEDURE — 2580000003 HC RX 258: Performed by: PLASTIC SURGERY

## 2018-10-30 PROCEDURE — 3700000001 HC ADD 15 MINUTES (ANESTHESIA): Performed by: PLASTIC SURGERY

## 2018-10-30 PROCEDURE — 3600000002 HC SURGERY LEVEL 2 BASE: Performed by: PLASTIC SURGERY

## 2018-10-30 PROCEDURE — 6360000002 HC RX W HCPCS: Performed by: SPECIALIST

## 2018-10-30 PROCEDURE — 2709999900 HC NON-CHARGEABLE SUPPLY: Performed by: PLASTIC SURGERY

## 2018-10-30 PROCEDURE — 2500000003 HC RX 250 WO HCPCS: Performed by: PLASTIC SURGERY

## 2018-10-30 RX ORDER — FENTANYL CITRATE 50 UG/ML
25 INJECTION, SOLUTION INTRAMUSCULAR; INTRAVENOUS EVERY 5 MIN PRN
Status: CANCELLED | OUTPATIENT
Start: 2018-10-30

## 2018-10-30 RX ORDER — HYDRALAZINE HYDROCHLORIDE 20 MG/ML
5 INJECTION INTRAMUSCULAR; INTRAVENOUS EVERY 10 MIN PRN
Status: CANCELLED | OUTPATIENT
Start: 2018-10-30

## 2018-10-30 RX ORDER — BUPIVACAINE HYDROCHLORIDE AND EPINEPHRINE 5; 5 MG/ML; UG/ML
INJECTION, SOLUTION EPIDURAL; INTRACAUDAL; PERINEURAL PRN
Status: DISCONTINUED | OUTPATIENT
Start: 2018-10-30 | End: 2018-10-30 | Stop reason: HOSPADM

## 2018-10-30 RX ORDER — PROPOFOL 10 MG/ML
INJECTION, EMULSION INTRAVENOUS PRN
Status: DISCONTINUED | OUTPATIENT
Start: 2018-10-30 | End: 2018-10-30 | Stop reason: SDUPTHER

## 2018-10-30 RX ORDER — ONDANSETRON 2 MG/ML
4 INJECTION INTRAMUSCULAR; INTRAVENOUS
Status: CANCELLED | OUTPATIENT
Start: 2018-10-30 | End: 2018-10-30

## 2018-10-30 RX ORDER — SODIUM CHLORIDE 0.9 % (FLUSH) 0.9 %
10 SYRINGE (ML) INJECTION PRN
Status: DISCONTINUED | OUTPATIENT
Start: 2018-10-30 | End: 2018-10-30 | Stop reason: HOSPADM

## 2018-10-30 RX ORDER — LABETALOL HYDROCHLORIDE 5 MG/ML
5 INJECTION, SOLUTION INTRAVENOUS EVERY 10 MIN PRN
Status: CANCELLED | OUTPATIENT
Start: 2018-10-30

## 2018-10-30 RX ORDER — FENTANYL CITRATE 50 UG/ML
INJECTION, SOLUTION INTRAMUSCULAR; INTRAVENOUS PRN
Status: DISCONTINUED | OUTPATIENT
Start: 2018-10-30 | End: 2018-10-30 | Stop reason: SDUPTHER

## 2018-10-30 RX ORDER — OXYCODONE HYDROCHLORIDE AND ACETAMINOPHEN 5; 325 MG/1; MG/1
1 TABLET ORAL EVERY 4 HOURS PRN
Status: CANCELLED | OUTPATIENT
Start: 2018-10-30

## 2018-10-30 RX ORDER — DIPHENHYDRAMINE HYDROCHLORIDE 50 MG/ML
12.5 INJECTION INTRAMUSCULAR; INTRAVENOUS
Status: CANCELLED | OUTPATIENT
Start: 2018-10-30 | End: 2018-10-30

## 2018-10-30 RX ORDER — SODIUM CHLORIDE 0.9 % (FLUSH) 0.9 %
10 SYRINGE (ML) INJECTION EVERY 12 HOURS SCHEDULED
Status: DISCONTINUED | OUTPATIENT
Start: 2018-10-30 | End: 2018-10-30 | Stop reason: HOSPADM

## 2018-10-30 RX ORDER — CEPHALEXIN 500 MG/1
500 CAPSULE ORAL 3 TIMES DAILY
Qty: 15 CAPSULE | Refills: 0 | Status: SHIPPED | OUTPATIENT
Start: 2018-10-30 | End: 2018-11-04

## 2018-10-30 RX ORDER — LIDOCAINE HYDROCHLORIDE 10 MG/ML
1 INJECTION, SOLUTION EPIDURAL; INFILTRATION; INTRACAUDAL; PERINEURAL
Status: COMPLETED | OUTPATIENT
Start: 2018-10-30 | End: 2018-10-30

## 2018-10-30 RX ORDER — SODIUM CHLORIDE, SODIUM LACTATE, POTASSIUM CHLORIDE, CALCIUM CHLORIDE 600; 310; 30; 20 MG/100ML; MG/100ML; MG/100ML; MG/100ML
INJECTION, SOLUTION INTRAVENOUS CONTINUOUS
Status: DISCONTINUED | OUTPATIENT
Start: 2018-10-30 | End: 2018-10-30 | Stop reason: HOSPADM

## 2018-10-30 RX ADMIN — SODIUM CHLORIDE, POTASSIUM CHLORIDE, SODIUM LACTATE AND CALCIUM CHLORIDE: 600; 310; 30; 20 INJECTION, SOLUTION INTRAVENOUS at 10:59

## 2018-10-30 RX ADMIN — SODIUM CHLORIDE, POTASSIUM CHLORIDE, SODIUM LACTATE AND CALCIUM CHLORIDE: 600; 310; 30; 20 INJECTION, SOLUTION INTRAVENOUS at 12:05

## 2018-10-30 RX ADMIN — FENTANYL CITRATE 50 MCG: 50 INJECTION INTRAMUSCULAR; INTRAVENOUS at 12:25

## 2018-10-30 RX ADMIN — LIDOCAINE HYDROCHLORIDE 40 MG: 10 INJECTION, SOLUTION EPIDURAL; INFILTRATION; INTRACAUDAL; PERINEURAL at 12:18

## 2018-10-30 RX ADMIN — FENTANYL CITRATE 50 MCG: 50 INJECTION INTRAMUSCULAR; INTRAVENOUS at 12:18

## 2018-10-30 RX ADMIN — PROPOFOL 50 MG: 10 INJECTION, EMULSION INTRAVENOUS at 12:18

## 2018-10-30 RX ADMIN — Medication 2 G: at 12:20

## 2018-10-30 ASSESSMENT — PULMONARY FUNCTION TESTS
PIF_VALUE: 1
PIF_VALUE: 2
PIF_VALUE: 1
PIF_VALUE: 2
PIF_VALUE: 1
PIF_VALUE: 0
PIF_VALUE: 1
PIF_VALUE: 1
PIF_VALUE: 0
PIF_VALUE: 1

## 2018-10-30 ASSESSMENT — PAIN SCALES - GENERAL
PAINLEVEL_OUTOF10: 0

## 2018-10-30 ASSESSMENT — PAIN - FUNCTIONAL ASSESSMENT: PAIN_FUNCTIONAL_ASSESSMENT: 0-10

## 2018-10-30 NOTE — PROGRESS NOTES
Spoke with Dr. Joann Reilly regarding restart of anticoag. Patient to wait at least 3 dys to restart per Dr. Joann Reilly.

## 2018-10-30 NOTE — ANESTHESIA PRE PROCEDURE
Department of Anesthesiology  Preprocedure Note       Name:  Jersey Jenkins   Age:  68 y.o.  :  1945                                          MRN:  [id number]         Date:  10/30/2018      Surgeon: Vale Napoles):  Charlotte Shields MD    Procedure: EXCISION BASAL CELL TEMPLE (Right )    Medications prior to admission:   Prior to Admission medications    Medication Sig Start Date End Date Taking?  Authorizing Provider   spironolactone (ALDACTONE) 25 MG tablet Take 1 tablet by mouth daily 18  Yes JOSÉ Lind - CNP   torsemide (DEMADEX) 20 MG tablet Take 1 tablet by mouth daily 18  Yes Talon Peña APRN - CNP   sertraline (ZOLOFT) 50 MG tablet TAKE 1 TABLET EVERY DAY 18  Yes JOSÉ Enriquez CNP   carvedilol (COREG) 12.5 MG tablet Take 12.5 mg by mouth 2 times daily (with meals)   Yes Historical Provider, MD   digoxin (LANOXIN) 125 MCG tablet Take 125 mcg by mouth daily   Yes Historical Provider, MD   lisinopril (ZESTRIL) 2.5 MG tablet Take 1 tablet by mouth daily 18  Yes Padmini Hopkins MD   mupirocin (BACTROBAN) 2 % cream Apply topically 2 times daily  Patient taking differently: Apply topically daily To feet 5/3/18  Yes JOSÉ Lind - CNP   isosorbide mononitrate (IMDUR) 30 MG extended release tablet Take 30 mg by mouth daily   Yes Historical Provider, MD   folic acid (FOLVITE) 1 MG tablet Take 1 mg by mouth daily    Historical Provider, MD   vitamin B-1 100 MG tablet Take 1 tablet by mouth daily 18   Shayla Arellano DO   vitamin B-12 (CYANOCOBALAMIN) 1000 MCG tablet Take 1,000 mcg by mouth daily    Historical Provider, MD   Multiple Vitamins-Minerals (THERAPEUTIC MULTIVITAMIN-MINERALS) tablet Take 1 tablet by mouth daily    Historical Provider, MD   calcium carbonate (OSCAL) 500 MG TABS tablet Take 500 mg by mouth daily    Historical Provider, MD   Biotin (BIOTIN 5000) 5 MG CAPS Take 5,000 mcg by mouth daily    Historical Provider, MD apixaban (ELIQUIS) 5 MG TABS tablet Take 5 mg by mouth 2 times daily    Historical Provider, MD   ascorbic acid (VITAMIN C) 500 MG tablet Take 500 mg by mouth daily     Historical Provider, MD       Current medications:    Current Facility-Administered Medications   Medication Dose Route Frequency Provider Last Rate Last Dose    sodium chloride flush 0.9 % injection 10 mL  10 mL Intravenous 2 times per day Jose Siu MD        sodium chloride flush 0.9 % injection 10 mL  10 mL Intravenous PRN Jose Siu MD        lidocaine PF 1 % injection 1 mL  1 mL Intradermal Once PRN Jose Siu MD        lactated ringers infusion   Intravenous Continuous Jeanette Devries  mL/hr at 10/30/18 1059         Allergies:     Allergies   Allergen Reactions    Contrast [Barium-Containing Compounds]      Unknown     Vancomycin Other (See Comments)     unknown       Problem List:    Patient Active Problem List   Diagnosis Code    Ataxia R27.0    Atrial fibrillation (HCC) I48.91    Nonischemic cardiomyopathy ([email or web address] Z21.0    Systolic CHF, chronic (HCC) I50.22    CKD (chronic kidney disease), stage III (HCC) N18.3    Positive FIT (fecal immunochemical test) R19.5    Vitamin B deficiency E53.9    Osteopenia M85.80    Arthritis of right hip M16.11    Bilateral carpal tunnel syndrome G56.03    Alcoholism ([email or web address] S39.78    Metabolic encephalopathy K65.00    Bilateral leg edema R60.0    Acute on chronic congestive heart failure (HCC) I50.9    Elevated liver function tests R94.5       Past Medical History:        Diagnosis Date    AICD (automatic cardioverter/defibrillator) present     Atrial fibrillation ([facility] 09/2012    Dr.David Lucas  CHRONIC ON ELIQUIS    Atrial fibrillation ([email or web address]     Cancer ([email or web address]     basal cell on face    Chronic kidney disease     H/O cardiovascular stress test 07/26/2017    Neg. EF 24%    Hx of long term use of blood thinners     Hypertension     Nonischemic dilated

## 2018-10-30 NOTE — ANESTHESIA POSTPROCEDURE EVALUATION
Department of Anesthesiology  Postprocedure Note    Patient: Milad Sarabia  MRN: [id number]  [place]: 1945  Date of evaluation: 10/30/2018  Time:  1:19 PM     Procedure Summary     Date:  10/30/18 Room / Location:  [facility]  / [facility] OR    Anesthesia Start:  1209 Anesthesia Stop:  1243    Procedure:  EXCISION BASAL CELL RIGHT TEMPLE (Right Head) Diagnosis:  (BASAL CELL TEMPLE )    Surgeon:  Jaci Flores MD Responsible Provider:  Geraldo Dale MD    Anesthesia Type:  MAC ASA Status:  4          Anesthesia Type: MAC    Nick Phase I:      Nick Phase II: Nick Score: 9    Last vitals: Reviewed and per EMR flowsheets.        Anesthesia Post Evaluation    Patient location during evaluation: PACU  Patient participation: complete - patient participated  Level of consciousness: awake  Pain score: 1  Airway patency: patent  Nausea & Vomiting: no nausea and no vomiting  Complications: no  Cardiovascular status: blood pressure returned to baseline and hemodynamically stable  Respiratory status: acceptable  Hydration status: euvolemic

## 2018-10-30 NOTE — H&P
1207   Resp: 16      GENERAL: A & O x3, pt in no apparent distress. HEENT:  NCAT, PERRL, EOMI, oral mucus membrane pink and moist  HEART: Normal Heart sounds,  RRR  LUNGS: Clear to auscultation bilaterally, no wheezes, no respiratory distress. ABDOMEN: Soft, non-tender, non-distended  EXTREMITIES: Moves all four extremities without difficulty, Normal, without deformities, edema, or skin discoloration  SKIN: Skin color, texture, turgor normal. No rashes. 1 cm centrally ulcerated, pigmented healing biopsy site to the right temple. No lesion or area of lesion recurrence noted to bilateral nasal ala or nasal bridge. NEURO: CN II-XII grossly intact. No motor or sensory deficits appreciated. MUSCULOSKELETAL: normal throughout upper and lower extremities     Assessment:  1.     Diagnosis Orders   1. Basal cell carcinoma of face        1 cm centrally ulcerated, pigmented healing biopsy site to the right temple.         Plan:  1. At this time we discussed her pathology at length and the progress of basal cell. 2. Self skin checks encouraged, patient advised to watch all existing lesions for changes and observe for any new lesions. Return to the office for evaluation of any concerning, changing or new skin lesions. 3.  Consistent use of sunscreen containing SPF 30 or higher and reapplication every 90 - 457 minutes while outside. 4.  The patient's was evaluated and after discussion surgical and non surgical options, the patient has decided to undergo surgical removal of the [facility]. All questions were answered to the patient's satisfaction. We will get her scheduled for excision under MAC anesthesia. The patient will call Hanna to schedule the procedure.  If there are any further questions or concerns, the patient was encouraged to call and follow up with one of the providers.     Updated 10/30/2018

## 2018-10-31 PROBLEM — C44.91 BASAL CELL CARCINOMA: Status: ACTIVE | Noted: 2018-10-31

## 2018-10-31 LAB — DERMATOLOGY PATHOLOGY REPORT: NORMAL

## 2018-11-04 DIAGNOSIS — F41.9 ANXIETY AND DEPRESSION: ICD-10-CM

## 2018-11-04 DIAGNOSIS — F32.A ANXIETY AND DEPRESSION: ICD-10-CM

## 2018-11-09 ENCOUNTER — OFFICE VISIT (OUTPATIENT)
Dept: PRIMARY CARE CLINIC | Age: 73
End: 2018-11-09
Payer: MEDICARE

## 2018-11-09 VITALS
HEIGHT: 69 IN | BODY MASS INDEX: 18.69 KG/M2 | OXYGEN SATURATION: 95 % | SYSTOLIC BLOOD PRESSURE: 110 MMHG | HEART RATE: 48 BPM | DIASTOLIC BLOOD PRESSURE: 68 MMHG | WEIGHT: 126.2 LBS

## 2018-11-09 DIAGNOSIS — I50.22 SYSTOLIC CHF, CHRONIC (HCC): Chronic | ICD-10-CM

## 2018-11-09 DIAGNOSIS — C44.91 BASAL CELL CARCINOMA (BCC), UNSPECIFIED SITE: ICD-10-CM

## 2018-11-09 DIAGNOSIS — Z23 NEED FOR PNEUMOCOCCAL VACCINATION: ICD-10-CM

## 2018-11-09 DIAGNOSIS — R19.7 DIARRHEA, UNSPECIFIED TYPE: Primary | ICD-10-CM

## 2018-11-09 PROCEDURE — 1036F TOBACCO NON-USER: CPT | Performed by: FAMILY MEDICINE

## 2018-11-09 PROCEDURE — 90732 PPSV23 VACC 2 YRS+ SUBQ/IM: CPT | Performed by: FAMILY MEDICINE

## 2018-11-09 PROCEDURE — G8399 PT W/DXA RESULTS DOCUMENT: HCPCS | Performed by: FAMILY MEDICINE

## 2018-11-09 PROCEDURE — 4040F PNEUMOC VAC/ADMIN/RCVD: CPT | Performed by: FAMILY MEDICINE

## 2018-11-09 PROCEDURE — 1123F ACP DISCUSS/DSCN MKR DOCD: CPT | Performed by: FAMILY MEDICINE

## 2018-11-09 PROCEDURE — G8427 DOCREV CUR MEDS BY ELIG CLIN: HCPCS | Performed by: FAMILY MEDICINE

## 2018-11-09 PROCEDURE — G0009 ADMIN PNEUMOCOCCAL VACCINE: HCPCS | Performed by: FAMILY MEDICINE

## 2018-11-09 PROCEDURE — 99214 OFFICE O/P EST MOD 30 MIN: CPT | Performed by: FAMILY MEDICINE

## 2018-11-09 PROCEDURE — 1090F PRES/ABSN URINE INCON ASSESS: CPT | Performed by: FAMILY MEDICINE

## 2018-11-09 PROCEDURE — G8420 CALC BMI NORM PARAMETERS: HCPCS | Performed by: FAMILY MEDICINE

## 2018-11-09 PROCEDURE — 1101F PT FALLS ASSESS-DOCD LE1/YR: CPT | Performed by: FAMILY MEDICINE

## 2018-11-09 PROCEDURE — G8484 FLU IMMUNIZE NO ADMIN: HCPCS | Performed by: FAMILY MEDICINE

## 2018-11-09 PROCEDURE — 3017F COLORECTAL CA SCREEN DOC REV: CPT | Performed by: FAMILY MEDICINE

## 2018-11-09 RX ORDER — DIPHENOXYLATE HYDROCHLORIDE AND ATROPINE SULFATE 2.5; .025 MG/1; MG/1
1 TABLET ORAL DAILY PRN
Qty: 10 TABLET | Refills: 0 | Status: SHIPPED | OUTPATIENT
Start: 2018-11-09 | End: 2018-11-19 | Stop reason: SDUPTHER

## 2018-11-09 RX ORDER — CARVEDILOL 6.25 MG/1
6.25 TABLET ORAL 2 TIMES DAILY WITH MEALS
Qty: 60 TABLET | Refills: 2 | Status: SHIPPED | OUTPATIENT
Start: 2018-11-09 | End: 2018-11-30 | Stop reason: SDUPTHER

## 2018-11-10 LAB
ALBUMIN SERPL-MCNC: 4.6 G/DL (ref 3.5–5.2)
ALK PHOSPHATASE: 51 U/L (ref 30–146)
ALT SERPL-CCNC: 14 U/L (ref 5–40)
ANION GAP SERPL CALCULATED.3IONS-SCNC: 13 MEQ/L (ref 10–19)
AST SERPL-CCNC: 22 U/L (ref 9–40)
BILIRUB SERPL-MCNC: 0.6 MG/DL
BUN BLDV-MCNC: 26 MG/DL (ref 8–23)
CALCIUM SERPL-MCNC: 10 MG/DL (ref 8.5–10.5)
CHLORIDE BLD-SCNC: 96 MEQ/L (ref 95–107)
CO2: 31 MEQ/L (ref 19–31)
CREAT SERPL-MCNC: 1.3 MG/DL (ref 0.6–1.3)
EGFR AFRICAN AMERICAN: 47.5 ML/MIN/1.73 M2
EGFR IF NONAFRICAN AMERICAN: 41 ML/MIN/1.73 M2
GLUCOSE: 101 MG/DL (ref 70–99)
POTASSIUM SERPL-SCNC: 4.6 MEQ/L (ref 3.5–5.4)
SODIUM BLD-SCNC: 140 MEQ/L (ref 135–146)
TOTAL PROTEIN: 7.2 G/DL (ref 6.1–8.3)

## 2018-11-14 LAB — C DIFFICILE TOXIN, EIA: NORMAL

## 2018-11-15 LAB — CULTURE, STOOL: NORMAL

## 2018-11-19 ENCOUNTER — TELEPHONE (OUTPATIENT)
Dept: PRIMARY CARE CLINIC | Age: 73
End: 2018-11-19

## 2018-11-19 DIAGNOSIS — R19.7 DIARRHEA, UNSPECIFIED TYPE: ICD-10-CM

## 2018-11-19 RX ORDER — DIPHENOXYLATE HYDROCHLORIDE AND ATROPINE SULFATE 2.5; .025 MG/1; MG/1
1 TABLET ORAL DAILY PRN
Qty: 10 TABLET | Refills: 0 | Status: SHIPPED | OUTPATIENT
Start: 2018-11-19 | End: 2018-12-06 | Stop reason: SDUPTHER

## 2018-11-19 NOTE — TELEPHONE ENCOUNTER
Patient is out of  Diphenatol, could another script be sent over to pharmacy?   Health Maintenance   Topic Date Due    Hepatitis C screen  1945    Shingles Vaccine (1 of 2 - 2 Dose Series) 05/18/1996    Colon Cancer Screen FIT/FOBT  11/12/2018    Flu vaccine (1) 02/01/2019 (Originally 9/1/2018)    Breast cancer screen  08/29/2019    Potassium monitoring  11/09/2019    Creatinine monitoring  11/09/2019    Lipid screen  10/05/2022    DTaP/Tdap/Td vaccine (2 - Td) 05/03/2028    DEXA (modify frequency per FRAX score)  Completed    Pneumococcal low/med risk  Completed             (applicable per patient's age: Cancer Screenings, Depression Screening, Fall Risk Screening, Immunizations)    LDL Calculated (mg/dL)   Date Value   10/05/2017 77     AST (U/L)   Date Value   11/09/2018 22     ALT (U/L)   Date Value   11/09/2018 14     BUN (mg/dL)   Date Value   11/09/2018 26 (H)      (goal A1C is < 7)   (goal LDL is <100) need 30-50% reduction from baseline     BP Readings from Last 3 Encounters:   11/09/18 110/68   11/07/18 122/77   10/30/18 (!) 92/59    (goal /80)      All Future Testing planned in [facility]:  Lab Frequency Next Occurrence   Vitamin B12 Once 65/88/4391   Basic Metabolic Panel Once 50/97/0924   Basic Metabolic Panel Once 12/57/9740   Basic Metabolic Panel Once 04/59/1331   Platelet function test Once 07/11/2018   CBC Once 10/30/2018   FL BARIUM ENEMA W AIR CONTRAST Once 05/01/2018   Liver Fibrosis, Chronic Viral Hepatitis Once 05/01/2018   Hepatitis A Antibody, Total Once 08/01/2018   Hepatitis B Core Antibody, Total Once 08/01/2018   Hepatitis B Surface Antibody Once 08/01/2018   Hepatitis C Antibody Once 08/01/2018   Hepatitis B Surface Antigen Once 08/01/2018   GIOVANNY Once 08/01/2018   Mitochondrial antibodies, M2 Once 07/30/2018   IgM Once 07/30/2018   Anti-smooth muscle antibody Once 07/30/2018   IgG Once 07/30/2018   Iron Profile Once 07/30/2018   Ferritin Once 07/30/2018   C Diff Toxin

## 2018-11-20 DIAGNOSIS — Z12.11 SCREENING FOR COLON CANCER: Primary | ICD-10-CM

## 2018-11-26 ENCOUNTER — TELEPHONE (OUTPATIENT)
Dept: PRIMARY CARE CLINIC | Age: 73
End: 2018-11-26

## 2018-11-26 DIAGNOSIS — R19.5 POSITIVE FIT (FECAL IMMUNOCHEMICAL TEST): Primary | ICD-10-CM

## 2018-11-26 DIAGNOSIS — Z12.11 SCREENING FOR COLON CANCER: ICD-10-CM

## 2018-11-26 LAB
CONTROL: PRESENT
HEMOCCULT STL QL: POSITIVE

## 2018-11-26 PROCEDURE — 82274 ASSAY TEST FOR BLOOD FECAL: CPT | Performed by: FAMILY MEDICINE

## 2018-11-30 ENCOUNTER — OFFICE VISIT (OUTPATIENT)
Dept: PRIMARY CARE CLINIC | Age: 73
End: 2018-11-30
Payer: MEDICARE

## 2018-11-30 VITALS
RESPIRATION RATE: 16 BRPM | OXYGEN SATURATION: 98 % | WEIGHT: 127.4 LBS | DIASTOLIC BLOOD PRESSURE: 78 MMHG | HEART RATE: 69 BPM | BODY MASS INDEX: 18.81 KG/M2 | SYSTOLIC BLOOD PRESSURE: 124 MMHG

## 2018-11-30 DIAGNOSIS — R19.5 POSITIVE FIT (FECAL IMMUNOCHEMICAL TEST): ICD-10-CM

## 2018-11-30 DIAGNOSIS — I50.22 SYSTOLIC CHF, CHRONIC (HCC): Primary | Chronic | ICD-10-CM

## 2018-11-30 DIAGNOSIS — M16.11 ARTHRITIS OF RIGHT HIP: ICD-10-CM

## 2018-11-30 DIAGNOSIS — R19.7 DIARRHEA, UNSPECIFIED TYPE: ICD-10-CM

## 2018-11-30 PROCEDURE — 3017F COLORECTAL CA SCREEN DOC REV: CPT | Performed by: FAMILY MEDICINE

## 2018-11-30 PROCEDURE — G8399 PT W/DXA RESULTS DOCUMENT: HCPCS | Performed by: FAMILY MEDICINE

## 2018-11-30 PROCEDURE — 99214 OFFICE O/P EST MOD 30 MIN: CPT | Performed by: FAMILY MEDICINE

## 2018-11-30 PROCEDURE — 1123F ACP DISCUSS/DSCN MKR DOCD: CPT | Performed by: FAMILY MEDICINE

## 2018-11-30 PROCEDURE — G8420 CALC BMI NORM PARAMETERS: HCPCS | Performed by: FAMILY MEDICINE

## 2018-11-30 PROCEDURE — G8484 FLU IMMUNIZE NO ADMIN: HCPCS | Performed by: FAMILY MEDICINE

## 2018-11-30 PROCEDURE — 1090F PRES/ABSN URINE INCON ASSESS: CPT | Performed by: FAMILY MEDICINE

## 2018-11-30 PROCEDURE — 1036F TOBACCO NON-USER: CPT | Performed by: FAMILY MEDICINE

## 2018-11-30 PROCEDURE — 4040F PNEUMOC VAC/ADMIN/RCVD: CPT | Performed by: FAMILY MEDICINE

## 2018-11-30 PROCEDURE — G8427 DOCREV CUR MEDS BY ELIG CLIN: HCPCS | Performed by: FAMILY MEDICINE

## 2018-11-30 PROCEDURE — 1101F PT FALLS ASSESS-DOCD LE1/YR: CPT | Performed by: FAMILY MEDICINE

## 2018-11-30 RX ORDER — CARVEDILOL 6.25 MG/1
6.25 TABLET ORAL 2 TIMES DAILY WITH MEALS
Qty: 180 TABLET | Refills: 2 | Status: SHIPPED | OUTPATIENT
Start: 2018-11-30 | End: 2019-09-03 | Stop reason: SDUPTHER

## 2018-11-30 RX ORDER — TRAMADOL HYDROCHLORIDE 50 MG/1
50 TABLET ORAL EVERY 6 HOURS PRN
Qty: 28 TABLET | Refills: 0 | Status: SHIPPED | OUTPATIENT
Start: 2018-11-30 | End: 2019-03-21

## 2018-11-30 NOTE — PROGRESS NOTES
Subjective:  Navin Lundberg is in for continued evaluation and management. Her chronic medical problems include the following; congestive heart failure, diarrhea, and arthritis. She also was noted to have a positive FIT test.    She has congestive heart failure. She was previously noted to be bradycardic. I recommended decreasing carvedilol to 6.25 mg twice daily. She has done so and is doing fine. She denies any orthopnea. She denies any paroxysmal nocturnal dyspnea. Diarrhea improved transiently. She continues to have intermittent diarrhea. She takes antidiarrheal medication on an intermittent basis. She denies any blood or mucus in the stool. Of note, she was noted to have a positive test.  She has been referred to gastroenterology for evaluation. She continues to complain of arthritic pain in her right hip. She has previously used tramadol which helps. Review of systems per HPI, otherwise negative. Allergies; medications; past medical, surgical, family, and social history; and problem list reviewed as indicated in this encounter. Objective:  Vitals: Blood pressure 124/78, pulse 69, resp. rate 16, weight 127 lb 6.4 oz (57.8 kg), SpO2 98 %, not currently breastfeeding. Constitutional: She is oriented to person, place, and time. She appears well-developed and well-nourished and in no acute distress. Cardiovascular: Normal rate, no murmur, rub, or gallop    Pulmonary/Chest: Effort normal and breath sounds normal. No rales or wheezes. No chest retraction. Extremities: no clubbing, cyanosis, or edema  Neurological:  CN II - XII grossly intact; no focal neurological deficits  Psychiatric:  Well groomed, well dressed. The patient maintains appropriate eye contact and does not appear to be responding to internal stimuli. No agitation      Assessment/ Plan / Medical Decision Making   Diagnosis Orders   1. Systolic CHF, chronic (HCC)  carvedilol (COREG) 6.25 MG tablet   2.  Diarrhea, unspecified

## 2018-12-03 ENCOUNTER — TELEPHONE (OUTPATIENT)
Dept: GASTROENTEROLOGY | Age: 73
End: 2018-12-03

## 2018-12-06 ENCOUNTER — TELEPHONE (OUTPATIENT)
Dept: GASTROENTEROLOGY | Age: 73
End: 2018-12-06

## 2018-12-06 DIAGNOSIS — R19.7 DIARRHEA, UNSPECIFIED TYPE: ICD-10-CM

## 2018-12-06 DIAGNOSIS — R19.5 POSITIVE FECAL IMMUNOCHEMICAL TEST: Primary | ICD-10-CM

## 2018-12-06 RX ORDER — DIPHENOXYLATE HYDROCHLORIDE AND ATROPINE SULFATE 2.5; .025 MG/1; MG/1
1 TABLET ORAL DAILY PRN
Qty: 10 TABLET | Refills: 0 | Status: SHIPPED | OUTPATIENT
Start: 2018-12-06 | End: 2018-12-21 | Stop reason: SDUPTHER

## 2018-12-10 ENCOUNTER — TELEPHONE (OUTPATIENT)
Dept: GASTROENTEROLOGY | Age: 73
End: 2018-12-10

## 2018-12-20 ENCOUNTER — TELEPHONE (OUTPATIENT)
Dept: PRIMARY CARE CLINIC | Age: 73
End: 2018-12-20

## 2018-12-21 ENCOUNTER — OFFICE VISIT (OUTPATIENT)
Dept: PRIMARY CARE CLINIC | Age: 73
End: 2018-12-21
Payer: MEDICARE

## 2018-12-21 VITALS
HEIGHT: 69 IN | RESPIRATION RATE: 18 BRPM | WEIGHT: 124.8 LBS | BODY MASS INDEX: 18.48 KG/M2 | SYSTOLIC BLOOD PRESSURE: 96 MMHG | DIASTOLIC BLOOD PRESSURE: 62 MMHG | HEART RATE: 84 BPM

## 2018-12-21 DIAGNOSIS — R19.7 DIARRHEA, UNSPECIFIED TYPE: Primary | ICD-10-CM

## 2018-12-21 PROCEDURE — G8427 DOCREV CUR MEDS BY ELIG CLIN: HCPCS | Performed by: NURSE PRACTITIONER

## 2018-12-21 PROCEDURE — G8399 PT W/DXA RESULTS DOCUMENT: HCPCS | Performed by: NURSE PRACTITIONER

## 2018-12-21 PROCEDURE — 1123F ACP DISCUSS/DSCN MKR DOCD: CPT | Performed by: NURSE PRACTITIONER

## 2018-12-21 PROCEDURE — 1036F TOBACCO NON-USER: CPT | Performed by: NURSE PRACTITIONER

## 2018-12-21 PROCEDURE — G8484 FLU IMMUNIZE NO ADMIN: HCPCS | Performed by: NURSE PRACTITIONER

## 2018-12-21 PROCEDURE — 99214 OFFICE O/P EST MOD 30 MIN: CPT | Performed by: NURSE PRACTITIONER

## 2018-12-21 PROCEDURE — 4040F PNEUMOC VAC/ADMIN/RCVD: CPT | Performed by: NURSE PRACTITIONER

## 2018-12-21 PROCEDURE — 3017F COLORECTAL CA SCREEN DOC REV: CPT | Performed by: NURSE PRACTITIONER

## 2018-12-21 PROCEDURE — G8419 CALC BMI OUT NRM PARAM NOF/U: HCPCS | Performed by: NURSE PRACTITIONER

## 2018-12-21 PROCEDURE — 1101F PT FALLS ASSESS-DOCD LE1/YR: CPT | Performed by: NURSE PRACTITIONER

## 2018-12-21 PROCEDURE — 1090F PRES/ABSN URINE INCON ASSESS: CPT | Performed by: NURSE PRACTITIONER

## 2018-12-21 RX ORDER — DIPHENOXYLATE HYDROCHLORIDE AND ATROPINE SULFATE 2.5; .025 MG/1; MG/1
1 TABLET ORAL DAILY PRN
Qty: 10 TABLET | Refills: 0 | Status: SHIPPED | OUTPATIENT
Start: 2018-12-21 | End: 2018-12-31

## 2018-12-21 ASSESSMENT — ENCOUNTER SYMPTOMS
SORE THROAT: 0
VOMITING: 0
ABDOMINAL PAIN: 0
CONSTIPATION: 0
COUGH: 0
NAUSEA: 0
BLOOD IN STOOL: 0
TROUBLE SWALLOWING: 0
WHEEZING: 0
DIARRHEA: 1
SINUS PRESSURE: 0
SHORTNESS OF BREATH: 0

## 2019-01-23 ENCOUNTER — TELEPHONE (OUTPATIENT)
Dept: GASTROENTEROLOGY | Age: 74
End: 2019-01-23

## 2019-02-05 ENCOUNTER — TELEPHONE (OUTPATIENT)
Dept: GASTROENTEROLOGY | Age: 74
End: 2019-02-05

## 2019-02-06 ENCOUNTER — ANESTHESIA EVENT (OUTPATIENT)
Dept: ENDOSCOPY | Age: 74
End: 2019-02-06
Payer: MEDICARE

## 2019-02-06 ENCOUNTER — ANESTHESIA (OUTPATIENT)
Dept: ENDOSCOPY | Age: 74
End: 2019-02-06
Payer: MEDICARE

## 2019-02-06 ENCOUNTER — HOSPITAL ENCOUNTER (OUTPATIENT)
Age: 74
Setting detail: OUTPATIENT SURGERY
Discharge: HOME | End: 2019-02-06
Attending: INTERNAL MEDICINE | Admitting: INTERNAL MEDICINE
Payer: MEDICARE

## 2019-02-06 VITALS
DIASTOLIC BLOOD PRESSURE: 64 MMHG | RESPIRATION RATE: 14 BRPM | OXYGEN SATURATION: 99 % | TEMPERATURE: 98.4 F | SYSTOLIC BLOOD PRESSURE: 113 MMHG | HEART RATE: 67 BPM

## 2019-02-06 VITALS
OXYGEN SATURATION: 98 % | RESPIRATION RATE: 22 BRPM | DIASTOLIC BLOOD PRESSURE: 52 MMHG | SYSTOLIC BLOOD PRESSURE: 112 MMHG

## 2019-02-06 LAB — COMMENT: NORMAL

## 2019-02-06 PROCEDURE — 7100000010 HC PHASE II RECOVERY - FIRST 15 MIN: Performed by: INTERNAL MEDICINE

## 2019-02-06 PROCEDURE — 45380 COLONOSCOPY AND BIOPSY: CPT | Performed by: INTERNAL MEDICINE

## 2019-02-06 PROCEDURE — 3609010600 HC COLONOSCOPY POLYPECTOMY SNARE/COLD BIOPSY: Performed by: INTERNAL MEDICINE

## 2019-02-06 PROCEDURE — 7100000011 HC PHASE II RECOVERY - ADDTL 15 MIN: Performed by: INTERNAL MEDICINE

## 2019-02-06 PROCEDURE — 45385 COLONOSCOPY W/LESION REMOVAL: CPT | Performed by: INTERNAL MEDICINE

## 2019-02-06 PROCEDURE — 3700000001 HC ADD 15 MINUTES (ANESTHESIA): Performed by: INTERNAL MEDICINE

## 2019-02-06 PROCEDURE — 2500000003 HC RX 250 WO HCPCS: Performed by: NURSE ANESTHETIST, CERTIFIED REGISTERED

## 2019-02-06 PROCEDURE — 3700000000 HC ANESTHESIA ATTENDED CARE: Performed by: INTERNAL MEDICINE

## 2019-02-06 PROCEDURE — 2709999900 HC NON-CHARGEABLE SUPPLY: Performed by: INTERNAL MEDICINE

## 2019-02-06 PROCEDURE — 2580000003 HC RX 258: Performed by: INTERNAL MEDICINE

## 2019-02-06 PROCEDURE — C1773 RET DEV, INSERTABLE: HCPCS | Performed by: INTERNAL MEDICINE

## 2019-02-06 PROCEDURE — 6360000002 HC RX W HCPCS: Performed by: NURSE ANESTHETIST, CERTIFIED REGISTERED

## 2019-02-06 PROCEDURE — 88305 TISSUE EXAM BY PATHOLOGIST: CPT

## 2019-02-06 RX ORDER — PROPOFOL 10 MG/ML
INJECTION, EMULSION INTRAVENOUS PRN
Status: DISCONTINUED | OUTPATIENT
Start: 2019-02-06 | End: 2019-02-06 | Stop reason: SDUPTHER

## 2019-02-06 RX ORDER — SODIUM CHLORIDE 0.9 % (FLUSH) 0.9 %
10 SYRINGE (ML) INJECTION PRN
Status: CANCELLED | OUTPATIENT
Start: 2019-02-06

## 2019-02-06 RX ORDER — SODIUM CHLORIDE 9 MG/ML
INJECTION, SOLUTION INTRAVENOUS CONTINUOUS
Status: DISCONTINUED | OUTPATIENT
Start: 2019-02-06 | End: 2019-02-06 | Stop reason: HOSPADM

## 2019-02-06 RX ORDER — LIDOCAINE HYDROCHLORIDE 10 MG/ML
INJECTION, SOLUTION INFILTRATION; PERINEURAL PRN
Status: DISCONTINUED | OUTPATIENT
Start: 2019-02-06 | End: 2019-02-06 | Stop reason: SDUPTHER

## 2019-02-06 RX ORDER — ONDANSETRON 2 MG/ML
4 INJECTION INTRAMUSCULAR; INTRAVENOUS ONCE
Status: CANCELLED | OUTPATIENT
Start: 2019-02-06 | End: 2019-02-06

## 2019-02-06 RX ORDER — SODIUM CHLORIDE 0.9 % (FLUSH) 0.9 %
10 SYRINGE (ML) INJECTION EVERY 12 HOURS SCHEDULED
Status: CANCELLED | OUTPATIENT
Start: 2019-02-06

## 2019-02-06 RX ADMIN — PROPOFOL 100 MG: 10 INJECTION, EMULSION INTRAVENOUS at 15:01

## 2019-02-06 RX ADMIN — SODIUM CHLORIDE: 9 INJECTION, SOLUTION INTRAVENOUS at 14:54

## 2019-02-06 RX ADMIN — LIDOCAINE HYDROCHLORIDE 50 MG: 10 INJECTION, SOLUTION INFILTRATION; PERINEURAL at 15:20

## 2019-02-06 RX ADMIN — PROPOFOL 50 MG: 10 INJECTION, EMULSION INTRAVENOUS at 15:10

## 2019-02-06 RX ADMIN — SODIUM CHLORIDE: 9 INJECTION, SOLUTION INTRAVENOUS at 13:04

## 2019-02-06 RX ADMIN — PROPOFOL 50 MG: 10 INJECTION, EMULSION INTRAVENOUS at 15:05

## 2019-02-06 RX ADMIN — SODIUM CHLORIDE: 9 INJECTION, SOLUTION INTRAVENOUS at 15:20

## 2019-02-06 RX ADMIN — LIDOCAINE HYDROCHLORIDE 30 MG: 10 INJECTION, SOLUTION INFILTRATION; PERINEURAL at 14:58

## 2019-02-06 ASSESSMENT — PAIN SCALES - GENERAL
PAINLEVEL_OUTOF10: 0

## 2019-02-07 LAB — SURGICAL PATHOLOGY REPORT: NORMAL

## 2019-02-13 ENCOUNTER — TELEPHONE (OUTPATIENT)
Dept: GASTROENTEROLOGY | Age: 74
End: 2019-02-13

## 2019-02-19 ENCOUNTER — OFFICE VISIT (OUTPATIENT)
Dept: PRIMARY CARE CLINIC | Age: 74
End: 2019-02-19
Payer: MEDICARE

## 2019-02-19 VITALS
WEIGHT: 124 LBS | DIASTOLIC BLOOD PRESSURE: 72 MMHG | BODY MASS INDEX: 18.37 KG/M2 | SYSTOLIC BLOOD PRESSURE: 144 MMHG | HEIGHT: 69 IN | HEART RATE: 92 BPM | RESPIRATION RATE: 18 BRPM

## 2019-02-19 DIAGNOSIS — L97.521 SKIN ULCER OF FOURTH TOE OF LEFT FOOT, LIMITED TO BREAKDOWN OF SKIN (HCC): ICD-10-CM

## 2019-02-19 DIAGNOSIS — R23.0 CYANOSIS: ICD-10-CM

## 2019-02-19 DIAGNOSIS — L97.521 ULCER OF GREAT TOE, LEFT, LIMITED TO BREAKDOWN OF SKIN (HCC): Primary | ICD-10-CM

## 2019-02-19 DIAGNOSIS — R20.9 BILATERAL COLD FEET: ICD-10-CM

## 2019-02-19 DIAGNOSIS — L97.511 SKIN ULCER OF RIGHT GREAT TOE, LIMITED TO BREAKDOWN OF SKIN (HCC): ICD-10-CM

## 2019-02-19 PROCEDURE — 1101F PT FALLS ASSESS-DOCD LE1/YR: CPT | Performed by: NURSE PRACTITIONER

## 2019-02-19 PROCEDURE — G8419 CALC BMI OUT NRM PARAM NOF/U: HCPCS | Performed by: NURSE PRACTITIONER

## 2019-02-19 PROCEDURE — 1036F TOBACCO NON-USER: CPT | Performed by: NURSE PRACTITIONER

## 2019-02-19 PROCEDURE — 4040F PNEUMOC VAC/ADMIN/RCVD: CPT | Performed by: NURSE PRACTITIONER

## 2019-02-19 PROCEDURE — G8427 DOCREV CUR MEDS BY ELIG CLIN: HCPCS | Performed by: NURSE PRACTITIONER

## 2019-02-19 PROCEDURE — 3017F COLORECTAL CA SCREEN DOC REV: CPT | Performed by: NURSE PRACTITIONER

## 2019-02-19 PROCEDURE — G8399 PT W/DXA RESULTS DOCUMENT: HCPCS | Performed by: NURSE PRACTITIONER

## 2019-02-19 PROCEDURE — 99214 OFFICE O/P EST MOD 30 MIN: CPT | Performed by: NURSE PRACTITIONER

## 2019-02-19 PROCEDURE — G8484 FLU IMMUNIZE NO ADMIN: HCPCS | Performed by: NURSE PRACTITIONER

## 2019-02-19 PROCEDURE — 1090F PRES/ABSN URINE INCON ASSESS: CPT | Performed by: NURSE PRACTITIONER

## 2019-02-19 PROCEDURE — 1123F ACP DISCUSS/DSCN MKR DOCD: CPT | Performed by: NURSE PRACTITIONER

## 2019-02-19 RX ORDER — CEPHALEXIN 500 MG/1
500 CAPSULE ORAL 3 TIMES DAILY
Qty: 30 CAPSULE | Refills: 0 | Status: SHIPPED | OUTPATIENT
Start: 2019-02-19 | End: 2019-03-21 | Stop reason: ALTCHOICE

## 2019-02-19 ASSESSMENT — ENCOUNTER SYMPTOMS
TROUBLE SWALLOWING: 0
VOMITING: 0
SORE THROAT: 0
SINUS PRESSURE: 0
BLOOD IN STOOL: 0
SHORTNESS OF BREATH: 0
COUGH: 0
ABDOMINAL PAIN: 0
WHEEZING: 0
CONSTIPATION: 0
DIARRHEA: 0
NAUSEA: 0

## 2019-02-19 ASSESSMENT — PATIENT HEALTH QUESTIONNAIRE - PHQ9
SUM OF ALL RESPONSES TO PHQ QUESTIONS 1-9: 0
1. LITTLE INTEREST OR PLEASURE IN DOING THINGS: 0
SUM OF ALL RESPONSES TO PHQ QUESTIONS 1-9: 0
2. FEELING DOWN, DEPRESSED OR HOPELESS: 0
SUM OF ALL RESPONSES TO PHQ9 QUESTIONS 1 & 2: 0

## 2019-02-26 ENCOUNTER — OFFICE VISIT (OUTPATIENT)
Dept: PRIMARY CARE CLINIC | Age: 74
End: 2019-02-26
Payer: MEDICARE

## 2019-02-26 VITALS
BODY MASS INDEX: 18.28 KG/M2 | WEIGHT: 123.4 LBS | HEIGHT: 69 IN | RESPIRATION RATE: 18 BRPM | SYSTOLIC BLOOD PRESSURE: 120 MMHG | DIASTOLIC BLOOD PRESSURE: 62 MMHG | HEART RATE: 88 BPM

## 2019-02-26 DIAGNOSIS — L97.511 SKIN ULCER OF FOURTH TOE OF RIGHT FOOT, LIMITED TO BREAKDOWN OF SKIN (HCC): ICD-10-CM

## 2019-02-26 DIAGNOSIS — L97.521 ULCER OF GREAT TOE, LEFT, LIMITED TO BREAKDOWN OF SKIN (HCC): Primary | ICD-10-CM

## 2019-02-26 DIAGNOSIS — L97.521: ICD-10-CM

## 2019-02-26 DIAGNOSIS — L97.511 ULCER OF GREAT TOE, RIGHT, LIMITED TO BREAKDOWN OF SKIN (HCC): ICD-10-CM

## 2019-02-26 PROCEDURE — 1090F PRES/ABSN URINE INCON ASSESS: CPT | Performed by: NURSE PRACTITIONER

## 2019-02-26 PROCEDURE — G8484 FLU IMMUNIZE NO ADMIN: HCPCS | Performed by: NURSE PRACTITIONER

## 2019-02-26 PROCEDURE — 1101F PT FALLS ASSESS-DOCD LE1/YR: CPT | Performed by: NURSE PRACTITIONER

## 2019-02-26 PROCEDURE — 4040F PNEUMOC VAC/ADMIN/RCVD: CPT | Performed by: NURSE PRACTITIONER

## 2019-02-26 PROCEDURE — 99214 OFFICE O/P EST MOD 30 MIN: CPT | Performed by: NURSE PRACTITIONER

## 2019-02-26 PROCEDURE — G8419 CALC BMI OUT NRM PARAM NOF/U: HCPCS | Performed by: NURSE PRACTITIONER

## 2019-02-26 PROCEDURE — G8399 PT W/DXA RESULTS DOCUMENT: HCPCS | Performed by: NURSE PRACTITIONER

## 2019-02-26 PROCEDURE — 1036F TOBACCO NON-USER: CPT | Performed by: NURSE PRACTITIONER

## 2019-02-26 PROCEDURE — 3017F COLORECTAL CA SCREEN DOC REV: CPT | Performed by: NURSE PRACTITIONER

## 2019-02-26 PROCEDURE — G8427 DOCREV CUR MEDS BY ELIG CLIN: HCPCS | Performed by: NURSE PRACTITIONER

## 2019-02-26 PROCEDURE — 1123F ACP DISCUSS/DSCN MKR DOCD: CPT | Performed by: NURSE PRACTITIONER

## 2019-02-27 ENCOUNTER — HOSPITAL ENCOUNTER (OUTPATIENT)
Dept: VASCULAR LAB | Facility: CLINIC | Age: 74
Discharge: HOME | End: 2019-02-27
Payer: MEDICARE

## 2019-02-27 ENCOUNTER — TELEPHONE (OUTPATIENT)
Dept: PRIMARY CARE CLINIC | Age: 74
End: 2019-02-27

## 2019-02-27 DIAGNOSIS — L97.511 SKIN ULCER OF RIGHT GREAT TOE, LIMITED TO BREAKDOWN OF SKIN (HCC): ICD-10-CM

## 2019-02-27 DIAGNOSIS — L97.521 ULCER OF GREAT TOE, LEFT, LIMITED TO BREAKDOWN OF SKIN (HCC): ICD-10-CM

## 2019-02-27 DIAGNOSIS — R23.0 CYANOSIS: ICD-10-CM

## 2019-02-27 DIAGNOSIS — L97.521 SKIN ULCER OF FOURTH TOE OF LEFT FOOT, LIMITED TO BREAKDOWN OF SKIN (HCC): ICD-10-CM

## 2019-02-27 DIAGNOSIS — R20.9 BILATERAL COLD FEET: ICD-10-CM

## 2019-02-27 PROCEDURE — 93925 LOWER EXTREMITY STUDY: CPT

## 2019-02-27 ASSESSMENT — ENCOUNTER SYMPTOMS
WHEEZING: 0
DIARRHEA: 0
COUGH: 0
SORE THROAT: 0
NAUSEA: 0
ABDOMINAL PAIN: 0
TROUBLE SWALLOWING: 0
SHORTNESS OF BREATH: 0
COLOR CHANGE: 1
SINUS PRESSURE: 0
BLOOD IN STOOL: 0
CONSTIPATION: 0
VOMITING: 0

## 2019-03-04 ENCOUNTER — TELEPHONE (OUTPATIENT)
Dept: GASTROENTEROLOGY | Age: 74
End: 2019-03-04

## 2019-03-21 ENCOUNTER — TELEPHONE (OUTPATIENT)
Dept: PRIMARY CARE CLINIC | Age: 74
End: 2019-03-21

## 2019-03-21 ENCOUNTER — OFFICE VISIT (OUTPATIENT)
Dept: PRIMARY CARE CLINIC | Age: 74
End: 2019-03-21
Payer: MEDICARE

## 2019-03-21 VITALS
DIASTOLIC BLOOD PRESSURE: 70 MMHG | WEIGHT: 125.2 LBS | SYSTOLIC BLOOD PRESSURE: 116 MMHG | HEIGHT: 69 IN | HEART RATE: 72 BPM | BODY MASS INDEX: 18.54 KG/M2 | RESPIRATION RATE: 18 BRPM

## 2019-03-21 DIAGNOSIS — L97.511 ULCER OF GREAT TOE, RIGHT, LIMITED TO BREAKDOWN OF SKIN (HCC): ICD-10-CM

## 2019-03-21 DIAGNOSIS — L97.521 ULCER OF GREAT TOE, LEFT, LIMITED TO BREAKDOWN OF SKIN (HCC): ICD-10-CM

## 2019-03-21 DIAGNOSIS — M16.11 ARTHRITIS OF RIGHT HIP: ICD-10-CM

## 2019-03-21 DIAGNOSIS — R20.9 BILATERAL COLD FEET: Primary | ICD-10-CM

## 2019-03-21 PROCEDURE — 1036F TOBACCO NON-USER: CPT | Performed by: NURSE PRACTITIONER

## 2019-03-21 PROCEDURE — 1123F ACP DISCUSS/DSCN MKR DOCD: CPT | Performed by: NURSE PRACTITIONER

## 2019-03-21 PROCEDURE — 3017F COLORECTAL CA SCREEN DOC REV: CPT | Performed by: NURSE PRACTITIONER

## 2019-03-21 PROCEDURE — G8419 CALC BMI OUT NRM PARAM NOF/U: HCPCS | Performed by: NURSE PRACTITIONER

## 2019-03-21 PROCEDURE — 1101F PT FALLS ASSESS-DOCD LE1/YR: CPT | Performed by: NURSE PRACTITIONER

## 2019-03-21 PROCEDURE — 4040F PNEUMOC VAC/ADMIN/RCVD: CPT | Performed by: NURSE PRACTITIONER

## 2019-03-21 PROCEDURE — G8399 PT W/DXA RESULTS DOCUMENT: HCPCS | Performed by: NURSE PRACTITIONER

## 2019-03-21 PROCEDURE — 99214 OFFICE O/P EST MOD 30 MIN: CPT | Performed by: NURSE PRACTITIONER

## 2019-03-21 PROCEDURE — 1090F PRES/ABSN URINE INCON ASSESS: CPT | Performed by: NURSE PRACTITIONER

## 2019-03-21 PROCEDURE — G8484 FLU IMMUNIZE NO ADMIN: HCPCS | Performed by: NURSE PRACTITIONER

## 2019-03-21 PROCEDURE — G8427 DOCREV CUR MEDS BY ELIG CLIN: HCPCS | Performed by: NURSE PRACTITIONER

## 2019-03-21 ASSESSMENT — ENCOUNTER SYMPTOMS
NAUSEA: 0
COUGH: 0
BLOOD IN STOOL: 0
SHORTNESS OF BREATH: 0
SINUS PRESSURE: 0
VOMITING: 0
CONSTIPATION: 0
ABDOMINAL PAIN: 0
DIARRHEA: 0
TROUBLE SWALLOWING: 0
SORE THROAT: 0
COLOR CHANGE: 1
WHEEZING: 0

## 2019-03-22 ENCOUNTER — TELEPHONE (OUTPATIENT)
Dept: PRIMARY CARE CLINIC | Age: 74
End: 2019-03-22

## 2019-03-22 DIAGNOSIS — M16.11 ARTHRITIS OF RIGHT HIP: Primary | ICD-10-CM

## 2019-03-27 ENCOUNTER — TELEPHONE (OUTPATIENT)
Dept: PRIMARY CARE CLINIC | Age: 74
End: 2019-03-27

## 2019-03-27 ENCOUNTER — OFFICE VISIT (OUTPATIENT)
Dept: PODIATRY | Age: 74
End: 2019-03-27
Payer: MEDICARE

## 2019-03-27 VITALS
SYSTOLIC BLOOD PRESSURE: 144 MMHG | HEART RATE: 16 BPM | OXYGEN SATURATION: 98 % | TEMPERATURE: 97.7 F | HEIGHT: 69 IN | BODY MASS INDEX: 18.37 KG/M2 | DIASTOLIC BLOOD PRESSURE: 65 MMHG | WEIGHT: 124 LBS

## 2019-03-27 DIAGNOSIS — R26.2 TROUBLE WALKING: ICD-10-CM

## 2019-03-27 DIAGNOSIS — I87.2 CHRONIC VENOUS INSUFFICIENCY: Primary | ICD-10-CM

## 2019-03-27 DIAGNOSIS — E11.51 TYPE II DIABETES MELLITUS WITH PERIPHERAL CIRCULATORY DISORDER (HCC): ICD-10-CM

## 2019-03-27 PROCEDURE — G8399 PT W/DXA RESULTS DOCUMENT: HCPCS | Performed by: PODIATRIST

## 2019-03-27 PROCEDURE — 3046F HEMOGLOBIN A1C LEVEL >9.0%: CPT | Performed by: PODIATRIST

## 2019-03-27 PROCEDURE — 4040F PNEUMOC VAC/ADMIN/RCVD: CPT | Performed by: PODIATRIST

## 2019-03-27 PROCEDURE — 99203 OFFICE O/P NEW LOW 30 MIN: CPT | Performed by: PODIATRIST

## 2019-03-27 PROCEDURE — 3017F COLORECTAL CA SCREEN DOC REV: CPT | Performed by: PODIATRIST

## 2019-03-27 PROCEDURE — 2022F DILAT RTA XM EVC RTNOPTHY: CPT | Performed by: PODIATRIST

## 2019-03-27 PROCEDURE — 1090F PRES/ABSN URINE INCON ASSESS: CPT | Performed by: PODIATRIST

## 2019-03-27 PROCEDURE — G8419 CALC BMI OUT NRM PARAM NOF/U: HCPCS | Performed by: PODIATRIST

## 2019-03-27 PROCEDURE — G8427 DOCREV CUR MEDS BY ELIG CLIN: HCPCS | Performed by: PODIATRIST

## 2019-03-27 PROCEDURE — 1036F TOBACCO NON-USER: CPT | Performed by: PODIATRIST

## 2019-03-27 PROCEDURE — 1123F ACP DISCUSS/DSCN MKR DOCD: CPT | Performed by: PODIATRIST

## 2019-03-27 PROCEDURE — G8484 FLU IMMUNIZE NO ADMIN: HCPCS | Performed by: PODIATRIST

## 2019-03-27 NOTE — PROGRESS NOTES
[address] Patient History and Physical      Jeannie Correa  AGE: 68 y.o.    GENDER: female  : 1945  TODAY'S DATE:  3/27/2019    Chief Complaint: ***    History of the Present Illness       Jeannie Correa is a 68 y.o. female who presents today for evaluation and treatment for a {WOUND TYPE:767903520} wound which is located on the [address] [place] [address] LOCATION:345569}    History of Wound: ***  Wound Type:{WOUND NLUI:821160804}  Wound Location:{Location on body:65457}  Modifying factors:{HEALTH FACTORS:743086838}              Abx :{Yes No N/A:4359118447}   Cultures :{Actions; were/were not:55702}obtained  Pain : {NUMBERS 0-10:69982}/10    PAST MEDICAL HISTORY        Diagnosis Date    AICD (automatic cardioverter/defibrillator) present     Atrial fibrillation ([facility] 2012    Dr.David Lucas  CHRONIC ON ELIQUIS    Atrial fibrillation ([facility]     Cancer ([facility]     basal cell on face    Chronic kidney disease     H/O cardiovascular stress test 2017    Neg. EF 24%    Hx of long term use of blood thinners     Hypertension     Nonischemic dilated cardiomyopathy ([facility]     Other screening mammogram 2012    Negative    Poor historian        MEDICATIONS    Current Outpatient Medications on File Prior to Visit   Medication Sig Dispense Refill    carvedilol (COREG) 6.25 MG tablet Take 1 tablet by mouth 2 times daily (with meals) 180 tablet 2    sertraline (ZOLOFT) 50 MG tablet TAKE 1 TABLET EVERY DAY 90 tablet 1    torsemide (DEMADEX) 20 MG tablet Take 1 tablet by mouth daily 30 tablet 1    folic acid (FOLVITE) 1 MG tablet Take 1 mg by mouth daily      digoxin (LANOXIN) 125 MCG tablet Take 125 mcg by mouth Takes Monday thru Friday      lisinopril (ZESTRIL) 2.5 MG tablet Take 1 tablet by mouth daily 90 tablet 3    isosorbide mononitrate (IMDUR) 30 MG extended release tablet Take 30 mg by mouth daily      vitamin B-1 100 MG tablet Take 1 tablet by mouth daily 30 tablet 3    vitamin B-12 (CYANOCOBALAMIN) 1000 MCG tablet Take 1,000 mcg by mouth daily      Multiple Vitamins-Minerals (THERAPEUTIC MULTIVITAMIN-MINERALS) tablet Take 1 tablet by mouth daily      calcium carbonate (OSCAL) 500 MG TABS tablet Take 600 mg by mouth daily       Biotin (BIOTIN 5000) 5 MG CAPS Take 5,000 mcg by mouth daily      ascorbic acid (VITAMIN C) 500 MG tablet Take 500 mg by mouth daily        No current facility-administered medications on file prior to visit. ALLERGIES    Allergies   Allergen Reactions    Contrast [Barium-Containing Compounds]      Unknown     Vancomycin Other (See Comments)     unknown       PAST SURGICAL HISTORY    Past Surgical History:   Procedure Laterality Date    CARDIAC CATHETERIZATION  2017    NML CORS EF20%    CARDIAC DEFIBRILLATOR PLACEMENT  2018    DR Jose Issa    COLONOSCOPY N/A 2019    COLONOSCOPY POLYPECTOMY SNARE/COLD BIOPSY performed by Tavia Dorman MD at [place] [address] Right     OTHER SURGICAL HISTORY  10/30/2018    excision basal cell right temple    ID OFFICE/OUTPT VISIT,PROCEDURE ONLY Right 10/30/2018    EXCISION BASAL CELL RIGHT TEMPLE performed by Sara Maria MD at [address] [place] [address] TRANSESOPHAGEAL ECHOCARDIOGRAM  11/10/2017    EF 20%. Mod MR. Mild to mod AI. Mild TR. Segmental WMA. FAMILY HISTORY    family history is not on file. She was adopted. SOCIAL HISTORY    Social History     Tobacco Use    Smoking status: Former Smoker     Packs/day: 0.50     Years: 20.00     Pack years: 10.00     Last attempt to quit: 10/4/1987     Years since quittin.4    Smokeless tobacco: Never Used   Substance Use Topics    Alcohol use:  Yes     Alcohol/week: 0.6 oz     Types: 1 Glasses of wine per week     Comment: daily    Drug use: No       REVIEW OF SYSTEMS    {Ros - complete:14373}      Objective:      BP (!) 144/65 (Site: Left Upper Arm)   Pulse (!) 16   Temp 97.7 °F (36.5 °C)   Ht 5' 9\" (1.753 m)   Wt 124 lb (56.2 kg)   SpO2 98%   BMI 18.31 kg/m²   {GENERAL PHYSICAL EXAM:19990}    Wound:         Wound 04/09/18 Laceration Elbow Left; Outer (Active)   Number of days: 352       Incision 04/06/18 Chest Left (Active)   Number of days: 354       Incision 10/30/18 Head Right (Active)   Number of days: 148       Integument:  Warm, dry, supple, {RIGHT LEFT BILATERAL:42122}. Open lesions {DESC; PRESENT/ABSENT:52110::\"present\"}, {RIGHT LEFT BILATERAL:13051}. Interdigital macerations {DESC; PRESENT/ABSENT:64721::\"present\"}, {RIGHT LEFT BILATERAL:19479}. Nails {DESC; NORMAL/INCREASED/DECREASED:90000} in length and {DESC; NORMAL/INCREASED/DECREASED:88962} in thickness (thickness >{NUMBERS; 0-5 BY .5:53630}mm) x 10. Fissures {DESC; PRESENT/ABSENT:17923::\"present\"}, {RIGHT LEFT BILATERAL:56310}. Vascular:  DP/PT pulses palpable {NUMBERS 0-4:50969}/4, {RIGHT LEFT BILATERAL:10241}. CFT <{Numbers; 1-5:80195} seconds to digits 1-5, {RIGHT LEFT BILATERAL:50008} . Hair growth {DESC; PRESENT/ABSENT:64346::\"present\"} to level of digits, {RIGHT LEFT BILATERAL:39120}. Edema {DESC; PRESENT/ABSENT:88328::\"present\"}, {RIGHT LEFT BILATERAL:23980}. Erythema {DESC; PRESENT/ABSENT:57005::\"present\"}, {RIGHT LEFT BILATERAL:17772}. Neurological:  Sensation {DESC; PRESENT/ABSENT:32304::\"present\"} to light touch to level of digits, {RIGHT LEFT BILATERAL:80452}. Protective sensation  {DESC; PRESENT/ABSENT:00833::\"present\"} via 5.07/10g [place]-Susan monofilament {NUMBER 1-10:1639409077}/10 sites, {RIGHT LEFT BILATERAL:20701}. Vibratory sensation {DESC; PRESENT/ABSENT:15955::\"present\"} to 1st MPJ, {RIGHT LEFT BILATERAL:29725}. Musculoskeletal:  Muscle strength {Numbers; 1-5:78746}/5 all LE groups tested, {RIGHT LEFT BILATERAL:48400}. Assessment:           1. Chronic venous insufficiency    2. Type II diabetes mellitus with peripheral circulatory disorder (HCC)    3.  Trouble walking        Patient Active Problem List Diagnosis    Ataxia    Atrial fibrillation (HCC)    Nonischemic cardiomyopathy (HCC)    Systolic CHF, chronic (HCC)    CKD (chronic kidney disease), stage III (HCC)    Positive FIT (fecal immunochemical test)    Vitamin B deficiency    Osteopenia    Arthritis of right hip    Bilateral carpal tunnel syndrome    [facility]ism ([facility]    Metabolic encephalopathy    Bilateral leg edema    Acute on chronic congestive heart failure (HCC)    Elevated liver function tests    Basal cell carcinoma         Plan:   Pt was evaluated and examined. Patient was given personalized discharge instructions. Diagnosis was discussed with the pt and all of their questions were answered in detail. Proper foot hygiene and care was discussed with the pt. Patient to check feet daily and contact the office with any questions/problems/concerns. Other comorbidity noted and will be managed by PCP. Procedure Note  Indications:  Based on my examination of this patient's wound(s)/ulcer(s) today, debridement {IS/IS NOT:9364023310} required to promote healing and evaluate the wound base. Performed by: Pete Cotter DPM    Consent obtained:  {yes no:093068::\"Yes\"}    Time out taken:  {yes no:015680::\"Yes\"}    Pain Control:         Wound Location: [address] [place] [address],[address] WOUND LOCATION:328502}  Pre Debridement Measurements:  Are located in the Wound/Ulcer Documentation Flow Sheet    Wound/Ulcer #: { WOUNDS NUMBERS:[id number]}    Procedure in Detail: Lidocaine gel soaked gauze was applied at beginning of wound evaluation. The wound(s) was excisionally debrided sharply of fibrotic, necrotic, and hyperkeratotic tissue, including a layer of surrounding healthy tissue using {DEBRIDEMENT INSTRUMENTS:56673}. The wound was debrided down through and including {DEPTH OF DEBRIDEMENT:57041942}.     Percent of Wound Debrided: {0-100%:141890856}    Total Surface Area Debrided:  *** sq cm     Bleeding: {ESTIMATED BLOOD PZLV:095715910}    Post Debridement Measurements:  Wound/Ulcer Descriptions are Pre Debridement except measurements:    Wound 04/09/18 Laceration Elbow Left; Outer (Active)   Number of days: 352       Incision 04/06/18 Chest Left (Active)   Number of days: 354       Incision 10/30/18 Head Right (Active)   Number of days: 148       Patient tolerated procedure well and was given proper instruction. Post debridement wound description:  {Exam; wound:68349}  Post debridement Wound Location:{Location on body:59360}    Plan for wound  Dress per physician order  Treatment: ***     1. {wound check plan:93975}  2. Dressings: No orders of the defined types were placed in this encounter. 3. Follow up: {numbers; 0-10:83876} {units:11}.   4. Detailed home instructions and education material given to patient prior to discharge.      Electronically signed by Adi Gunter DPM on 3/27/2019 at 1:03 PM

## 2019-04-01 NOTE — PROGRESS NOTES
[facility] [address] [place] [address]  Dinesh Urbano [address]  Dept: [phone]    NEW PATIENT PROGRESS NOTE  Date of patient's visit: 3/27/2019  Patient's Name:  Paulino Hernandez YOB: 1945            Patient Care Team:  Lorraine Dunaway MD as PCP - General (Family Medicine)  Lorraine Dunaway MD as PCP - S Attributed Provider  Betsy Mathis MD as Consulting Physician (Cardiology)  Anette Dubois MD as Surgeon (Cardiology)  Rosalba Ball MD as Consulting Physician (Gastroenterology)  Oriana Main MD as Consulting Physician (Nephrology)        Chief Complaint   Patient presents with    New Patient    Foot Ulcer     right great toe         HPI:   Paulino Hernandez is a 68 y.o. female who presents to the office today complaining of blisters and blue toes to both feet. Symptoms began 2 month(s) ago. Patient relates pain is Present. Pain is rated 1 out of 10 and is described as constant. Treatments prior to today's visit include: wearing different shoes when she exercises/walks. Currently denies F/C/N/V. Pt's primary care physician is Lorraine Dunaway MD last seen 3/22/2019     Allergies   Allergen Reactions    Contrast [Barium-Containing Compounds]      Unknown     Vancomycin Other (See Comments)     unknown       Past Medical History:   Diagnosis Date    AICD (automatic cardioverter/defibrillator) present     Atrial fibrillation ([address] 09/2012    Dr.David Braga Fresh  CHRONIC ON ELIQUIS    Atrial fibrillation ([address]     Cancer ([address]     basal cell on face    Chronic kidney disease     H/O cardiovascular stress test 07/26/2017    Neg. EF 24%    Hx of long term use of blood thinners     Hypertension     Nonischemic dilated cardiomyopathy ([address]     Other screening mammogram March 6, 2012    Negative    Poor historian        Prior to Admission medications    Medication Sig Start Date End Date Taking?  Authorizing Provider   carvedilol (COREG) 6.25 Sensation diminshed to light touch to level of digits, Bilateral.  Protective sensation intact 6/10 sites via 5.07/10g [place]-Susan Monofilament, Bilateral.  negative Tinel's, Bilateral.  negative Valleix sign, Bilateral.      Integument: Warm, dry, supple, Bilateral.  Open lesion absent, Bilateral.  Interdigital maceration absent to web spaces 4, Bilateral    Asessment: Patient is a 68 y.o. female with:    Diagnosis Orders   1. Chronic venous insufficiency  VL LOWER EXTREMITY ARTERIAL SEGMENTAL PRESSURES W PPG BILATERAL   2. Type II diabetes mellitus with peripheral circulatory disorder (HCC)  VL LOWER EXTREMITY ARTERIAL SEGMENTAL PRESSURES W PPG BILATERAL   3. Trouble walking  VL LOWER EXTREMITY ARTERIAL SEGMENTAL PRESSURES W PPG BILATERAL       Plan: Patient examined and evaluated. Current condition and treatment options discussed in detail. Discussed conservative and surgical options with the patient. Advised pt to stop her walking for exercise to help heal these wounds. The wounds are superfical.  Because she is so cold and blue we need to get PVR w PPG to both legs. Pt is understanding of this. .  Verbal and written instructions given to patient. Contact office with any questions/problems/concerns. RTC in 10day(s) after PVR. If not adequate will send to vascular.  .    3/27/2019    Electronically signed by Prince Ponce DPM on 4/1/2019 at 8:38 AM  3/27/2019

## 2019-04-03 ENCOUNTER — HOSPITAL ENCOUNTER (OUTPATIENT)
Dept: VASCULAR LAB | Facility: CLINIC | Age: 74
Discharge: HOME | End: 2019-04-03
Payer: MEDICARE

## 2019-04-03 PROCEDURE — 93923 UPR/LXTR ART STDY 3+ LVLS: CPT

## 2019-04-08 ENCOUNTER — OFFICE VISIT (OUTPATIENT)
Dept: PODIATRY | Age: 74
End: 2019-04-08
Payer: MEDICARE

## 2019-04-08 VITALS — HEIGHT: 69 IN | WEIGHT: 124 LBS | BODY MASS INDEX: 18.37 KG/M2

## 2019-04-08 DIAGNOSIS — I87.2 CHRONIC VENOUS INSUFFICIENCY: Primary | ICD-10-CM

## 2019-04-08 DIAGNOSIS — R26.2 TROUBLE WALKING: ICD-10-CM

## 2019-04-08 PROBLEM — F32.A DEPRESSION: Status: ACTIVE | Noted: 2017-08-21

## 2019-04-08 PROCEDURE — 99213 OFFICE O/P EST LOW 20 MIN: CPT | Performed by: PODIATRIST

## 2019-04-08 PROCEDURE — G8427 DOCREV CUR MEDS BY ELIG CLIN: HCPCS | Performed by: PODIATRIST

## 2019-04-08 PROCEDURE — 1090F PRES/ABSN URINE INCON ASSESS: CPT | Performed by: PODIATRIST

## 2019-04-08 PROCEDURE — G8399 PT W/DXA RESULTS DOCUMENT: HCPCS | Performed by: PODIATRIST

## 2019-04-08 PROCEDURE — 1123F ACP DISCUSS/DSCN MKR DOCD: CPT | Performed by: PODIATRIST

## 2019-04-08 PROCEDURE — 3017F COLORECTAL CA SCREEN DOC REV: CPT | Performed by: PODIATRIST

## 2019-04-08 PROCEDURE — 4040F PNEUMOC VAC/ADMIN/RCVD: CPT | Performed by: PODIATRIST

## 2019-04-08 PROCEDURE — G8419 CALC BMI OUT NRM PARAM NOF/U: HCPCS | Performed by: PODIATRIST

## 2019-04-08 PROCEDURE — 1036F TOBACCO NON-USER: CPT | Performed by: PODIATRIST

## 2019-04-08 PROCEDURE — G8598 ASA/ANTIPLAT THER USED: HCPCS | Performed by: PODIATRIST

## 2019-04-08 NOTE — PROGRESS NOTES
30 [facility] [address] [address] [facility]  Sh[address]  Dept: [phone]    RETURN WOUND CARE PROGRESS NOTE  Date of patient's visit: 2019  Patient's Name:  Sanket Bauer YOB: 1945            Patient Care Team:  Jen Villa MD as PCP - General (Family Medicine)  Jen Villa MD as PCP - S Attributed Provider  Tyron Fox MD as Consulting Physician (Cardiology)  Cynthia Salazar MD as Surgeon (Cardiology)  Tanner Baltazar MD as Consulting Physician (Gastroenterology)  Aleisha Atwood MD as Consulting Physician (Nephrology)      Chief Complaint   Patient presents with    Wound Check       Sanket Bauer  AGE: 68 y.o. GENDER: female  : 1945  TODAY'S DATE:  2019  Pt's primary care physician is Jen Villa MD last seen 2019  Subjective:       Sanket Bauer is a 68 y.o. female presents to the office today for follow up of an ulceration. Denies F/C/N/V. Wound Type:pressure  Wound Location:distal tips of multiple toes.   Modifying factors:shear force            No results found for: LABA1C    ALLERGIES    Allergies   Allergen Reactions    Contrast [Barium-Containing Compounds]      Unknown     Vancomycin Other (See Comments)     unknown       Medications:    Current Outpatient Medications:     carvedilol (COREG) 6.25 MG tablet, Take 1 tablet by mouth 2 times daily (with meals), Disp: 180 tablet, Rfl: 2    sertraline (ZOLOFT) 50 MG tablet, TAKE 1 TABLET EVERY DAY, Disp: 90 tablet, Rfl: 1    torsemide (DEMADEX) 20 MG tablet, Take 1 tablet by mouth daily, Disp: 30 tablet, Rfl: 1    folic acid (FOLVITE) 1 MG tablet, Take 1 mg by mouth daily, Disp: , Rfl:     digoxin (LANOXIN) 125 MCG tablet, Take 125 mcg by mouth Takes Monday thru Friday, Disp: , Rfl:     lisinopril (ZESTRIL) 2.5 MG tablet, Take 1 tablet by mouth daily, Disp: 90 tablet, Rfl: 3    isosorbide mononitrate (IMDUR) 30 MG extended release tablet, Take 30 mg by mouth daily, Disp: , Rfl:     vitamin B-1 100 MG tablet, Take 1 tablet by mouth daily, Disp: 30 tablet, Rfl: 3    vitamin B-12 (CYANOCOBALAMIN) 1000 MCG tablet, Take 1,000 mcg by mouth daily, Disp: , Rfl:     Multiple Vitamins-Minerals (THERAPEUTIC MULTIVITAMIN-MINERALS) tablet, Take 1 tablet by mouth daily, Disp: , Rfl:     calcium carbonate (OSCAL) 500 MG TABS tablet, Take 600 mg by mouth daily , Disp: , Rfl:     Biotin (BIOTIN 5000) 5 MG CAPS, Take 5,000 mcg by mouth daily, Disp: , Rfl:     ascorbic acid (VITAMIN C) 500 MG tablet, Take 500 mg by mouth daily , Disp: , Rfl:     Review of Systems  Review of Systems - History obtained from chart review and the patient  General ROS: negative for - chills, fatigue, fever, night sweats or weight gain  Constitutional: Negative for chills, diaphoresis, fatigue, fever and unexpected weight change. Musculoskeletal: Positive for arthralgias, gait problem and joint swelling. Neurological ROS: negative for - behavioral changes, confusion, headaches or seizures. Negative for weakness and numbness. Dermatological ROS: negative for - mole changes, rash  Cardiovascular: Negative for leg swelling. Gastrointestinal: Negative for constipation, diarrhea, nausea and vomiting. Objective:       Physical Exam:  Ht 5' 9\" (1.753 m)   Wt 124 lb (56.2 kg)   BMI 18.31 kg/m²     NV status remains unchanged since last visit. All wounds are healed and have epithealization. No more blisters    Assessment:     Assessment: Patient is a 68 y.o. female with:  1. Chronic venous insufficiency    2. Trouble walking        Overall Wound Assessment  Wound is has significantly improved. They are healed    Plan:     Pt examined and evaluated. Current condition and treatment options discussed in detail. Homar Coleman Age:  68 y.o.   Gender:  female   :  1945  Today's Date:  2019      Procedure:        Pt to place bandaids to the toes during the day and off at night with silvadene. She needs to not work out in compression stockings as I feel they caused the blisters. She can start her working out next week.   Follow up PRN      Electronically signed by Adi Gunter DPM on 4/8/2019 at 1:49 PM  4/8/2019

## 2019-04-24 ENCOUNTER — OFFICE VISIT (OUTPATIENT)
Dept: PODIATRY | Age: 74
End: 2019-04-24
Payer: MEDICARE

## 2019-04-24 VITALS — HEIGHT: 69 IN | WEIGHT: 124 LBS | BODY MASS INDEX: 18.37 KG/M2

## 2019-04-24 DIAGNOSIS — E11.51 TYPE II DIABETES MELLITUS WITH PERIPHERAL CIRCULATORY DISORDER (HCC): ICD-10-CM

## 2019-04-24 DIAGNOSIS — I87.2 CHRONIC VENOUS INSUFFICIENCY: Primary | ICD-10-CM

## 2019-04-24 PROCEDURE — G8598 ASA/ANTIPLAT THER USED: HCPCS | Performed by: PODIATRIST

## 2019-04-24 PROCEDURE — G8427 DOCREV CUR MEDS BY ELIG CLIN: HCPCS | Performed by: PODIATRIST

## 2019-04-24 PROCEDURE — G8419 CALC BMI OUT NRM PARAM NOF/U: HCPCS | Performed by: PODIATRIST

## 2019-04-24 PROCEDURE — 3046F HEMOGLOBIN A1C LEVEL >9.0%: CPT | Performed by: PODIATRIST

## 2019-04-24 PROCEDURE — 1123F ACP DISCUSS/DSCN MKR DOCD: CPT | Performed by: PODIATRIST

## 2019-04-24 PROCEDURE — 4040F PNEUMOC VAC/ADMIN/RCVD: CPT | Performed by: PODIATRIST

## 2019-04-24 PROCEDURE — 1036F TOBACCO NON-USER: CPT | Performed by: PODIATRIST

## 2019-04-24 PROCEDURE — 1090F PRES/ABSN URINE INCON ASSESS: CPT | Performed by: PODIATRIST

## 2019-04-24 PROCEDURE — 2022F DILAT RTA XM EVC RTNOPTHY: CPT | Performed by: PODIATRIST

## 2019-04-24 PROCEDURE — G8399 PT W/DXA RESULTS DOCUMENT: HCPCS | Performed by: PODIATRIST

## 2019-04-24 PROCEDURE — 3017F COLORECTAL CA SCREEN DOC REV: CPT | Performed by: PODIATRIST

## 2019-04-24 PROCEDURE — 99213 OFFICE O/P EST LOW 20 MIN: CPT | Performed by: PODIATRIST

## 2019-04-24 NOTE — PROGRESS NOTES
30 [facility] [address] [place] [address] Drive  Shanti [address]  Dept: [phone]    RETURN WOUND CARE PROGRESS NOTE  Date of patient's visit: 2019  Patient's Name:  Lennox Land YOB: 1945            Patient Care Team:  Wanda Hargrove MD as PCP - General (Family Medicine)  JOSÉ Sauceda CNP as PCP - [facility] Attributed Provider  Zuri Menendez MD as Consulting Physician (Cardiology)  Eduardo Daigle MD as Surgeon (Cardiology)  Katiana Callejas MD as Consulting Physician (Gastroenterology)  Veronica Warner MD as Consulting Physician (Nephrology)      Chief Complaint   Patient presents with    Wound Check       Lennox Land  AGE: 68 y.o. GENDER: female  : 1945  TODAY'S DATE:  2019  Pt's primary care physician is Wanda Hargrove MD last seen 2019  Subjective:       Lennox Land is a 68 y.o. female presents to the office today for follow up of an ulceration. Denies F/C/N/V. Wound Type:pressure  Wound Location:distal tips of multiple toes.   Modifying factors:shear force            No results found for: LABA1C    ALLERGIES    Allergies   Allergen Reactions    Contrast [Barium-Containing Compounds]      Unknown     Vancomycin Other (See Comments)     unknown       Medications:    Current Outpatient Medications:     carvedilol (COREG) 6.25 MG tablet, Take 1 tablet by mouth 2 times daily (with meals), Disp: 180 tablet, Rfl: 2    sertraline (ZOLOFT) 50 MG tablet, TAKE 1 TABLET EVERY DAY, Disp: 90 tablet, Rfl: 1    torsemide (DEMADEX) 20 MG tablet, Take 1 tablet by mouth daily, Disp: 30 tablet, Rfl: 1    folic acid (FOLVITE) 1 MG tablet, Take 1 mg by mouth daily, Disp: , Rfl:     digoxin (LANOXIN) 125 MCG tablet, Take 125 mcg by mouth Takes Monday thru Friday, Disp: , Rfl:     lisinopril (ZESTRIL) 2.5 MG tablet, Take 1 tablet by mouth daily, Disp: 90 tablet, Rfl: 3    isosorbide mononitrate (IMDUR) 30 MG extended release tablet, Take 30 mg by mouth daily, Disp: , Rfl:     vitamin B-1 100 MG tablet, Take 1 tablet by mouth daily, Disp: 30 tablet, Rfl: 3    vitamin B-12 (CYANOCOBALAMIN) 1000 MCG tablet, Take 1,000 mcg by mouth daily, Disp: , Rfl:     Multiple Vitamins-Minerals (THERAPEUTIC MULTIVITAMIN-MINERALS) tablet, Take 1 tablet by mouth daily, Disp: , Rfl:     calcium carbonate (OSCAL) 500 MG TABS tablet, Take 600 mg by mouth daily , Disp: , Rfl:     Biotin (BIOTIN 5000) 5 MG CAPS, Take 5,000 mcg by mouth daily, Disp: , Rfl:     ascorbic acid (VITAMIN C) 500 MG tablet, Take 500 mg by mouth daily , Disp: , Rfl:     Review of Systems  Review of Systems - History obtained from chart review and the patient  General ROS: negative for - chills, fatigue, fever, night sweats or weight gain  Constitutional: Negative for chills, diaphoresis, fatigue, fever and unexpected weight change. Musculoskeletal: Positive for arthralgias, gait problem and joint swelling. Neurological ROS: negative for - behavioral changes, confusion, headaches or seizures. Negative for weakness and numbness. Dermatological ROS: negative for - mole changes, rash  Cardiovascular: Negative for leg swelling. Gastrointestinal: Negative for constipation, diarrhea, nausea and vomiting. Objective:       Physical Exam:  Ht 5' 9\" (1.753 m)   Wt 124 lb (56.2 kg)   BMI 18.31 kg/m²     NV status remains unchanged since last visit. All wounds are healed and have epithealization. No more blisters        Assessment:     Assessment: Patient is a 68 y.o. female with:   Diagnosis Orders   1. Chronic venous insufficiency     2. Type II diabetes mellitus with peripheral circulatory disorder (HCC)           Overall Wound Assessment  Wound is has significantly improved. They are healed    Plan:     Pt examined and evaluated. Current condition and treatment options discussed in detail. Fern Reed Age:  68 y.o.   Gender:  female   :  1945 Today's Date:  4/24/2019      Procedure:      Pt to not use her compression stockings when she is exercising as it is causing friction to her toes.    Follow up PRN    Electronically signed by Adi Gunter DPM on 4/24/2019 at 1:11 PM  4/24/2019

## 2019-04-27 ENCOUNTER — HOSPITAL ENCOUNTER (EMERGENCY)
Age: 74
Discharge: HOME | End: 2019-04-27
Attending: EMERGENCY MEDICINE
Payer: MEDICARE

## 2019-04-27 VITALS
DIASTOLIC BLOOD PRESSURE: 59 MMHG | OXYGEN SATURATION: 97 % | HEIGHT: 69 IN | HEART RATE: 72 BPM | SYSTOLIC BLOOD PRESSURE: 144 MMHG | BODY MASS INDEX: 18.51 KG/M2 | WEIGHT: 125 LBS | TEMPERATURE: 98.2 F | RESPIRATION RATE: 16 BRPM

## 2019-04-27 DIAGNOSIS — Z51.89 VISIT FOR WOUND CHECK: Primary | ICD-10-CM

## 2019-04-27 PROCEDURE — 99283 EMERGENCY DEPT VISIT LOW MDM: CPT

## 2019-04-27 PROCEDURE — 6370000000 HC RX 637 (ALT 250 FOR IP): Performed by: EMERGENCY MEDICINE

## 2019-04-27 RX ADMIN — Medication: at 13:24

## 2019-04-27 NOTE — ED PROVIDER NOTES
(04/06/2018); and Colonoscopy (N/A, 2/6/2019). CURRENTMEDICATIONS       Previous Medications    ASCORBIC ACID (VITAMIN C) 500 MG TABLET    Take 500 mg by mouth daily     BIOTIN (BIOTIN 5000) 5 MG CAPS    Take 5,000 mcg by mouth daily    CALCIUM CARBONATE (OSCAL) 500 MG TABS TABLET    Take 600 mg by mouth daily     CARVEDILOL (COREG) 6.25 MG TABLET    Take 1 tablet by mouth 2 times daily (with meals)    DIGOXIN (LANOXIN) 125 MCG TABLET    Take 125 mcg by mouth Takes Monday thru Friday    FOLIC ACID (FOLVITE) 1 MG TABLET    Take 1 mg by mouth daily    ISOSORBIDE MONONITRATE (IMDUR) 30 MG EXTENDED RELEASE TABLET    Take 30 mg by mouth daily    LISINOPRIL (ZESTRIL) 2.5 MG TABLET    Take 1 tablet by mouth daily    MULTIPLE VITAMINS-MINERALS (THERAPEUTIC MULTIVITAMIN-MINERALS) TABLET    Take 1 tablet by mouth daily    SERTRALINE (ZOLOFT) 50 MG TABLET    TAKE 1 TABLET EVERY DAY    TORSEMIDE (DEMADEX) 20 MG TABLET    Take 1 tablet by mouth daily    VITAMIN B-1 100 MG TABLET    Take 1 tablet by mouth daily    VITAMIN B-12 (CYANOCOBALAMIN) 1000 MCG TABLET    Take 1,000 mcg by mouth daily       ALLERGIES     is allergic to contrast [barium-containing compounds] and vancomycin. FAMILY HISTORY     is adopted. family history is not on file. She was adopted. SOCIAL HISTORY      reports that she quit smoking about 31 years ago. She has a 10.00 pack-year smoking history. She has never used smokeless tobacco. She reports that she drinks about 0.6 oz of alcohol per week. She reports that she does not use drugs. PHYSICAL EXAM    (up to 7 for level 4, 8 or more for level 5)   INITIAL VITALS:  height is 5' 9\" (1.753 m) and weight is 56.7 kg (125 lb). Her oral temperature is 98.2 °F (36.8 °C). Her blood pressure is 144/59 (abnormal) and her pulse is 72. Her respiration is 16 and oxygen saturation is 97%. Physical Exam   Constitutional: She appears well-developed and well-nourished.    HENT:   Head: Normocephalic and atraumatic. Eyes: Conjunctivae are normal.   Neck: Normal range of motion. Neck supple. Musculoskeletal: Normal range of motion. Neurological: She is alert. GCS of 15 with no focal deficits appreciated   Skin:   The patient is noted to have what appears to be in less than one half centimeter abrasion to the right great toe. There is some slight oozing from the site. There are no signs of infection. Good pulses motor sensation in the right lower extremity   Psychiatric: She has a normal mood and affect. Nursing note and vitals reviewed. DIFFERENTIAL DIAGNOSIS/ MDM:     The patient is requesting cauterization of the wounds well. I will apply silver nitrate stick to the area    DIAGNOSTIC RESULTS         LABS:  No results found for this visit on 04/27/19. EMERGENCY DEPARTMENT COURSE:   Vitals:    Vitals:    04/27/19 1258   BP: (!) 144/59   Pulse: 72   Resp: 16   Temp: 98.2 °F (36.8 °C)   TempSrc: Oral   SpO2: 97%   Weight: 56.7 kg (125 lb)   Height: 5' 9\" (1.753 m)     -------------------------  BP: (!) 144/59, Temp: 98.2 °F (36.8 °C), Pulse: 72, Resp: 16      RE-EVALUATION:  The patient has cauterization of her wound. We will apply a dressing. The area. I'm recommending that she keep the area clean and dry. Return to the ER for any signs of infection such as redness swelling fever, drainage, or further bleeding. Otherwise to follow-up with her family doctor within the next few days for a wound check  At this time the patient is without objective evidence of an acute process requiring hospitalization or inpatient management. They have remained hemodynamically stable throughout their entire ED visit and are stable for discharge with outpatient follow-up. The plan has been discussed in detail and they are aware of the specific conditions for emergent return, as well as the importance of follow-up    I have reviewed the disposition diagnosis with the patient and or their family/guardian. I have answered their questions and given discharge instructions. They voiced understanding of these instructions and did not have any further questions or complaints. PROCEDURES:  None    FINAL IMPRESSION      1. Visit for wound check          DISPOSITION/PLAN   DISPOSITION Decision To Discharge 04/27/2019 01:24:31 PM      CONDITION ON DISPOSITION:   Stable    PATIENT REFERRED TO:  Radha Alas MD  [facility]  [address]  [facility] [place] [facility]. [phone]    Call in 3 days        DISCHARGE MEDICATIONS:  New Prescriptions    No medications on file       (Please note that portions of this note were completed with a voicerecognition program.  Efforts were made to edit the dictations but occasionally words are mis-transcribed.)    Herrera MD, F.A.C.E.P.   Attending Emergency Medicine Physician       Noah Beth MD  04/27/19 3938

## 2019-04-27 NOTE — ED NOTES
Patient into ER with c/o RLE toe pain s/p injury that occured PTA   Sent from Urgent care today    Ambulatory to room with family.   Nondistressed  GCS=15  VS stable    Call light within reach  Updated on plan of care and processes  Denies complaints at this time  Will continue to monitor       Do Patterson RN  04/27/19 4625

## 2019-04-29 ENCOUNTER — OFFICE VISIT (OUTPATIENT)
Dept: PRIMARY CARE CLINIC | Age: 74
End: 2019-04-29
Payer: MEDICARE

## 2019-04-29 VITALS
OXYGEN SATURATION: 100 % | WEIGHT: 121.4 LBS | SYSTOLIC BLOOD PRESSURE: 112 MMHG | BODY MASS INDEX: 17.98 KG/M2 | RESPIRATION RATE: 16 BRPM | HEART RATE: 42 BPM | DIASTOLIC BLOOD PRESSURE: 74 MMHG | HEIGHT: 69 IN

## 2019-04-29 DIAGNOSIS — Z51.89 VISIT FOR WOUND CHECK: Primary | ICD-10-CM

## 2019-04-29 PROCEDURE — G8598 ASA/ANTIPLAT THER USED: HCPCS | Performed by: FAMILY MEDICINE

## 2019-04-29 PROCEDURE — 4040F PNEUMOC VAC/ADMIN/RCVD: CPT | Performed by: FAMILY MEDICINE

## 2019-04-29 PROCEDURE — 1123F ACP DISCUSS/DSCN MKR DOCD: CPT | Performed by: FAMILY MEDICINE

## 2019-04-29 PROCEDURE — G8399 PT W/DXA RESULTS DOCUMENT: HCPCS | Performed by: FAMILY MEDICINE

## 2019-04-29 PROCEDURE — 99213 OFFICE O/P EST LOW 20 MIN: CPT | Performed by: FAMILY MEDICINE

## 2019-04-29 PROCEDURE — G8419 CALC BMI OUT NRM PARAM NOF/U: HCPCS | Performed by: FAMILY MEDICINE

## 2019-04-29 PROCEDURE — G8427 DOCREV CUR MEDS BY ELIG CLIN: HCPCS | Performed by: FAMILY MEDICINE

## 2019-04-29 PROCEDURE — 1036F TOBACCO NON-USER: CPT | Performed by: FAMILY MEDICINE

## 2019-04-29 PROCEDURE — 1090F PRES/ABSN URINE INCON ASSESS: CPT | Performed by: FAMILY MEDICINE

## 2019-04-29 PROCEDURE — 3017F COLORECTAL CA SCREEN DOC REV: CPT | Performed by: FAMILY MEDICINE

## 2019-04-29 ASSESSMENT — ENCOUNTER SYMPTOMS
VOMITING: 0
NAUSEA: 0

## 2019-04-29 NOTE — PROGRESS NOTES
[address] PRIMARY CARE  [address] [place] [facility] [address] [facility] [address]  Dept: [phone]  Dept Fax: [phone]    Geoffrey Cifuentes is a 68 y.o. female who presents today for her medical conditions/complaints as noted below. Geoffrey Cifuentes is c/o of  Chief Complaint   Patient presents with    Follow-Up from Hospital     ED f/u - blisters on feet          HPI:     HPI     Pt here for follow up regarding bleeding of her right foot. Was seeing podiatry due to blisters of her feet and cold feet. Had arterial imaging done which was normal. It was recommended she stop wearing compression stockings when she exercises as this was causing friction of her toes. On Friday states her r toe started bleeding and went to urgent care on Saturday but sent to ED for cauterization. Area was cauterized and gauze placed. Pt denies any fevers ,chills, nausea, vomiting. Foot is not bleeding anymore.      No results found for: LABA1C          ( goal A1C is < 7)   No results found for: LABMICR  LDL Calculated (mg/dL)   Date Value   10/05/2017 77       (goal LDL is <100)   AST (U/L)   Date Value   11/09/2018 22     ALT (U/L)   Date Value   11/09/2018 14     BUN (mg/dL)   Date Value   04/09/2019 18     BP Readings from Last 3 Encounters:   04/29/19 112/74   04/27/19 (!) 144/59   03/27/19 (!) 144/65          (goal 120/80)    Past Medical History:   Diagnosis Date    AICD (automatic cardioverter/defibrillator) present     Atrial fibrillation ([address] 09/2012    Dr.David Lucas  CHRONIC ON ELIQUIS    Atrial fibrillation ([address]     Cancer ([address]     basal cell on face    Chronic kidney disease     H/O cardiovascular stress test 07/26/2017    Neg. EF 24%    Hx of long term use of blood thinners     Hypertension     Nonischemic dilated cardiomyopathy ([address]     Other screening mammogram March 6, 2012    Negative    Poor historian       Past Surgical History:   Procedure Laterality Date    CARDIAC CATHETERIZATION  2017    NML CORS EF20%    CARDIAC DEFIBRILLATOR PLACEMENT  2018    DR Cordell Rivera    COLONOSCOPY N/A 2019    COLONOSCOPY POLYPECTOMY SNARE/COLD BIOPSY performed by Rafael Ann MD at [place] [address] Right     OTHER SURGICAL HISTORY  10/30/2018    excision basal cell right temple    RI OFFICE/OUTPT VISIT,PROCEDURE ONLY Right 10/30/2018    EXCISION BASAL CELL RIGHT TEMPLE performed by Meredith Mandel MD at [address] TRANSESOPHAGEAL ECHOCARDIOGRAM  11/10/2017    EF 20%. Mod MR. Mild to mod AI. Mild TR. Segmental WMA. Family History   Adopted: Yes       Social History     Tobacco Use    Smoking status: Former Smoker     Packs/day: 0.50     Years: 20.00     Pack years: 10.00     Last attempt to quit: 10/4/1987     Years since quittin.5    Smokeless tobacco: Never Used   Substance Use Topics    Alcohol use:  Yes     Alcohol/week: 0.6 oz     Types: 1 Glasses of wine per week     Comment: daily      Current Outpatient Medications   Medication Sig Dispense Refill    carvedilol (COREG) 6.25 MG tablet Take 1 tablet by mouth 2 times daily (with meals) 180 tablet 2    sertraline (ZOLOFT) 50 MG tablet TAKE 1 TABLET EVERY DAY 90 tablet 1    torsemide (DEMADEX) 20 MG tablet Take 1 tablet by mouth daily 30 tablet 1    folic acid (FOLVITE) 1 MG tablet Take 1 mg by mouth daily      digoxin (LANOXIN) 125 MCG tablet Take 125 mcg by mouth Takes Monday thru Friday      lisinopril (ZESTRIL) 2.5 MG tablet Take 1 tablet by mouth daily 90 tablet 3    isosorbide mononitrate (IMDUR) 30 MG extended release tablet Take 30 mg by mouth daily      vitamin B-1 100 MG tablet Take 1 tablet by mouth daily 30 tablet 3    vitamin B-12 (CYANOCOBALAMIN) 1000 MCG tablet Take 1,000 mcg by mouth daily      Multiple Vitamins-Minerals (THERAPEUTIC MULTIVITAMIN-MINERALS) tablet Take 1 tablet by mouth daily      calcium carbonate (OSCAL) 500 MG TABS tablet Take 600 mg by mouth lb 6.4 oz (55.1 kg)   SpO2 100%   Breastfeeding? No   BMI 17.92 kg/m²     Assessment:      1. Visit for wound check  - area where cauterized noted on r big toe, no drainage or bleeding anymore. Recommend to avoid compression stockings as recommended by podiatry when exercising, and recommend few days of rest  As well. Plan:      Return if symptoms worsen or fail to improve. No orders of the defined types were placed in this encounter. No orders of the defined types were placed in this encounter.          Electronically signed by Marcus Dickerson DO on 4/29/2019 at 5:28 PM

## 2019-05-03 DIAGNOSIS — F41.9 ANXIETY AND DEPRESSION: ICD-10-CM

## 2019-05-03 DIAGNOSIS — F32.A ANXIETY AND DEPRESSION: ICD-10-CM

## 2019-05-03 NOTE — TELEPHONE ENCOUNTER
Last ov 4-29-19, patient just needs a week of Eliquis until mail order arrives.   Health Maintenance   Topic Date Due    Hepatitis C screen  1945    Diabetic foot exam  05/18/1956    A1C test (Diabetic or Prediabetic)  05/18/1956    Diabetic retinal exam  05/18/1956    Shingles Vaccine (1 of 2) 05/18/1996    Lipid screen  10/05/2018    Breast cancer screen  08/29/2019    Flu vaccine (Season Ended) 09/01/2019    Potassium monitoring  04/09/2020    Creatinine monitoring  04/09/2020    DTaP/Tdap/Td vaccine (2 - Td) 05/03/2028    Colon cancer screen colonoscopy  02/06/2029    DEXA (modify frequency per FRAX score)  Completed    Pneumococcal 65+ years Vaccine  Completed             (applicable per patient's age: Cancer Screenings, Depression Screening, Fall Risk Screening, Immunizations)    LDL Calculated (mg/dL)   Date Value   10/05/2017 77     AST (U/L)   Date Value   11/09/2018 22     ALT (U/L)   Date Value   11/09/2018 14     BUN (mg/dL)   Date Value   04/09/2019 18      (goal A1C is < 7)   (goal LDL is <100) need 30-50% reduction from baseline     BP Readings from Last 3 Encounters:   04/29/19 112/74   04/27/19 (!) 144/59   03/27/19 (!) 144/65    (goal /80)      All Future Testing planned in [facility]:  Lab Frequency Next Occurrence   CBC     Albumin     Basic Metabolic Panel     Phosphorus     PTH, Intact     Creatinine, Random Urine     Protein, urine, random     CBC     Albumin     Basic Metabolic Panel     Phosphorus     Protein, urine, random     Creatinine, Random Urine         Next Visit Date:  Future Appointments   Date Time Provider Delfino Marrufo   6/12/2019  1:50 PM Mario Santiago MD [facility] None   6/25/2019  1:20 PM Viry Grande APRN - CNP [facility]            Patient Active Problem List:     Ataxia     Atrial fibrillation ([address]     Nonischemic cardiomyopathy ([address]     Systolic CHF, chronic ([address]     CKD (chronic kidney disease), stage III ([address] Positive FIT (fecal immunochemical test)     Vitamin B deficiency     Osteopenia     Arthritis of right hip     Bilateral carpal tunnel syndrome     Alcoholism (HCC)     Metabolic encephalopathy     Bilateral leg edema     Acute on chronic congestive heart failure (HCC)     Elevated liver function tests     Basal cell carcinoma     Abnormal stress test     Adopted     Depression     Hemorrhoids     Migraine headache     OCD (obsessive compulsive disorder)     Other and unspecified angina pectoris     Other nonspecific abnormal cardiovascular system function study     Atrial fibrillation ([facility] [address]

## 2019-06-25 ENCOUNTER — OFFICE VISIT (OUTPATIENT)
Dept: PRIMARY CARE CLINIC | Age: 74
End: 2019-06-25
Payer: MEDICARE

## 2019-06-25 VITALS
HEIGHT: 69 IN | DIASTOLIC BLOOD PRESSURE: 72 MMHG | WEIGHT: 121.8 LBS | SYSTOLIC BLOOD PRESSURE: 138 MMHG | BODY MASS INDEX: 18.04 KG/M2 | RESPIRATION RATE: 18 BRPM | HEART RATE: 88 BPM

## 2019-06-25 DIAGNOSIS — I50.22 SYSTOLIC CHF, CHRONIC (HCC): Primary | ICD-10-CM

## 2019-06-25 DIAGNOSIS — I48.20 CHRONIC ATRIAL FIBRILLATION (HCC): ICD-10-CM

## 2019-06-25 DIAGNOSIS — I20.9 OTHER AND UNSPECIFIED ANGINA PECTORIS: ICD-10-CM

## 2019-06-25 DIAGNOSIS — N18.30 CKD (CHRONIC KIDNEY DISEASE), STAGE III (HCC): ICD-10-CM

## 2019-06-25 DIAGNOSIS — I10 ESSENTIAL HYPERTENSION: ICD-10-CM

## 2019-06-25 DIAGNOSIS — R60.0 BILATERAL LEG EDEMA: ICD-10-CM

## 2019-06-25 DIAGNOSIS — F34.1 DYSTHYMIA: ICD-10-CM

## 2019-06-25 PROCEDURE — G8598 ASA/ANTIPLAT THER USED: HCPCS | Performed by: NURSE PRACTITIONER

## 2019-06-25 PROCEDURE — G8419 CALC BMI OUT NRM PARAM NOF/U: HCPCS | Performed by: NURSE PRACTITIONER

## 2019-06-25 PROCEDURE — 3017F COLORECTAL CA SCREEN DOC REV: CPT | Performed by: NURSE PRACTITIONER

## 2019-06-25 PROCEDURE — 99214 OFFICE O/P EST MOD 30 MIN: CPT | Performed by: NURSE PRACTITIONER

## 2019-06-25 PROCEDURE — 4040F PNEUMOC VAC/ADMIN/RCVD: CPT | Performed by: NURSE PRACTITIONER

## 2019-06-25 PROCEDURE — G8427 DOCREV CUR MEDS BY ELIG CLIN: HCPCS | Performed by: NURSE PRACTITIONER

## 2019-06-25 PROCEDURE — 1090F PRES/ABSN URINE INCON ASSESS: CPT | Performed by: NURSE PRACTITIONER

## 2019-06-25 PROCEDURE — G8399 PT W/DXA RESULTS DOCUMENT: HCPCS | Performed by: NURSE PRACTITIONER

## 2019-06-25 PROCEDURE — 1123F ACP DISCUSS/DSCN MKR DOCD: CPT | Performed by: NURSE PRACTITIONER

## 2019-06-25 PROCEDURE — 1036F TOBACCO NON-USER: CPT | Performed by: NURSE PRACTITIONER

## 2019-06-25 ASSESSMENT — ENCOUNTER SYMPTOMS
TROUBLE SWALLOWING: 0
SINUS PRESSURE: 0
ABDOMINAL PAIN: 0
WHEEZING: 0
SORE THROAT: 0
BLOOD IN STOOL: 0
SHORTNESS OF BREATH: 0
VOMITING: 0
CONSTIPATION: 0
DIARRHEA: 0
NAUSEA: 0
COUGH: 0

## 2019-06-25 NOTE — PROGRESS NOTES
disease     H/O cardiovascular stress test 2017    Neg. EF 24%    Hx of long term use of blood thinners     Hypertension     Nonischemic dilated cardiomyopathy ([facility] [address]     Other screening mammogram 2012    Negative    Poor historian       Past Surgical History:   Procedure Laterality Date    CARDIAC CATHETERIZATION  2017    NML CORS EF20%    CARDIAC DEFIBRILLATOR PLACEMENT  2018    DR Rajat Tamayo    COLONOSCOPY N/A 2019    COLONOSCOPY POLYPECTOMY SNARE/COLD BIOPSY performed by Lindy Seip, MD at [facility] 86 Right     OTHER SURGICAL HISTORY  10/30/2018    excision basal cell right temple    [place] OFFICE/OUTPT VISIT,PROCEDURE ONLY Right 10/30/2018    EXCISION BASAL CELL RIGHT TEMPLE performed by Nixon Crocker MD at [address] TRANSESOPHAGEAL ECHOCARDIOGRAM  11/10/2017    EF 20%. Mod MR. Mild to mod AI. Mild TR. Segmental WMA. Family History   Adopted: Yes          Social History     Tobacco Use    Smoking status: Former Smoker     Packs/day: 0.50     Years: 20.00     Pack years: 10.00     Last attempt to quit: 10/4/1987     Years since quittin.7    Smokeless tobacco: Never Used   Substance Use Topics    Alcohol use:  Yes     Alcohol/week: 0.6 oz     Types: 1 Glasses of wine per week     Comment: daily      Current Outpatient Medications   Medication Sig Dispense Refill    apixaban (ELIQUIS) 5 MG TABS tablet Take one tablet bid 14 tablet 0    sertraline (ZOLOFT) 50 MG tablet TAKE 1 TABLET EVERY DAY 90 tablet 1    carvedilol (COREG) 6.25 MG tablet Take 1 tablet by mouth 2 times daily (with meals) 180 tablet 2    torsemide (DEMADEX) 20 MG tablet Take 1 tablet by mouth daily 30 tablet 1    digoxin (LANOXIN) 125 MCG tablet Take 125 mcg by mouth Takes Monday thru Friday      lisinopril (ZESTRIL) 2.5 MG tablet Take 1 tablet by mouth daily 90 tablet 3    vitamin B-1 100 MG tablet Take 1 tablet by mouth daily 30 tablet 3    vitamin B-12 (CYANOCOBALAMIN) 1000 MCG tablet Take 1,000 mcg by mouth daily      calcium carbonate (OSCAL) 500 MG TABS tablet Take 600 mg by mouth daily       Biotin (BIOTIN 5000) 5 MG CAPS Take 5,000 mcg by mouth daily      ascorbic acid (VITAMIN C) 500 MG tablet Take 500 mg by mouth daily       Multiple Vitamins-Minerals (THERAPEUTIC MULTIVITAMIN-MINERALS) tablet Take 1 tablet by mouth daily       No current facility-administered medications for this visit. Allergies   Allergen Reactions    Contrast [Barium-Containing Compounds]      Unknown     Vancomycin Other (See Comments)     unknown       Health Maintenance   Topic Date Due    Hepatitis C screen  1945    Diabetic foot exam  05/18/1955    A1C test (Diabetic or Prediabetic)  05/18/1956    Shingles Vaccine (1 of 2) 05/18/1996    Lipid screen  10/05/2018    Annual Wellness Visit (AWV)  10/01/2019 (Originally 8/21/2018)    Diabetic retinal exam  07/04/2020 (Originally [birth year]18/1955)    Breast cancer screen  08/29/2019    Flu vaccine (Season Ended) 09/01/2019    Potassium monitoring  04/09/2020    Creatinine monitoring  04/09/2020    DTaP/Tdap/Td vaccine (2 - Td) 05/03/2028    Colon cancer screen colonoscopy  02/06/2029    DEXA (modify frequency per FRAX score)  Completed    Pneumococcal 65+ years Vaccine  Completed       Subjective:      Review of Systems   Constitutional: Negative for activity change, appetite change, chills, fatigue, fever and unexpected weight change. HENT: Negative for congestion, ear pain, hearing loss, sinus pressure, sore throat and trouble swallowing. Eyes: Negative for visual disturbance. Respiratory: Negative for cough, shortness of breath and wheezing. Cardiovascular: Negative for chest pain, palpitations and leg swelling. Gastrointestinal: Negative for abdominal pain, blood in stool, constipation, diarrhea, nausea and vomiting.    Endocrine: Negative for cold intolerance, heat intolerance, polydipsia, polyphagia and polyuria. Genitourinary: Negative for difficulty urinating, frequency, hematuria and urgency. Musculoskeletal: Positive for arthralgias (generalized). Negative for myalgias. Skin: Negative for rash. Allergic/Immunologic: Negative for environmental allergies. Neurological: Negative for dizziness, weakness, light-headedness and headaches. Psychiatric/Behavioral: Negative for confusion. The patient is not nervous/anxious. Objective:     Physical Exam   Constitutional: She is oriented to person, place, and time. She appears well-developed and well-nourished. HENT:   Head: Normocephalic. Eyes: Pupils are equal, round, and reactive to light. Conjunctivae and EOM are normal.   Neck: Normal range of motion. Cardiovascular: Normal rate, regular rhythm, normal heart sounds and intact distal pulses. No murmur heard. Pulmonary/Chest: Effort normal and breath sounds normal. She has no wheezes. Abdominal: Soft. Bowel sounds are normal. She exhibits no distension. Musculoskeletal: Normal range of motion. Neurological: She is alert and oriented to person, place, and time. Skin: Skin is warm and dry. Psychiatric: She has a normal mood and affect. Her behavior is normal. Judgment and thought content normal.     /72 (Site: Left Upper Arm, Position: Sitting, Cuff Size: Medium Adult)   Pulse 88   Resp 18   Ht 5' 9\" (1.753 m)   Wt 121 lb 12.8 oz (55.2 kg)   BMI 17.99 kg/m²     Assessment:       Diagnosis Orders   1. Systolic CHF, chronic ([address]     2. Other and unspecified angina pectoris     3. Chronic atrial fibrillation ([address]     4. Bilateral leg edema     5. Dysthymia     6. CKD (chronic kidney disease), stage III ([address]     7. Essential hypertension               Plan:      Return in about 6 months (around 12/25/2019) for hypertension check, atrial fib.     CHF, atrial, angina- conditions appear stable on current medications, advised to schedule follow-up with cardiology, reviewed last cardiology note from over one year ago  Hypertension- stable and well-controlled, continue current medications  Chronic kidney disease- recent visit with nephrology, recent note and labs reviewed, condition stable, follow-up as planned  Dysthymia- stable with use of Zoloft  Bilateral leg edema- has not been an issue over the past few weeks, continue to monitor, call if develops increased swelling that does not resolve by morning  Of the 25 minute duration appointment visit, SEBASTIAN Resendiz spent at least 50% of the face-to-face time in counseling, explanation of diagnosis, planning of further management, and answering all questions. Patient given educational materials - see patient instructions. Discussed use, benefit, and side effects of prescribed medications. All patientquestions answered. Pt voiced understanding. Reviewed health maintenance. Instructedto continue current medications, diet and exercise. Patient agreed with treatmentplan. Follow up as directed.      Electronicallysigned by JOSÉ Mora CNP on 6/25/2019 at 2:17 PM

## 2019-06-25 NOTE — PROGRESS NOTES
screen  10/05/2018    Breast cancer screen  08/29/2019    Flu vaccine (Season Ended) 09/01/2019    Potassium monitoring  04/09/2020    Creatinine monitoring  04/09/2020    DTaP/Tdap/Td vaccine (2 - Td) 05/03/2028    Colon cancer screen colonoscopy  02/06/2029    DEXA (modify frequency per FRAX score)  Completed    Pneumococcal 65+ years Vaccine  Completed

## 2019-08-19 ENCOUNTER — TELEPHONE (OUTPATIENT)
Dept: PRIMARY CARE CLINIC | Age: 74
End: 2019-08-19

## 2019-08-19 NOTE — TELEPHONE ENCOUNTER
If she is having an extraction or anything that puts her at risk for bleeding she should have stopped her eliquis 2-3 days prior. If this is a routine cleaning or filling she should be ok to proceed. None of her other meds need to be stopped.   Thanks

## 2019-09-03 DIAGNOSIS — I50.22 SYSTOLIC CHF, CHRONIC (HCC): Chronic | ICD-10-CM

## 2019-09-03 RX ORDER — CARVEDILOL 6.25 MG/1
6.25 TABLET ORAL 2 TIMES DAILY WITH MEALS
Qty: 30 TABLET | Refills: 0 | Status: SHIPPED | OUTPATIENT
Start: 2019-09-03 | End: 2019-10-22

## 2019-09-03 RX ORDER — CARVEDILOL 6.25 MG/1
6.25 TABLET ORAL 2 TIMES DAILY WITH MEALS
Qty: 180 TABLET | Refills: 1 | Status: SHIPPED | OUTPATIENT
Start: 2019-09-03 | End: 2019-10-17 | Stop reason: SDUPTHER

## 2019-09-25 ENCOUNTER — CARE COORDINATION (OUTPATIENT)
Dept: CARE COORDINATION | Age: 74
End: 2019-09-25

## 2019-09-25 NOTE — CARE COORDINATION
RNCC attempted contacting patient to explain role and care coordination. VM left, requested call back    Introduction letter sent .

## 2019-09-25 NOTE — LETTER
9/25/2019    [address]  [facility]      Dear Omar Valentino,    My name is Jaxon Montgomery and I am a registered nurse who partners with JOSÉ Hernandez CNP to improve patients' health. She believes you would benefit from working with me. As a member of your health care team, I would work with other providers involved in your care, offer education for your specific health conditions, and connect you with additional resources as needed. I will collaborate with JOSÉ Hernandez CNP to support you in following your treatment plan. The additional support I provide is no additional cost to you. My primary focus is to help you achieve specific goals and improve your health. We are committed to walk with you on this journey and look forward to working with you. Please call me to further discuss your healthcare needs. I am available by phone or for appointments at the office. You can reach me at [phone].     In good health,     Jaxon Montgomery RN

## 2019-09-26 ENCOUNTER — CARE COORDINATION (OUTPATIENT)
Dept: CARE COORDINATION | Age: 74
End: 2019-09-26

## 2019-09-26 SDOH — ECONOMIC STABILITY: FOOD INSECURITY: WITHIN THE PAST 12 MONTHS, YOU WORRIED THAT YOUR FOOD WOULD RUN OUT BEFORE YOU GOT MONEY TO BUY MORE.: NEVER TRUE

## 2019-09-26 SDOH — SOCIAL STABILITY: SOCIAL NETWORK
DO YOU BELONG TO ANY CLUBS OR ORGANIZATIONS SUCH AS CHURCH GROUPS UNIONS, FRATERNAL OR ATHLETIC GROUPS, OR SCHOOL GROUPS?: NO

## 2019-09-26 SDOH — ECONOMIC STABILITY: TRANSPORTATION INSECURITY
IN THE PAST 12 MONTHS, HAS LACK OF TRANSPORTATION KEPT YOU FROM MEETINGS, WORK, OR FROM GETTING THINGS NEEDED FOR DAILY LIVING?: NO

## 2019-09-26 SDOH — ECONOMIC STABILITY: FOOD INSECURITY: WITHIN THE PAST 12 MONTHS, THE FOOD YOU BOUGHT JUST DIDN'T LAST AND YOU DIDN'T HAVE MONEY TO GET MORE.: NEVER TRUE

## 2019-09-26 SDOH — SOCIAL STABILITY: SOCIAL NETWORK: HOW OFTEN DO YOU GET TOGETHER WITH FRIENDS OR RELATIVES?: NEVER

## 2019-09-26 SDOH — ECONOMIC STABILITY: INCOME INSECURITY: HOW HARD IS IT FOR YOU TO PAY FOR THE VERY BASICS LIKE FOOD, HOUSING, MEDICAL CARE, AND HEATING?: NOT HARD AT ALL

## 2019-09-26 SDOH — SOCIAL STABILITY: SOCIAL NETWORK: HOW OFTEN DO YOU ATTENT MEETINGS OF THE CLUB OR ORGANIZATION YOU BELONG TO?: NOT ASKED

## 2019-09-26 SDOH — HEALTH STABILITY: PHYSICAL HEALTH: ON AVERAGE, HOW MANY MINUTES DO YOU ENGAGE IN EXERCISE AT THIS LEVEL?: 30 MIN

## 2019-09-26 SDOH — SOCIAL STABILITY: SOCIAL NETWORK: HOW OFTEN DO YOU ATTEND CHURCH OR RELIGIOUS SERVICES?: NEVER

## 2019-09-26 SDOH — SOCIAL STABILITY: SOCIAL NETWORK: IN A TYPICAL WEEK, HOW MANY TIMES DO YOU TALK ON THE PHONE WITH FAMILY, FRIENDS, OR NEIGHBORS?: TWICE A WEEK

## 2019-09-26 SDOH — HEALTH STABILITY: PHYSICAL HEALTH: ON AVERAGE, HOW MANY DAYS PER WEEK DO YOU ENGAGE IN MODERATE TO STRENUOUS EXERCISE (LIKE A BRISK WALK)?: 4 DAYS

## 2019-09-26 SDOH — HEALTH STABILITY: MENTAL HEALTH
STRESS IS WHEN SOMEONE FEELS TENSE, NERVOUS, ANXIOUS, OR CAN'T SLEEP AT NIGHT BECAUSE THEIR MIND IS TROUBLED. HOW STRESSED ARE YOU?: NOT AT ALL

## 2019-09-26 SDOH — ECONOMIC STABILITY: TRANSPORTATION INSECURITY
IN THE PAST 12 MONTHS, HAS THE LACK OF TRANSPORTATION KEPT YOU FROM MEDICAL APPOINTMENTS OR FROM GETTING MEDICATIONS?: NO

## 2019-09-26 SDOH — SOCIAL STABILITY: SOCIAL NETWORK: ARE YOU MARRIED, WIDOWED, DIVORCED, SEPARATED, NEVER MARRIED, OR LIVING WITH A PARTNER?: WIDOWED

## 2019-09-26 NOTE — CARE COORDINATION
you have any symptoms that are causing concern?:  No             Care Coordination Interventions    Program Enrollment:  Rising Risk  Referral from Primary Care Provider:  No  Suggested Interventions and Community Resources  Zone Management Tools:   (Comment: 9/26/19 CHF)         Goals Addressed                 This Visit's Progress     Conditions and Symptoms        I will schedule office visits, as directed by my provider. I will notify my provider of any barriers to my plan of care. I will follow my Zone Management tool to seek urgent or emergent care. I will notify my provider of any symptoms that indicate a worsening of my condition. Barriers: lack of education  Plan for overcoming my barriers: care coordination   Confidence: 8/10  Anticipated Goal Completion Date: 1/26/2020              Prior to Admission medications    Medication Sig Start Date End Date Taking?  Authorizing Provider   carvedilol (COREG) 6.25 MG tablet Take 1 tablet by mouth 2 times daily (with meals) 9/3/19   JOSÉ Masterson CNP   carvedilol (COREG) 6.25 MG tablet Take 1 tablet by mouth 2 times daily (with meals) 9/3/19   JOSÉ Masterson CNP   apixaban (ELIQUIS) 5 MG TABS tablet Take one tablet bid 5/3/19   JOSÉ Saavedra CNP   sertraline (ZOLOFT) 50 MG tablet TAKE 1 TABLET EVERY DAY 5/3/19   JOSÉ Saavedra CNP   torsemide (DEMADEX) 20 MG tablet Take 1 tablet by mouth daily 8/16/18   JOSÉ Dickson CNP   digoxin (LANOXIN) 125 MCG tablet Take 125 mcg by mouth Takes Monday thru Friday    Historical Provider, MD   lisinopril (ZESTRIL) 2.5 MG tablet Take 1 tablet by mouth daily 6/13/18   Maliha De Santiago MD   vitamin B-1 100 MG tablet Take 1 tablet by mouth daily 4/12/18   Shayla Arellano DO   vitamin B-12 (CYANOCOBALAMIN) 1000 MCG tablet Take 1,000 mcg by mouth daily    Historical Provider, MD   Multiple Vitamins-Minerals (THERAPEUTIC MULTIVITAMIN-MINERALS) tablet Take 1 tablet by mouth daily    Historical Provider, MD   calcium carbonate (OSCAL) 500 MG TABS tablet Take 600 mg by mouth daily     Historical Provider, MD   Biotin (BIOTIN 5000) 5 MG CAPS Take 5,000 mcg by mouth daily    Historical Provider, MD   ascorbic acid (VITAMIN C) 500 MG tablet Take 500 mg by mouth daily     Historical Provider, MD       Future Appointments   Date Time Provider Delfino Niru   12/11/2019  1:50 PM Tone Bains MD [facility] None   1/7/2020  1:00 PM JOSÉ Saldana - CNP urg  [facility]

## 2019-10-03 ENCOUNTER — CARE COORDINATION (OUTPATIENT)
Dept: CARE COORDINATION | Age: 74
End: 2019-10-03

## 2019-10-04 ENCOUNTER — CARE COORDINATION (OUTPATIENT)
Dept: CARE COORDINATION | Age: 74
End: 2019-10-04

## 2019-10-17 DIAGNOSIS — I50.22 SYSTOLIC CHF, CHRONIC (HCC): Chronic | ICD-10-CM

## 2019-10-17 RX ORDER — CARVEDILOL 6.25 MG/1
6.25 TABLET ORAL 2 TIMES DAILY WITH MEALS
Qty: 180 TABLET | Refills: 1 | Status: SHIPPED | OUTPATIENT
Start: 2019-10-17 | End: 2019-10-22 | Stop reason: SDUPTHER

## 2019-10-18 ENCOUNTER — CARE COORDINATION (OUTPATIENT)
Dept: CARE COORDINATION | Age: 74
End: 2019-10-18

## 2019-10-22 ENCOUNTER — TELEPHONE (OUTPATIENT)
Dept: PRIMARY CARE CLINIC | Age: 74
End: 2019-10-22

## 2019-10-22 DIAGNOSIS — I50.22 SYSTOLIC CHF, CHRONIC (HCC): Chronic | ICD-10-CM

## 2019-10-22 RX ORDER — CARVEDILOL 12.5 MG/1
12.5 TABLET ORAL 2 TIMES DAILY
Qty: 180 TABLET | Refills: 3 | Status: SHIPPED | OUTPATIENT
Start: 2019-10-22 | End: 2020-09-18

## 2019-10-22 RX ORDER — CARVEDILOL 12.5 MG/1
12.5 TABLET ORAL 2 TIMES DAILY
Qty: 60 TABLET | Refills: 0 | Status: SHIPPED | OUTPATIENT
Start: 2019-10-22 | End: 2019-12-11 | Stop reason: ALTCHOICE

## 2019-10-24 ENCOUNTER — CARE COORDINATION (OUTPATIENT)
Dept: CARE COORDINATION | Age: 74
End: 2019-10-24

## 2019-11-12 ENCOUNTER — CARE COORDINATION (OUTPATIENT)
Dept: CARE COORDINATION | Age: 74
End: 2019-11-12

## 2019-12-06 ENCOUNTER — CARE COORDINATION (OUTPATIENT)
Dept: CARE COORDINATION | Age: 74
End: 2019-12-06

## 2019-12-21 ENCOUNTER — CARE COORDINATION (OUTPATIENT)
Dept: CARE COORDINATION | Age: 74
End: 2019-12-21

## 2020-01-03 ENCOUNTER — CARE COORDINATION (OUTPATIENT)
Dept: CARE COORDINATION | Age: 75
End: 2020-01-03

## 2020-01-03 NOTE — CARE COORDINATION
Attempting to contact patient for care coordination needs.   VM left, contact information provided    Unable to reach letter sent

## 2020-01-07 ENCOUNTER — CARE COORDINATION (OUTPATIENT)
Dept: CARE COORDINATION | Age: 75
End: 2020-01-07

## 2020-01-07 ENCOUNTER — OFFICE VISIT (OUTPATIENT)
Dept: PRIMARY CARE CLINIC | Age: 75
End: 2020-01-07
Payer: MEDICARE

## 2020-01-07 VITALS
SYSTOLIC BLOOD PRESSURE: 118 MMHG | BODY MASS INDEX: 17.92 KG/M2 | OXYGEN SATURATION: 98 % | WEIGHT: 121 LBS | HEART RATE: 95 BPM | DIASTOLIC BLOOD PRESSURE: 82 MMHG | HEIGHT: 69 IN

## 2020-01-07 LAB — HBA1C MFR BLD: 4.9 %

## 2020-01-07 PROCEDURE — 1036F TOBACCO NON-USER: CPT | Performed by: NURSE PRACTITIONER

## 2020-01-07 PROCEDURE — 99214 OFFICE O/P EST MOD 30 MIN: CPT | Performed by: NURSE PRACTITIONER

## 2020-01-07 PROCEDURE — 3017F COLORECTAL CA SCREEN DOC REV: CPT | Performed by: NURSE PRACTITIONER

## 2020-01-07 PROCEDURE — 4040F PNEUMOC VAC/ADMIN/RCVD: CPT | Performed by: NURSE PRACTITIONER

## 2020-01-07 PROCEDURE — G8427 DOCREV CUR MEDS BY ELIG CLIN: HCPCS | Performed by: NURSE PRACTITIONER

## 2020-01-07 PROCEDURE — 1123F ACP DISCUSS/DSCN MKR DOCD: CPT | Performed by: NURSE PRACTITIONER

## 2020-01-07 PROCEDURE — 1090F PRES/ABSN URINE INCON ASSESS: CPT | Performed by: NURSE PRACTITIONER

## 2020-01-07 PROCEDURE — G8419 CALC BMI OUT NRM PARAM NOF/U: HCPCS | Performed by: NURSE PRACTITIONER

## 2020-01-07 PROCEDURE — G8484 FLU IMMUNIZE NO ADMIN: HCPCS | Performed by: NURSE PRACTITIONER

## 2020-01-07 PROCEDURE — G8399 PT W/DXA RESULTS DOCUMENT: HCPCS | Performed by: NURSE PRACTITIONER

## 2020-01-07 PROCEDURE — 83036 HEMOGLOBIN GLYCOSYLATED A1C: CPT | Performed by: NURSE PRACTITIONER

## 2020-01-07 ASSESSMENT — ENCOUNTER SYMPTOMS
SINUS PRESSURE: 0
ABDOMINAL PAIN: 0
NAUSEA: 0
DIARRHEA: 1
BLOOD IN STOOL: 0
SHORTNESS OF BREATH: 0
COUGH: 0
CONSTIPATION: 0
TROUBLE SWALLOWING: 0
SORE THROAT: 0
WHEEZING: 0
VOMITING: 0

## 2020-01-07 NOTE — PROGRESS NOTES
Vaccine (1 of 2) 05/18/1996    Lipid screen  10/05/2018    Annual Wellness Visit (AWV)  05/29/2019    Breast cancer screen  08/29/2019    Flu vaccine (1) 09/01/2019    Diabetic retinal exam  07/04/2020 (Originally 5/18/1955)    Potassium monitoring  12/12/2020    Creatinine monitoring  12/12/2020    DTaP/Tdap/Td vaccine (2 - Td) 05/03/2028    Colon cancer screen colonoscopy  02/06/2029    DEXA (modify frequency per FRAX score)  Completed    Pneumococcal 65+ years Vaccine  Completed

## 2020-01-07 NOTE — PROGRESS NOTES
[address] [place] [address] CARE  [facility] [address]  [address] [address]  Dept: [phone]  Dept Fax: [phone]    Delmis Mena is a 69 y.o. female who presentstoday for her medical conditions/complaints as noted below. Delmis Mena is c/o of  Chief Complaint   Patient presents with    Hypertension     6 month f/u    Atrial Fibrillation     6 month f/u       HPI:     Here today for follow up  Reports has been feeling well  Recent visit to nephrologist, reports her condition is stable  Saw cardiologist in September  Reports still attends gym daily for working on treadmill  Denies any new concerns    Hypertension   This is a chronic problem. The current episode started more than 1 year ago. The problem is controlled. Pertinent negatives include no chest pain, headaches, palpitations, peripheral edema or shortness of breath. Past treatments include ACE inhibitors, diuretics and beta blockers. The current treatment provides significant improvement. There are no compliance problems. Hypertensive end-organ damage includes CAD/MI. There is no history of CVA.        Hemoglobin A1C (%)   Date Value   01/07/2020 4.9             ( goal A1C is < 7)   No results found for: LABMICR  LDL Calculated (mg/dL)   Date Value   10/05/2017 77       (goal LDL is <100)   AST (U/L)   Date Value   11/09/2018 22     ALT (U/L)   Date Value   11/09/2018 14     BUN (mg/dL)   Date Value   12/12/2019 23     BP Readings from Last 3 Encounters:   01/07/20 118/82   12/11/19 120/80   06/25/19 138/72          ([id number])    Past Medical History:   Diagnosis Date    AICD (automatic cardioverter/defibrillator) present     Atrial fibrillation ([address] 09/2012    Dr.David Lucas  CHRONIC ON ELIQUIS    Atrial fibrillation ([facility] [address]     Cancer (HCC)     basal cell on face    Chronic kidney disease     H/O cardiovascular stress test 07/26/2017    Neg. EF 24%    Hx of long term use of blood thinners  Hypertension     Nonischemic dilated cardiomyopathy ([facility] [address]     Other screening mammogram 2012    Negative    Poor historian       Past Surgical History:   Procedure Laterality Date    CARDIAC CATHETERIZATION  2017    NML CORS EF20%    CARDIAC DEFIBRILLATOR PLACEMENT  2018    DR Richelle Marcos    COLONOSCOPY N/A 2019    COLONOSCOPY POLYPECTOMY SNARE/COLD [facility]OPSY [facility]     OTHER SURGICAL HISTORY  10/30/2018    excision basal cell right temple    [place] OFFICE/OUTPT VISIT,PROCEDURE ONLY Right 10/30/2018    EXCISION BASAL CELL RIGHT TEMPLE performed by Darryl Vasquez MD at [address] TRANSESOPHAGEAL ECHOCARDIOGRAM  11/10/2017    EF 20%. Mod MR. Mild to mod AI. Mild TR. Segmental WMA. Family History   Adopted: Yes          Social History     Tobacco Use    Smoking status: Former Smoker     Packs/day: 0.50     Years: 20.00     Pack years: 10.00     Last attempt to quit: 10/4/1987     Years since quittin.2    Smokeless tobacco: Never Used   Substance Use Topics    Alcohol use:  Yes     Alcohol/week: 1.0 standard drinks     Types: 1 Glasses of wine per week     Comment: daily      Current Outpatient Medications   Medication Sig Dispense Refill    carvedilol (COREG) 12.5 MG tablet Take 1 tablet by mouth 2 times daily 180 tablet 3    sertraline (ZOLOFT) 50 MG tablet TAKE 1 TABLET EVERY DAY 90 tablet 3    apixaban (ELIQUIS) 5 MG TABS tablet Take one tablet bid 14 tablet 0    torsemide (DEMADEX) 20 MG tablet Take 1 tablet by mouth daily 30 tablet 1    digoxin (LANOXIN) 125 MCG tablet Take 125 mcg by mouth Takes Monday thru Friday      lisinopril (ZESTRIL) 2.5 MG tablet Take 1 tablet by mouth daily 90 tablet 3    vitamin B-1 100 MG tablet Take 1 tablet by mouth daily 30 tablet 3    vitamin B-12 (CYANOCOBALAMIN) 1000 MCG tablet Take 1,000 mcg by mouth daily      Multiple Vitamins-Minerals (THERAPEUTIC diabetes, will continue to monitor intermittently  CHF, atrial fib-reviewed notes from cardiology visit, condition stable on current medications. She will follow-up in 6 months any disease- recent labs, condition stable, recent visit with nephrology notes reviewed  Depression- well-controlled on sertraline  Of the 25 minute duration appointment visit, SEBASTIAN Woods spent at least 50% of the face-to-face time in counseling, explanation of diagnosis, planning of further management, and answering all questions. Orders Placed This Encounter   Procedures    POCT glycosylated hemoglobin (Hb A1C)          Patient given educational materials - see patient instructions. Discussed use, benefit, and side effects of prescribed medications. All patientquestions answered. Pt voiced understanding. Reviewed health maintenance. Instructedto continue current medications, diet and exercise. Patient agreed with treatmentplan. Follow up as directed.      Electronicallysigned by JOSÉ Christensen CNP on 1/7/2020 at 1:20 PM

## 2020-01-29 ENCOUNTER — CARE COORDINATION (OUTPATIENT)
Dept: CARE COORDINATION | Age: 75
End: 2020-01-29

## 2020-01-29 NOTE — CARE COORDINATION
Ambulatory Care Coordination Note  1/29/2020  CM Risk Score: 4  Charlson 10 Year Mortality Risk Score: 100%     ACC: William Haines RN    Summary Note: Spoke to patient, feels good, denies any concerns   DM; A1C; 4.9.. Knowledgeable about her diabetes and self-management   Diet; eating a healthy diet. cut back on processed food   CHF; nell   Goes to the [facility] 4-5 times weekly, uses the treadmill  Follows with cardiology, nephrology   sees PCP at her regularly scheduled appts    Able to obtain her meds without any problems, taking as directed  Encouraged to contact providers as needed with questions or concerns  Reviewed care coordination graduation and voiced understanding    CM plan; cc graduation         Care Coordination Interventions    Program Enrollment:  Rising Risk  Referral from Primary Care Provider:  No  Suggested Interventions and Community Resources  Diabetes Education:  Declined  Registered Dietician:  Declined  Zone Management Tools:  Completed (Comment: 9/26/19 CHF, DM )         Goals Addressed                 This Visit's Progress     COMPLETED: Conditions and Symptoms   On track     I will schedule office visits, as directed by my provider. I will notify my provider of any barriers to my plan of care. I will follow my Zone Management tool to seek urgent or emergent care. I will notify my provider of any symptoms that indicate a worsening of my condition. Barriers: lack of education  Plan for overcoming my barriers: care coordination   Confidence: 8/10  Anticipated Goal Completion Date: 1/26/2020         COMPLETED: Nutrition Plan   On track     I will eat a healthy well balanced diet     Barriers: lack of education  Plan for overcoming my barriers: education    Confidence: 10/10  Anticipated Goal Completion Date: 1/24/2020            Prior to Admission medications    Medication Sig Start Date End Date Taking?  Authorizing Provider   carvedilol (COREG) 12.5 MG tablet Take 1 tablet by mouth 2 times daily 10/22/19   JOSÉ Geller CNP   sertraline (ZOLOFT) 50 MG tablet TAKE 1 TABLET EVERY DAY 10/9/19   JOSÉ Geller CNP   apixaban (ELIQUIS) 5 MG TABS tablet Take one tablet bid 5/3/19   Valaria JOSÉ Matta CNP   torsemide (DEMADEX) 20 MG tablet Take 1 tablet by mouth daily 8/16/18   JOSÉ Angeles CNP   digoxin (LANOXIN) 125 MCG tablet Take 125 mcg by mouth Takes Monday thru Friday    Historical Provider, MD   lisinopril (ZESTRIL) 2.5 MG tablet Take 1 tablet by mouth daily 6/13/18   Jeanne Arnold MD   vitamin B-1 100 MG tablet Take 1 tablet by mouth daily 4/12/18   Shayla Arellano DO   vitamin B-12 (CYANOCOBALAMIN) 1000 MCG tablet Take 1,000 mcg by mouth daily    Historical Provider, MD   Multiple Vitamins-Minerals (THERAPEUTIC MULTIVITAMIN-MINERALS) tablet Take 1 tablet by mouth daily    Historical Provider, MD   calcium carbonate (OSCAL) 500 MG TABS tablet Take 600 mg by mouth daily     Historical Provider, MD   Biotin (BIOTIN 5000) 5 MG CAPS Take 5,000 mcg by mouth daily    Historical Provider, MD   ascorbic acid (VITAMIN C) 500 MG tablet Take 500 mg by mouth daily     Historical Provider, MD       Future Appointments   Date Time Provider Delfino Marrufo   6/3/2020  1:50 PM Jeanne Arnold MD [facility] None   7/7/2020  1:00 PM JOSÉ Geller CNP [facility] same name as above

## 2020-02-27 NOTE — TELEPHONE ENCOUNTER
Pt states she has numbness in her fingers and toes, she states this has happened before but it would go away after rubbing her hands. This time feels different and the sensation isn't stopping. I scheduled pt an appointment to be seen. Airway patent, Nasal mucosa clear. Mouth with normal mucosa. Throat has no vesicles, no oropharyngeal exudates and uvula is midline.

## 2020-04-09 RX ORDER — LISINOPRIL 2.5 MG/1
2.5 TABLET ORAL DAILY
Qty: 90 TABLET | Refills: 3 | Status: SHIPPED | OUTPATIENT
Start: 2020-04-09 | End: 2021-04-23

## 2020-04-09 NOTE — TELEPHONE ENCOUNTER
Ataxia     Nonischemic cardiomyopathy (HCC)     Systolic CHF, chronic (HCC)     CKD (chronic kidney disease), stage III (HCC)     Positive FIT (fecal immunochemical test)     Vitamin B deficiency     Osteopenia     Arthritis of right hip     Bilateral carpal tunnel syndrome     Alcoholism (HCC)     Metabolic encephalopathy     Bilateral leg edema     Acute on chronic congestive heart failure (HCC)     Elevated liver function tests     Basal cell carcinoma     Abnormal stress test     Adopted     Depression     Hemorrhoids     Migraine headache     OCD (obsessive compulsive disorder)     Other and unspecified angina pectoris     Atrial fibrillation ([facility] [address]     Essential hypertension

## 2020-04-14 RX ORDER — DIGOXIN 125 MCG
125 TABLET ORAL DAILY
Qty: 90 TABLET | Refills: 3 | Status: SHIPPED | OUTPATIENT
Start: 2020-04-14 | End: 2021-05-05

## 2020-04-14 NOTE — TELEPHONE ENCOUNTER
Last OV 01/07/2020  Health Maintenance   Topic Date Due    Hepatitis C screen  1945    Diabetic foot exam  05/18/1956    Shingles Vaccine (1 of 2) 05/18/1996    Lipid screen  10/05/2018    Annual Wellness Visit (AWV)  05/29/2019    Breast cancer screen  08/29/2019    Diabetic retinal exam  07/04/2020 (Originally 5/18/1955)    Flu vaccine (Season Ended) 01/07/2021 (Originally 9/1/2020)    A1C test (Diabetic or Prediabetic)  01/07/2021    Potassium monitoring  03/04/2021    Creatinine monitoring  03/04/2021    DTaP/Tdap/Td vaccine (2 - Td) 05/03/2028    Colon cancer screen colonoscopy  02/06/2029    DEXA (modify frequency per FRAX score)  Completed    Pneumococcal 65+ years Vaccine  Completed    Hepatitis A vaccine  Aged Out    Hib vaccine  Aged Out    Meningococcal (ACWY) vaccine  Aged Out             (applicable per patient's age: Cancer Screenings, Depression Screening, Fall Risk Screening, Immunizations)    Hemoglobin A1C (%)   Date Value   01/07/2020 4.9     LDL Calculated (mg/dL)   Date Value   10/05/2017 77     AST (U/L)   Date Value   11/09/2018 22     ALT (U/L)   Date Value   11/09/2018 14     BUN (mg/dL)   Date Value   03/04/2020 29 (H)      (goal A1C is < 7)   (goal LDL is <100) need 30-50% reduction from baseline     BP Readings from Last 3 Encounters:   01/07/20 118/82   12/11/19 120/80   06/25/19 138/72    (goal /80)      All Future Testing planned in [facility]:  Lab Frequency Next Occurrence   PTH, Intact Every 12 Weeks    Vitamin D 25 Hydroxy Every 12 Weeks    CBC Auto Differential Every 12 Weeks    Basic Metabolic Panel Every 12 Weeks    Phosphorus Every 12 Weeks    Albumin Every 12 Weeks    Creatinine, Random Urine Every 12 Weeks    Protein, urine, random Every 12 Weeks        Next Visit Date:  Future Appointments   Date Time Provider Delfino Marrufo   6/3/2020  1:50 PM Gisselle Matthews MD [facility] None   7/7/2020  1:00 PM Viry Cope, APRN - CNP [facility]            Patient Active Problem List:     Ataxia     Nonischemic cardiomyopathy ([facility] [address]     Systolic CHF, chronic (HCC)     CKD (chronic kidney disease), stage III ([address]     Positive FIT (fecal immunochemical test)     Vitamin B deficiency     Osteopenia     Arthritis of right hip     Bilateral carpal tunnel syndrome     Alcoholism (HCC)     Metabolic encephalopathy     Bilateral leg edema     Acute on chronic congestive heart failure (HCC)     Elevated liver function tests     Basal cell carcinoma     Abnormal stress test     Adopted     Depression     Hemorrhoids     Migraine headache     OCD (obsessive compulsive disorder)     Other and unspecified angina pectoris     Atrial fibrillation ([address]     Essential hypertension

## 2020-05-05 ENCOUNTER — OFFICE VISIT (OUTPATIENT)
Dept: PRIMARY CARE CLINIC | Age: 75
End: 2020-05-05
Payer: MEDICARE

## 2020-05-05 VITALS — DIASTOLIC BLOOD PRESSURE: 72 MMHG | SYSTOLIC BLOOD PRESSURE: 128 MMHG

## 2020-05-05 PROBLEM — R19.5 POSITIVE FIT (FECAL IMMUNOCHEMICAL TEST): Status: RESOLVED | Noted: 2017-11-14 | Resolved: 2020-05-05

## 2020-05-05 PROCEDURE — 1123F ACP DISCUSS/DSCN MKR DOCD: CPT | Performed by: NURSE PRACTITIONER

## 2020-05-05 PROCEDURE — 4040F PNEUMOC VAC/ADMIN/RCVD: CPT | Performed by: NURSE PRACTITIONER

## 2020-05-05 PROCEDURE — G0438 PPPS, INITIAL VISIT: HCPCS | Performed by: NURSE PRACTITIONER

## 2020-05-05 PROCEDURE — 3017F COLORECTAL CA SCREEN DOC REV: CPT | Performed by: NURSE PRACTITIONER

## 2020-05-05 RX ORDER — ACETAMINOPHEN AND CODEINE PHOSPHATE 300; 30 MG/1; MG/1
1 TABLET ORAL EVERY 6 HOURS PRN
Qty: 120 TABLET | Refills: 0 | Status: SHIPPED
Start: 2020-05-05 | End: 2020-05-11 | Stop reason: SINTOL

## 2020-05-05 ASSESSMENT — PATIENT HEALTH QUESTIONNAIRE - PHQ9
SUM OF ALL RESPONSES TO PHQ QUESTIONS 1-9: 2
SUM OF ALL RESPONSES TO PHQ QUESTIONS 1-9: 2

## 2020-05-05 ASSESSMENT — LIFESTYLE VARIABLES: HOW OFTEN DO YOU HAVE A DRINK CONTAINING ALCOHOL: 0

## 2020-05-05 NOTE — PROGRESS NOTES
12/11/19 120 lb 3.2 oz (54.5 kg)   06/25/19 121 lb 12.8 oz (55.2 kg)     Vitals:    05/05/20 1316   BP: 128/72     There is no height or weight on file to calculate BMI. Based upon direct observation of the patient, evaluation of cognition reveals recent and remote memory intact. Heart sounds irregular due to chronic atrial fib. Lungs clear bilaterally. No edema. Complains of increased discomfort in her right hip that is starting to interfere with daily activity. Tried tramadol about 1 year ago but did not help much, has seen ortho but prefers to avoid surgery if able    Patient's complete Health Risk Assessment and screening values have been reviewed and are found in Flowsheets. The following problems were reviewed today and where indicated follow up appointments were made and/or referrals ordered. Positive Risk Factor Screenings with Interventions:     Health Habits/Nutrition:     There is no height or weight on file to calculate BMI. Health Habits/Nutrition Interventions:  · she maintains activel lifestyle, enjoys walking on treadmill for exercise but  Has trouble walking on concrete.  She is past due tor dental exam as has no insurance, only seeks dental care when needed    Personalized Preventive Plan   Current Health Maintenance Status  Immunization History   Administered Date(s) Administered    Pneumococcal Conjugate 13-valent ([id number]) 10/04/2017    Pneumococcal Polysaccharide ([id number]) 11/09/2018    Tdap (Boostrix, Adacel) 05/03/2018        Health Maintenance   Topic Date Due    Hepatitis C screen  1945    Diabetic foot exam  05/18/1956    Shingles Vaccine (1 of 2) 05/18/1996    Lipid screen  10/05/2018    Annual Wellness Visit (AWV)  05/29/2019    Breast cancer screen  08/29/2019    Diabetic retinal exam  07/04/2020 (Originally 5/18/1955)    Flu vaccine (Season Ended) 01/07/2021 (Originally 9/1/2020)    A1C test (Diabetic or Prediabetic)  01/07/2021    Potassium

## 2020-05-11 ENCOUNTER — TELEPHONE (OUTPATIENT)
Dept: PRIMARY CARE CLINIC | Age: 75
End: 2020-05-11

## 2020-05-11 NOTE — TELEPHONE ENCOUNTER
Patient called and said that she took the acetaminophen-codeine (TYLENOL/CODEINE #3) 300-30 MG per tablet for 2 days and developed a rash on her lower abdomen. She stopped taking it and resumed her other medication.

## 2020-07-07 ENCOUNTER — OFFICE VISIT (OUTPATIENT)
Dept: PRIMARY CARE CLINIC | Age: 75
End: 2020-07-07
Payer: MEDICARE

## 2020-07-07 VITALS
OXYGEN SATURATION: 97 % | HEART RATE: 89 BPM | WEIGHT: 127.2 LBS | SYSTOLIC BLOOD PRESSURE: 126 MMHG | TEMPERATURE: 98.2 F | BODY MASS INDEX: 18.78 KG/M2 | DIASTOLIC BLOOD PRESSURE: 72 MMHG

## 2020-07-07 PROBLEM — R79.89 ELEVATED LIVER FUNCTION TESTS: Status: RESOLVED | Noted: 2018-05-01 | Resolved: 2020-07-07

## 2020-07-07 PROBLEM — L97.511 NON-PRESSURE CHRONIC ULCER OF OTHER PART OF RIGHT FOOT LIMITED TO BREAKDOWN OF SKIN (HCC): Status: ACTIVE | Noted: 2020-07-07

## 2020-07-07 PROBLEM — R60.0 BILATERAL LEG EDEMA: Status: RESOLVED | Noted: 2018-04-10 | Resolved: 2020-07-07

## 2020-07-07 PROBLEM — L97.511 NON-PRESSURE CHRONIC ULCER OF OTHER PART OF RIGHT FOOT LIMITED TO BREAKDOWN OF SKIN (HCC): Status: RESOLVED | Noted: 2020-07-07 | Resolved: 2020-07-07

## 2020-07-07 PROCEDURE — 3017F COLORECTAL CA SCREEN DOC REV: CPT | Performed by: NURSE PRACTITIONER

## 2020-07-07 PROCEDURE — G8427 DOCREV CUR MEDS BY ELIG CLIN: HCPCS | Performed by: NURSE PRACTITIONER

## 2020-07-07 PROCEDURE — 1123F ACP DISCUSS/DSCN MKR DOCD: CPT | Performed by: NURSE PRACTITIONER

## 2020-07-07 PROCEDURE — 99214 OFFICE O/P EST MOD 30 MIN: CPT | Performed by: NURSE PRACTITIONER

## 2020-07-07 PROCEDURE — 4040F PNEUMOC VAC/ADMIN/RCVD: CPT | Performed by: NURSE PRACTITIONER

## 2020-07-07 PROCEDURE — 1090F PRES/ABSN URINE INCON ASSESS: CPT | Performed by: NURSE PRACTITIONER

## 2020-07-07 PROCEDURE — G8420 CALC BMI NORM PARAMETERS: HCPCS | Performed by: NURSE PRACTITIONER

## 2020-07-07 PROCEDURE — G8399 PT W/DXA RESULTS DOCUMENT: HCPCS | Performed by: NURSE PRACTITIONER

## 2020-07-07 PROCEDURE — 1036F TOBACCO NON-USER: CPT | Performed by: NURSE PRACTITIONER

## 2020-07-07 ASSESSMENT — ENCOUNTER SYMPTOMS
SORE THROAT: 0
SINUS PRESSURE: 0
CONSTIPATION: 0
NAUSEA: 0
VOMITING: 0
ABDOMINAL PAIN: 0
SHORTNESS OF BREATH: 0
WHEEZING: 0
DIARRHEA: 0
COUGH: 0
TROUBLE SWALLOWING: 0
BLOOD IN STOOL: 0

## 2020-07-07 NOTE — PROGRESS NOTES
[facility]  [facility] [address]  [address] [address]  Dept: [phone]  Dept Fax: [phone]    Angel Lopez is a 77 y.o. female who presentstoday for her medical conditions/complaints as noted below. Angel Lopez is c/o of  Chief Complaint   Patient presents with    6 Month Follow-Up    Hypertension    Congestive Heart Failure       HPI:     Here today for follow up  Reports has been doing well, walking intermittently outdoors for exercise  Completed recent labs for nephrology  Denies any new concerns    Hypertension   This is a chronic problem. The current episode started more than 1 year ago. The problem is controlled. Pertinent negatives include no chest pain, headaches, palpitations, peripheral edema or shortness of breath. Past treatments include ACE inhibitors and beta blockers. The current treatment provides significant improvement. There are no compliance problems. Hypertensive end-organ damage includes heart failure. There is no history of CAD/MI or CVA.        Hemoglobin A1C (%)   Date Value   01/07/2020 4.9             ( goal A1C is < 7)   No results found for: LABMICR  LDL Calculated (mg/dL)   Date Value   10/05/2017 77       (goal LDL is <100)   AST (U/L)   Date Value   11/09/2018 22     ALT (U/L)   Date Value   11/09/2018 14     BUN (mg/dL)   Date Value   06/09/2020 28 (H)     BP Readings from Last 3 Encounters:   07/07/20 126/72   06/03/20 124/72   05/05/20 128/72          ([id number])    Past Medical History:   Diagnosis Date    AICD (automatic cardioverter/defibrillator) present     Atrial fibrillation ([address] 09/2012    Dr.David Lucas  CHRONIC ON ELIQUIS    Atrial fibrillation ([address]     Cancer (HCC)     basal cell on face    Chronic kidney disease     H/O cardiovascular stress test 07/26/2017    Neg. EF 24%    Hx of long term use of blood thinners     Hypertension     Nonischemic dilated cardiomyopathy ([address]     Other screening mammogram 2012    Negative    Poor historian       Past Surgical History:   Procedure Laterality Date    CARDIAC CATHETERIZATION  2017    NML CORS EF20%    CARDIAC DEFIBRILLATOR PLACEMENT  2018    DR Carrie Carney    COLONOSCOPY N/A 2019    COLONOSCOPY POLYPECTOMY SNARE/COLD BIOPSY performed by Linn Jimenes MD at [place] [address] Right     OTHER SURGICAL HISTORY  10/30/2018    excision basal cell right temple    OR OFFICE/OUTPT VISIT,PROCEDURE ONLY Right 10/30/2018    EXCISION BASAL CELL RIGHT TEMPLE performed by Jocelin Araujo MD at [address] [place] [address] TRANSESOPHAGEAL ECHOCARDIOGRAM  11/10/2017    EF 20%. Mod MR. Mild to mod AI. Mild TR. Segmental WMA. Family History   Adopted: Yes          Social History     Tobacco Use    Smoking status: Former Smoker     Packs/day: 0.50     Years: 20.00     Pack years: 10.00     Last attempt to quit: 10/4/1987     Years since quittin.7    Smokeless tobacco: Never Used   Substance Use Topics    Alcohol use:  Yes     Alcohol/week: 1.0 standard drinks     Types: 1 Glasses of wine per week     Comment: daily      Current Outpatient Medications   Medication Sig Dispense Refill    Acetaminophen (TYLENOL ARTHRITIS PAIN PO) Take by mouth 3 times daily      digoxin (LANOXIN) 125 MCG tablet Take 1 tablet by mouth daily Takes Monday thru Friday 90 tablet 3    lisinopril (ZESTRIL) 2.5 MG tablet Take 1 tablet by mouth daily 90 tablet 3    carvedilol (COREG) 12.5 MG tablet Take 1 tablet by mouth 2 times daily 180 tablet 3    sertraline (ZOLOFT) 50 MG tablet TAKE 1 TABLET EVERY DAY 90 tablet 3    apixaban (ELIQUIS) 5 MG TABS tablet Take one tablet bid 14 tablet 0    torsemide (DEMADEX) 20 MG tablet Take 1 tablet by mouth daily 30 tablet 1    vitamin B-1 100 MG tablet Take 1 tablet by mouth daily 30 tablet 3    vitamin B-12 (CYANOCOBALAMIN) 1000 MCG tablet Take 1,000 mcg by mouth daily      Multiple Vitamins-Minerals heat intolerance, polydipsia, polyphagia and polyuria. Genitourinary: Negative for difficulty urinating, frequency, hematuria and urgency. Musculoskeletal: Negative for arthralgias and myalgias. Skin: Negative for rash. Allergic/Immunologic: Negative for environmental allergies. Neurological: Negative for dizziness, weakness, light-headedness and headaches. Psychiatric/Behavioral: Negative for confusion. The patient is not nervous/anxious. Objective:     Physical Exam  Constitutional:       Appearance: She is well-developed. HENT:      Head: Normocephalic. Eyes:      Conjunctiva/sclera: Conjunctivae normal.      Pupils: Pupils are equal, round, and reactive to light. Neck:      Musculoskeletal: Normal range of motion. Cardiovascular:      Rate and Rhythm: Normal rate and regular rhythm. Heart sounds: Normal heart sounds. No murmur. Pulmonary:      Effort: Pulmonary effort is normal.      Breath sounds: Normal breath sounds. No wheezing. Abdominal:      General: Bowel sounds are normal. There is no distension. Palpations: Abdomen is soft. Musculoskeletal: Normal range of motion. Skin:     General: Skin is warm and dry. Neurological:      Mental Status: She is alert and oriented to person, place, and time. Psychiatric:         Behavior: Behavior normal.         Thought Content: Thought content normal.         Judgment: Judgment normal.       /72   Pulse 89   Temp 98.2 °F (36.8 °C)   Wt 127 lb 3.2 oz (57.7 kg)   SpO2 97%   BMI 18.78 kg/m²     Assessment:       Diagnosis Orders   1. Essential hypertension     2. Encounter for screening mammogram for breast cancer  AYESHA Digital Screen Bilateral [[id number]]   3. Screening for hyperlipidemia     4. Systolic CHF, chronic ([address]     5. CKD (chronic kidney disease), stage III ([address]     6. Other depression     7. Chronic atrial fibrillation     8.  Non-pressure chronic ulcer of other part of right foot limited to breakdown of skin ([facility] [address]     9. Longstanding persistent atrial fibrillation     10. Angina pectoris, unspecified ([facility]               Plan:      Return in about 6 months (around 1/7/2021) for hypertension check. Hypertension, CHF, atrial fib, AICD, angina- conditions all stable and well-controlled with current medications. Follow-up with cardiology annually as planned  Depression- well-controlled with Zoloft  Pressure ulcer-resolved  CKD-stable and well-controlled at this time, reviewed recent labs, follow-up with nephrology as planned  Orders Placed This Encounter   Procedures    AYESHA Digital Screen Bilateral [[id number]]     Standing Status:   Future     Standing Expiration Date:   7/7/2021     Order Specific Question:   Reason for exam:     Answer:   screening      No orders of the defined types were placed in this encounter. Patient given educational materials - see patient instructions. Discussed use, benefit, and side effects of prescribed medications. All patientquestions answered. Pt voiced understanding. Reviewed health maintenance. Instructedto continue current medications, diet and exercise. Patient agreed with treatmentplan. Follow up as directed.      Electronicallysigned by JOSÉ Cooney CNP on 7/7/2020 at 1:38 PM

## 2020-09-18 RX ORDER — CARVEDILOL 12.5 MG/1
TABLET ORAL
Qty: 180 TABLET | Refills: 3 | Status: SHIPPED | OUTPATIENT
Start: 2020-09-18 | End: 2021-07-01

## 2021-01-05 ENCOUNTER — OFFICE VISIT (OUTPATIENT)
Dept: PRIMARY CARE CLINIC | Age: 76
End: 2021-01-05
Payer: MEDICARE

## 2021-01-05 VITALS
BODY MASS INDEX: 19.79 KG/M2 | TEMPERATURE: 97.3 F | DIASTOLIC BLOOD PRESSURE: 76 MMHG | HEART RATE: 62 BPM | OXYGEN SATURATION: 99 % | SYSTOLIC BLOOD PRESSURE: 132 MMHG | WEIGHT: 134 LBS

## 2021-01-05 DIAGNOSIS — N18.31 STAGE 3A CHRONIC KIDNEY DISEASE (HCC): ICD-10-CM

## 2021-01-05 DIAGNOSIS — I10 ESSENTIAL HYPERTENSION: Primary | ICD-10-CM

## 2021-01-05 DIAGNOSIS — Z95.810 AICD (AUTOMATIC CARDIOVERTER/DEFIBRILLATOR) PRESENT: ICD-10-CM

## 2021-01-05 DIAGNOSIS — I42.8 NONISCHEMIC CARDIOMYOPATHY (HCC): ICD-10-CM

## 2021-01-05 DIAGNOSIS — R04.0 NOSEBLEED: ICD-10-CM

## 2021-01-05 DIAGNOSIS — I50.22 SYSTOLIC CHF, CHRONIC (HCC): Chronic | ICD-10-CM

## 2021-01-05 DIAGNOSIS — I48.20 CHRONIC ATRIAL FIBRILLATION (HCC): ICD-10-CM

## 2021-01-05 PROBLEM — F10.20 ALCOHOLISM (HCC): Status: RESOLVED | Noted: 2018-04-06 | Resolved: 2021-01-05

## 2021-01-05 PROBLEM — G93.41 METABOLIC ENCEPHALOPATHY: Status: RESOLVED | Noted: 2018-04-06 | Resolved: 2021-01-05

## 2021-01-05 PROBLEM — F32.A DEPRESSION: Status: RESOLVED | Noted: 2017-08-21 | Resolved: 2021-01-05

## 2021-01-05 PROCEDURE — G8399 PT W/DXA RESULTS DOCUMENT: HCPCS | Performed by: NURSE PRACTITIONER

## 2021-01-05 PROCEDURE — 1090F PRES/ABSN URINE INCON ASSESS: CPT | Performed by: NURSE PRACTITIONER

## 2021-01-05 PROCEDURE — 1036F TOBACCO NON-USER: CPT | Performed by: NURSE PRACTITIONER

## 2021-01-05 PROCEDURE — G8427 DOCREV CUR MEDS BY ELIG CLIN: HCPCS | Performed by: NURSE PRACTITIONER

## 2021-01-05 PROCEDURE — G8420 CALC BMI NORM PARAMETERS: HCPCS | Performed by: NURSE PRACTITIONER

## 2021-01-05 PROCEDURE — 99214 OFFICE O/P EST MOD 30 MIN: CPT | Performed by: NURSE PRACTITIONER

## 2021-01-05 PROCEDURE — 1123F ACP DISCUSS/DSCN MKR DOCD: CPT | Performed by: NURSE PRACTITIONER

## 2021-01-05 PROCEDURE — 3017F COLORECTAL CA SCREEN DOC REV: CPT | Performed by: NURSE PRACTITIONER

## 2021-01-05 PROCEDURE — G8484 FLU IMMUNIZE NO ADMIN: HCPCS | Performed by: NURSE PRACTITIONER

## 2021-01-05 PROCEDURE — 4040F PNEUMOC VAC/ADMIN/RCVD: CPT | Performed by: NURSE PRACTITIONER

## 2021-01-05 ASSESSMENT — ENCOUNTER SYMPTOMS
COUGH: 0
TROUBLE SWALLOWING: 0
WHEEZING: 0
SINUS PRESSURE: 0
NAUSEA: 0
DIARRHEA: 0
VOMITING: 0
SHORTNESS OF BREATH: 0
CONSTIPATION: 0
ABDOMINAL PAIN: 0
SORE THROAT: 0
BLOOD IN STOOL: 0

## 2021-01-05 NOTE — PROGRESS NOTES
1,000 mcg by mouth daily      Multiple Vitamins-Minerals (THERAPEUTIC MULTIVITAMIN-MINERALS) tablet Take 1 tablet by mouth daily      calcium carbonate (OSCAL) 500 MG TABS tablet Take 600 mg by mouth daily       Biotin (BIOTIN 5000) 5 MG CAPS Take 5,000 mcg by mouth daily      ascorbic acid (VITAMIN C) 500 MG tablet Take 500 mg by mouth daily        No current facility-administered medications for this visit. Allergies   Allergen Reactions    Contrast [Barium-Containing Compounds]      Unknown     Vancomycin Other (See Comments)     unknown    Codeine Rash       Health Maintenance   Topic Date Due    Shingles Vaccine (1 of 2) 05/18/1996    Breast cancer screen  08/29/2019    A1C test (Diabetic or Prediabetic)  01/07/2021    Diabetic foot exam  07/07/2021 (Originally 5/18/1955)    Lipid screen  07/07/2021 (Originally 10/5/2018)    Diabetic retinal exam  07/17/2021 (Originally [birth year]18/1955)    Flu vaccine (1) 01/05/2022 (Originally 9/1/2020)    Annual Wellness Visit (AWV)  05/06/2021    Potassium monitoring  09/22/2021    Creatinine monitoring  09/22/2021    DTaP/Tdap/Td vaccine (2 - Td) 05/03/2028    Colon cancer screen colonoscopy  02/06/2029    DEXA (modify frequency per FRAX score)  Completed    Pneumococcal 65+ years Vaccine  Completed    Hepatitis A vaccine  Aged Out    Hib vaccine  Aged Out    Meningococcal (ACWY) vaccine  Aged Out    Hepatitis C screen  Discontinued       Subjective:      Review of Systems   Constitutional: Negative for activity change, appetite change, chills, fatigue, fever and unexpected weight change. HENT: Negative for congestion, ear pain, hearing loss, sinus pressure, sore throat and trouble swallowing. Eyes: Negative for visual disturbance. Respiratory: Negative for cough, shortness of breath and wheezing. Cardiovascular: Negative for chest pain, palpitations and leg swelling.    Gastrointestinal: Negative for abdominal pain, blood in stool, constipation, diarrhea, nausea and vomiting. Endocrine: Negative for cold intolerance, heat intolerance, polydipsia, polyphagia and polyuria. Genitourinary: Negative for difficulty urinating, frequency, hematuria and urgency. Musculoskeletal: Negative for arthralgias and myalgias. Skin: Negative for rash. Allergic/Immunologic: Negative for environmental allergies. Neurological: Negative for dizziness, weakness, light-headedness and headaches. Psychiatric/Behavioral: Negative for confusion. The patient is not nervous/anxious. Objective:     Physical Exam  Constitutional:       Appearance: She is well-developed. HENT:      Head: Normocephalic. Eyes:      Conjunctiva/sclera: Conjunctivae normal.      Pupils: Pupils are equal, round, and reactive to light. Neck:      Musculoskeletal: Normal range of motion. Cardiovascular:      Rate and Rhythm: Normal rate and regular rhythm. Heart sounds: Normal heart sounds. No murmur. Pulmonary:      Effort: Pulmonary effort is normal.      Breath sounds: Normal breath sounds. No wheezing. Abdominal:      General: Bowel sounds are normal. There is no distension. Palpations: Abdomen is soft. Musculoskeletal: Normal range of motion. Skin:     General: Skin is warm and dry. Neurological:      Mental Status: She is alert and oriented to person, place, and time. Psychiatric:         Behavior: Behavior normal.         Thought Content: Thought content normal.         Judgment: Judgment normal.       /76 (Site: Left Upper Arm, Position: Sitting)   Pulse 62   Temp 97.3 °F (36.3 °C)   Wt 134 lb (60.8 kg)   SpO2 99%   BMI 19.79 kg/m²     Assessment:       Diagnosis Orders   1. Essential hypertension     2. AICD (automatic cardioverter/defibrillator) Implant     3. Chronic atrial fibrillation (HCC)     4. Stage 3a chronic kidney disease     5. Nonischemic cardiomyopathy ([address]     6. Systolic CHF, chronic ([address]     7.  Nosebleed Plan:      Return in about 6 months (around 7/5/2021) for AWV. Hypertension-BP remains well controlled, reviewed recent labs per nephrology  FEROZ KUNZ fib, cardiomyopathy, CHF-conditions have been stable and well-controlled with current medications, again strongly encouraged to schedule appointment for follow-up with cardiologist as has been over 1 year  Nosebleeds-advised regular use of saline nasal spray 2 times daily, lubricant at night, discussed likely contributed to by [place]. If does not improve encouraged to call/return office  CKD-recent labs reviewed, she will continue to follow-up with nephrologist as planned         Patient given educational materials - see patient instructions. Discussed use, benefit, and side effects of prescribed medications. All patientquestions answered. Pt voiced understanding. Reviewed health maintenance. Instructedto continue current medications, diet and exercise. Patient agreed with treatmentplan. Follow up as directed.      Electronicallysigned by JOSÉ Trevizo CNP on 1/5/2021 at 1:29 PM

## 2021-04-23 RX ORDER — LISINOPRIL 2.5 MG/1
TABLET ORAL
Qty: 90 TABLET | Refills: 3 | Status: SHIPPED | OUTPATIENT
Start: 2021-04-23 | End: 2021-07-09 | Stop reason: SINTOL

## 2021-05-05 RX ORDER — DIGOXIN 125 MCG
TABLET ORAL
Qty: 65 TABLET | Refills: 3 | Status: SHIPPED | OUTPATIENT
Start: 2021-05-05 | End: 2021-05-10 | Stop reason: SDUPTHER

## 2021-05-10 RX ORDER — DIGOXIN 125 MCG
TABLET ORAL
Qty: 65 TABLET | Refills: 3 | Status: SHIPPED | OUTPATIENT
Start: 2021-05-10 | End: 2022-06-07

## 2021-05-10 NOTE — TELEPHONE ENCOUNTER
Pt called stating that [facility] did not receive this medication refill. E-subscription failed to send. Please resend.

## 2021-07-01 DIAGNOSIS — F32.A ANXIETY AND DEPRESSION: ICD-10-CM

## 2021-07-01 DIAGNOSIS — I50.22 SYSTOLIC CHF, CHRONIC (HCC): Chronic | ICD-10-CM

## 2021-07-01 DIAGNOSIS — F41.9 ANXIETY AND DEPRESSION: ICD-10-CM

## 2021-07-01 RX ORDER — CARVEDILOL 12.5 MG/1
TABLET ORAL
Qty: 180 TABLET | Refills: 3 | Status: ON HOLD | OUTPATIENT
Start: 2021-07-01 | End: 2021-10-15 | Stop reason: HOSPADM

## 2021-07-08 ENCOUNTER — OFFICE VISIT (OUTPATIENT)
Dept: PRIMARY CARE CLINIC | Age: 76
End: 2021-07-08
Payer: MEDICARE

## 2021-07-08 ENCOUNTER — HOSPITAL ENCOUNTER (OUTPATIENT)
Age: 76
Setting detail: SPECIMEN
Discharge: HOME | End: 2021-07-08
Payer: MEDICARE

## 2021-07-08 VITALS
RESPIRATION RATE: 16 BRPM | DIASTOLIC BLOOD PRESSURE: 68 MMHG | OXYGEN SATURATION: 95 % | HEART RATE: 80 BPM | BODY MASS INDEX: 20.76 KG/M2 | WEIGHT: 137 LBS | SYSTOLIC BLOOD PRESSURE: 128 MMHG | HEIGHT: 68 IN

## 2021-07-08 DIAGNOSIS — I48.20 CHRONIC ATRIAL FIBRILLATION (HCC): ICD-10-CM

## 2021-07-08 DIAGNOSIS — R46.89 COMPULSIVE BEHAVIOR: ICD-10-CM

## 2021-07-08 DIAGNOSIS — Z13.29 SCREENING FOR THYROID DISORDER: ICD-10-CM

## 2021-07-08 DIAGNOSIS — I42.8 NONISCHEMIC CARDIOMYOPATHY (HCC): ICD-10-CM

## 2021-07-08 DIAGNOSIS — Z13.0 SCREENING, ANEMIA, DEFICIENCY, IRON: ICD-10-CM

## 2021-07-08 DIAGNOSIS — R41.3 MEMORY CHANGES: ICD-10-CM

## 2021-07-08 DIAGNOSIS — Z00.00 ROUTINE GENERAL MEDICAL EXAMINATION AT A HEALTH CARE FACILITY: Primary | ICD-10-CM

## 2021-07-08 DIAGNOSIS — N18.31 STAGE 3A CHRONIC KIDNEY DISEASE (HCC): ICD-10-CM

## 2021-07-08 DIAGNOSIS — Z95.810 AICD (AUTOMATIC CARDIOVERTER/DEFIBRILLATOR) PRESENT: ICD-10-CM

## 2021-07-08 DIAGNOSIS — I10 ESSENTIAL HYPERTENSION: ICD-10-CM

## 2021-07-08 LAB
ABSOLUTE EOS #: 0.08 K/UL (ref 0–0.44)
ABSOLUTE IMMATURE GRANULOCYTE: 0.03 K/UL (ref 0–0.3)
ABSOLUTE LYMPH #: 1.85 K/UL (ref 1.1–3.7)
ABSOLUTE MONO #: 0.75 K/UL (ref 0.1–1.2)
ALBUMIN SERPL-MCNC: 4.5 G/DL (ref 3.5–5.2)
ALBUMIN/GLOBULIN RATIO: 1.6 (ref 1–2.5)
ALP BLD-CCNC: 49 U/L (ref 35–104)
ALT SERPL-CCNC: 15 U/L (ref 5–33)
ANION GAP SERPL CALCULATED.3IONS-SCNC: 12 MMOL/L (ref 9–17)
AST SERPL-CCNC: 21 U/L
BASOPHILS # BLD: 1 % (ref 0–2)
BASOPHILS ABSOLUTE: 0.07 K/UL (ref 0–0.2)
BILIRUB SERPL-MCNC: 0.7 MG/DL (ref 0.3–1.2)
BUN BLDV-MCNC: 21 MG/DL (ref 8–23)
BUN/CREAT BLD: ABNORMAL (ref 9–20)
CALCIUM SERPL-MCNC: 9.7 MG/DL (ref 8.6–10.4)
CHLORIDE BLD-SCNC: 99 MMOL/L (ref 98–107)
CO2: 27 MMOL/L (ref 20–31)
CREAT SERPL-MCNC: 1.04 MG/DL (ref 0.5–0.9)
DIFFERENTIAL TYPE: ABNORMAL
EOSINOPHILS RELATIVE PERCENT: 1 % (ref 1–4)
GFR AFRICAN AMERICAN: >60 ML/MIN
GFR NON-AFRICAN AMERICAN: 52 ML/MIN
GFR SERPL CREATININE-BSD FRML MDRD: ABNORMAL ML/MIN/{1.73_M2}
GFR SERPL CREATININE-BSD FRML MDRD: ABNORMAL ML/MIN/{1.73_M2}
GLUCOSE BLD-MCNC: 108 MG/DL (ref 70–99)
HCT VFR BLD CALC: 39.6 % (ref 36.3–47.1)
HEMOGLOBIN: 12.9 G/DL (ref 11.9–15.1)
IMMATURE GRANULOCYTES: 0 %
LYMPHOCYTES # BLD: 26 % (ref 24–43)
MCH RBC QN AUTO: 35.4 PG (ref 25.2–33.5)
MCHC RBC AUTO-ENTMCNC: 32.6 G/DL (ref 28.4–34.8)
MCV RBC AUTO: 108.8 FL (ref 82.6–102.9)
MONOCYTES # BLD: 10 % (ref 3–12)
NRBC AUTOMATED: 0 PER 100 WBC
PDW BLD-RTO: 12.7 % (ref 11.8–14.4)
PLATELET # BLD: 271 K/UL (ref 138–453)
PLATELET ESTIMATE: ABNORMAL
PMV BLD AUTO: 9.6 FL (ref 8.1–13.5)
POTASSIUM SERPL-SCNC: 5.5 MMOL/L (ref 3.7–5.3)
RBC # BLD: 3.64 M/UL (ref 3.95–5.11)
RBC # BLD: ABNORMAL 10*6/UL
SEG NEUTROPHILS: 62 % (ref 36–65)
SEGMENTED NEUTROPHILS ABSOLUTE COUNT: 4.42 K/UL (ref 1.5–8.1)
SODIUM BLD-SCNC: 138 MMOL/L (ref 135–144)
TOTAL PROTEIN: 7.4 G/DL (ref 6.4–8.3)
TSH SERPL DL<=0.05 MIU/L-ACNC: 0.77 MIU/L (ref 0.3–5)
WBC # BLD: 7.2 K/UL (ref 3.5–11.3)
WBC # BLD: ABNORMAL 10*3/UL

## 2021-07-08 PROCEDURE — 4040F PNEUMOC VAC/ADMIN/RCVD: CPT | Performed by: NURSE PRACTITIONER

## 2021-07-08 PROCEDURE — G0439 PPPS, SUBSEQ VISIT: HCPCS | Performed by: NURSE PRACTITIONER

## 2021-07-08 PROCEDURE — 3017F COLORECTAL CA SCREEN DOC REV: CPT | Performed by: NURSE PRACTITIONER

## 2021-07-08 PROCEDURE — 1123F ACP DISCUSS/DSCN MKR DOCD: CPT | Performed by: NURSE PRACTITIONER

## 2021-07-08 SDOH — ECONOMIC STABILITY: FOOD INSECURITY: WITHIN THE PAST 12 MONTHS, THE FOOD YOU BOUGHT JUST DIDN'T LAST AND YOU DIDN'T HAVE MONEY TO GET MORE.: NEVER TRUE

## 2021-07-08 SDOH — ECONOMIC STABILITY: FOOD INSECURITY: WITHIN THE PAST 12 MONTHS, YOU WORRIED THAT YOUR FOOD WOULD RUN OUT BEFORE YOU GOT MONEY TO BUY MORE.: NEVER TRUE

## 2021-07-08 ASSESSMENT — LIFESTYLE VARIABLES
AUDIT TOTAL SCORE: 0
HOW OFTEN DO YOU HAVE A DRINK CONTAINING ALCOHOL: 4
HOW OFTEN DO YOU HAVE A DRINK CONTAINING ALCOHOL: FOUR OR MORE TIMES A WEEK
HAS A RELATIVE, FRIEND, DOCTOR, OR ANOTHER HEALTH PROFESSIONAL EXPRESSED CONCERN ABOUT YOUR DRINKING OR SUGGESTED YOU CUT DOWN: NO
AUDIT-C TOTAL SCORE: 0
HAVE YOU OR SOMEONE ELSE BEEN INJURED AS A RESULT OF YOUR DRINKING: 0
AUDIT-C TOTAL SCORE: 4
HOW OFTEN DURING THE LAST YEAR HAVE YOU NEEDED AN ALCOHOLIC DRINK FIRST THING IN THE MORNING TO GET YOURSELF GOING AFTER A NIGHT OF HEAVY DRINKING: 0
AUDIT TOTAL SCORE: 4
HOW OFTEN DURING THE LAST YEAR HAVE YOU FAILED TO DO WHAT WAS NORMALLY EXPECTED FROM YOU BECAUSE OF DRINKING: 0
HOW OFTEN DO YOU HAVE SIX OR MORE DRINKS ON ONE OCCASION: 0
HOW OFTEN DURING THE LAST YEAR HAVE YOU FAILED TO DO WHAT WAS NORMALLY EXPECTED FROM YOU BECAUSE OF DRINKING: NEVER
HAS A RELATIVE, FRIEND, DOCTOR, OR ANOTHER HEALTH PROFESSIONAL EXPRESSED CONCERN ABOUT YOUR DRINKING OR SUGGESTED YOU CUT DOWN: 0
HOW OFTEN DO YOU HAVE SIX OR MORE DRINKS ON ONE OCCASION: NEVER
HOW OFTEN DURING THE LAST YEAR HAVE YOU FOUND THAT YOU WERE NOT ABLE TO STOP DRINKING ONCE YOU HAD STARTED: NEVER
HOW MANY STANDARD DRINKS CONTAINING ALCOHOL DO YOU HAVE ON A TYPICAL DAY: ONE OR TWO
HOW OFTEN DURING THE LAST YEAR HAVE YOU BEEN UNABLE TO REMEMBER WHAT HAPPENED THE NIGHT BEFORE BECAUSE YOU HAD BEEN DRINKING: 0
HOW OFTEN DURING THE LAST YEAR HAVE YOU BEEN UNABLE TO REMEMBER WHAT HAPPENED THE NIGHT BEFORE BECAUSE YOU HAD BEEN DRINKING: NEVER
HOW OFTEN DURING THE LAST YEAR HAVE YOU HAD A FEELING OF GUILT OR REMORSE AFTER DRINKING: 0
HOW OFTEN DURING THE LAST YEAR HAVE YOU FOUND THAT YOU WERE NOT ABLE TO STOP DRINKING ONCE YOU HAD STARTED: 0
HOW MANY STANDARD DRINKS CONTAINING ALCOHOL DO YOU HAVE ON A TYPICAL DAY: 0
HOW OFTEN DURING THE LAST YEAR HAVE YOU HAD A FEELING OF GUILT OR REMORSE AFTER DRINKING: NEVER
HOW OFTEN DURING THE LAST YEAR HAVE YOU NEEDED AN ALCOHOLIC DRINK FIRST THING IN THE MORNING TO GET YOURSELF GOING AFTER A NIGHT OF HEAVY DRINKING: NEVER
HAVE YOU OR SOMEONE ELSE BEEN INJURED AS A RESULT OF YOUR DRINKING: NO

## 2021-07-08 ASSESSMENT — PATIENT HEALTH QUESTIONNAIRE - PHQ9
SUM OF ALL RESPONSES TO PHQ QUESTIONS 1-9: 0
SUM OF ALL RESPONSES TO PHQ9 QUESTIONS 1 & 2: 0
1. LITTLE INTEREST OR PLEASURE IN DOING THINGS: 0
SUM OF ALL RESPONSES TO PHQ QUESTIONS 1-9: 0
SUM OF ALL RESPONSES TO PHQ QUESTIONS 1-9: 0
2. FEELING DOWN, DEPRESSED OR HOPELESS: 0

## 2021-07-08 ASSESSMENT — SOCIAL DETERMINANTS OF HEALTH (SDOH): HOW HARD IS IT FOR YOU TO PAY FOR THE VERY BASICS LIKE FOOD, HOUSING, MEDICAL CARE, AND HEATING?: NOT HARD AT ALL

## 2021-07-08 NOTE — PROGRESS NOTES
Medicare Annual Wellness Visit  Name: Ivania Tapia Date: 2021   MRN: [id number] Sex: Female   Age: 77 y.o. Ethnicity: Non-/Non    : 1945 Race: Aren Bishop is here for Medicare AWV    Screenings for behavioral, psychosocial and functional/safety risks, and cognitive dysfunction are all negative except as indicated below. These results, as well as other patient data from the [address] form, are documented in Flowsheets linked to this Encounter. Allergies   Allergen Reactions    Contrast [Barium-Containing Compounds]      Unknown     Vancomycin Other (See Comments)     unknown    Codeine Rash         Prior to Visit Medications    Medication Sig Taking?  Authorizing Provider   sertraline (ZOLOFT) 50 MG tablet TAKE 1 TABLET EVERY DAY Yes JOSÉ Aguiar CNP   digoxin (LANOXIN) 125 MCG tablet TAKE 1 TABLET BY MOUTH DAILY MONDAY THROUGH FRIDAY Yes JOSÉ Aguiar CNP   lisinopril (PRINIVIL;ZESTRIL) 2.5 MG tablet TAKE 1 TABLET EVERY DAY Yes JOSÉ Aguiar CNP   Acetaminophen (TYLENOL ARTHRITIS PAIN PO) Take by mouth 3 times daily Yes Historical Provider, MD   apixaban (ELIQUIS) 5 MG TABS tablet Take one tablet bid Yes JOSÉ Giles CNP   torsemide (DEMADEX) 20 MG tablet Take 1 tablet by mouth daily Yes JOSÉ Gaytan CNP   vitamin B-1 100 MG tablet Take 1 tablet by mouth daily Yes Shayla Arellano DO   vitamin B-12 (CYANOCOBALAMIN) 1000 MCG tablet Take 1,000 mcg by mouth daily Yes Historical Provider, MD   Multiple Vitamins-Minerals (THERAPEUTIC MULTIVITAMIN-MINERALS) tablet Take 1 tablet by mouth daily Yes Historical Provider, MD   calcium carbonate (OSCAL) 500 MG TABS tablet Take 600 mg by mouth daily  Yes Historical Provider, MD   Biotin (BIOTIN 5000) 5 MG CAPS Take 5,000 mcg by mouth daily Yes Historical Provider, MD   ascorbic acid (VITAMIN C) 500 MG tablet Take 500 mg by mouth daily  Yes Historical Provider, MD   carvedilol (COREG) 12.5 MG tablet TAKE 1 TABLET TWICE DAILY  Patient taking differently: Indications: pt unsure of dosing   JOSÉ Schmitt CNP         Past Medical History:   Diagnosis Date    AICD (automatic cardioverter/defibrillator) present     Atrial fibrillation ([facility] [address] 09/2012    Dr.David Lucas  CHRONIC ON ELIQUIS    Atrial fibrillation ([address]     Cancer ([address]     basal cell on face    Chronic kidney disease     H/O cardiovascular stress test 07/26/2017    Neg. EF 24%    Hx of long term use of blood thinners     Hypertension     Nonischemic dilated cardiomyopathy ([address]     Other screening mammogram March 6, 2012    Negative    Poor historian        Past Surgical History:   Procedure Laterality Date    CARDIAC CATHETERIZATION  08/2017    NML CORS EF20%    CARDIAC DEFIBRILLATOR PLACEMENT  04/06/2018    DR Wilfred Rodriguez    COLONOSCOPY N/A 2/6/2019    COLONOSCOPY POLYPECTOMY SNARE/COLD BIOPSY performed by [facility]ryn Mello MD at [facility] Right     OTHER SURGICAL HISTORY  10/30/2018    excision basal cell right temple    [place] OFFICE/OUTPT VISIT,PROCEDURE ONLY Right 10/30/2018    EXCISION BASAL CELL RIGHT TEMPLE performed by Traci Suggs MD at [address] TRANSESOPHAGEAL ECHOCARDIOGRAM  11/10/2017    EF 20%. Mod MR. Mild to mod AI. Mild TR. Segmental WMA.          Family History   Adopted: Yes       CareTeam (Including outside providers/suppliers regularly involved in providing care):   Patient Care Team:  JOSÉ Schmitt CNP as PCP - General (Family Nurse Practitioner)  JOSÉ Schmitt CNP as PCP - [facility]  Thuan Mendez MD as Consulting Physician (Cardiology)  Bel Vaughan MD as Surgeon (Cardiology)  Kamryn Mello MD as Consulting Physician (Gastroenterology)  Dick Meeks MD as Consulting Physician (Nephrology)    Wt Readings from Last 3 Encounters:   07/08/21 137 lb (62.1 kg)   01/05/21 134 lb (60.8 kg)   07/07/20 127 lb 3.2 oz (57.7 kg)     Vitals:    07/08/21 1307   BP: 128/68   Pulse: 80   Resp: 16   SpO2: 95%   Weight: 137 lb (62.1 kg)   Height: 5' 8\" (1.727 m)     Body mass index is 20.83 kg/m². Based upon direct observation of the patient, evaluation of cognition reveals: discussed changes in memory and what she describes as \"OCD\" behaviors. She denies any significant forgetfulness in regards to dates, keeping appointments, paying bills. She remains independent in her own home at this time. She states she feels she is starting to have compulsive behaviors like repeatedly checking to see if doors are locked being sure she put things away in the proper places, etc. she is willing to seek further evaluation with psychiatry    Lungs are clear bilaterally, heart rate is regular, no swelling    Patient's complete Health Risk Assessment and screening values have been reviewed and are found in Flowsheets. The following problems were reviewed today and where indicated follow up appointments were made and/or referrals ordered. Positive Risk Factor Screenings with Interventions:      Cognitive: Words recalled: 1 Word Recalled  Clock Drawing Test (CDT) Score: (!) Abnormal  Total Score Interpretation: Positive Mini-Cog  Did the patient refuse to take the cognition test?: No  Cognitive Impairment Interventions:  · Psychiatry referral ordered, see above note         General Health and ACP:  General  In general, how would you say your health is?: Good  In the past 7 days, have you experienced any of the following?  New or Increased Pain, New or Increased Fatigue, Loneliness, Social Isolation, Stress or Anger?: (!) Loneliness, Social Isolation  Do you get the social and emotional support that you need?: Yes  Do you have a Living Will?: (!) No  Advance Directives     Power of  Living Will ACP-Advance Directive ACP-Power of     Not on File [place] on 08/07/17 Filed Not on File      General Health Risk Interventions:  · does not desire any furhter evaluation at this time    Health Habits/Nutrition:  Health Habits/Nutrition  Do you exercise for at least 20 minutes 2-3 times per week?: Yes  Have you lost any weight without trying in the past 3 months?: No  Do you eat only one meal per day?: (!) Yes  Have you seen the dentist within the past year?: (!) No  Body mass index: 20.83  Health Habits/Nutrition Interventions:  · Dental exam overdue:  patient declines dental evaluation    Hearing/Vision:  No exam data present  Hearing/Vision  Do you or your family notice any trouble with your hearing that hasn't been managed with hearing aids?: No  Do you have difficulty driving, watching TV, or doing any of your daily activities because of your eyesight?: No  Have you had an eye exam within the past year?: (!) No  Hearing/Vision Interventions:  · Vision concerns:  patient encouraged to make appointment with his/her eye specialist    Safety:  Safety  Do you have working smoke detectors?: Yes  Have all throw rugs been removed or fastened?: (!) No  Do you have non-slip mats or surfaces in all bathtubs/showers?: Yes  Do all of your stairways have a railing or banister?: Yes  Are your doorways, halls and stairs free of clutter?: Yes  Do you always fasten your seatbelt when you are in a car?: Yes  Safety Interventions:  · Home safety tips provided     Personalized Preventive Plan   Current Health Maintenance Status  Immunization History   Administered Date(s) Administered    COVID-19, Pfizer, PF, 30mcg/0.3mL 03/08/2021, 03/15/2021    Pneumococcal Conjugate 13-valent ([id number]) 10/04/2017    Pneumococcal Polysaccharide ([id number]) 11/09/2018    Tdap (Boostrix, Adacel) 05/03/2018        Health Maintenance   Topic Date Due    Shingles Vaccine (1 of 2) Never done   [employer] Visit (AWV)  Never done    Flu vaccine (1) 09/01/2021    Potassium monitoring  09/22/2021    Creatinine monitoring  09/22/2021    Lipid screen  10/05/2022    DTaP/Tdap/Td vaccine (2 - Td or Tdap) 05/03/2028    Colon cancer screen colonoscopy  02/06/2029    DEXA (modify frequency per FRAX score)  Completed    Pneumococcal 65+ years Vaccine  Completed    COVID-19 Vaccine  Completed    Hepatitis A vaccine  Aged Out    Hepatitis B vaccine  Aged Out    Hib vaccine  Aged Out    Meningococcal (ACWY) vaccine  Aged Out    Hepatitis C screen  Discontinued     Recommendations for [place] Due: see orders and patient instructions/AVS.  . Recommended screening schedule for the next 5-10 years is provided to the patient in written form: see Patient Mk Dodge was seen today for medicare aw. Diagnoses and all orders for this visit:    Routine general medical examination at a health care facility    Memory changes  -     Comprehensive Metabolic Panel; Future  -     Love Rangel MD, Psychiatry, [place]    Compulsive behavior  -     Comprehensive Metabolic Panel; Future  -     Love Rangel MD, Psychiatry, [place]    Screening, anemia, deficiency, iron  -     CBC With Auto Differential; Future    Screening for thyroid disorder  -     TSH without Reflex;  Future    AICD (automatic cardioverter/defibrillator) Implant    Chronic atrial fibrillation (HCC)    Stage 3a chronic kidney disease ([address]    Essential hypertension    Nonischemic cardiomyopathy ([address]    Screening labs ordered  Referral placed to psych for further evaluation of her memory changes and what she reports as compulsive behavior  Reminded to schedule follow-up appointment with cardiologist as she denies any recent visits  She does see nephrology every 6 months  Blood pressure is well controlled

## 2021-07-08 NOTE — PATIENT INSTRUCTIONS
Personalized Preventive Plan for Jen Hensley - 7/8/2021  Medicare offers a range of preventive health benefits. Some of the tests and screenings are paid in full while other may be subject to a deductible, co-insurance, and/or copay. Some of these benefits include a comprehensive review of your medical history including lifestyle, illnesses that may run in your family, and various assessments and screenings as appropriate. After reviewing your medical record and screening and assessments performed today your provider may have ordered immunizations, labs, imaging, and/or referrals for you. A list of these orders (if applicable) as well as your Preventive Care list are included within your After Visit Summary for your review. Other Preventive Recommendations:    · A preventive eye exam performed by an eye specialist is recommended every 1-2 years to screen for glaucoma; cataracts, macular degeneration, and other eye disorders. · A preventive dental visit is recommended every 6 months. · Try to get at least 150 minutes of exercise per week or 10,000 steps per day on a pedometer . · Order or download the FREE \"Exercise & Physical Activity: Your Everyday Guide\" from [employer]. Call 2-840.898.2522 or search [employer] online. · You need 2138-9701 mg of calcium and 9109-8417 IU of vitamin D per day. It is possible to meet your calcium requirement with diet alone, but a vitamin D supplement is usually necessary to meet this goal.  · When exposed to the sun, use a sunscreen that protects against both UVA and UVB radiation with an SPF of 30 or greater. Reapply every 2 to 3 hours or after sweating, drying off with a towel, or swimming. · Always wear a seat belt when traveling in a car. Always wear a helmet when riding a bicycle or motorcycle.

## 2021-07-09 DIAGNOSIS — E87.5 HYPERKALEMIA: Primary | ICD-10-CM

## 2021-07-15 ENCOUNTER — TELEPHONE (OUTPATIENT)
Dept: PRIMARY CARE CLINIC | Age: 76
End: 2021-07-15

## 2021-07-15 DIAGNOSIS — R41.3 MEMORY CHANGES: Primary | ICD-10-CM

## 2021-07-15 DIAGNOSIS — R46.89 COMPULSIVE BEHAVIOR: ICD-10-CM

## 2021-07-15 NOTE — TELEPHONE ENCOUNTER
A employee if Dr. Ketty Chatterjee office called & Dr Ketty Chatterjee is suggesting Geriatric Psychiatry for pt. They also reached out to pt & pt declined wanting to see a Doctor.      [facility]  Dr. Jose Gutierrez  [phone]

## 2021-10-06 LAB
ANION GAP SERPL CALCULATED.3IONS-SCNC: 12 MMOL/L (ref 5–15)
BUN BLDV-MCNC: 22 MG/DL (ref 5–27)
CALCIUM SERPL-MCNC: 9.6 MG/DL (ref 8.5–10.5)
CHLORIDE BLD-SCNC: 100 MMOL/L (ref 98–109)
CO2: 27 MMOL/L (ref 22–32)
CREAT SERPL-MCNC: 1.04 MG/DL (ref 0.4–1)
EGFR AFRICAN AMERICAN: >60 ML/MIN/1.73SQ.M
EGFR IF NONAFRICAN AMERICAN: 52 ML/MIN/1.73SQ.M
GLUCOSE: 112 MG/DL (ref 65–99)
POTASSIUM SERPL-SCNC: 4.2 MMOL/L (ref 3.5–5)
SODIUM BLD-SCNC: 139 MMOL/L (ref 134–146)

## 2021-10-12 ENCOUNTER — APPOINTMENT (OUTPATIENT)
Dept: GENERAL RADIOLOGY | Age: 76
DRG: 540 | End: 2021-10-12
Payer: MEDICARE

## 2021-10-12 ENCOUNTER — HOSPITAL ENCOUNTER (INPATIENT)
Age: 76
LOS: 3 days | Discharge: HOME | DRG: 540 | End: 2021-10-15
Attending: EMERGENCY MEDICINE | Admitting: FAMILY MEDICINE
Payer: MEDICARE

## 2021-10-12 ENCOUNTER — OFFICE VISIT (OUTPATIENT)
Dept: PRIMARY CARE CLINIC | Age: 76
End: 2021-10-12
Payer: MEDICARE

## 2021-10-12 VITALS
SYSTOLIC BLOOD PRESSURE: 122 MMHG | BODY MASS INDEX: 21.59 KG/M2 | RESPIRATION RATE: 18 BRPM | WEIGHT: 142 LBS | DIASTOLIC BLOOD PRESSURE: 74 MMHG | HEART RATE: 99 BPM | OXYGEN SATURATION: 98 %

## 2021-10-12 DIAGNOSIS — E87.5 HYPERKALEMIA: ICD-10-CM

## 2021-10-12 DIAGNOSIS — M86.9 OSTEOMYELITIS OF LEFT FOOT, UNSPECIFIED TYPE (HCC): Primary | ICD-10-CM

## 2021-10-12 DIAGNOSIS — M86.172 OTHER ACUTE OSTEOMYELITIS OF LEFT FOOT (HCC): ICD-10-CM

## 2021-10-12 DIAGNOSIS — L08.9 INFECTION OF TOE: Primary | ICD-10-CM

## 2021-10-12 LAB
ABSOLUTE EOS #: 0 K/UL (ref 0–0.4)
ABSOLUTE IMMATURE GRANULOCYTE: ABNORMAL K/UL (ref 0–0.3)
ABSOLUTE LYMPH #: 1.4 K/UL (ref 1–4.8)
ABSOLUTE MONO #: 0.8 K/UL (ref 0.1–1.2)
BASOPHILS # BLD: 1 % (ref 0–2)
BASOPHILS ABSOLUTE: 0 K/UL (ref 0–0.2)
C-REACTIVE PROTEIN: 63.9 MG/L (ref 0–5)
DIFFERENTIAL TYPE: ABNORMAL
EOSINOPHILS RELATIVE PERCENT: 0 % (ref 1–4)
HCT VFR BLD CALC: 39.7 % (ref 36–46)
HEMOGLOBIN: 13.6 G/DL (ref 12–16)
IMMATURE GRANULOCYTES: ABNORMAL %
LACTIC ACID: 1.3 MMOL/L (ref 0.5–2.2)
LYMPHOCYTES # BLD: 18 % (ref 24–44)
MCH RBC QN AUTO: 35.9 PG (ref 26–34)
MCHC RBC AUTO-ENTMCNC: 34.2 G/DL (ref 31–37)
MCV RBC AUTO: 105 FL (ref 80–100)
MONOCYTES # BLD: 11 % (ref 2–11)
NRBC AUTOMATED: ABNORMAL PER 100 WBC
PDW BLD-RTO: 11.8 % (ref 12.5–15.4)
PLATELET # BLD: 330 K/UL (ref 140–450)
PLATELET ESTIMATE: ABNORMAL
PMV BLD AUTO: 7.2 FL (ref 6–12)
RBC # BLD: 3.78 M/UL (ref 4–5.2)
RBC # BLD: ABNORMAL 10*6/UL
SEDIMENTATION RATE, ERYTHROCYTE: 28 MM (ref 0–30)
SEG NEUTROPHILS: 70 % (ref 36–66)
SEGMENTED NEUTROPHILS ABSOLUTE COUNT: 5.4 K/UL (ref 1.8–7.7)
WBC # BLD: 7.7 K/UL (ref 3.5–11)
WBC # BLD: ABNORMAL 10*3/UL

## 2021-10-12 PROCEDURE — 1210000000 HC MED SURG R&B

## 2021-10-12 PROCEDURE — 80053 COMPREHEN METABOLIC PANEL: CPT

## 2021-10-12 PROCEDURE — 36415 COLL VENOUS BLD VENIPUNCTURE: CPT

## 2021-10-12 PROCEDURE — 6360000002 HC RX W HCPCS: Performed by: REGISTERED NURSE

## 2021-10-12 PROCEDURE — 96365 THER/PROPH/DIAG IV INF INIT: CPT

## 2021-10-12 PROCEDURE — 85025 COMPLETE CBC W/AUTO DIFF WBC: CPT

## 2021-10-12 PROCEDURE — 2580000003 HC RX 258: Performed by: NURSE PRACTITIONER

## 2021-10-12 PROCEDURE — 6370000000 HC RX 637 (ALT 250 FOR IP): Performed by: NURSE PRACTITIONER

## 2021-10-12 PROCEDURE — 6360000002 HC RX W HCPCS: Performed by: NURSE PRACTITIONER

## 2021-10-12 PROCEDURE — 2580000003 HC RX 258: Performed by: REGISTERED NURSE

## 2021-10-12 PROCEDURE — 83605 ASSAY OF LACTIC ACID: CPT

## 2021-10-12 PROCEDURE — 1090F PRES/ABSN URINE INCON ASSESS: CPT | Performed by: NURSE PRACTITIONER

## 2021-10-12 PROCEDURE — 2580000003 HC RX 258: Performed by: PODIATRIST

## 2021-10-12 PROCEDURE — 1036F TOBACCO NON-USER: CPT | Performed by: NURSE PRACTITIONER

## 2021-10-12 PROCEDURE — G8484 FLU IMMUNIZE NO ADMIN: HCPCS | Performed by: NURSE PRACTITIONER

## 2021-10-12 PROCEDURE — 99214 OFFICE O/P EST MOD 30 MIN: CPT | Performed by: NURSE PRACTITIONER

## 2021-10-12 PROCEDURE — 85652 RBC SED RATE AUTOMATED: CPT

## 2021-10-12 PROCEDURE — G8420 CALC BMI NORM PARAMETERS: HCPCS | Performed by: NURSE PRACTITIONER

## 2021-10-12 PROCEDURE — 1123F ACP DISCUSS/DSCN MKR DOCD: CPT | Performed by: NURSE PRACTITIONER

## 2021-10-12 PROCEDURE — 99284 EMERGENCY DEPT VISIT MOD MDM: CPT

## 2021-10-12 PROCEDURE — 73630 X-RAY EXAM OF FOOT: CPT

## 2021-10-12 PROCEDURE — 87070 CULTURE OTHR SPECIMN AEROBIC: CPT

## 2021-10-12 PROCEDURE — G8399 PT W/DXA RESULTS DOCUMENT: HCPCS | Performed by: NURSE PRACTITIONER

## 2021-10-12 PROCEDURE — 86140 C-REACTIVE PROTEIN: CPT

## 2021-10-12 PROCEDURE — 87040 BLOOD CULTURE FOR BACTERIA: CPT

## 2021-10-12 PROCEDURE — 87205 SMEAR GRAM STAIN: CPT

## 2021-10-12 PROCEDURE — G8427 DOCREV CUR MEDS BY ELIG CLIN: HCPCS | Performed by: NURSE PRACTITIONER

## 2021-10-12 PROCEDURE — 2500000003 HC RX 250 WO HCPCS: Performed by: PODIATRIST

## 2021-10-12 PROCEDURE — 4040F PNEUMOC VAC/ADMIN/RCVD: CPT | Performed by: NURSE PRACTITIONER

## 2021-10-12 PROCEDURE — 3017F COLORECTAL CA SCREEN DOC REV: CPT | Performed by: NURSE PRACTITIONER

## 2021-10-12 RX ORDER — TORSEMIDE 20 MG/1
20 TABLET ORAL DAILY
Status: DISCONTINUED | OUTPATIENT
Start: 2021-10-13 | End: 2021-10-15 | Stop reason: HOSPADM

## 2021-10-12 RX ORDER — DIGOXIN 125 MCG
125 TABLET ORAL DAILY
Status: DISCONTINUED | OUTPATIENT
Start: 2021-10-12 | End: 2021-10-15 | Stop reason: HOSPADM

## 2021-10-12 RX ORDER — ACETAMINOPHEN 650 MG/1
650 SUPPOSITORY RECTAL EVERY 6 HOURS PRN
Status: DISCONTINUED | OUTPATIENT
Start: 2021-10-12 | End: 2021-10-15 | Stop reason: HOSPADM

## 2021-10-12 RX ORDER — SODIUM CHLORIDE 0.9 % (FLUSH) 0.9 %
10 SYRINGE (ML) INJECTION PRN
Status: DISCONTINUED | OUTPATIENT
Start: 2021-10-12 | End: 2021-10-15 | Stop reason: HOSPADM

## 2021-10-12 RX ORDER — POTASSIUM CHLORIDE 7.45 MG/ML
10 INJECTION INTRAVENOUS PRN
Status: DISCONTINUED | OUTPATIENT
Start: 2021-10-12 | End: 2021-10-15 | Stop reason: HOSPADM

## 2021-10-12 RX ORDER — ACETAMINOPHEN 325 MG/1
650 TABLET ORAL EVERY 6 HOURS PRN
Status: DISCONTINUED | OUTPATIENT
Start: 2021-10-12 | End: 2021-10-15 | Stop reason: HOSPADM

## 2021-10-12 RX ORDER — POLYETHYLENE GLYCOL 3350 17 G/17G
17 POWDER, FOR SOLUTION ORAL DAILY PRN
Status: DISCONTINUED | OUTPATIENT
Start: 2021-10-12 | End: 2021-10-15 | Stop reason: HOSPADM

## 2021-10-12 RX ORDER — MAGNESIUM SULFATE 1 G/100ML
1000 INJECTION INTRAVENOUS PRN
Status: DISCONTINUED | OUTPATIENT
Start: 2021-10-12 | End: 2021-10-15 | Stop reason: HOSPADM

## 2021-10-12 RX ORDER — POTASSIUM CHLORIDE 20 MEQ/1
40 TABLET, EXTENDED RELEASE ORAL PRN
Status: DISCONTINUED | OUTPATIENT
Start: 2021-10-12 | End: 2021-10-15 | Stop reason: HOSPADM

## 2021-10-12 RX ORDER — CARVEDILOL 12.5 MG/1
12.5 TABLET ORAL 2 TIMES DAILY WITH MEALS
Status: DISCONTINUED | OUTPATIENT
Start: 2021-10-12 | End: 2021-10-15

## 2021-10-12 RX ORDER — GAUZE BANDAGE 2" X 2"
100 BANDAGE TOPICAL DAILY
Status: DISCONTINUED | OUTPATIENT
Start: 2021-10-12 | End: 2021-10-15 | Stop reason: HOSPADM

## 2021-10-12 RX ORDER — ONDANSETRON 4 MG/1
4 TABLET, ORALLY DISINTEGRATING ORAL EVERY 8 HOURS PRN
Status: DISCONTINUED | OUTPATIENT
Start: 2021-10-12 | End: 2021-10-15 | Stop reason: HOSPADM

## 2021-10-12 RX ORDER — SODIUM CHLORIDE 9 MG/ML
25 INJECTION, SOLUTION INTRAVENOUS PRN
Status: DISCONTINUED | OUTPATIENT
Start: 2021-10-12 | End: 2021-10-15 | Stop reason: HOSPADM

## 2021-10-12 RX ORDER — ONDANSETRON 2 MG/ML
4 INJECTION INTRAMUSCULAR; INTRAVENOUS EVERY 6 HOURS PRN
Status: DISCONTINUED | OUTPATIENT
Start: 2021-10-12 | End: 2021-10-15 | Stop reason: HOSPADM

## 2021-10-12 RX ORDER — CHOLECALCIFEROL (VITAMIN D3) 125 MCG
1000 CAPSULE ORAL DAILY
Status: DISCONTINUED | OUTPATIENT
Start: 2021-10-13 | End: 2021-10-15 | Stop reason: HOSPADM

## 2021-10-12 RX ORDER — SODIUM CHLORIDE 0.9 % (FLUSH) 0.9 %
5-40 SYRINGE (ML) INJECTION EVERY 12 HOURS SCHEDULED
Status: DISCONTINUED | OUTPATIENT
Start: 2021-10-12 | End: 2021-10-15 | Stop reason: HOSPADM

## 2021-10-12 RX ORDER — SODIUM CHLORIDE 9 MG/ML
INJECTION, SOLUTION INTRAVENOUS CONTINUOUS
Status: DISCONTINUED | OUTPATIENT
Start: 2021-10-12 | End: 2021-10-13

## 2021-10-12 RX ORDER — ASCORBIC ACID 500 MG
500 TABLET ORAL DAILY
Status: DISCONTINUED | OUTPATIENT
Start: 2021-10-06 | End: 2021-10-15 | Stop reason: HOSPADM

## 2021-10-12 RX ADMIN — DOXYCYCLINE 100 MG: 100 INJECTION, POWDER, LYOPHILIZED, FOR SOLUTION INTRAVENOUS at 22:42

## 2021-10-12 RX ADMIN — PIPERACILLIN AND TAZOBACTAM 4500 MG: 4; .5 INJECTION, POWDER, LYOPHILIZED, FOR SOLUTION INTRAVENOUS; PARENTERAL at 14:21

## 2021-10-12 RX ADMIN — SODIUM CHLORIDE: 9 INJECTION, SOLUTION INTRAVENOUS at 21:44

## 2021-10-12 RX ADMIN — ACETAMINOPHEN 650 MG: 325 TABLET ORAL at 22:41

## 2021-10-12 RX ADMIN — CARVEDILOL 12.5 MG: 12.5 TABLET, FILM COATED ORAL at 17:00

## 2021-10-12 RX ADMIN — PIPERACILLIN AND TAZOBACTAM 3375 MG: 3; .375 INJECTION, POWDER, LYOPHILIZED, FOR SOLUTION INTRAVENOUS at 23:50

## 2021-10-12 ASSESSMENT — PATIENT HEALTH QUESTIONNAIRE - PHQ9
SUM OF ALL RESPONSES TO PHQ9 QUESTIONS 1 & 2: 0
SUM OF ALL RESPONSES TO PHQ QUESTIONS 1-9: 0
2. FEELING DOWN, DEPRESSED OR HOPELESS: 0
1. LITTLE INTEREST OR PLEASURE IN DOING THINGS: 0
SUM OF ALL RESPONSES TO PHQ QUESTIONS 1-9: 0
SUM OF ALL RESPONSES TO PHQ QUESTIONS 1-9: 0

## 2021-10-12 ASSESSMENT — ENCOUNTER SYMPTOMS
DIARRHEA: 0
DIARRHEA: 0
VOMITING: 0
NAUSEA: 0
NAUSEA: 0
TROUBLE SWALLOWING: 0
ABDOMINAL PAIN: 0
VOMITING: 0
WHEEZING: 0
SINUS PRESSURE: 0
SHORTNESS OF BREATH: 0
SORE THROAT: 0
RESPIRATORY NEGATIVE: 1
COUGH: 0
COUGH: 0
GASTROINTESTINAL NEGATIVE: 1
CONSTIPATION: 0
BLOOD IN STOOL: 0
SORE THROAT: 0

## 2021-10-12 ASSESSMENT — PAIN SCALES - GENERAL
PAINLEVEL_OUTOF10: 3
PAINLEVEL_OUTOF10: 3

## 2021-10-12 ASSESSMENT — PAIN DESCRIPTION - PAIN TYPE: TYPE: ACUTE PAIN

## 2021-10-12 ASSESSMENT — PAIN DESCRIPTION - FREQUENCY: FREQUENCY: INTERMITTENT

## 2021-10-12 ASSESSMENT — PAIN DESCRIPTION - ORIENTATION: ORIENTATION: LEFT

## 2021-10-12 ASSESSMENT — PAIN DESCRIPTION - LOCATION: LOCATION: TOE (COMMENT WHICH ONE)

## 2021-10-12 ASSESSMENT — PAIN DESCRIPTION - DESCRIPTORS: DESCRIPTORS: ACHING

## 2021-10-12 NOTE — ED NOTES
Spoke with Jerald Skelton- diet NPO after midnight- pt provided food. Pt states she only takes Carvedilol/eliquis as PM meds- notified of Shannan To request to hold Eliquis. Pt took HOME MED Carvedilol. Pt ambulates to restroom- gait steady- denies further needs at this time. Call light in reach.       Claudia Sandoval RN  10/12/21 1930

## 2021-10-12 NOTE — ED PROVIDER NOTES
[id number] [facility]  [address] [address] [facility] [address]  Phone: [phone]  Fax: [phone]        Pt Name: Rachna Guajardo  MRN: [id number]  [place] 1945  Date of evaluation: 10/12/21    [address] [place] [address]       Chief Complaint   Patient presents with    Toe Pain       HISTORY OF PRESENT ILLNESS (Location/Symptom, Timing/Onset, Context/Setting, Quality, Duration, Modifying Factors, Severity)      Rachna Guajardo is a 77 y.o. female  who presents to the ED via private auto with left big toe pain with a wound that has been ongoing for the last month. Patient went to her primary care doctor for pain to her left big toe, was told to come to the ER immediately for evaluation IV antibiotics to wound to her left foot. Patient states that she did notice a small wound to her medial aspect of her big toe that has grown over the last few weeks an increase in pain. She denies any history of diabetes or known injury to the foot. She does have a cardiac history and currently is on Eliquis. She denies any fevers or chills, chest pain, shortness of breath, abdominal pain, or nausea vomiting diarrhea. PAST MEDICAL / SURGICAL / SOCIAL / FAMILY HISTORY     PMH:  has a past medical history of AICD (automatic cardioverter/defibrillator) present, Atrial fibrillation ([address] Atrial fibrillation ([address] Cancer ([address] Chronic kidney disease, H/O cardiovascular stress test, Hx of long term use of blood thinners, Hypertension, Nonischemic dilated cardiomyopathy ([address] Other screening mammogram, and Poor historian. Surgical History:  has a past surgical history that includes knee surgery (Right); transesophageal echocardiogram (11/10/2017); other surgical history (10/30/2018); pr office/outpt visit,procedure only (Right, 10/30/2018); Cardiac catheterization (08/2017); Cardiac defibrillator placement (04/06/2018); and Colonoscopy (N/A, 2/6/2019).   Social History:  reports that she quit smoking about 34 years ago. She has a 10.00 pack-year smoking history. She has never used smokeless tobacco. She reports current alcohol use of about 1.0 standard drinks of alcohol per week. She reports that she does not use drugs. Family History: is adopted. family history is not on file. She was adopted. Psychiatric History: None    Allergies: Contrast [barium-containing compounds], Vancomycin, and Codeine    Home Medications:   Prior to Admission medications    Medication Sig Start Date End Date Taking? Authorizing Provider   carvedilol (COREG) 12.5 MG tablet TAKE 1 TABLET TWICE DAILY  Patient taking differently: Indications: pt unsure of dosing  7/1/21   JOSÉ Mancia CNP   sertraline (ZOLOFT) 50 MG tablet TAKE 1 TABLET EVERY DAY 7/1/21   JOÉS Mancia CNP   digoxin (LANOXIN) 125 MCG tablet TAKE 1 TABLET BY MOUTH DAILY MONDAY THROUGH FRIDAY 5/10/21   JOSÉ Crawley CNP   Acetaminophen (TYLENOL ARTHRITIS PAIN PO) Take by mouth 3 times daily    Historical Provider, MD   apixaban (ELIQUIS) 5 MG TABS tablet Take one tablet bid 5/3/19   JOSÉ Brewer CNP   torsemide (DEMADEX) 20 MG tablet Take 1 tablet by mouth daily 8/16/18   JOSÉ Madison CNP   vitamin B-1 100 MG tablet Take 1 tablet by mouth daily 4/12/18   Shayla Arellano DO   vitamin B-12 (CYANOCOBALAMIN) 1000 MCG tablet Take 1,000 mcg by mouth daily    Historical Provider, MD   Multiple Vitamins-Minerals (THERAPEUTIC MULTIVITAMIN-MINERALS) tablet Take 1 tablet by mouth daily    Historical Provider, MD   calcium carbonate (OSCAL) 500 MG TABS tablet Take 600 mg by mouth daily     Historical Provider, MD   Biotin (BIOTIN 5000) 5 MG CAPS Take 5,000 mcg by mouth daily    Historical Provider, MD   ascorbic acid (VITAMIN C) 500 MG tablet Take 500 mg by mouth daily     Historical Provider, MD       REVIEW OF SYSTEMS  (2-9 systems for level 4, 10 ormore for level 5)      Review of Systems   Constitutional: Negative. HENT: Negative. Respiratory: Negative. Cardiovascular: Negative. Gastrointestinal: Negative. Endocrine: Negative. Genitourinary: Negative. Musculoskeletal: Positive for arthralgias and gait problem. Skin: Positive for wound. All other systems negative except as marked. PHYSICAL EXAM  (up to 7 for level 4, 8 or more for level 5)      INITIAL VITALS:  height is 5' 9\" (1.753 m) and weight is 64.9 kg (143 lb). Her oral temperature is 98 °F (36.7 °C). Her blood pressure is 138/59 (abnormal) and her pulse is 90. Her respiration is 15 and oxygen saturation is 15% (abnormal). Vital signs reviewed. Physical Exam  Constitutional:       General: She is not in acute distress. Appearance: Normal appearance. She is not toxic-appearing. HENT:      Head: Normocephalic and atraumatic. Eyes:      General: No scleral icterus. Extraocular Movements: Extraocular movements intact. Cardiovascular:      Rate and Rhythm: Normal rate and regular rhythm. Pulses: Normal pulses. Heart sounds: Normal heart sounds. Pulmonary:      Effort: Pulmonary effort is normal.      Breath sounds: Normal breath sounds. Abdominal:      Palpations: Abdomen is soft. Musculoskeletal:      Cervical back: Normal range of motion and neck supple. No rigidity. Feet:    Skin:     General: Skin is warm and dry. Findings: Wound present. Neurological:      General: No focal deficit present. Mental Status: She is alert. Mental status is at baseline. Psychiatric:         Mood and Affect: Mood normal.         Behavior: Behavior normal.           DIFFERENTIAL DIAGNOSIS / MDM     After my physical exam and reviewing patient history, plan to obtain blood cultures, culture of wound, x-ray of the left foot along with a CBC CMP and lactic acid to evaluate for sepsis and osteomyelitis. Patient denied the need for any pain medication at this time.  X-ray left foot does show some osteomyelitis. Lactic acid is negative at 1.3. CBC is unremarkable. CMP is unremarkable. Will start Zosyn for antibiotic coverage at this time. I discussed case with hospitalist team, they are accepting patient and will admit patient. Per request of hospitalist, I will page podiatry and consultation of patient. Podiatry resident states that she will come evaluate the patient today either in the emergency department or up on the floor. PLAN (LABS / IMAGING / EKG):  Orders Placed This Encounter   Procedures    Culture, Blood 1    Culture, Blood 1    Culture, Wound    XR FOOT LEFT (MIN 3 VIEWS)    CBC Auto Differential    Comprehensive Metabolic Panel    Lactic Acid    Sedimentation Rate    C-REACTIVE PROTEIN    PATIENT STATUS (FROM ED OR OR/PROCEDURAL) Inpatient       MEDICATIONS ORDERED:  Orders Placed This Encounter   Medications    piperacillin-tazobactam (ZOSYN) 4,500 mg in dextrose 5 % 100 mL IVPB (mini-bag)     Order Specific Question:   Antimicrobial Indications     Answer:   Skin and Soft Tissue Infection       Controlled Substances Monitoring:     DIAGNOSTIC RESULTS     EKG: All EKG's are interpreted by the Emergency Department Physician who either signs or Co-signs this chart in the absenceof a cardiologist.      RADIOLOGY: All images are read by the radiologist and their interpretations are reviewed. XR FOOT LEFT (MIN 3 VIEWS)   Preliminary Result   Soft tissue ulceration at the medial aspect of the great toe with small focus   of osseous destructive change in the medial aspect of the 1st distal phalanx,   concerning for osteomyelitis. No results found.     LABS:  Results for orders placed or performed during the hospital encounter of 10/12/21   CBC Auto Differential   Result Value Ref Range    WBC 7.7 3.5 - 11.0 k/uL    RBC 3.78 (L) 4.0 - 5.2 m/uL    Hemoglobin 13.6 12.0 - 16.0 g/dL    Hematocrit 39.7 36 - 46 %    .0 (H) 80 - 100 fL    MCH 35.9 (H) 26 - 34 pg    MCHC 34.2 31 - 37 g/dL    RDW 11.8 (L) 12.5 - 15.4 %    Platelets 967 038 - 907 k/uL    MPV 7.2 6.0 - 12.0 fL    NRBC Automated NOT REPORTED per 100 WBC    Differential Type NOT REPORTED     Seg Neutrophils 70 (H) 36 - 66 %    Lymphocytes 18 (L) 24 - 44 %    Monocytes 11 2 - 11 %    Eosinophils % 0 (L) 1 - 4 %    Basophils 1 0 - 2 %    Immature Granulocytes NOT REPORTED 0 %    Segs Absolute 5.40 1.8 - 7.7 k/uL    Absolute Lymph # 1.40 1.0 - 4.8 k/uL    Absolute Mono # 0.80 0.1 - 1.2 k/uL    Absolute Eos # 0.00 0.0 - 0.4 k/uL    Basophils Absolute 0.00 0.0 - 0.2 k/uL    Absolute Immature Granulocyte NOT REPORTED 0.00 - 0.30 k/uL    WBC Morphology NOT REPORTED     RBC Morphology NOT REPORTED     Platelet Estimate NOT REPORTED    Comprehensive Metabolic Panel   Result Value Ref Range    Glucose 101 (H) 70 - 99 mg/dL    BUN 21 8 - 23 mg/dL    CREATININE 0.86 0.50 - 0.90 mg/dL    Bun/Cre Ratio NOT REPORTED 9 - 20    Calcium 9.9 8.6 - 10.4 mg/dL    Sodium 133 (L) 135 - 144 mmol/L    Potassium 3.5 (L) 3.7 - 5.3 mmol/L    Chloride 96 (L) 98 - 107 mmol/L    CO2 23 20 - 31 mmol/L    Anion Gap 14 9 - 17 mmol/L    Alkaline Phosphatase 74 35 - 104 U/L    ALT 11 5 - 33 U/L    AST 18 <32 U/L    Total Bilirubin 0.60 0.3 - 1.2 mg/dL    Total Protein 7.6 6.4 - 8.3 g/dL    Albumin 4.4 3.5 - 5.2 g/dL    Albumin/Globulin Ratio 1.4 1.0 - 2.5    GFR Non-African American >60 >60 mL/min    GFR African American >60 >60 mL/min    GFR Comment          GFR Staging NOT REPORTED    Lactic Acid   Result Value Ref Range    Lactic Acid 1.3 0.5 - 2.2 mmol/L       EMERGENCY DEPARTMENT COURSE     ED Course as of Oct 12 1517   Tue Oct 12, 2021   1447 Spoke with hospitalist about admission for patient, they are accepting patient. Per their request I will page podiatry for recommendations and consultation on the patient.     [TM]   1151 Spoke with Dr. Jena Cantu, podiatry, she states that she will evaluate patient today after seeing consult prior to her and will follow along for her care. [TM]      ED Course User Index  [TM] Aba Malik Danish, APRN - CNP        Vitals:    Vitals:    10/12/21 1308 10/12/21 1443   BP: 124/63 (!) 138/59   Pulse: 93 90   Resp: 16 15   Temp: 98 °F (36.7 °C)    TempSrc: Oral    SpO2: 99% (!) 15%   Weight: 64.9 kg (143 lb)    Height: 5' 9\" (1.753 m)      -------------------------  BP: (!) 138/59, Temp: 98 °F (36.7 °C), Pulse: 90, Resp: 15      RE-EVALUATION:  See ED Course notes above. CONSULTS:  None    PROCEDURES:  None    FINAL IMPRESSION      1. Osteomyelitis of left foot, unspecified type ([facility] [address] [address]          DISPOSITION / PLAN     CONDITION ON DISPOSITION:   Stable for discharge. PATIENT REFERRED TO:  No follow-up provider specified.     DISCHARGE MEDICATIONS:  New Prescriptions    No medications on file       Suzanne Coello, APRN - [address] [facility]   Emergency Medicine Nurse Practitioner    (Please note that portions of this note were completed with a voice recognition program.  Efforts were made to edit the dictations but occasionally words aremis-transcribed.)     JOSÉ Harding - MARIA E  10/12/21 3505

## 2021-10-12 NOTE — ED NOTES
Nonstick dressing- gauze wrap applied- pt tolerates well- ambulates per self to restroom     Jose Rainey RN  10/12/21 6783

## 2021-10-12 NOTE — PROGRESS NOTES
[address] PRIMARY CARE  [address] [facility]  [address] [address]  Dept: [phone]  Dept Fax: [phone]    Peri Bruno is a 77 y.o. female who presentstoday for her medical conditions/complaints as noted below.   Peri Cancer is c/o of  Chief Complaint   Patient presents with    Other     infected left great toe x 1 month       HPI:     Here today for left great toe infection  She states it started about 4-6 weeks ago and thought she could just wait for her November appt  However it has continued to worsen and has become painful  Draining a lot, has been covering with gauze and gentle cleansing with water but has not tried anything else      Hemoglobin A1C (%)   Date Value   2020 4.9             ( goal A1C is < 7)   No results found for: LABMICR  LDL Calculated (mg/dL)   Date Value   10/05/2017 77       (goal LDL is <100)   AST (U/L)   Date Value   2021 21     ALT (U/L)   Date Value   2021 15     BUN (mg/dL)   Date Value   10/06/2021 22     BP Readings from Last 3 Encounters:   10/12/21 122/74   21 128/68   21 132/76          ([id number])    Past Medical History:   Diagnosis Date    AICD (automatic cardioverter/defibrillator) present     Atrial fibrillation ([address] 2012    Dr.David Lucas  CHRONIC ON ELIQUIS    Atrial fibrillation ([address]     Cancer ([address]     basal cell on face    Chronic kidney disease     H/O cardiovascular stress test 2017    Neg. EF 24%    Hx of long term use of blood thinners     Hypertension     Nonischemic dilated cardiomyopathy ([address]     Other screening mammogram 2012    Negative    Poor historian       Past Surgical History:   Procedure Laterality Date    CARDIAC CATHETERIZATION  2017    NML CORS EF20%    CARDIAC DEFIBRILLATOR PLACEMENT  2018    DR Devin Montgomery    COLONOSCOPY N/A 2019    COLONOSCOPY POLYPECTOMY SNARE/COLD BIOPSY performed by Maida Irvin MD at [facility]    KNEE SURGERY Right     OTHER SURGICAL HISTORY  10/30/2018    excision basal cell right temple    FL OFFICE/OUTPT VISIT,PROCEDURE ONLY Right 10/30/2018    EXCISION BASAL CELL RIGHT TEMPLE performed by Angelo Green MD at [address] [place] [address] TRANSESOPHAGEAL ECHOCARDIOGRAM  11/10/2017    EF 20%. Mod MR. Mild to mod AI. Mild TR. Segmental WMA. Family History   Adopted: Yes          Social History     Tobacco Use    Smoking status: Former Smoker     Packs/day: 0.50     Years: 20.00     Pack years: 10.00     Quit date: 10/4/1987     Years since quittin.0    Smokeless tobacco: Never Used   Substance Use Topics    Alcohol use: Yes     Alcohol/week: 1.0 standard drinks     Types: 1 Glasses of wine per week     Comment: daily      Current Outpatient Medications   Medication Sig Dispense Refill    carvedilol (COREG) 12.5 MG tablet TAKE 1 TABLET TWICE DAILY (Patient taking differently: Indications: pt unsure of dosing ) 180 tablet 3    sertraline (ZOLOFT) 50 MG tablet TAKE 1 TABLET EVERY DAY 90 tablet 3    digoxin (LANOXIN) 125 MCG tablet TAKE 1 TABLET BY MOUTH DAILY MONDAY THROUGH FRIDAY 65 tablet 3    Acetaminophen (TYLENOL ARTHRITIS PAIN PO) Take by mouth 3 times daily      apixaban (ELIQUIS) 5 MG TABS tablet Take one tablet bid 14 tablet 0    torsemide (DEMADEX) 20 MG tablet Take 1 tablet by mouth daily 30 tablet 1    vitamin B-1 100 MG tablet Take 1 tablet by mouth daily 30 tablet 3    vitamin B-12 (CYANOCOBALAMIN) 1000 MCG tablet Take 1,000 mcg by mouth daily      Multiple Vitamins-Minerals (THERAPEUTIC MULTIVITAMIN-MINERALS) tablet Take 1 tablet by mouth daily      calcium carbonate (OSCAL) 500 MG TABS tablet Take 600 mg by mouth daily       Biotin (BIOTIN 5000) 5 MG CAPS Take 5,000 mcg by mouth daily      ascorbic acid (VITAMIN C) 500 MG tablet Take 500 mg by mouth daily        No current facility-administered medications for this visit.      Allergies   Allergen Reactions  Contrast [Barium-Containing Compounds]      Unknown     Vancomycin Other (See Comments)     unknown    Codeine Rash       Health Maintenance   Topic Date Due    Shingles Vaccine (1 of 2) Never done    Flu vaccine (1) 10/12/2022 (Originally 9/1/2021)    Annual Wellness Visit (AWV)  07/09/2022    Lipid screen  10/05/2022    Potassium monitoring  10/06/2022    Creatinine monitoring  10/06/2022    DTaP/Tdap/Td vaccine (2 - Td or Tdap) 05/03/2028    Colon cancer screen colonoscopy  02/06/2029    DEXA (modify frequency per FRAX score)  Completed    Pneumococcal 65+ years Vaccine  Completed    COVID-19 Vaccine  Completed    Hepatitis A vaccine  Aged Out    Hepatitis B vaccine  Aged Out    Hib vaccine  Aged Out    Meningococcal (ACWY) vaccine  Aged Out    Hepatitis C screen  Discontinued       Subjective:      Review of Systems   Constitutional: Negative for activity change, appetite change, chills, fatigue, fever and unexpected weight change. HENT: Negative for congestion, ear pain, hearing loss, sinus pressure, sore throat and trouble swallowing. Eyes: Negative for visual disturbance. Respiratory: Negative for cough, shortness of breath and wheezing. Cardiovascular: Negative for chest pain, palpitations and leg swelling. Gastrointestinal: Negative for abdominal pain, blood in stool, constipation, diarrhea, nausea and vomiting. Endocrine: Negative for cold intolerance, heat intolerance, polydipsia, polyphagia and polyuria. Genitourinary: Negative for difficulty urinating, frequency, hematuria and urgency. Musculoskeletal: Negative for arthralgias and myalgias. Skin: Positive for wound (Left great toe). Negative for rash. Allergic/Immunologic: Negative for environmental allergies. Neurological: Negative for dizziness, weakness, light-headedness and headaches. Psychiatric/Behavioral: Negative for confusion. The patient is not nervous/anxious.         Objective:     Physical Exam  Constitutional:       Appearance: She is well-developed. HENT:      Head: Normocephalic. Eyes:      Conjunctiva/sclera: Conjunctivae normal.      Pupils: Pupils are equal, round, and reactive to light. Cardiovascular:      Rate and Rhythm: Normal rate and regular rhythm. Heart sounds: Normal heart sounds. No murmur heard. Pulmonary:      Effort: Pulmonary effort is normal.      Breath sounds: Normal breath sounds. No wheezing. Abdominal:      General: Bowel sounds are normal. There is no distension. Palpations: Abdomen is soft. Musculoskeletal:         General: Normal range of motion. Cervical back: Normal range of motion. Skin:     General: Skin is warm and dry. Comments: Lateral left great toe with necrotic ulceration yellow center with black surrounding necrotic tissue. Surrounding erythema extending to midfoot. Foul odor with purulent drainage. The foot is swollen up into the ankle area, painful with touch   Neurological:      Mental Status: She is alert and oriented to person, place, and time. Psychiatric:         Behavior: Behavior normal.         Thought Content: Thought content normal.         Judgment: Judgment normal.       /74   Pulse 99   Resp 18   Wt 142 lb (64.4 kg)   SpO2 98%   BMI 21.59 kg/m²     Assessment:       Diagnosis Orders   1. Infection of toe               Plan:      Return if symptoms worsen or fail to improve. Infection of the left great toe-patient needs emergent evaluation, concern for osteomyelitis and need for debridement. Spoke with patient's son who will come to transport her to the ED. ED alerted of patient status and arrival within the next hour       Patient given educational materials - see patient instructions. Discussed use, benefit, and side effects of prescribed medications. All patientquestions answered. Pt voiced understanding. Reviewed health maintenance.   Instructedto continue current medications, diet and exercise. Patient agreed with treatmentplan. Follow up as directed.      Electronicallysigned by Debera Hammans, APRN - CNP on 10/12/2021 at 11:40 AM

## 2021-10-13 ENCOUNTER — APPOINTMENT (OUTPATIENT)
Dept: MRI IMAGING | Age: 76
DRG: 540 | End: 2021-10-13
Payer: MEDICARE

## 2021-10-13 LAB
ALBUMIN SERPL-MCNC: 4.4 G/DL (ref 3.5–5.2)
ALBUMIN/GLOBULIN RATIO: 1.4 (ref 1–2.5)
ALP BLD-CCNC: 74 U/L (ref 35–104)
ALT SERPL-CCNC: 11 U/L (ref 5–33)
ANION GAP SERPL CALCULATED.3IONS-SCNC: 11 MMOL/L (ref 9–17)
ANION GAP SERPL CALCULATED.3IONS-SCNC: 19 MMOL/L (ref 9–17)
AST SERPL-CCNC: 18 U/L
BILIRUB SERPL-MCNC: 0.6 MG/DL (ref 0.3–1.2)
BUN BLDV-MCNC: 14 MG/DL (ref 8–23)
BUN BLDV-MCNC: 21 MG/DL (ref 8–23)
BUN/CREAT BLD: ABNORMAL (ref 9–20)
BUN/CREAT BLD: NORMAL (ref 9–20)
CALCIUM SERPL-MCNC: 9.1 MG/DL (ref 8.6–10.4)
CALCIUM SERPL-MCNC: 9.9 MG/DL (ref 8.6–10.4)
CHLORIDE BLD-SCNC: 102 MMOL/L (ref 98–107)
CHLORIDE BLD-SCNC: 96 MMOL/L (ref 98–107)
CO2: 22 MMOL/L (ref 20–31)
CO2: 23 MMOL/L (ref 20–31)
CREAT SERPL-MCNC: 0.75 MG/DL (ref 0.5–0.9)
CREAT SERPL-MCNC: 0.86 MG/DL (ref 0.5–0.9)
CULTURE: ABNORMAL
DIRECT EXAM: ABNORMAL
GFR AFRICAN AMERICAN: >60 ML/MIN
GFR AFRICAN AMERICAN: >60 ML/MIN
GFR NON-AFRICAN AMERICAN: >60 ML/MIN
GFR NON-AFRICAN AMERICAN: >60 ML/MIN
GFR SERPL CREATININE-BSD FRML MDRD: ABNORMAL ML/MIN/{1.73_M2}
GFR SERPL CREATININE-BSD FRML MDRD: ABNORMAL ML/MIN/{1.73_M2}
GFR SERPL CREATININE-BSD FRML MDRD: NORMAL ML/MIN/{1.73_M2}
GFR SERPL CREATININE-BSD FRML MDRD: NORMAL ML/MIN/{1.73_M2}
GLUCOSE BLD-MCNC: 101 MG/DL (ref 70–99)
GLUCOSE BLD-MCNC: 98 MG/DL (ref 70–99)
Lab: ABNORMAL
POTASSIUM SERPL-SCNC: 3.5 MMOL/L (ref 3.7–5.3)
POTASSIUM SERPL-SCNC: 3.9 MMOL/L (ref 3.7–5.3)
SARS-COV-2, RAPID: NOT DETECTED
SODIUM BLD-SCNC: 135 MMOL/L (ref 135–144)
SODIUM BLD-SCNC: 138 MMOL/L (ref 135–144)
SPECIMEN DESCRIPTION: ABNORMAL
SPECIMEN DESCRIPTION: NORMAL
TOTAL PROTEIN: 7.6 G/DL (ref 6.4–8.3)

## 2021-10-13 PROCEDURE — APPSS45 APP SPLIT SHARED TIME 31-45 MINUTES: Performed by: NURSE PRACTITIONER

## 2021-10-13 PROCEDURE — 93005 ELECTROCARDIOGRAM TRACING: CPT | Performed by: FAMILY MEDICINE

## 2021-10-13 PROCEDURE — 1210000000 HC MED SURG R&B

## 2021-10-13 PROCEDURE — 2500000003 HC RX 250 WO HCPCS: Performed by: PODIATRIST

## 2021-10-13 PROCEDURE — 99223 1ST HOSP IP/OBS HIGH 75: CPT | Performed by: FAMILY MEDICINE

## 2021-10-13 PROCEDURE — 2580000003 HC RX 258: Performed by: NURSE PRACTITIONER

## 2021-10-13 PROCEDURE — 6360000002 HC RX W HCPCS: Performed by: NURSE PRACTITIONER

## 2021-10-13 PROCEDURE — 87635 SARS-COV-2 COVID-19 AMP PRB: CPT

## 2021-10-13 PROCEDURE — 2580000003 HC RX 258: Performed by: PODIATRIST

## 2021-10-13 PROCEDURE — 36415 COLL VENOUS BLD VENIPUNCTURE: CPT

## 2021-10-13 PROCEDURE — 80048 BASIC METABOLIC PNL TOTAL CA: CPT

## 2021-10-13 PROCEDURE — 6370000000 HC RX 637 (ALT 250 FOR IP): Performed by: NURSE PRACTITIONER

## 2021-10-13 PROCEDURE — 80162 ASSAY OF DIGOXIN TOTAL: CPT

## 2021-10-13 RX ADMIN — SODIUM CHLORIDE, PRESERVATIVE FREE 10 ML: 5 INJECTION INTRAVENOUS at 20:48

## 2021-10-13 RX ADMIN — SERTRALINE HYDROCHLORIDE 50 MG: 50 TABLET ORAL at 09:05

## 2021-10-13 RX ADMIN — TORSEMIDE 20 MG: 20 TABLET ORAL at 09:05

## 2021-10-13 RX ADMIN — SODIUM CHLORIDE, PRESERVATIVE FREE 10 ML: 5 INJECTION INTRAVENOUS at 09:06

## 2021-10-13 RX ADMIN — DIGOXIN 125 MCG: 125 TABLET ORAL at 09:05

## 2021-10-13 RX ADMIN — SODIUM CHLORIDE: 9 INJECTION, SOLUTION INTRAVENOUS at 12:23

## 2021-10-13 RX ADMIN — CARVEDILOL 12.5 MG: 12.5 TABLET, FILM COATED ORAL at 09:05

## 2021-10-13 RX ADMIN — PIPERACILLIN AND TAZOBACTAM 3375 MG: 3; .375 INJECTION, POWDER, LYOPHILIZED, FOR SOLUTION INTRAVENOUS at 16:03

## 2021-10-13 RX ADMIN — ACETAMINOPHEN 650 MG: 325 TABLET ORAL at 21:32

## 2021-10-13 RX ADMIN — PIPERACILLIN AND TAZOBACTAM 3375 MG: 3; .375 INJECTION, POWDER, LYOPHILIZED, FOR SOLUTION INTRAVENOUS at 08:31

## 2021-10-13 RX ADMIN — CARVEDILOL 12.5 MG: 12.5 TABLET, FILM COATED ORAL at 16:04

## 2021-10-13 RX ADMIN — DOXYCYCLINE 100 MG: 100 INJECTION, POWDER, LYOPHILIZED, FOR SOLUTION INTRAVENOUS at 20:48

## 2021-10-13 RX ADMIN — DOXYCYCLINE 100 MG: 100 INJECTION, POWDER, LYOPHILIZED, FOR SOLUTION INTRAVENOUS at 10:22

## 2021-10-13 ASSESSMENT — PAIN SCALES - GENERAL
PAINLEVEL_OUTOF10: 0
PAINLEVEL_OUTOF10: 3

## 2021-10-13 ASSESSMENT — PAIN DESCRIPTION - DESCRIPTORS: DESCRIPTORS: OTHER (COMMENT)

## 2021-10-13 ASSESSMENT — PAIN DESCRIPTION - FREQUENCY: FREQUENCY: INTERMITTENT

## 2021-10-13 ASSESSMENT — PAIN DESCRIPTION - LOCATION: LOCATION: ARM

## 2021-10-13 ASSESSMENT — PAIN DESCRIPTION - PAIN TYPE: TYPE: ACUTE PAIN

## 2021-10-13 ASSESSMENT — ENCOUNTER SYMPTOMS
COLOR CHANGE: 0
GASTROINTESTINAL NEGATIVE: 1
RESPIRATORY NEGATIVE: 1
EYES NEGATIVE: 1

## 2021-10-13 ASSESSMENT — PAIN DESCRIPTION - ORIENTATION: ORIENTATION: RIGHT

## 2021-10-13 NOTE — ED NOTES
Spoke with kia in MRI, pt to have MRI on 10/14/21, d/t defibrillator/pacemaker hx.       Nadiya Marroquin RN  10/13/21 [id number]

## 2021-10-13 NOTE — CONSULTS
tablet TAKE 1 TABLET TWICE DAILY  Patient taking differently: Indications: pt unsure of dosing  7/1/21   JOSÉ Camarillo CNP   sertraline (ZOLOFT) 50 MG tablet TAKE 1 TABLET EVERY DAY 7/1/21   JOSÉ Camarillo CNP   digoxin (LANOXIN) 125 MCG tablet TAKE 1 TABLET BY MOUTH DAILY MONDAY THROUGH FRIDAY 5/10/21   JOSÉ Angel CNP   Acetaminophen (TYLENOL ARTHRITIS PAIN PO) Take by mouth 3 times daily    Historical Provider, MD   apixaban (ELIQUIS) 5 MG TABS tablet Take one tablet bid 5/3/19   JOSÉ Daniels CNP   torsemide (DEMADEX) 20 MG tablet Take 1 tablet by mouth daily 8/16/18   JOSÉ Tucker CNP   vitamin B-1 100 MG tablet Take 1 tablet by mouth daily 4/12/18   Shayla Arellano DO   vitamin B-12 (CYANOCOBALAMIN) 1000 MCG tablet Take 1,000 mcg by mouth daily    Historical Provider, MD   Multiple Vitamins-Minerals (THERAPEUTIC MULTIVITAMIN-MINERALS) tablet Take 1 tablet by mouth daily    Historical Provider, MD   calcium carbonate (OSCAL) 500 MG TABS tablet Take 600 mg by mouth daily     Historical Provider, MD   Biotin (BIOTIN 5000) 5 MG CAPS Take 5,000 mcg by mouth daily    Historical Provider, MD   ascorbic acid (VITAMIN C) 500 MG tablet Take 500 mg by mouth daily     Historical Provider, MD    Scheduled Meds:   [Held by provider] apixaban  5 mg Oral BID    ascorbic acid  500 mg Oral Daily    carvedilol  12.5 mg Oral BID     digoxin  125 mcg Oral Daily    sertraline  50 mg Oral Daily    [START ON 10/13/2021] torsemide  20 mg Oral Daily    thiamine mononitrate  100 mg Oral Daily    [START ON 10/13/2021] vitamin B-12  1,000 mcg Oral Daily    sodium chloride flush  5-40 mL IntraVENous 2 times per day    piperacillin-tazobactam  3,375 mg IntraVENous Q8H     Continuous Infusions:   sodium chloride      sodium chloride       PRN Meds:.sodium chloride flush, sodium chloride, potassium chloride **OR** potassium alternative oral replacement **OR** potassium chloride, magnesium sulfate, ondansetron **OR** ondansetron, polyethylene glycol, acetaminophen **OR** acetaminophen    Allergies  is allergic to contrast [barium-containing compounds], vancomycin, and codeine. Family History  family history is not on file. She was adopted. Social History   reports that she quit smoking about 34 years ago. She has a 10.00 pack-year smoking history. She has never used smokeless tobacco.   reports current alcohol use of about 1.0 standard drinks of alcohol per week. reports no history of drug use. Objective     Vitals:  Patient Vitals for the past 8 hrs:   BP Pulse Resp SpO2   10/12/21 1443 (!) 138/59 90 15 95 %     Average, Min, and Max for last 24 hours Vitals:  TEMPERATURE:  Temp  Av °F (36.7 °C)  Min: 98 °F (36.7 °C)  Max: 98 °F (36.7 °C)    RESPIRATIONS RANGE: Resp  Av.3  Min: 15  Max: 18    PULSE RANGE: Pulse  Av  Min: 90  Max: 99    BLOOD PRESSURE RANGE:  Systolic (93AOJ), KJH:103 , Min:122 , FAT:086   ; Diastolic (90CMX), KKW:31, Min:59, Max:74      PULSE OXIMETRY RANGE: SpO2  Av.3 %  Min: 95 %  Max: 99 %  I&O:  No intake/output data recorded. CBC:  Recent Labs     10/12/21  1321   WBC 7.7   HGB 13.6   HCT 39.7      CRP 63.9*        BMP:  Recent Labs     10/12/21  1321   *   K 3.5*   CL 96*   CO2 23   BUN 21   CREATININE 0.86   GLUCOSE 101*   CALCIUM 9.9        Coags:  Recent Labs     10/12/21  1321   PROT 7.6       Lab Results   Component Value Date    LABA1C 4.9 2020     Lab Results   Component Value Date    SEDRATE 28 10/12/2021     Lab Results   Component Value Date    CRP 63.9 (H) 10/12/2021         Lower Extremity Physical Exam:  Vascular: DP and PT pulses are faintly palpable. CFT <3 seconds to all digits. Hair growth is absent to the level of the digits. No edema. Neuro: Saph/sural/SP/DP/plantar sensation diminished to light touch.     Musculoskeletal: Muscle strength is 5/5 to all lower extremity muscle groups. Dermatologic: Full thickness ulcer #1 located medial hallux. Periwound skin is hyperkeratotic. Purulent drainage noted with no associated mal odor. Erythema noted with associated increase in warmth. Probes deep, difficult to assess if the wound extends to bone given significant necrotic/boggy tissue. Does not appear to sinus track, or undermine. No fluctuance, crepitus, or induration. Significant periwound erythema with associated increased warmth. Clinical Images:                  Imaging:   XR FOOT LEFT (MIN 3 VIEWS)   Preliminary Result   Soft tissue ulceration at the medial aspect of the great toe with small focus   of osseous destructive change in the medial aspect of the 1st distal phalanx,   concerning for osteomyelitis. Cultures: obtained per ED    Assessment     Breann Mendoza is a 77 y.o. female with   Non-healing ulcer, left foot  History of a-fib with pacemaker  CHF  CKD-stage III    Active Problems:    Osteomyelitis ([address]  Resolved Problems:    * No resolved hospital problems. *        Plan     Patient examined and evaluated at bedside   Patient admitted to the floor under medicine team for IV antibiotic and possible surgical intervention. Treatment options discussed in detail with the patient  X-ray shows erosive change of proximal and distal phalanx of hallux, left foot. Concern for osteomyelitis  MRI ordered. PVR's ordered  ABX: IV zoysn and doxycycline given the vancomycin allergy  Consult ID due to vancomycin allergy. Dressing applied to Left foot: Betadine, dsd  Diet: Patient may have a diet at medicine discretion  Weight bearing to heel touch to Left lower extremity with surgical shoe. Discussed with Randi Huddleston 26   Podiatric Medicine & Surgery   10/12/2021 at 9:42 PM     Senior Resident Statement:  I have discussed the case, including pertinent history and exam findings with the resident.  I agree with the assessment, plan and orders as documented by the intern.       Electronically signed by Jesus Moralez DPM on 10/12/2021 at 10:08 PM    Electronically signed by Hassel Simmonds, DPM on 10/14/2021 at 7:53 AM

## 2021-10-13 NOTE — ED PROVIDER NOTES
FACULTY SIGN-OUT  ADDENDUM     Care of this patient was assumed from Dr. Jonathon Aviles. The patient was seen for Toe Pain  . The patient's initial evaluation and plan have been discussed with the prior provider who initially evaluated the patient. Nursing Notes, Past Medical Hx, Past Surgical Hx, Social Hx, Allergies, and Family Hx were all reviewed. CHIEF COMPLAINT       Chief Complaint   Patient presents with    Toe Pain         PAST MEDICAL HISTORY    has a past medical history of AICD (automatic cardioverter/defibrillator) present, Atrial fibrillation ([address] Atrial fibrillation ([address] Cancer ([address] Chronic kidney disease, H/O cardiovascular stress test, Hx of long term use of blood thinners, Hypertension, Nonischemic dilated cardiomyopathy ([address] Other screening mammogram, and Poor historian. SURGICAL HISTORY      has a past surgical history that includes knee surgery (Right); transesophageal echocardiogram (11/10/2017); other surgical history (10/30/2018); pr office/outpt visit,procedure only (Right, 10/30/2018); Cardiac catheterization (08/2017); Cardiac defibrillator placement (04/06/2018); and Colonoscopy (N/A, 2/6/2019).     CURRENT MEDICATIONS       Previous Medications    ACETAMINOPHEN (TYLENOL ARTHRITIS PAIN PO)    Take by mouth 3 times daily    APIXABAN (ELIQUIS) 5 MG TABS TABLET    Take one tablet bid    ASCORBIC ACID (VITAMIN C) 500 MG TABLET    Take 500 mg by mouth daily     BIOTIN (BIOTIN 5000) 5 MG CAPS    Take 5,000 mcg by mouth daily    CALCIUM CARBONATE (OSCAL) 500 MG TABS TABLET    Take 600 mg by mouth daily     CARVEDILOL (COREG) 12.5 MG TABLET    TAKE 1 TABLET TWICE DAILY    DIGOXIN (LANOXIN) 125 MCG TABLET    TAKE 1 TABLET BY MOUTH DAILY MONDAY THROUGH FRIDAY    MULTIPLE VITAMINS-MINERALS (THERAPEUTIC MULTIVITAMIN-MINERALS) TABLET    Take 1 tablet by mouth daily    SERTRALINE (ZOLOFT) 50 MG TABLET    TAKE 1 TABLET EVERY DAY    TORSEMIDE (DEMADEX) 20 MG TABLET    Take 1 tablet by mouth daily    VITAMIN B-1 100 MG TABLET    Take 1 tablet by mouth daily    VITAMIN B-12 (CYANOCOBALAMIN) 1000 MCG TABLET    Take 1,000 mcg by mouth daily       ALLERGIES     is allergic to contrast [barium-containing compounds], vancomycin, and codeine. FAMILY HISTORY     is adopted. family history is not on file. She was adopted. SOCIAL HISTORY      reports that she quit smoking about 34 years ago. She has a 10.00 pack-year smoking history. She has never used smokeless tobacco. She reports current alcohol use of about 1.0 standard drinks of alcohol per week. She reports that she does not use drugs. DIAGNOSTIC RESULTS     EKG: All EKG's are interpreted by the Emergency Department Physician who either signs or Co-signs this chart in the absence of a cardiologist.        RADIOLOGY:   Non-plain film images such as CT, Ultrasound and MRI are read by the radiologist. Plain radiographic images are visualized and the radiologist interpretations are reviewed as follows:   XR FOOT LEFT (MIN 3 VIEWS)   Preliminary Result   Soft tissue ulceration at the medial aspect of the great toe with small focus   of osseous destructive change in the medial aspect of the 1st distal phalanx,   concerning for osteomyelitis. MRI FOOT LEFT W WO CONTRAST    (Results Pending)   VL LOWER EXTREMITY ARTERIAL SEGMENTAL PRESSURES W PPG    (Results Pending)       XR FOOT LEFT (MIN 3 VIEWS)    Result Date: 10/12/2021  EXAMINATION: THREE XRAY VIEWS OF THE LEFT FOOT 10/12/2021 1:26 pm COMPARISON: None. HISTORY: ORDERING SYSTEM PROVIDED HISTORY: wound, r/o osteomyelitis TECHNOLOGIST PROVIDED HISTORY: wound, r/o osteomyelitis Reason for Exam: wound to left 1st toe - rule out osteomyelitis per ordering physician Acuity: Acute Type of Exam: Initial Additional signs and symptoms: NA Relevant Medical/Surgical History: NA FINDINGS: There is soft tissue ulceration along the medial aspect of the great toe.  Adjacent to the ulceration, Morphology NOT REPORTED     Platelet Estimate NOT REPORTED    Comprehensive Metabolic Panel   Result Value Ref Range    Glucose 101 (H) 70 - 99 mg/dL    BUN 21 8 - 23 mg/dL    CREATININE 0.86 0.50 - 0.90 mg/dL    Bun/Cre Ratio NOT REPORTED 9 - 20    Calcium 9.9 8.6 - 10.4 mg/dL    Sodium 133 (L) 135 - 144 mmol/L    Potassium 3.5 (L) 3.7 - 5.3 mmol/L    Chloride 96 (L) 98 - 107 mmol/L    CO2 23 20 - 31 mmol/L    Anion Gap 14 9 - 17 mmol/L    Alkaline Phosphatase 74 35 - 104 U/L    ALT 11 5 - 33 U/L    AST 18 <32 U/L    Total Bilirubin 0.60 0.3 - 1.2 mg/dL    Total Protein 7.6 6.4 - 8.3 g/dL    Albumin 4.4 3.5 - 5.2 g/dL    Albumin/Globulin Ratio 1.4 1.0 - 2.5    GFR Non-African American >60 >60 mL/min    GFR African American >60 >60 mL/min    GFR Comment          GFR Staging NOT REPORTED    Lactic Acid   Result Value Ref Range    Lactic Acid 1.3 0.5 - 2.2 mmol/L   Sedimentation Rate   Result Value Ref Range    Sed Rate 28 0 - 30 mm   C-REACTIVE PROTEIN   Result Value Ref Range    CRP 63.9 (H) 0.0 - 5.0 mg/L         EMERGENCY DEPARTMENT COURSE:   Recent Vitals:    Vitals:    10/12/21 1308 10/12/21 1443 10/12/21 2244   BP: 124/63 (!) 138/59 106/64   Pulse: 93 90 95   Resp: 16 15 12   Temp: 98 °F (36.7 °C)  98 °F (36.7 °C)   TempSrc: Oral  Oral   SpO2: 99% 95% 96%   Weight: 64.9 kg (143 lb)     Height: 5' 9\" (1.753 m)       -------------------------  BP: 106/64, Temp: 98 °F (36.7 °C), Pulse: 95, Resp: 12      The patient was given the following medications:  Orders Placed This Encounter   Medications    piperacillin-tazobactam (ZOSYN) 4,500 mg in dextrose 5 % 100 mL IVPB (mini-bag)     Order Specific Question:   Antimicrobial Indications     Answer:   Skin and Soft Tissue Infection    apixaban (ELIQUIS) tablet 5 mg    ascorbic acid (VITAMIN C) tablet 500 mg    carvedilol (COREG) tablet 12.5 mg    digoxin (LANOXIN) tablet 125 mcg    sertraline (ZOLOFT) tablet 50 mg    torsemide (DEMADEX) tablet 20 mg    thiamine mononitrate tablet 100 mg    vitamin B-12 (CYANOCOBALAMIN) tablet 1,000 mcg    0.9 % sodium chloride infusion    sodium chloride flush 0.9 % injection 5-40 mL    sodium chloride flush 0.9 % injection 10 mL    0.9 % sodium chloride infusion    OR Linked Order Group     potassium chloride (KLOR-CON M) extended release tablet 40 mEq     potassium bicarb-citric acid (EFFER-K) effervescent tablet 40 mEq     potassium chloride 10 mEq/100 mL IVPB (Peripheral Line)    magnesium sulfate 1000 mg in dextrose 5% 100 mL IVPB    OR Linked Order Group     ondansetron (ZOFRAN-ODT) disintegrating tablet 4 mg     ondansetron (ZOFRAN) injection 4 mg    polyethylene glycol (GLYCOLAX) packet 17 g    OR Linked Order Group     acetaminophen (TYLENOL) tablet 650 mg     acetaminophen (TYLENOL) suppository 650 mg    piperacillin-tazobactam (ZOSYN) 3,375 mg in dextrose 5 % 50 mL IVPB extended infusion (mini-bag)     Order Specific Question:   Antimicrobial Indications     Answer:   Skin and Soft Tissue Infection    doxycycline (VIBRAMYCIN) 100 mg in dextrose 5 % 100 mL IVPB     Order Specific Question:   Antimicrobial Indications     Answer:   Skin and Soft Tissue Infection           MEDICAL DECISION MAKING:     Patient presents with osteomyelitis of her toe. Podiatry residents to come to see the patient. Antibiotics ordered. Awaiting inpatient bed and will keep the patient in the emergency department until that happens. Patient has been otherwise asymptomatic and doing well throughout her night under my supervision. CONSULTS:    None    CRITICAL CARE:     None    PROCEDURES:    None    FINAL IMPRESSION      1. Osteomyelitis of left foot, unspecified type [facility])          DISPOSITION/PLAN   DISPOSITION Admitted 10/12/2021 03:11:26 PM      Condition on Disposition    Improved    PATIENT REFERRED TO:  No follow-up provider specified.     DISCHARGE MEDICATIONS:  New Prescriptions    No medications on file       (Please note that portions of this note were completed with a voice recognition program.  Efforts were made to edit the dictations but occasionally words are mis-transcribed.)        Keri Alberto D.O.   Emergency Medicine Attending       Deya Funez,   10/13/21 5807

## 2021-10-13 NOTE — H&P
[facility]  Office: [address], DO, Jatin Warner, DO, Hiren Albarran, DO, Roxana Bradford, DO, Ethan Guerra MD, Elodia Velarde MD, Anuja Smith MD, Nichelle Wright MD, Delmy Collins MD, Jorge Cedeno MD, Sergio Giles MD, Ruben Cordova MD, Lise Villavicencio, DO, Deisy Mistry DO, Ara Ordoñez MD,  Richie Mendoza DO, Jose Uribe MD, Tima Rogers MD, Jose Carlos Bradford MD, Jennifer Clifford MD, Shai Steven MD, Valente Saavedra MD, Jn Lechuga, [facility], [facility], CNP, Debbie Rai, CNP, Manuel Roger, CNS, Ailene Lesches, CNP, Mikki Sanders, CNP, Kaitlyn Polk, CNP, Andreina Saenz, CNP, Radha Diego, CNP, Margarette Bernardo, CNP, Harry Seo PA-C, Bree oJ, [facility], Ortiz Bennett, CNP, Luis Miguel Bliss, CNP, Bridget García, CNP, Alexandro Jin, CNP, Eugene Fernandez, CNP, Gina Summers, CNP, Misty Franco, [facility]   [address] [facility]    HISTORY AND PHYSICAL EXAMINATION            Date:   10/13/2021  Patient name:  Davis Livingston  Date of admission:  10/12/2021  1:05 PM  MRN:   [id number]  Account:  [de-identified]  YOB: 1945  PCP:    JOSÉ Weiner CNP  Room:   330/330-01  Code Status:    Full Code    Chief Complaint:     Chief Complaint   Patient presents with    Toe Pain       History Obtained From:     patient, family member - son    History of Present Illness:     Davis Livingston is a 77 y.o. Non- / non  female who presents with Toe Pain   and is admitted to the hospital for the management of Osteomyel[facility] Patient reports wound on her toe started over a month ago. She denies injuring her toe. Her son tells me that patient has a shuffling gait and wears boat shoes, so it is suspected that perhaps the wound was caused by the issue with her shuffling gait that is secondary to severe bilateral hip arthritis. Patient is not a diabetic.   She has a significant history of permanent pacemaker/AICD, heart failure, arthritis. She is a former smoker and quit smoking 25 years ago. She also reports that she has a glass of wine nightly. She came to the ED for evaluation due to increased swelling of the toe and increased pain. While in the ED, X ray of the left foot was taken and revealed a soft tissue ulceration at the medial aspect of the great toe with a small focus of osseous destructive change in the medial aspect of the first distal phalanx, concerning for osteomyelitis. Podiatry was consulted and she was started on IV Zosyn. She has an allergy to vancomycin. She was also started on doxycycline IV. Infectious disease was consulted to assist with antibiotic management. Past Medical History:     Past Medical History:   Diagnosis Date    AICD (automatic cardioverter/defibrillator) present     April 6. 2018. Dr. Joselito Castro     Atrial fibrillation ([address] 09/2012    Dr.David Nisha Truong  CHRONIC ON [place] [facility]    Atrial fibrillation ([address]     Cancer ([address]     basal cell on face    Chronic kidney disease     H/O cardiovascular stress test 07/26/2017    Neg. EF 24%    Hx of long term use of blood thinners     Hypertension     Nonischemic dilated cardiomyopathy ([address]     Other screening mammogram March 6, 2012    Negative    Poor historian         Past Surgical History:     Past Surgical History:   Procedure Laterality Date    CARDIAC CATHETERIZATION  08/2017    NML CORS EF20%    CARDIAC DEFIBRILLATOR PLACEMENT  04/06/2018    DR Danis Scanlon    COLONOSCOPY N/A 02/06/2019    COLONOSCOPY POLYPECTOMY SNARE/COLD BIOPSY performed by Jefferson Castano MD at [facility] Right     OTHER SURGICAL HISTORY  10/30/2018    excision basal cell right temple    UT OFFICE/OUTPT VISIT,PROCEDURE ONLY Right 10/30/2018    EXCISION BASAL CELL RIGHT TEMPLE performed by Cruz Loyd MD at [address] [place] [address] Drive TRANSESOPHAGEAL ECHOCARDIOGRAM  11/10/2017    EF 20%. Mod MR. Mild to mod AI. Mild TR. Segmental WMA. Medications Prior to Admission:     Prior to Admission medications    Medication Sig Start Date End Date Taking? Authorizing Provider   carvedilol (COREG) 12.5 MG tablet TAKE 1 TABLET TWICE DAILY  Patient taking differently: Indications: pt unsure of dosing  7/1/21   JOSÉ Nguyen CNP   sertraline (ZOLOFT) 50 MG tablet TAKE 1 TABLET EVERY DAY 7/1/21   JOSÉ Nguyen CNP   digoxin (LANOXIN) 125 MCG tablet TAKE 1 TABLET BY MOUTH DAILY MONDAY THROUGH FRIDAY 5/10/21   JOSÉ Caldera CNP   Acetaminophen (TYLENOL ARTHRITIS PAIN PO) Take by mouth 3 times daily    Historical Provider, MD   apixaban (ELIQUIS) 5 MG TABS tablet Take one tablet bid 5/3/19   JOSÉ Ambriz CNP   torsemide (DEMADEX) 20 MG tablet Take 1 tablet by mouth daily 8/16/18   [facility], APRN - CNP   vitamin B-1 100 MG tablet Take 1 tablet by mouth daily 4/12/18   Shayla Arellano DO   vitamin B-12 (CYANOCOBALAMIN) 1000 MCG tablet Take 1,000 mcg by mouth daily    Historical Provider, MD   Multiple Vitamins-Minerals (THERAPEUTIC MULTIVITAMIN-MINERALS) tablet Take 1 tablet by mouth daily    Historical Provider, MD   calcium carbonate (OSCAL) 500 MG TABS tablet Take 600 mg by mouth daily     Historical Provider, MD   Biotin (BIOTIN 5000) 5 MG CAPS Take 5,000 mcg by mouth daily    Historical Provider, MD   ascorbic acid (VITAMIN C) 500 MG tablet Take 500 mg by mouth daily     Historical Provider, MD        Allergies:     Contrast [barium-containing compounds], Vancomycin, and Codeine    Social History:     Tobacco:    reports that she quit smoking about 34 years ago. She has a 10.00 pack-year smoking history. She has never used smokeless tobacco.  Alcohol:      reports current alcohol use of about 1.0 standard drinks of alcohol per week. Drug Use:  reports no history of drug use.     Family History:     Family History   Adopted: Yes       Review of Systems:     Positive and Negative as described in HPI. Review of Systems   Constitutional: Negative. HENT: Negative. Eyes: Negative. Respiratory: Negative. Cardiovascular: Negative. Gastrointestinal: Negative. Genitourinary: Negative. Musculoskeletal: Positive for arthralgias and gait problem. Negative for myalgias. Skin: Positive for wound. Negative for color change, pallor and rash. Neurological: Negative for dizziness, weakness, numbness and headaches. Psychiatric/Behavioral: Negative. Physical Exam:   /67   Pulse 91   Temp 98.7 °F (37.1 °C) (Oral)   Resp 18   Ht 5' 9\" (1.753 m)   Wt 143 lb (64.9 kg)   SpO2 97%   BMI 21.12 kg/m²   Temp (24hrs), Av.4 °F (36.9 °C), Min:98 °F (36.7 °C), Max:98.7 °F (37.1 °C)    No results for input(s): POCGLU in the last 72 hours. Intake/Output Summary (Last 24 hours) at 10/13/2021 1331  Last data filed at 10/12/2021 2349  Gross per 24 hour   Intake 200 ml   Output --   Net 200 ml       Physical Exam  Vitals and nursing note reviewed. Constitutional:       General: She is not in acute distress. Appearance: She is not ill-appearing, toxic-appearing or diaphoretic. HENT:      Head: Normocephalic and atraumatic. Nose: Nose normal. No rhinorrhea. Mouth/Throat:      Mouth: Mucous membranes are moist.   Eyes:      General: No scleral icterus. Right eye: No discharge. Left eye: No discharge. Extraocular Movements: Extraocular movements intact. Conjunctiva/sclera: Conjunctivae normal.      Pupils: Pupils are equal, round, and reactive to light. Neck:      Comments: No JVD  Cardiovascular:      Rate and Rhythm: Normal rate. Rhythm irregular. Pulses: Normal pulses. Heart sounds: Normal heart sounds. No murmur heard. No friction rub. No gallop. Pulmonary:      Effort: Pulmonary effort is normal. No respiratory distress. Breath sounds: Normal breath sounds. No wheezing, rhonchi or rales. Abdominal:      General: There is no distension. Palpations: Abdomen is soft. Tenderness: There is no abdominal tenderness. There is no guarding. Hernia: No hernia is present. Musculoskeletal:         General: Normal range of motion. Cervical back: Normal range of motion and neck supple. Right lower leg: No edema. Left lower leg: No edema. Skin:     General: Skin is warm. Coloration: Skin is not jaundiced. Findings: Lesion present. No bruising or erythema. Neurological:      General: No focal deficit present. Mental Status: She is alert and oriented to person, place, and time. Psychiatric:         Mood and Affect: Mood normal.         Behavior: Behavior normal.         Thought Content: Thought content normal.         Judgment: Judgment normal.         Investigations:      Laboratory Testing:  Recent Results (from the past 24 hour(s))   Culture, Wound    Collection Time: 10/12/21  1:45 PM    Specimen: Foot   Result Value Ref Range    Specimen Description . FOOT     Special Requests L GREAT TOE     Direct Exam MODERATE NEUTROPHILS (A)     Direct Exam MODERATE GRAM POSITIVE COCCI IN PAIRS (A)     Direct Exam FEW GRAM POSITIVE RODS (A)     Direct Exam MODERATE GRAM NEGATIVE RODS (A)     Culture NORMAL SKIN SHAHEEN (A)    Culture, Blood 1    Collection Time: 10/12/21  1:58 PM    Specimen: Blood   Result Value Ref Range    Specimen Description . BLOOD     Special Requests 20ML Right Arm     Culture NO GROWTH 20 HOURS    COVID-19, Rapid    Collection Time: 10/13/21  3:27 AM    Specimen: Nasopharyngeal Swab   Result Value Ref Range    Specimen Description . NASOPHARYNGEAL SWAB     SARS-CoV-2, Rapid Not Detected Not Detected   Basic Metabolic Panel w/ Reflex to MG    Collection Time: 10/13/21  9:33 AM   Result Value Ref Range    Glucose 98 70 - 99 mg/dL    BUN 14 8 - 23 mg/dL    CREATININE 0.75 0.50 - 0.90 mg/dL    Bun/Cre Ratio NOT REPORTED 9 - 20    Calcium 9.1 8.6 - 10.4 mg/dL    Sodium 135 135 - 144 mmol/L    Potassium 3.9 3.7 - 5.3 mmol/L    Chloride 102 98 - 107 mmol/L    CO2 22 20 - 31 mmol/L    Anion Gap 11 9 - 17 mmol/L    GFR Non-African American >60 >60 mL/min    GFR African American >60 >60 mL/min    GFR Comment          GFR Staging NOT REPORTED        Imaging/Diagnostics:    XR FOOT LEFT (MIN 3 VIEWS)    Result Date: 10/12/2021  Soft tissue ulceration at the medial aspect of the great toe with small focus of osseous destructive change in the medial aspect of the 1st distal phalanx, concerning for osteomyelitis. Assessment :      Hospital Problems         Last Modified POA    * (Principal) Osteomyelitis ([facility] 10/13/2021 Yes    AICD (automatic cardioverter/defibrillator) Implant 10/13/2021 Yes    Nonischemic cardiomyopathy ([address] 10/13/2021 Yes    Overview Addendum 5/14/2018 10:02 PM by JOSÉ Correa - CNP     Correspondence dated November 30, 2017 from Dr. Araceli Morrison reviewed on 12/18/17, see Media tab of Chart Review for more information. Correspondence dated January 9, 2018 from Dr. Araceli Morrison, reviewed on 1/11/2018, see media tab of chart for more information   EF 10-15%  Correspondence dated April 3, 2018 from Dr. Araceli Morrison, reviewed on 4/4/2018, see media tab of chart for more information   Admitted to hospital for ICD placement. Correspondence dated May 8, 2018 from Dr. Araceli Morrison, reviewed on 5/14/2018, see media tab of chart for more information          Systolic CHF, chronic ([address] (Chronic) 10/13/2021 Yes    CKD (chronic kidney disease), stage III ([address] 10/13/2021 Yes    Overview Addendum 6/3/2020  1:43 PM by Vale Merlin, MD     From ischemic nephrosclerosis related to cardiorenal syndrome.   Baseline creatinine 1.1-1.3 GFR 40-45 mL per minute  Correspondence dated March 7, 2018 from Dr. Katie Frazier, reviewed on 3/7/2018, see media tab of chart for more information          Essential hypertension 10/13/2021 Yes    Vitamin B deficiency 10/13/2021 Yes Plan:     Patient status inpatient in the Med/Surge    Osteomyelitis: Infectious disease consulted. Discussed current treatment and antibiotics with infectious disease. Infectious disease to evaluate and offer further recommendations. Appreciate input. Monitor for fever. Control pain. Podiatry following closely. Appreciate assistance. MRI of foot to be completed tomorrow. PT/OT eval and treat  AICD/permanent pacemaker: Continuous telemetry. Pacemaker is a [employer]. Patient's own cardiologist is Dr. Neyda Magallon. Chronic systolic CHF: Continue cardiac meds as ordered. Discontinue IV fluids. Daily weights. Monitor intake and output. CKD: Monitor BMP daily. Replace electrolytes as needed. Trend renal function. Avoid nephrotoxic agents. Hypertension: Continue antihypertensives: Monitor vital signs every 8 hours and as needed. Blood pressure appears to be well controlled at this time. Vitamin B deficiency: Continue vitamin B supplementation as ordered. Consultations:   IP CONSULT TO INFECTIOUS DISEASES  IP CONSULT TO PODIATRY     Patient is admitted as inpatient status because of co-morbidities listed above, severity of signs and symptoms as outlined, requirement for current medical therapies and most importantly because of direct risk to patient if care not provided in a hospital setting. Expected length of stay > 48 hours.     JOSÉ Barnes - NP  10/13/2021  1:31 PM    Copy sent to Dr. Noam Carter, APRN - CNP

## 2021-10-13 NOTE — CARE COORDINATION
Case Management Initial Discharge Plan  Silverio Koroma             Met with:patient to discuss discharge plans. Information verified: address, contacts, phone number, , insurance Yes  Insurance Provider: Brittany Casarez    Emergency Contact/Next of Kin name & number: Raisa Jules [phone]  Who are involved in patient's support system? children    PCP: JOSÉ Pettit CNP  Date of last visit: 10/12/2021      Discharge Planning    Living Arrangements:        Home has 1 stories  0 stairs to climb to get into front door,    Patient able to perform ADL's:Independent    Current Services (outpatient & in home) none  DME equipment:   DME provider:     Is patient receiving oral anticoagulation therapy? Eliquis     Potential Assistance Needed:       Patient agreeable to home care: No  [place] of choice provided:  no    Prior SNF/Rehab Placement and Facility: no  Agreeable to SNF/Rehab: No  [place] of choice provided: no     Evaluation: no    Expected Discharge date:       Patient expects to be discharged to: If home: is the family and/or caregiver wiling & able to provide support at home? independent  Who will be providing this support?n/a    Follow Up Appointment: Best Day/ Time:      Transportation provider: drives  Transportation arrangements needed for discharge: No    Readmission Risk              Risk of Unplanned Readmission:  9         Does patient have a readmission risk score greater than 14?: No  If yes, follow-up appointment must be made within 7 days of discharge.      Goals of Care: manage foot wound      Educated patient on transitional options, provided freedom of choice and are agreeable with plan      Discharge Plan: home, independent          Electronically signed by Ghislaine Peña RN on 10/13/21 at 11:36 AM EDT

## 2021-10-13 NOTE — H&P (VIEW-ONLY)
[facility]  Office: [address], DO, [employer], DO, Chrisstaceylg Guerra, DO, Annamarie Sabins Blood, DO, Arsalan Salinas MD, Elver De León MD, Swetha Ruth MD, Dandre Catalan MD, Adeline Goldmann, MD, Ami Esparza MD, Armando Coe MD, Lenny Coffey MD, Winnie Keller, DO, Becca Charles, DO, Miriam Huang MD,  Carmelina Friday, DO, Elizabeth Wilson MD, Sabino Rocha MD, Vicenta Kim MD, Moni Goodwin MD, Altagracia Sahu MD, John Pereira MD, Andrew Abarca, [facility], [facility], [facility], Driss Nurse, CNP, Daniel Oakes, CNS, Kp Oswald, CNP, Hugh Solomon, CNP, Jennifer Stone, CNP, Roger Siddiqui, CNP, Selena Araujo, CNP, Lois Chavez, CNP, Cruz Camarillo PA-C, Tracy Banks, [facility], Deanna Nur, CNP, Florin Patrick, CNP, Jatin Ruiz, CNP, Louise Constantino, CNP, Deb Squibb, CNP, Braulio Risleah, CNP, [facility], CNP         [facility]   [address] [facility]    HISTORY AND PHYSICAL EXAMINATION            Date:   10/13/2021  Patient name:  Aidan Acosta  Date of admission:  10/12/2021  1:05 PM  MRN:   [id number]  Account:  [de-identified]  YOB: 1945  PCP:    JOSÉ Burnett CNP  Room:   [address] [place] [address]  Code Status:    Full Code    Chief Complaint:     Chief Complaint   Patient presents with    Toe Pain       History Obtained From:     patient, family member - son    History of Present Illness:     Aidan Acosta is a 77 y.o. Non- / non  female who presents with Toe Pain   and is admitted to the hospital for the management of Osteomyelitis ([facility] [address] Patient reports wound on her toe started over a month ago. She denies injuring her toe. Her son tells me that patient has a shuffling gait and wears boat shoes, so it is suspected that perhaps the wound was caused by the issue with her shuffling gait that is secondary to severe bilateral hip arthritis. Patient is not a diabetic.   She has a significant history of permanent pacemaker/AICD, heart failure, arthritis. She is a former smoker and quit smoking 25 years ago. She also reports that she has a glass of wine nightly. She came to the ED for evaluation due to increased swelling of the toe and increased pain. While in the ED, X ray of the left foot was taken and revealed a soft tissue ulceration at the medial aspect of the great toe with a small focus of osseous destructive change in the medial aspect of the first distal phalanx, concerning for osteomyelitis. Podiatry was consulted and she was started on IV Zosyn. She has an allergy to vancomycin. She was also started on doxycycline IV. Infectious disease was consulted to assist with antibiotic management. Past Medical History:     Past Medical History:   Diagnosis Date    AICD (automatic cardioverter/defibrillator) present     April 6. 2018. Dr. Hali Varela     Atrial fibrillation ([address] 09/2012    Dr.David Hilario Collier  [facility]RON[facility] [place]    Atrial fibrillation ([address]     Cancer ([address]     basal cell on face    Chronic kidney disease     H/O cardiovascular stress test 07/26/2017    Neg. EF 24%    Hx of long term use of blood thinners     Hypertension     Nonischemic dilated cardiomyopathy ([address]     Other screening mammogram March 6, 2012    Negative    Poor historian         Past Surgical History:     Past Surgical History:   Procedure Laterality Date    CARDIAC CATHETERIZATION  08/2017    NML CORS EF20%    CARDIAC DEFIBRILLATOR PLACEMENT  04/06/2018    DR Rajni Ponce    COLONOSCOPY N/A 02/06/2019    COLONOSCOPY POLYPECTOMY SNARE/COLD BIOPSY performed by Anais Cheng MD at [facility] Right     OTHER SURGICAL HISTORY  10/30/2018    excision basal cell right temple    [place] OFFICE/OUTPT VISIT,PROCEDURE ONLY Right 10/30/2018    EXCISION BASAL CELL RIGHT TEMPLE performed by Jeet Johnson MD at [address] [place] [address] Drive TRANSESOPHAGEAL ECHOCARDIOGRAM  11/10/2017    EF 20%. Mod MR. Mild to mod AI. Mild TR. Segmental WMA. Medications Prior to Admission:     Prior to Admission medications    Medication Sig Start Date End Date Taking? Authorizing Provider   carvedilol (COREG) 12.5 MG tablet TAKE 1 TABLET TWICE DAILY  Patient taking differently: Indications: pt unsure of dosing  7/1/21   JOSÉ Mathews CNP   sertraline (ZOLOFT) 50 MG tablet TAKE 1 TABLET EVERY DAY 7/1/21   JOSÉ Mathews CNP   digoxin (LANOXIN) 125 MCG tablet TAKE 1 TABLET BY MOUTH DAILY MONDAY THROUGH FRIDAY 5/10/21   JOSÉ Almonte CNP   Acetaminophen (TYLENOL ARTHRITIS PAIN PO) Take by mouth 3 times daily    Historical Provider, MD   apixaban (ELIQUIS) 5 MG TABS tablet Take one tablet bid 5/3/19   JOSÉ López CNP   torsemide (DEMADEX) 20 MG tablet Take 1 tablet by mouth daily 8/16/18   JOSÉ Rai CNP   vitamin B-1 100 MG tablet Take 1 tablet by mouth daily 4/12/18   Shayla Arellano DO   vitamin B-12 (CYANOCOBALAMIN) 1000 MCG tablet Take 1,000 mcg by mouth daily    Historical Provider, MD   Multiple Vitamins-Minerals (THERAPEUTIC MULTIVITAMIN-MINERALS) tablet Take 1 tablet by mouth daily    Historical Provider, MD   calcium carbonate (OSCAL) 500 MG TABS tablet Take 600 mg by mouth daily     Historical Provider, MD   Biotin (BIOTIN 5000) 5 MG CAPS Take 5,000 mcg by mouth daily    Historical Provider, MD   ascorbic acid (VITAMIN C) 500 MG tablet Take 500 mg by mouth daily     Historical Provider, MD        Allergies:     Contrast [barium-containing compounds], Vancomycin, and Codeine    Social History:     Tobacco:    reports that she quit smoking about 34 years ago. She has a 10.00 pack-year smoking history. She has never used smokeless tobacco.  Alcohol:      reports current alcohol use of about 1.0 standard drinks of alcohol per week. Drug Use:  reports no history of drug use.     Family History:     Family History   Adopted: Yes       Review of Systems:     Positive and Negative as described in HPI. Review of Systems   Constitutional: Negative. HENT: Negative. Eyes: Negative. Respiratory: Negative. Cardiovascular: Negative. Gastrointestinal: Negative. Genitourinary: Negative. Musculoskeletal: Positive for arthralgias and gait problem. Negative for myalgias. Skin: Positive for wound. Negative for color change, pallor and rash. Neurological: Negative for dizziness, weakness, numbness and headaches. Psychiatric/Behavioral: Negative. Physical Exam:   /67   Pulse 91   Temp 98.7 °F (37.1 °C) (Oral)   Resp 18   Ht 5' 9\" (1.753 m)   Wt 143 lb (64.9 kg)   SpO2 97%   BMI 21.12 kg/m²   Temp (24hrs), Av.4 °F (36.9 °C), Min:98 °F (36.7 °C), Max:98.7 °F (37.1 °C)    No results for input(s): POCGLU in the last 72 hours. Intake/Output Summary (Last 24 hours) at 10/13/2021 1331  Last data filed at 10/12/2021 2349  Gross per 24 hour   Intake 200 ml   Output    Net 200 ml       Physical Exam  Vitals and nursing note reviewed. Constitutional:       General: She is not in acute distress. Appearance: She is not ill-appearing, toxic-appearing or diaphoretic. HENT:      Head: Normocephalic and atraumatic. Nose: Nose normal. No rhinorrhea. Mouth/Throat:      Mouth: Mucous membranes are moist.   Eyes:      General: No scleral icterus. Right eye: No discharge. Left eye: No discharge. Extraocular Movements: Extraocular movements intact. Conjunctiva/sclera: Conjunctivae normal.      Pupils: Pupils are equal, round, and reactive to light. Neck:      Comments: No JVD  Cardiovascular:      Rate and Rhythm: Normal rate. Rhythm irregular. Pulses: Normal pulses. Heart sounds: Normal heart sounds. No murmur heard. No friction rub. No gallop. Pulmonary:      Effort: Pulmonary effort is normal. No respiratory distress. Breath sounds: Normal breath sounds. No wheezing, rhonchi or rales. Abdominal:      General: There is no distension. Palpations: Abdomen is soft. Tenderness: There is no abdominal tenderness. There is no guarding. Hernia: No hernia is present. Musculoskeletal:         General: Normal range of motion. Cervical back: Normal range of motion and neck supple. Right lower leg: No edema. Left lower leg: No edema. Skin:     General: Skin is warm. Coloration: Skin is not jaundiced. Findings: Lesion present. No bruising or erythema. Neurological:      General: No focal deficit present. Mental Status: She is alert and oriented to person, place, and time. Psychiatric:         Mood and Affect: Mood normal.         Behavior: Behavior normal.         Thought Content: Thought content normal.         Judgment: Judgment normal.         Investigations:      Laboratory Testing:  Recent Results (from the past 24 hour(s))   Culture, Wound    Collection Time: 10/12/21  1:45 PM    Specimen: Foot   Result Value Ref Range    Specimen Description . FOOT     Special Requests L GREAT TOE     Direct Exam MODERATE NEUTROPHILS (A)     Direct Exam MODERATE GRAM POSITIVE COCCI IN PAIRS (A)     Direct Exam FEW GRAM POSITIVE RODS (A)     Direct Exam MODERATE GRAM NEGATIVE RODS (A)     Culture NORMAL SKIN SHAHEEN (A)    Culture, Blood 1    Collection Time: 10/12/21  1:58 PM    Specimen: Blood   Result Value Ref Range    Specimen Description . BLOOD     Special Requests 20ML Right Arm     Culture NO GROWTH 20 HOURS    COVID-19, Rapid    Collection Time: 10/13/21  3:27 AM    Specimen: Nasopharyngeal Swab   Result Value Ref Range    Specimen Description . NASOPHARYNGEAL SWAB     SARS-CoV-2, Rapid Not Detected Not Detected   Basic Metabolic Panel w/ Reflex to MG    Collection Time: 10/13/21  9:33 AM   Result Value Ref Range    Glucose 98 70 - 99 mg/dL    BUN 14 8 - 23 mg/dL    CREATININE 0.75 0.50 - 0.90 mg/dL    Bun/Cre Ratio NOT REPORTED 9 - 20    Calcium 9.1 8.6 - 10.4 mg/dL    Sodium 135 135 - 144 mmol/L    Potassium 3.9 3.7 - 5.3 mmol/L    Chloride 102 98 - 107 mmol/L    CO2 22 20 - 31 mmol/L    Anion Gap 11 9 - 17 mmol/L    GFR Non-African American >60 >60 mL/min    GFR African American >60 >60 mL/min    GFR Comment          GFR Staging NOT REPORTED        Imaging/Diagnostics:    XR FOOT LEFT (MIN 3 VIEWS)    Result Date: 10/12/2021  Soft tissue ulceration at the medial aspect of the great toe with small focus of osseous destructive change in the medial aspect of the 1st distal phalanx, concerning for osteomyelitis. Assessment :      Hospital Problems         Last Modified POA    * (Principal) Osteomyelitis ([address] 10/13/2021 Yes    AICD (automatic cardioverter/defibrillator) Implant 10/13/2021 Yes    Nonischemic cardiomyopathy ([address] 10/13/2021 Yes    Overview Addendum 5/14/2018 10:02 PM by JOSÉ Orellana - CNP     Correspondence dated November 30, 2017 from Dr. Anthony Estrada reviewed on 12/18/17, see Media tab of Chart Review for more information. Correspondence dated January 9, 2018 from Dr. Anthony Estrada, reviewed on 1/11/2018, see media tab of chart for more information   EF 10-15%  Correspondence dated April 3, 2018 from Dr. Anthony Estrada, reviewed on 4/4/2018, see media tab of chart for more information   Admitted to hospital for ICD placement. Correspondence dated May 8, 2018 from Dr. Anthony Estrada, reviewed on 5/14/2018, see media tab of chart for more information          Systolic CHF, chronic ([address] (Chronic) 10/13/2021 Yes    CKD (chronic kidney disease), stage III ([address] 10/13/2021 Yes    Overview Addendum 6/3/2020  1:43 PM by Cassandra Hughes MD     From ischemic nephrosclerosis related to cardiorenal syndrome.   Baseline creatinine 1.1-1.3 GFR 40-45 mL per minute  Correspondence dated March 7, 2018 from Dr. Sia Blackmon, reviewed on 3/7/2018, see media tab of chart for more information          Essential hypertension 10/13/2021 Yes    Vitamin B deficiency 10/13/2021 Yes Plan:     Patient status inpatient in the Med/Surge    1. Osteomyelitis: Infectious disease consulted. Discussed current treatment and antibiotics with infectious disease. Infectious disease to evaluate and offer further recommendations. Appreciate input. Monitor for fever. Control pain. Podiatry following closely. Appreciate assistance. MRI of foot to be completed tomorrow. PT/OT eval and treat  2. AICD/permanent pacemaker: Continuous telemetry. Pacemaker is a [employer]. Patient's own cardiologist is Dr. Ragini Cash. 3. Chronic systolic CHF: Continue cardiac meds as ordered. Discontinue IV fluids. Daily weights. Monitor intake and output. 4. CKD: Monitor BMP daily. Replace electrolytes as needed. Trend renal function. Avoid nephrotoxic agents. 5. Hypertension: Continue antihypertensives: Monitor vital signs every 8 hours and as needed. Blood pressure appears to be well controlled at this time. 6. Vitamin B deficiency: Continue vitamin B supplementation as ordered. Consultations:   IP CONSULT TO INFECTIOUS DISEASES  IP CONSULT TO PODIATRY     Patient is admitted as inpatient status because of co-morbidities listed above, severity of signs and symptoms as outlined, requirement for current medical therapies and most importantly because of direct risk to patient if care not provided in a hospital setting. Expected length of stay > 48 hours.     JOSÉ Kent NP  10/13/2021  1:31 PM    Copy sent to JOSÉ Wallace - CNP

## 2021-10-13 NOTE — ED NOTES
Report given to Yariel Hines on progressive unit, bed assignment Keven Triplett [id number], [place]  10[address]13/[address], RN  10/13/21 [id number]

## 2021-10-13 NOTE — PLAN OF CARE
Pt admitted for L greater toe abscess. No complaints of pain. Toe dressed, purulent drainage- yellow/greenish. VSS. Comfort and safety maintained.        Problem: Infection:  Goal: Will remain free from infection  Description: Will remain free from infection  Outcome: Ongoing     Problem: Safety:  Goal: Free from accidental physical injury  Description: Free from accidental physical injury  Outcome: Ongoing  Goal: Free from intentional harm  Description: Free from intentional harm  Outcome: Ongoing     Problem: Daily Care:  Goal: Daily care needs are met  Description: Daily care needs are met  Outcome: Ongoing     Problem: Pain:  Goal: Patient's pain/discomfort is manageable  Description: Patient's pain/discomfort is manageable  Outcome: Ongoing  Goal: Pain level will decrease  Description: Pain level will decrease  Outcome: Ongoing  Goal: Control of acute pain  Description: Control of acute pain  Outcome: Ongoing  Goal: Control of chronic pain  Description: Control of chronic pain  Outcome: Ongoing     Problem: Skin Integrity:  Goal: Skin integrity will stabilize  Description: Skin integrity will stabilize  Outcome: Ongoing     Problem: Discharge Planning:  Goal: Patients continuum of care needs are met  Description: Patients continuum of care needs are met  Outcome: Ongoing     Problem: Nutritional:  Goal: Nutritional status will improve  Description: Nutritional status will improve  Outcome: Ongoing     Problem: Physical Regulation:  Goal: Will remain free from infection  Description: Will remain free from infection  Outcome: Ongoing  Goal: Diagnostic test results will improve  Description: Diagnostic test results will improve  Outcome: Ongoing  Goal: Ability to maintain vital signs within normal range will improve  Description: Ability to maintain vital signs within normal range will improve  Outcome: Ongoing     Problem: Respiratory:  Goal: Ability to maintain normal respiratory secretions will improve  Description: Ability to maintain normal respiratory secretions will improve  Outcome: Ongoing     Problem: Skin Integrity:  Goal: Demonstration of wound healing without infection will improve  Description: Demonstration of wound healing without infection will improve  Outcome: Ongoing  Goal: Complications related to intravenous access or infusion will be avoided or minimized  Description: Complications related to intravenous access or infusion will be avoided or minimized  Outcome: Ongoing

## 2021-10-13 NOTE — PLAN OF CARE
Pt admitted this afternoon for L greater toe abcess. Pediatry dressed foot. VSS, no pain. Antibiotics given. Up independently. Comfort and safety maintained. See flow sheet for more info.       Problem: Infection:  Goal: Will remain free from infection  Description: Will remain free from infection  10/13/2021 1755 by Carmen Valle RN  Outcome: Ongoing  10/13/2021 1345 by Carmen Valle RN  Outcome: Ongoing     Problem: Safety:  Goal: Free from accidental physical injury  Description: Free from accidental physical injury  10/13/2021 1755 by Carmen Valle RN  Outcome: Ongoing  10/13/2021 1345 by Carmen Valle RN  Outcome: Ongoing  Goal: Free from intentional harm  Description: Free from intentional harm  10/13/2021 1755 by Carmen Valle RN  Outcome: Ongoing  10/13/2021 1345 by Carmen Valle RN  Outcome: Ongoing     Problem: Daily Care:  Goal: Daily care needs are met  Description: Daily care needs are met  10/13/2021 1755 by Carmen Valle RN  Outcome: Ongoing  10/13/2021 1345 by Carmen Valle RN  Outcome: Ongoing     Problem: Pain:  Goal: Patient's pain/discomfort is manageable  Description: Patient's pain/discomfort is manageable  10/13/2021 1755 by Carmen Valle RN  Outcome: Ongoing  10/13/2021 1345 by Carmen Valle RN  Outcome: Ongoing  Goal: Pain level will decrease  Description: Pain level will decrease  10/13/2021 1755 by Carmen Valle RN  Outcome: Ongoing  10/13/2021 1345 by Carmen Valle RN  Outcome: Ongoing  Goal: Control of acute pain  Description: Control of acute pain  10/13/2021 1755 by Carmen Valle RN  Outcome: Ongoing  10/13/2021 1345 by Carmen Valle RN  Outcome: Ongoing  Goal: Control of chronic pain  Description: Control of chronic pain  10/13/2021 1755 by Carmen Valle RN  Outcome: Ongoing  10/13/2021 1345 by Carmen Valle RN  Outcome: Ongoing     Problem: Skin Integrity:  Goal: Skin integrity will stabilize  Description: Skin integrity will stabilize  10/13/2021 1755 by Laly Chung Anabelle Del Castillo RN  Outcome: Ongoing  10/13/2021 1345 by Chacorta Morgan RN  Outcome: Ongoing     Problem: Discharge Planning:  Goal: Patients continuum of care needs are met  Description: Patients continuum of care needs are met  10/13/2021 1755 by Chacorta Morgan RN  Outcome: Ongoing  10/13/2021 1345 by Chacorta Morgan RN  Outcome: Ongoing     Problem: Nutritional:  Goal: Nutritional status will improve  Description: Nutritional status will improve  10/13/2021 1755 by Chacorta Morgan RN  Outcome: Ongoing  10/13/2021 1345 by Chacorta Morgan RN  Outcome: Ongoing     Problem: Physical Regulation:  Goal: Will remain free from infection  Description: Will remain free from infection  10/13/2021 1755 by Chacorta Morgan RN  Outcome: Ongoing  10/13/2021 1345 by Chacorta Morgan RN  Outcome: Ongoing  Goal: Diagnostic test results will improve  Description: Diagnostic test results will improve  10/13/2021 1755 by Chacorta Morgan RN  Outcome: Ongoing  10/13/2021 1345 by Chacorta Morgan RN  Outcome: Ongoing  Goal: Ability to maintain vital signs within normal range will improve  Description: Ability to maintain vital signs within normal range will improve  10/13/2021 1755 by Chacorta Morgan RN  Outcome: Ongoing  10/13/2021 1345 by Chacorta Morgan RN  Outcome: Ongoing     Problem: Respiratory:  Goal: Ability to maintain normal respiratory secretions will improve  Description: Ability to maintain normal respiratory secretions will improve  10/13/2021 1755 by Chacorta Morgan RN  Outcome: Ongoing  10/13/2021 1345 by Chacorta Morgan RN  Outcome: Ongoing     Problem: Skin Integrity:  Goal: Demonstration of wound healing without infection will improve  Description: Demonstration of wound healing without infection will improve  10/13/2021 1755 by Chacorta Morgan RN  Outcome: Ongoing  10/13/2021 1345 by Chacorta Morgan RN  Outcome: Ongoing  Goal: Complications related to intravenous access or infusion will be avoided or minimized  Description: Complications related to intravenous access or infusion will be avoided or minimized  10/13/2021 1755 by Sushant Wong RN  Outcome: Ongoing  10/13/2021 1345 by Sushant Wong RN  Outcome: Ongoing

## 2021-10-13 NOTE — PROGRESS NOTES
Progress Note  Podiatric Medicine and Surgery     Subjective     CC: left hallux wound    Patient seen and examined at bedside at ED. No acute events overnight. Patient states pain is well controlled to the foot. Labs and images reviewed. Afebrile, vital signs stable     HPI :  Emilia Franco is a 77 y.o. female seen at [facility] ED for left hallux wound. Patient reports wound started about a month ago. She doesn't recall how it started. Today, she went to visit her PCP who told patient to come to the ED. Upon ED visit, x-ray was taken which showed erosive change on the proximal and distal phalanx of hallux. Patient has a significant history of A-fib with pacemaker, CHF and CKD. Although she is not diabetic, she reports she doesn't have much sensation on her feet. Today, patient is accompanied by her son at bedside. ROS: Denies N/V/F/C/SOB/CP. Otherwise negative except at stated in the HPI.      Medications:  Scheduled Meds:   [Held by provider] apixaban  5 mg Oral BID    ascorbic acid  500 mg Oral Daily    carvedilol  12.5 mg Oral BID WC    digoxin  125 mcg Oral Daily    sertraline  50 mg Oral Daily    torsemide  20 mg Oral Daily    thiamine mononitrate  100 mg Oral Daily    vitamin B-12  1,000 mcg Oral Daily    sodium chloride flush  5-40 mL IntraVENous 2 times per day    piperacillin-tazobactam  3,375 mg IntraVENous Q8H    doxycycline (VIBRAMYCIN) IV  100 mg IntraVENous Q12H       Continuous Infusions:   sodium chloride 75 mL/hr at 10/12/21 2144    sodium chloride         PRN Meds:sodium chloride flush, sodium chloride, potassium chloride **OR** potassium alternative oral replacement **OR** potassium chloride, magnesium sulfate, ondansetron **OR** ondansetron, polyethylene glycol, acetaminophen **OR** acetaminophen    Objective     Vitals:  Patient Vitals for the past 8 hrs:   BP Pulse Resp SpO2   10/13/21 0600 121/70 109 16 97 %     Average, Min, and Max for last 24 hours Vitals:  TEMPERATURE:  Temp Av °F (36.7 °C)  Min: 98 °F (36.7 °C)  Max: 98 °F (36.7 °C)    RESPIRATIONS RANGE: Resp  Avg: 15.4  Min: 12  Max: 18    PULSE RANGE: Pulse  Av.2  Min: 90  Max: 109    BLOOD PRESSURE RANGE:  Systolic (26YQS), TQX:981 , Min:106 , NFX:639   ; Diastolic (54TXV), YJR:29, Min:57, Max:74      PULSE OXIMETRY RANGE: SpO2  Av %  Min: 95 %  Max: 99 %    I/O last 3 completed shifts: In: 200 [IV Piggyback:200]  Out: -     CBC:  Recent Labs     10/12/21  1321   WBC 7.7   HGB 13.6   HCT 39.7      CRP 63.9*        BMP:  Recent Labs     10/12/21  1321   *   K 3.5*   CL 96*   CO2 23   BUN 21   CREATININE 0.86   GLUCOSE 101*   CALCIUM 9.9        Coags:  Recent Labs     10/12/21  1321   PROT 7.6       Lab Results   Component Value Date    SEDRATE 28 10/12/2021     Recent Labs     10/12/21  1321   CRP 63.9*       Lower Extremity Physical Exam:  Vascular: DP and PT pulses are faintly palpable. CFT <3 seconds to all digits. Hair growth is absent to the level of the digits. No edema. Neuro: Saph/sural/SP/DP/plantar sensation diminished to light touch. Musculoskeletal: Muscle strength is 5/5 to all lower extremity muscle groups. Dermatologic: Full thickness ulcer #1 located medial hallux. Periwound skin is hyperkeratotic. Purulent drainage noted with no associated mal odor. Erythema noted with associated increase in warmth. Probes deep, difficult to assess if the wound extends to bone given significant necrotic/boggy tissue. Does not appear to sinus track, or undermine. No fluctuance, crepitus, or induration. Significant periwound erythema with associated increased warmth. Clinical Images:      Imaging:   XR FOOT LEFT (MIN 3 VIEWS)   Preliminary Result   Soft tissue ulceration at the medial aspect of the great toe with small focus   of osseous destructive change in the medial aspect of the 1st distal phalanx,   concerning for osteomyelitis.          MRI FOOT LEFT W WO CONTRAST (Results Pending)   VL LOWER EXTREMITY ARTERIAL SEGMENTAL PRESSURES W PPG    (Results Pending)       Cultures:   L foot wound, 10/12/2021: GPC, GPR, GNR    Assessment   Amaury James is a 77 y.o. female with   Non-healing ulcer, left foot  History of a-fib with pacemaker  CHF  CKD-stage III    Active Problems:    Osteomyelitis ([address]  Resolved Problems:    * No resolved hospital problems. *       Plan     Patient examined and evaluated at bedside   Patient admitted to the floor under medicine team for IV antibiotic and possible surgical intervention. Treatment options discussed in detail with the patient  X-ray shows erosive change of proximal and distal phalanx of hallux, left foot. Concern for osteomyelitis  MRI ordered. Might not be able to proceed due to patient's pacemaker. Will consider CT afterward  ABX: IV zoysn and doxycycline given the vancomycin allergy  CRP->63.9 ESR->28  Consult ID due to vancomycin allergy. Dressing applied to Left foot: Betadine, dsd  Weight bearing to heel touch to Left lower extremity with surgical shoe. Discussed with Dr. Sergio Galicia, [facility]   Podiatric Medicine & Surgery   10/13/2021 at 8:20 AM     Senior Resident Statement:  I have discussed the case, including pertinent history and exam findings with the resident. I agree with the assessment, plan and orders as documented above. Electronically signed by Nidia Conteh DPM on 10/13/2021 at 3:21 PM    Electronically signed by Orlando Rabago DPM on 10/14/2021 at 7:53 AM  I performed a history and physical examination of the patient and discussed management with the resident. I reviewed the residents note and agree with the documented findings and plan of care. Any areas of disagreement are noted on the chart. I was personally present for the key portions of any procedures. I have documented in the chart those procedures where I was not present during the key portions.  I have reviewed the Podiatry Resident progress note. I agree with the chief complaint, past medical history, past surgical history, allergies, medications, social and family history as documented unless otherwise noted below. Documentation of the HPI, Physical Exam and Medical Decision Making performed by medical students or scribes is based on my personal performance of the HPI, PE and MDM. I have personally evaluated this patient and have completed at least one if not all key elements of the E/M (history, physical exam, and MDM). Additional findings are as noted.      Bob Vilchis DPM on 10/14/2021 at 9:12 AM  Board Certified, American Board of Podiatric Surgery  Fellow, [employer] [facility] and [facility]

## 2021-10-14 ENCOUNTER — APPOINTMENT (OUTPATIENT)
Dept: MRI IMAGING | Age: 76
DRG: 540 | End: 2021-10-14
Payer: MEDICARE

## 2021-10-14 PROBLEM — M00.9: Status: ACTIVE | Noted: 2021-10-14

## 2021-10-14 LAB
ABSOLUTE EOS #: 0.1 K/UL (ref 0–0.4)
ABSOLUTE IMMATURE GRANULOCYTE: ABNORMAL K/UL (ref 0–0.3)
ABSOLUTE LYMPH #: 1.5 K/UL (ref 1–4.8)
ABSOLUTE MONO #: 0.8 K/UL (ref 0.1–1.2)
BASOPHILS # BLD: 1 % (ref 0–2)
BASOPHILS ABSOLUTE: 0 K/UL (ref 0–0.2)
C-REACTIVE PROTEIN: 28.4 MG/L (ref 0–5)
DIFFERENTIAL TYPE: ABNORMAL
DIGOXIN DATE LAST DOSE: NORMAL
DIGOXIN DOSE AMOUNT: NORMAL
DIGOXIN DOSE TIME: NORMAL
DIGOXIN LEVEL: 0.7 NG/ML (ref 0.5–2)
EOSINOPHILS RELATIVE PERCENT: 2 % (ref 1–4)
HCT VFR BLD CALC: 37.3 % (ref 36–46)
HEMOGLOBIN: 12.7 G/DL (ref 12–16)
IMMATURE GRANULOCYTES: ABNORMAL %
LYMPHOCYTES # BLD: 26 % (ref 24–44)
MCH RBC QN AUTO: 36.1 PG (ref 26–34)
MCHC RBC AUTO-ENTMCNC: 34.2 G/DL (ref 31–37)
MCV RBC AUTO: 105.8 FL (ref 80–100)
MONOCYTES # BLD: 14 % (ref 2–11)
NRBC AUTOMATED: ABNORMAL PER 100 WBC
PDW BLD-RTO: 11.7 % (ref 12.5–15.4)
PLATELET # BLD: 307 K/UL (ref 140–450)
PLATELET ESTIMATE: ABNORMAL
PMV BLD AUTO: 7.1 FL (ref 6–12)
RBC # BLD: 3.53 M/UL (ref 4–5.2)
RBC # BLD: ABNORMAL 10*6/UL
SEDIMENTATION RATE, ERYTHROCYTE: 33 MM (ref 0–30)
SEG NEUTROPHILS: 57 % (ref 36–66)
SEGMENTED NEUTROPHILS ABSOLUTE COUNT: 3.4 K/UL (ref 1.8–7.7)
WBC # BLD: 5.8 K/UL (ref 3.5–11)
WBC # BLD: ABNORMAL 10*3/UL

## 2021-10-14 PROCEDURE — 2580000003 HC RX 258: Performed by: NURSE PRACTITIONER

## 2021-10-14 PROCEDURE — 6360000002 HC RX W HCPCS: Performed by: NURSE PRACTITIONER

## 2021-10-14 PROCEDURE — 97535 SELF CARE MNGMENT TRAINING: CPT

## 2021-10-14 PROCEDURE — 85025 COMPLETE CBC W/AUTO DIFF WBC: CPT

## 2021-10-14 PROCEDURE — 97166 OT EVAL MOD COMPLEX 45 MIN: CPT

## 2021-10-14 PROCEDURE — 99222 1ST HOSP IP/OBS MODERATE 55: CPT | Performed by: INTERNAL MEDICINE

## 2021-10-14 PROCEDURE — 2500000003 HC RX 250 WO HCPCS: Performed by: PODIATRIST

## 2021-10-14 PROCEDURE — 86140 C-REACTIVE PROTEIN: CPT

## 2021-10-14 PROCEDURE — 97116 GAIT TRAINING THERAPY: CPT

## 2021-10-14 PROCEDURE — 6370000000 HC RX 637 (ALT 250 FOR IP): Performed by: NURSE PRACTITIONER

## 2021-10-14 PROCEDURE — 85652 RBC SED RATE AUTOMATED: CPT

## 2021-10-14 PROCEDURE — 2580000003 HC RX 258: Performed by: PODIATRIST

## 2021-10-14 PROCEDURE — 1210000000 HC MED SURG R&B

## 2021-10-14 PROCEDURE — 99232 SBSQ HOSP IP/OBS MODERATE 35: CPT | Performed by: FAMILY MEDICINE

## 2021-10-14 PROCEDURE — 6360000004 HC RX CONTRAST MEDICATION: Performed by: STUDENT IN AN ORGANIZED HEALTH CARE EDUCATION/TRAINING PROGRAM

## 2021-10-14 PROCEDURE — 73720 MRI LWR EXTREMITY W/O&W/DYE: CPT

## 2021-10-14 PROCEDURE — 36415 COLL VENOUS BLD VENIPUNCTURE: CPT

## 2021-10-14 PROCEDURE — A9579 GAD-BASE MR CONTRAST NOS,1ML: HCPCS | Performed by: STUDENT IN AN ORGANIZED HEALTH CARE EDUCATION/TRAINING PROGRAM

## 2021-10-14 PROCEDURE — APPSS30 APP SPLIT SHARED TIME 16-30 MINUTES: Performed by: NURSE PRACTITIONER

## 2021-10-14 PROCEDURE — 97162 PT EVAL MOD COMPLEX 30 MIN: CPT

## 2021-10-14 RX ADMIN — APIXABAN 5 MG: 5 TABLET, FILM COATED ORAL at 22:37

## 2021-10-14 RX ADMIN — SODIUM CHLORIDE, PRESERVATIVE FREE 10 ML: 5 INJECTION INTRAVENOUS at 08:59

## 2021-10-14 RX ADMIN — DOXYCYCLINE 100 MG: 100 INJECTION, POWDER, LYOPHILIZED, FOR SOLUTION INTRAVENOUS at 11:14

## 2021-10-14 RX ADMIN — SODIUM CHLORIDE, PRESERVATIVE FREE 10 ML: 5 INJECTION INTRAVENOUS at 22:37

## 2021-10-14 RX ADMIN — CARVEDILOL 12.5 MG: 12.5 TABLET, FILM COATED ORAL at 17:50

## 2021-10-14 RX ADMIN — TORSEMIDE 20 MG: 20 TABLET ORAL at 08:58

## 2021-10-14 RX ADMIN — OXYCODONE HYDROCHLORIDE AND ACETAMINOPHEN 500 MG: 500 TABLET ORAL at 08:59

## 2021-10-14 RX ADMIN — ACETAMINOPHEN 650 MG: 325 TABLET ORAL at 12:20

## 2021-10-14 RX ADMIN — APIXABAN 5 MG: 5 TABLET, FILM COATED ORAL at 09:03

## 2021-10-14 RX ADMIN — SERTRALINE HYDROCHLORIDE 50 MG: 50 TABLET ORAL at 08:58

## 2021-10-14 RX ADMIN — PIPERACILLIN AND TAZOBACTAM 3375 MG: 3; .375 INJECTION, POWDER, LYOPHILIZED, FOR SOLUTION INTRAVENOUS at 00:51

## 2021-10-14 RX ADMIN — DOXYCYCLINE 100 MG: 100 INJECTION, POWDER, LYOPHILIZED, FOR SOLUTION INTRAVENOUS at 22:37

## 2021-10-14 RX ADMIN — PIPERACILLIN AND TAZOBACTAM 3375 MG: 3; .375 INJECTION, POWDER, LYOPHILIZED, FOR SOLUTION INTRAVENOUS at 08:51

## 2021-10-14 RX ADMIN — GADOTERIDOL 13 ML: 279.3 INJECTION, SOLUTION INTRAVENOUS at 11:37

## 2021-10-14 RX ADMIN — Medication 100 MG: at 08:58

## 2021-10-14 RX ADMIN — CYANOCOBALAMIN TAB 500 MCG 1000 MCG: 500 TAB at 09:03

## 2021-10-14 RX ADMIN — DIGOXIN 125 MCG: 125 TABLET ORAL at 08:58

## 2021-10-14 RX ADMIN — CARVEDILOL 12.5 MG: 12.5 TABLET, FILM COATED ORAL at 08:58

## 2021-10-14 RX ADMIN — PIPERACILLIN AND TAZOBACTAM 3375 MG: 3; .375 INJECTION, POWDER, LYOPHILIZED, FOR SOLUTION INTRAVENOUS at 22:47

## 2021-10-14 ASSESSMENT — PAIN SCALES - GENERAL
PAINLEVEL_OUTOF10: 5
PAINLEVEL_OUTOF10: 3
PAINLEVEL_OUTOF10: 0

## 2021-10-14 ASSESSMENT — PAIN DESCRIPTION - LOCATION: LOCATION: HIP

## 2021-10-14 ASSESSMENT — PAIN DESCRIPTION - PAIN TYPE: TYPE: CHRONIC PAIN

## 2021-10-14 ASSESSMENT — PAIN DESCRIPTION - ORIENTATION: ORIENTATION: RIGHT

## 2021-10-14 NOTE — PROGRESS NOTES
Patient done with MRI. [employer] at bedside, turning defibrillator back on and switching pacemaker back to original settings.  See flowsheets for documented vital signs throughout test.

## 2021-10-14 NOTE — PROGRESS NOTES
Physical Therapy    Facility/Department: [facility] SURG ICU  Initial Assessment    NAME: Raj Whitt  : [id number]  MRN: [id number]    Date of Service: 10/14/2021   Chief Complaint   Patient presents with    Toe Pain       Discharge Recommendations:  Patient would benefit from continued therapy after discharge   PT Equipment Recommendations  Equipment Needed: Yes  Mobility Devices: Aubrie Skip: Rolling  Other: Pt reports having no device at home but would benefit from RW at this time. Assessment   Body structures, Functions, Activity limitations: Decreased functional mobility ; Decreased endurance;Decreased balance;Decreased strength;Decreased safe awareness  Treatment Diagnosis: dec mobility  Prognosis: Good  Decision Making: Medium Complexity  PT Education: Goals;PT Role;Plan of Care;Gait Training;General Safety; Functional Mobility Training;Transfer Training  Patient Education: walker management, weight bearing restriction  REQUIRES PT FOLLOW UP: Yes  Activity Tolerance  Activity Tolerance: Patient Tolerated treatment well       Patient Diagnosis(es): The encounter diagnosis was Osteomyelitis of left foot, unspecified type ([address] has a past medical history of AICD (automatic cardioverter/defibrillator) present, Atrial fibrillation ([address] Atrial fibrillation ([address] Cancer ([address] Chronic kidney disease, H/O cardiovascular stress test, Hx of long term use of blood thinners, Hypertension, Nonischemic dilated cardiomyopathy ([address] Other screening mammogram, and Poor historian. has a past surgical history that includes knee surgery (Right); transesophageal echocardiogram (11/10/2017); other surgical history (10/30/2018); pr office/outpt visit,procedure only (Right, 10/30/2018); Cardiac catheterization (2017); Cardiac defibrillator placement (2018); and Colonoscopy (N/A, 2019).     Restrictions  Restrictions/Precautions  Restrictions/Precautions: Weight Bearing, Fall Risk  Required Supervision  Transfers  Sit to Stand: Stand by assistance  Stand to sit: Stand by assistance  Squat Pivot Transfers: Stand by assistance  Comment: Transfers initially without device then pt used RW. Ambulation  Ambulation?: Yes  More Ambulation?: No  Ambulation 1  Surface: level tile  Device: No Device;Rolling Walker  Assistance: Contact guard assistance;Stand by assistance  Quality of Gait: decreased step length and decreased velocity; pt with difficulty maintaining weight bearing on heel of left foot only  Gait Deviations: Slow Sheila;Decreased step length  Distance: 80' x 1  Comments: Pt initially ambulated without device with mild-moderate balance deficits and increased weight bearing through full foot; with RW pt exhibited improved steadiness and improved maintenance of weight bearing restrictions but still some difficulty maintaining heel only.   Stairs/Curb  Stairs?: No     Balance  Posture: Good  Sitting - Static: Good  Sitting - Dynamic: Good  Standing - Static: Good;-  Standing - Dynamic: Fair;+  Comments: balance assessed with/without RW; pt with improved steadiness with RW vs no device        Plan   Plan  Times per week: 5-6x/week  Times per day: Daily  Current Treatment Recommendations: Strengthening, Endurance Training, Transfer Training, Patient/Caregiver Education & Training, Gait Training, Balance Training, Functional Mobility Training, Safety Education & Training  Safety Devices  Type of devices: Gait belt, Call light within reach, Left in bed, Patient at risk for falls, Bed alarm in place  Restraints  Initially in place: No    G-Code       OutComes Score                                                  AM-PAC Score     AM-PAC Inpatient Mobility without Stair Climbing Raw Score : 17 (10/14/21 1603)  AM-PAC Inpatient without Stair Climbing T-Scale Score : 48.47 (10/14/21 1603)  Mobility Inpatient CMS 0-100% Score: 32.72 (10/14/21 1603)  Mobility Inpatient without Stair CMS G-Code Modifier : Alexander Ramsey (10/14/21 4357)       Goals  Short term goals  Time Frame for Short term goals: 10 visits  Short term goal 1: Pt to be Independent with transfers without device. Short term goal 2: Pt to ambulate Modified (I) with appropriate device > 150' without LOB. Short term goal 3: Improve standing static/dynamic balance to Good with appropriate device as needed to minimize fall risk. Short term goal 4: Pt to perform LE PRE's seated/standing x 15 reps to improve strength for mobility.   Patient Goals   Patient goals : Return  home       Therapy Time   Individual Concurrent Group Co-treatment   Time In 1401         Time Out 1425         Minutes 24         Timed Code Treatment Minutes: 8 Minutes       Bridgette Harris, PT

## 2021-10-14 NOTE — CONSULTS
[facility]  Initial Consult Note  Date: 10/14/2021    Hospital day :2     Impression:   1. Left foot great toe ulceration with associated septic first IP joint and osteomyelitis  2. Dilated nonischemic cardiomyopathy  3. Chronic kidney disease    Recommendations   · The patient is on Zosyn and doxycycline class currently. · At the time of discharge the patient can be switched to oral antibiotic therapy with Omnicef and doxycycline. · When the patient returns for surgery I would recommend daptomycin perioperatively but the patient can continue the above antibiotics at discharge  · We will follow the operative culture data and make recommendations thereafter  · Given that the plan is for a toe amputation she should not require IV antibiotics or long-term antibiotics at the time of discharge after surgery    Chief complaint/reason for consultation:   Left toe ulcer and associated osteomyelitis/septic arthritis    History of Present Illness:   Matt Morin is a 77y.o.-year-old female who was initially admitted on 10/12/2021. Pérez Mcintyre has a history of atrial fibrillation, nonischemic dilated cardiomyopathy, chronic kidney disease, hypertension among other medical problems. The patient reports that the left foot great toe developed a \"skin lesion/ulceration\" nearly about a month ago and she initially thought that it would heal but it progressively started to get larger and worse. The patient was planning on discussing this ulceration with her primary care physician at her next scheduled follow-up but she subsequently reports that she developed increasing pain, redness as well as purulent drainage from the great toe medially and laterally and this prompted her to call her PCP and when she was seen in the office she was recommended to come into the emergency room for evaluation.     The patient did not report any subjective fevers, chills, sweats, abdominal pain, nausea, vomiting or Right     OTHER SURGICAL HISTORY  10/30/2018    excision basal cell right temple    MS OFFICE/OUTPT VISIT,PROCEDURE ONLY Right 10/30/2018    EXCISION BASAL CELL RIGHT TEMPLE performed by Asa Seo MD at [address] [place] [address] TRANSESOPHAGEAL ECHOCARDIOGRAM  11/10/2017    EF 20%. Mod MR. Mild to mod AI. Mild TR. Segmental WMA. Medications:      apixaban  5 mg Oral BID    ascorbic acid  500 mg Oral Daily    carvedilol  12.5 mg Oral BID WC    digoxin  125 mcg Oral Daily    sertraline  50 mg Oral Daily    torsemide  20 mg Oral Daily    thiamine mononitrate  100 mg Oral Daily    vitamin B-12  1,000 mcg Oral Daily    sodium chloride flush  5-40 mL IntraVENous 2 times per day    piperacillin-tazobactam  3,375 mg IntraVENous Q8H    doxycycline (VIBRAMYCIN) IV  100 mg IntraVENous Q12H     Social History:     Social History     Socioeconomic History    Marital status:      Spouse name: Not on file    Number of children: 1    Years of education: Not on file    Highest education level: Not on file   Occupational History    Not on file   Tobacco Use    Smoking status: Former Smoker     Packs/day: 0.50     Years: 20.00     Pack years: 10.00     Quit date: 10/4/1987     Years since quittin.0    Smokeless tobacco: Never Used   Vaping Use    Vaping Use: Never used   Substance and Sexual Activity    Alcohol use: Yes     Alcohol/week: 1.0 standard drinks     Types: 1 Glasses of wine per week     Comment: daily    Drug use: No    Sexual activity: Not on file   Other Topics Concern    Not on file   Social History Narrative    Not on file     Social Determinants of Health     Financial Resource Strain: Low Risk     Difficulty of Paying Living Expenses: Not hard at all   Food Insecurity: No Food Insecurity    Worried About [facility] in the Last Year: Never true    [facility] in the Last Year: Never true   Transportation Needs:     Lack of Transportation (Medical):      Lack of Transportation (Non-Medical):    Physical Activity:     Days of Exercise per Week:     Minutes of Exercise per Session:    Stress:     Feeling of Stress :    Social Connections:     Frequency of Communication with Friends and Family:     Frequency of Social Gatherings with Friends and Family:     Attends Mandaen Services:     Active Member of Clubs or Organizations:     Attends Club or Organization Meetings:     Marital Status:    Intimate Partner Violence:     Fear of Current or Ex-Partner:     Emotionally Abused:     Physically Abused:     Sexually Abused:      Family History:     Family History   Adopted: Yes      Allergies:   Contrast [barium-containing compounds], Vancomycin, and Codeine     Review of Systems:   General: No fevers or chills. Eyes: No double vision or blurry vision. ENT: No sore throat or runny nose. Cardiovascular: No chest pain or palpitations. Lung: No shortness of breath or cough. Abdomen: No nausea, vomiting, diarrhea, or abdominal pain. Genitourinary: No increased urinary frequency, or dysuria. Musculoskeletal: Left great toe ulceration medially with slough in the wound bed  Hematologic: No bleeding or bruising. Neurologic: No headache, weakness, numbness, or tingling. Physical Examination :   /84   Pulse 75   Temp 98.2 °F (36.8 °C) (Oral)   Resp 16   Ht 5' 9\" (1.753 m)   Wt 143 lb 4.8 oz (65 kg)   SpO2 94%   BMI 21.16 kg/m²     Temperature Range: Temp: 98.2 °F (36.8 °C) Temp  Av °F (36.7 °C)  Min: 97.4 °F (36.3 °C)  Max: 98.2 °F (36.8 °C)  General Appearance: Awake, alert, and in no apparent distress  Head: Normocephalic, without obvious abnormality, atraumatic  Eyes: Pupils equal, round, reactive, to light and accommodation; extraocular movements intact; sclera anicteric; conjunctivae pink  ENT: Oropharynx clear, without erythema, exudate, or thrush. Neck: Supple, without lymphadenopathy.    Pulmonary/Chest: Clear to auscultation, without wheezes, rales, or rhonchi  Cardiovascular: Regular rate and rhythm without murmurs, rubs, or gallops. Abdomen: Soft, nontender, nondistended. Extremities: No cyanosis, clubbing, edema, or effusions. Neurologic: No gross sensory or motor deficits. Skin: The patient does have a left medial foot ulceration at the great toe with slough in the wound bed and some mild surrounding cellulitis. There is at the skin is warm and dry with no rash. Medical Decision Making:   I have independently reviewed/ordered the following labs:  CBC with Differential:   Recent Labs     10/12/21  1321 10/14/21  0521   WBC 7.7 5.8   HGB 13.6 12.7   HCT 39.7 37.3    307   LYMPHOPCT 18* 26   MONOPCT 11 14*     BMP:   Recent Labs     10/12/21  1321 10/13/21  0933    135   K 3.5* 3.9   CL 96* 102   CO2 23 22   BUN 21 14   CREATININE 0.86 0.75     Hepatic Function Panel:   Recent Labs     10/12/21  1321   PROT 7.6   LABALBU 4.4   BILITOT 0.60   ALKPHOS 74   ALT 11   AST 18       No results found for: PROCAL    Lab Results   Component Value Date    CRP 28.4 10/14/2021    CRP 63.9 10/12/2021     No results found for: FERRITIN  No results found for: FIBRINOGEN  No results found for: DDIMER  No results found for: LDH    Lab Results   Component Value Date    SEDRATE 33 (H) 10/14/2021       Lab Results   Component Value Date    COVID19 Not Detected 10/13/2021     No results found for requested labs within last 30 days. Imaging Studies:   THREE XRAY VIEWS OF THE LEFT FOOT 10/12/2021 1:26 pm FINDINGS:   Soft tissue ulceration at the medial aspect of the great toe with small focus of osseous destructive change in the medial aspect of the 1st distal phalanx, concerning for osteomyelitis. MRI OF THE LEFT FOOT WITH AND WITHOUT CONTRAST, 10/14/2021 11:15 am   FINDINGS:   1. Ulcer exposing the medial 1st IP joint with subjacent septic 1st IP joint and osteomyelitis as detailed above.   Probable infectious tenosynovitis of the flexor and extensor tendons to the 1st digit at the level of the 1st IP joint. 2. Signal abnormality in the phalanges of the 3rd digit which could represent sequela of trauma, reactive osteitis or osteomyelitis. Osteomyelitis considered less likely as no associated ulceration is identified, however. 3. Mild degenerative changes as detailed above. 4. Moderate left great toe cellulitis. 5. Ravinder-bursal formation/pressure lesion subjacent to the 5th MTP joint. The findings were sent to the Radiology Results [address] at 1:18 pm on 10/14/2021to be communicated to a licensed caregiver. Cultures:     Culture, Wound [[id number]] (Abnormal) Collected: 10/12/21 1345   Order Status: Completed Specimen: Foot Updated: 10/14/21 1012    Specimen Description . FOOT    Special Requests L GREAT TOE    Direct Exam MODERATE NEUTROPHILSAbnormal      MODERATE GRAM POSITIVE COCCI IN PAIRSAbnormal      FEW GRAM POSITIVE RODSAbnormal      MODERATE GRAM NEGATIVE RODSAbnormal     Culture NORMAL SKIN FLORAAbnormal    Culture, Blood 1 [[id number]] Collected: 10/12/21 1321   Order Status: Completed Specimen: Blood Updated: 10/14/21 0345    Specimen Description . BLOOD    Special Requests 20 ML LEFT FOREARM    Culture NO GROWTH 2 DAYS   Culture, Blood 1 [[id number]] Collected: 10/12/21 1358   Order Status: Completed Specimen: Blood Updated: 10/14/21 0345    Specimen Description . BLOOD    Special Requests 20ML Right Arm    Culture NO GROWTH 2 DAYS   COVID-19, Rapid [[id number]] Collected: 10/13/21 0327   Order Status: Completed Specimen: Nasopharyngeal Swab Updated: 10/13/21 0355    Specimen Description . NASOPHARYNGEAL SWAB    SARS-CoV-2, Rapid Not Detected    Comment:        Rapid NAAT:  The specimen is NEGATIVE for SARS-CoV-2, the novel coronavirus associated with   COVID-19.         The ID NOW COVID-19 assay is designed to detect the virus that causes COVID-19 in patients   with signs and symptoms of infection who are suspected of COVID-19. An individual without symptoms of COVID-19 and who is not shedding SARS-CoV-2 virus would   expect to have a negative (not detected) result in this assay. Negative results should be treated as presumptive and, if inconsistent with clinical signs   and symptoms or necessary for patient management,   should be tested with an alternative molecular assay. Negative results do not preclude   SARS-CoV-2 infection and   should not be used as the sole basis for patient management decisions.         Fact sheet for Healthcare Providers: August   Fact sheet for Patients: August           Methodology: Isothermal Nucleic Acid Amplification               Thank you for allowing us to participate in the care of this patient. Please call with questions. Electronically signed by Etienne Ugalde MD on 10/14/2021 at 6:55 PM      [facility]  Etienne Ugalde MD  [employer] messaging  OFFICE: [phone]      This note is created with the assistance of a speech recognition program.  While intending to generate a document that actually reflects the content of the visit, the document can still have some errors including those of syntax and sound a like substitutions which may escape proof reading. In such instances, actual meaning can be extrapolated by contextual diversion.

## 2021-10-14 NOTE — PLAN OF CARE
Pt Aox4. Up to bathroom independently. Refer to [facility] for medications provided to pain/tenderness to right awm. Safety maintained. Will continue to monitor.        Problem: Infection:  Goal: Will remain free from infection  Description: Will remain free from infection  10/14/2021 0254 by Tonja Rosa RN  Outcome: Ongoing  10/13/2021 1755 by Carmen Valle RN  Outcome: Ongoing  10/13/2021 1345 by Carmen Valle RN  Outcome: Ongoing     Problem: Safety:  Goal: Free from accidental physical injury  Description: Free from accidental physical injury  10/14/2021 0254 by Tonja Rosa RN  Outcome: Ongoing  10/13/2021 1755 by Carmen Valle RN  Outcome: Ongoing  10/13/2021 1345 by Carmen Valle RN  Outcome: Ongoing  Goal: Free from intentional harm  Description: Free from intentional harm  10/14/2021 0254 by Tonja Rosa RN  Outcome: Ongoing  10/13/2021 1755 by Carmen Valle RN  Outcome: Ongoing  10/13/2021 1345 by Carmen Valle RN  Outcome: Ongoing     Problem: Daily Care:  Goal: Daily care needs are met  Description: Daily care needs are met  10/14/2021 0254 by Tonja Rosa RN  Outcome: Ongoing  10/13/2021 1755 by Carmen Valle RN  Outcome: Ongoing  10/13/2021 1345 by Carmen Valle RN  Outcome: Ongoing     Problem: Pain:  Goal: Patient's pain/discomfort is manageable  Description: Patient's pain/discomfort is manageable  10/14/2021 0254 by Tonja Rosa RN  Outcome: Ongoing  10/13/2021 1755 by Carmen Valle RN  Outcome: Ongoing  10/13/2021 1345 by Carmen Valle RN  Outcome: Ongoing  Goal: Pain level will decrease  Description: Pain level will decrease  10/14/2021 0254 by Tonja Rosa RN  Outcome: Ongoing  10/13/2021 1755 by Carmen Valle RN  Outcome: Ongoing  10/13/2021 1345 by Carmen Valle RN  Outcome: Ongoing  Goal: Control of acute pain  Description: Control of acute pain  10/14/2021 0254 by Tonja Rosa RN  Outcome: Ongoing  10/13/2021 1755 by Carmen Valle RN  Outcome: Ongoing  10/13/2021 1345 by Sonali Gamez RN  Outcome: Ongoing  Goal: Control of chronic pain  Description: Control of chronic pain  10/14/2021 0254 by Ingrid Zuluaga RN  Outcome: Ongoing  10/13/2021 1755 by Sonali Gamez RN  Outcome: Ongoing  10/13/2021 1345 by Sonali Gamez RN  Outcome: Ongoing     Problem: Skin Integrity:  Goal: Skin integrity will stabilize  Description: Skin integrity will stabilize  10/14/2021 0254 by Ingrid Zuluaga RN  Outcome: Ongoing  10/13/2021 1755 by Sonali Gamez RN  Outcome: Ongoing  10/13/2021 1345 by Sonali Gamez RN  Outcome: Ongoing     Problem: Discharge Planning:  Goal: Patients continuum of care needs are met  Description: Patients continuum of care needs are met  10/14/2021 0254 by Ingrid Zuluaga RN  Outcome: Ongoing  10/13/2021 1755 by Sonali Gamez RN  Outcome: Ongoing  10/13/2021 1345 by Sonali Gamez RN  Outcome: Ongoing     Problem: Nutritional:  Goal: Nutritional status will improve  Description: Nutritional status will improve  10/14/2021 0254 by Ingrid Zuluaga RN  Outcome: Ongoing  10/13/2021 1755 by Sonali Gamez RN  Outcome: Ongoing  10/13/2021 1345 by Sonali Gamez RN  Outcome: Ongoing     Problem: Physical Regulation:  Goal: Will remain free from infection  Description: Will remain free from infection  10/14/2021 0254 by Ingrid Zuluaga RN  Outcome: Ongoing  10/13/2021 1755 by Sonali Gamez RN  Outcome: Ongoing  10/13/2021 1345 by Sonali Gamez RN  Outcome: Ongoing  Goal: Diagnostic test results will improve  Description: Diagnostic test results will improve  10/14/2021 0254 by Ingrid Zuluaga RN  Outcome: Ongoing  10/13/2021 1755 by Sonali Gamez RN  Outcome: Ongoing  10/13/2021 1345 by Sonali Gamez RN  Outcome: Ongoing  Goal: Ability to maintain vital signs within normal range will improve  Description: Ability to maintain vital signs within normal range will improve  10/14/2021 0254 by Ingrid Zuluaga RN  Outcome: Ongoing  10/13/2021 1755 by America Lombardo RN  Outcome: Ongoing  10/13/2021 1345 by America Lombardo RN  Outcome: Ongoing     Problem: Respiratory:  Goal: Ability to maintain normal respiratory secretions will improve  Description: Ability to maintain normal respiratory secretions will improve  10/14/2021 0254 by Harika Lakhani RN  Outcome: Ongoing  10/13/2021 1755 by America Lombardo RN  Outcome: Ongoing  10/13/2021 1345 by America Lombardo RN  Outcome: Ongoing     Problem: Skin Integrity:  Goal: Demonstration of wound healing without infection will improve  Description: Demonstration of wound healing without infection will improve  10/14/2021 0254 by Harika Lakhani RN  Outcome: Ongoing  10/13/2021 1755 by America Lombardo RN  Outcome: Ongoing  10/13/2021 1345 by America Lombardo RN  Outcome: Ongoing  Goal: Complications related to intravenous access or infusion will be avoided or minimized  Description: Complications related to intravenous access or infusion will be avoided or minimized  10/14/2021 0254 by Harika Lakhani RN  Outcome: Ongoing  10/13/2021 1755 by America Lombardo RN  Outcome: Ongoing  10/13/2021 1345 by America Lombardo RN  Outcome: Ongoing

## 2021-10-14 NOTE — PROGRESS NOTES
Occupational Therapy   Occupational Therapy Initial Assessment  Date: 10/14/2021   Patient Name: Rachna Guajardo  MRN: [id number]     : 1945    Date of Service: 10/14/2021    Chief Complaint   Patient presents with    Toe Pain     Discharge Recommendations:  Patient would benefit from continued therapy after discharge  OT Equipment Recommendations  Equipment Needed: Yes  Mobility Devices: Jodeen Risen; ADL Assistive Devices  Walker: Rolling  ADL Assistive Devices: Shower Chair with back    Assessment   Performance deficits / Impairments: Decreased functional mobility ; Decreased ADL status; Decreased safe awareness;Decreased balance;Decreased endurance;Decreased high-level IADLs  Prognosis: Good  Decision Making: Medium Complexity  OT Education: OT Role;Plan of Care;Precautions; ADL Adaptive Strategies;Transfer Training;Energy Conservation;Equipment (Use of Surgical Shoe, Weight Bearing through the Heel to LLE, Safety with Transfers/RW Mngt; fair return)  REQUIRES OT FOLLOW UP: Yes  Activity Tolerance  Activity Tolerance: Patient limited by fatigue  Safety Devices  Safety Devices in place: Yes  Type of devices: Call light within reach;Nurse notified; Left in bed  Restraints  Initially in place: No           Patient Diagnosis(es): The encounter diagnosis was Osteomyelitis of left foot, unspecified type ([address] has a past medical history of AICD (automatic cardioverter/defibrillator) present, Atrial fibrillation ([address] Atrial fibrillation ([address] Cancer ([address] Chronic kidney disease, H/O cardiovascular stress test, Hx of long term use of blood thinners, Hypertension, Nonischemic dilated cardiomyopathy ([address] Other screening mammogram, and Poor historian. has a past surgical history that includes knee surgery (Right); transesophageal echocardiogram (11/10/2017); other surgical history (10/30/2018); pr office/outpt visit,procedure only (Right, 10/30/2018); Cardiac catheterization (2017);  Cardiac defibrillator placement (04/06/2018); and Colonoscopy (N/A, 02/06/2019). Restrictions  Restrictions/Precautions  Restrictions/Precautions: Weight Bearing, Fall Risk  Required Braces or Orthoses?: Yes  Lower Extremity Weight Bearing Restrictions  Left Lower Extremity Weight Bearing: Weight Bearing As Tolerated  Required Braces or Orthoses  Left Lower Extremity Brace:  (surgical shoe)  Position Activity Restriction  Other position/activity restrictions: Up with assistance; weight on heel of (L) foot only    Subjective   General  Patient assessed for rehabilitation services?: Yes  Family / Caregiver Present: Yes (son)  General Comment  Comments: RN ok'd for therapy this PM. Pt agreeable to participate in session and pleasant throughout. Pt denies pain.     Oxygen Therapy  SpO2: 94 % (on room air)    Social/Functional History  Social/Functional History  Lives With: Alone  Type of Home: Apartment  Home Layout: One level  Home Access: Level entry  Bathroom Shower/Tub: Tub/Shower unit  Bathroom Toilet: Standard  Bathroom Equipment: Grab bars in shower  Bathroom Accessibility: Accessible  Home Equipment:  (pt reports none)  Receives Help From: Family (Pt reports supportive son)  ADL Assistance: Independent  Homemaking Assistance: Independent  Homemaking Responsibilities: Yes  Ambulation Assistance: Independent  Transfer Assistance: Independent  Occupation: Retired  Leisure & Hobbies: Enjoys spending time with family       Objective   Vision: Impaired  Vision Exceptions: Wears glasses at all times  Hearing: Within functional limits    Orientation  Overall Orientation Status: Within Functional Limits        Balance  Sitting Balance:  (seated EOB ~10-12 minutes while seated EOB)  Standing Balance: Contact guard assistance  Standing Balance  Time: ~2 minutes, ~3 minutes  Activity: static standing at EOB, functional mobility within hospital room  Comment: Pt educated on use of surgical shoe and heel WBing to LLE, pt verbalized understanding. During standing/mobility pt provided max verbal cues and repeated demonstration on heel WBing, pt would maintain heel WBing for ~1-2 steps however unable to maintain for more than a few steps. ADL  Feeding: Independent  Grooming: Increased time to complete;Verbal cueing;Contact guard assistance  UE Bathing: Verbal cueing; Increased time to complete;Contact guard assistance  LE Bathing: Increased time to complete;Verbal cueing; Moderate assistance  UE Dressing: Verbal cueing; Increased time to complete;Contact guard assistance  LE Dressing: Increased time to complete;Verbal cueing; Moderate assistance  Toileting: Increased time to complete;Minimal assistance     Tone RUE  RUE Tone: Normotonic  Tone LUE  LUE Tone: Normotonic  Coordination  Movements Are Fluid And Coordinated: Yes        Bed mobility  Supine to Sit: Stand by assistance (HOB raised ~30* and use of bed rail)  Sit to Supine: Stand by assistance  Scooting: Stand by assistance     Transfers  Sit to stand: Contact guard assistance  Stand to sit: Contact guard assistance  Transfer Comments:  Mod VCs for proper hand placement/sequencing/safety awareness with use of RW, pt with noted impulsivity and poor ability to maintain weight bearing through the heel during functional transfers        Cognition  Overall Cognitive Status: Exceptions (Decreased safety awareness, decreased insight)           Sensation  Overall Sensation Status: Impaired (baseline with N/T (B) feet)        LUE AROM (degrees)  LUE AROM : WFL  RUE AROM (degrees)  RUE AROM : WFL  LUE Strength  Gross LUE Strength: WFL  RUE Strength  Gross RUE Strength: WFL           Plan   Plan  Times per week: 5-6x/wk  Current Treatment Recommendations: Balance Training, Functional Mobility Training, Safety Education & Training, Endurance Training, Patient/Caregiver Education & Training, Equipment Evaluation, Education, & procurement, Self-Care / ADL, Home Management Training    AM-PAC Score  AM-PAC Inpatient Daily Activity Raw Score: 19 (10/14/21 1654)  AM-PAC Inpatient ADL T-Scale Score : 40.22 (10/14/21 1654)  ADL Inpatient CMS 0-100% Score: 42.8 (10/14/21 1654)  ADL Inpatient CMS G-Code Modifier : CK (10/14/21 1654)    Goals  Short term goals  Time Frame for Short term goals: 14 visits  Short term goal 1: Pt will perform ADL tasks with mod IND using AE/DME PRN  Short term goal 2: Pt will perform functional transfers/functional mobility, while maintaining heel WBing to LLE with surgical shoe in place, with mod IND using least restrictive AD  Short term goal 3: Pt will independently demo good safety awareness during engagement in all ADLs and functional transfers/functional mobility       Therapy Time   Individual Concurrent Group Co-treatment   Time In 1401         Time Out 1425         Minutes 24         Timed Code Treatment Minutes: 8 Minutes       Dee Dee Barlow, OTR/L

## 2021-10-14 NOTE — PROGRESS NOTES
Progress Note  Podiatric Medicine and Surgery     Subjective     CC: left hallux wound    Patient seen and examined at bedside. No acute events overnight. Patient states pain is well controlled to the foot. Afebrile, vital signs stable   Patient is going to MRI today at 11:40 am    HPI :  Humberto Sanchez is a 77 y.o. female seen at [facility] ED for left hallux wound. Patient reports wound started about a month ago. She doesn't recall how it started. Today, she went to visit her PCP who told patient to come to the ED. Upon ED visit, x-ray was taken which showed erosive change on the proximal and distal phalanx of hallux. Patient has a significant history of A-fib with pacemaker, CHF and CKD. Although she is not diabetic, she reports she doesn't have much sensation on her feet. Today, patient is accompanied by her son at bedside. ROS: Denies N/V/F/C/SOB/CP. Otherwise negative except at stated in the HPI.      Medications:  Scheduled Meds:   apixaban  5 mg Oral BID    ascorbic acid  500 mg Oral Daily    carvedilol  12.5 mg Oral BID WC    digoxin  125 mcg Oral Daily    sertraline  50 mg Oral Daily    torsemide  20 mg Oral Daily    thiamine mononitrate  100 mg Oral Daily    vitamin B-12  1,000 mcg Oral Daily    sodium chloride flush  5-40 mL IntraVENous 2 times per day    piperacillin-tazobactam  3,375 mg IntraVENous Q8H    doxycycline (VIBRAMYCIN) IV  100 mg IntraVENous Q12H       Continuous Infusions:   sodium chloride         PRN Meds:sodium chloride flush, sodium chloride, potassium chloride **OR** potassium alternative oral replacement **OR** potassium chloride, magnesium sulfate, ondansetron **OR** ondansetron, polyethylene glycol, acetaminophen **OR** acetaminophen    Objective     Vitals:  Patient Vitals for the past 8 hrs:   BP Temp Temp src Pulse Resp SpO2   10/14/21 0849 126/61 98.1 °F (36.7 °C) Oral 98 18 98 %   10/14/21 0500 (!) 146/86 97.4 °F (36.3 °C) Oral 106 17 96 %     Average, Min, and Max for last 24 hours Vitals:  TEMPERATURE:  Temp  Av.2 °F (36.8 °C)  Min: 97.4 °F (36.3 °C)  Max: 98.7 °F (37.1 °C)    RESPIRATIONS RANGE: Resp  Av.6  Min: 15  Max: 18    PULSE RANGE: Pulse  Av.5  Min: 65  Max: 106    BLOOD PRESSURE RANGE:  Systolic (39UQD), AZW:641 , Min:103 , DYM:205   ; Diastolic (23PKN), LTA:69, Min:53, Max:93      PULSE OXIMETRY RANGE: SpO2  Av.4 %  Min: 95 %  Max: 98 %    I/O last 3 completed shifts: In: 1984.6 [P.O.:300; I.V.:1371.3; IV Piggyback:313.3]  Out: 750 [Urine:750]    CBC:  Recent Labs     10/12/21  1321 10/14/21  0521   WBC 7.7 5.8   HGB 13.6 12.7   HCT 39.7 37.3    307   CRP 63.9*  --         BMP:  Recent Labs     10/12/21  1321 10/13/21  0933    135   K 3.5* 3.9   CL 96* 102   CO2 23 22   BUN 21 14   CREATININE 0.86 0.75   GLUCOSE 101* 98   CALCIUM 9.9 9.1        Coags:  Recent Labs     10/12/21  1321   PROT 7.6       Lab Results   Component Value Date    SEDRATE 28 10/12/2021     Recent Labs     10/12/21  1321   CRP 63.9*       Lower Extremity Physical Exam:  Vascular: DP and PT pulses are faintly palpable. CFT <3 seconds to all digits. Hair growth is absent to the level of the digits. No edema. Neuro: Saph/sural/SP/DP/plantar sensation diminished to light touch. Musculoskeletal: Muscle strength is 5/5 to all lower extremity muscle groups. Dermatologic: Full thickness ulcer #1 located medial hallux. Periwound skin is hyperkeratotic. Purulent drainage noted with no associated mal odor. Erythema noted with associated increase in warmth. Probes deep, difficult to assess if the wound extends to bone given significant necrotic/boggy tissue. Does not appear to sinus track, or undermine. No fluctuance, crepitus, or induration. Significant periwound erythema with associated increased warmth.         Clinical Images:          Imaging:   XR FOOT LEFT (MIN 3 VIEWS)   Final Result   Soft tissue ulceration at the medial aspect of the great toe with small focus   of osseous destructive change in the medial aspect of the 1st distal phalanx,   concerning for osteomyelitis. MRI FOOT LEFT W WO CONTRAST    (Results Pending)   VL LOWER EXTREMITY ARTERIAL SEGMENTAL PRESSURES W PPG    (Results Pending)       Cultures:   L foot wound, 10/12/2021: GPC, GPR, GNR    Assessment   Jaun Ibarra is a 77 y.o. female with   Non-healing ulcer, left foot  History of a-fib with pacemaker  CHF  CKD-stage III    Principal Problem:    Osteomyelitis ([address]  Active Problems:    AICD (automatic cardioverter/defibrillator) Implant    Nonischemic cardiomyopathy ([address]    Systolic CHF, chronic ([address]    CKD (chronic kidney disease), stage III ([address]    Vitamin B deficiency    Essential hypertension  Resolved Problems:    * No resolved hospital problems. *       Plan     Patient examined and evaluated at bedside   Patient admitted to the floor under medicine team for IV antibiotic. Treatment options discussed in detail with the patient  X-ray shows erosive change of proximal and distal phalanx of hallux, left foot. Concern for osteomyelitis  Patient is going to MRI at 11:40am today with rep at bedside for pacemaker instruction. Will need to schedule PVR study as outpatient to evaluate vascular status for surgical planning. Then have patient come back for surgery as outpatient. ABX: IV zoysn and doxycycline given the vancomycin allergy  New CRP and ESR ordered. PT/OT ordered   Consult ID due to vancomycin allergy. Appreciate recommendation. Dressing applied to Left foot: Betadine, dsd, ace  Weight bearing to heel touch to Left lower extremity with surgical shoe. Discussed with Dr. Terrance Worthington, [facility]   Podiatric Medicine & Surgery   10/14/2021 at 9:41 AM     Senior Resident Statement:  I have discussed the case, including pertinent history and exam findings with the resident. I agree with the assessment, plan and orders as documented above.        Electronically signed by Vlad Willard DPM on 10/14/2021 at 9:33 PM    I performed a history and physical examination of the patient and discussed management with the resident. I reviewed the residents note and agree with the documented findings and plan of care. Any areas of disagreement are noted on the chart. I was personally present for the key portions of any procedures. I have documented in the chart those procedures where I was not present during the key portions. I have reviewed the Podiatry Resident progress note. I agree with the chief complaint, past medical history, past surgical history, allergies, medications, social and family history as documented unless otherwise noted below. Documentation of the HPI, Physical Exam and Medical Decision Making performed by medical students or scribes is based on my personal performance of the HPI, PE and MDM. I have personally evaluated this patient and have completed at least one if not all key elements of the E/M (history, physical exam, and MDM). Additional findings are as noted.      Tommy Galeas DPM on 10/15/2021 at 8:09 AM  Board Certified, American Board of Podiatric Surgery  Fellow, [employer] [facility] [employer] [facility]

## 2021-10-14 NOTE — PROGRESS NOTES
Physician Progress Note      Bouchra Freitas  CSN #:                  [id number]  :                       1945  ADMIT DATE:       10/12/2021 1:05 PM  [address] [place] DATE:   Doug  PROVIDER #:        Hilario Elizalde NP          QUERY TEXT:    Patient admitted with Osteomyelitis , noted to have atrial fibrillation. If   possible, please document in progress notes and discharge summary further   specificity regarding the type of atrial fibrillation: The medical record reflects the following:  Risk Factors: history of a fib, aicd implant  Clinical Indicators: EKG on admission shows atrial fibrillation noted home   meds include Lanoxin and Eliquis  Treatment: ***    Chronic: nonspecific term that could be referring to paroxysmal, persistent,   or permanent  Longstanding persistent: persistent and continuous, lasting > 1 year. Paroxysmal - self-terminating or intermittent; resolves with or without   intervention within 7 days of onset; may recur with various frequency. Persistent - Fails to terminate within 7 days; Often requires meds or   cardioversion to restore to NSR. Permanent - longstanding & persistent; Medication has been ineffective in   restoring NSR &/or cardioversion is contraindicated    Definitions per MS-DRG Training Guide and Quick Reference Guide, [facility] 5   Diseases and Disorders of the Circulatory System; 2019; [employer]. Software content   from the [employer]?  Advanced [employer] Transformation Program    thank you Call if questions Cherene Claude BSN [facility]                                                                                                                                                                                                                                                                                                                                 [phone]  Options provided:  -- Paroxysmal Atrial Fibrillation  -- Longstanding Persistent Atrial Fibrillation  -- Permanent Atrial Fibrillation  -- Persistent Atrial Fibrillation  -- Chronic Atrial Fibrillation, unspecified  -- Other - I will add my own diagnosis  -- Disagree - Not applicable / Not valid  -- Disagree - Clinically unable to determine / Unknown  -- Refer to Clinical Documentation Reviewer    PROVIDER RESPONSE TEXT:    This patient has unspecified chronic atrial fibrillation. Query created by:  Tami Enriquez on 10/14/2021 9:47 AM      Electronically signed by:  Hilario Elizalde NP 10/14/2021 3:36 PM

## 2021-10-14 NOTE — PROGRESS NOTES
Pt's IV infiltrated while running doxycycline; writer pulled IV; contacted pharmacist to confirm applying warm compress to site; area red/swollen; pt denies numbness and pain in the area at this time; compress applied; pt resting comfortably; continue to monitor

## 2021-10-14 NOTE — PROGRESS NOTES
[facility]  Office: [address], DO, Juen Prophet, DO, Taylor Perry, DO, Soraida Bradford, DO, Lois Bedoya MD, Donna Dillon MD, Eric Fagan MD, Linden Good MD, Sonia Martinez MD, Ian Gomez MD, María Nogueira MD, Fadumo Stephenson MD, Candice Sanchez, DO, Salvatore Stephenson, DO, Valarie Bridges MD,  Claudetta Shilling, DO, Astrid Lou MD, Paul Fagan MD, Rin James MD, Helena Kothari MD, Garland Ponce MD, Maria R Randall MD, Maximo Guzman, [facility], [facility], CNP, Cindi Lock, CNP, Tony Loredo, CNS, Jerry Liz, CNP, Winnie Mukherjee, CNP, Roxana Hyde, CNP, Kait Muniz, CNP, Vania Kearns, CNP, Hermelindo Green, CNP, Kathleen Ramirez PA-C, Chris Schmidt, [facility], Anayeli Yip, CNP, Ming Zarco, CNP, Kristi Peacock, CNP, Consuelo Hernandez, CNP, Tasha Basurto, CNP, Lamin Colon, CNP, [address]    Progress Note    10/14/2021    9:42 AM    Name:   Rachna Guajardo  MRN:     [id number]     Acct:      [de-identified]   Room:   [address] [place] [address] Day:  2  Admit Date:  10/12/2021  1:05 PM    PCP:   JOSÉ Prince CNP  Code Status:  Full Code    Subjective:     C/C: Feeling better  Chief Complaint   Patient presents with    Toe Pain     Interval History Status: improved. Patient resting in bed. She is denying pain. She has no complaints of abdominal pain, nausea, headache, vomiting. No complaints of fever or chills, chest pain, or shortness of breath. No issues reported overnight per nursing. Brief History:     Per previous documentation:    Rachna Guajardo is a 77 y.o. Non- / non  female who presents with Toe Pain   and is admitted to the hospital for the management of Osteomyelitis ([facility] [address]    Patient reports wound on her toe started over a month ago. She denies injuring her toe.   Her son tells me that patient has a shuffling gait and wears boat shoes, so it is suspected that perhaps the wound was caused by the issue with her shuffling gait that is secondary to severe bilateral hip arthritis. Patient is not a diabetic. She has a significant history of permanent pacemaker/AICD, heart failure, arthritis. She is a former smoker and quit smoking 25 years ago. She also reports that she has a glass of wine nightly. She came to the ED for evaluation due to increased swelling of the toe and increased pain.        While in the ED, X ray of the left foot was taken and revealed a soft tissue ulceration at the medial aspect of the great toe with a small focus of osseous destructive change in the medial aspect of the first distal phalanx, concerning for osteomyelitis. Podiatry was consulted and she was started on IV Zosyn. She has an allergy to vancomycin. She was also started on doxycycline IV. Infectious disease was consulted to assist with antibiotic management. Discussed case with podiatry resident. Plan for patient to obtain arterial studies as outpatient prior to any surgical procedure to the affected foot. Eliquis resumed. Await further recommendations from infectious disease. IV Zosyn and IV doxycycline continue. Review of Systems:     Constitutional:  negative for chills, fevers, sweats  Respiratory:  negative for cough, dyspnea on exertion, shortness of breath, wheezing  Cardiovascular:  negative for chest pain, chest pressure/discomfort, lower extremity edema, palpitations  Gastrointestinal:  negative for abdominal pain, constipation, diarrhea, nausea, vomiting  Neurological:  negative for dizziness, headache    Medications: Allergies:     Allergies   Allergen Reactions    Contrast [Barium-Containing Compounds]      Unknown     Vancomycin Other (See Comments)     unknown    Codeine Rash       Current Meds:   Scheduled Meds:    apixaban  5 mg Oral BID    ascorbic acid  500 mg Oral Daily    carvedilol  12.5 mg Oral BID WC    digoxin  125 mcg Oral Daily    sertraline  50 mg Oral Daily    torsemide  20 mg Oral Daily    thiamine mononitrate  100 mg Oral Daily    vitamin B-12  1,000 mcg Oral Daily    sodium chloride flush  5-40 mL IntraVENous 2 times per day    piperacillin-tazobactam  3,375 mg IntraVENous Q8H    doxycycline (VIBRAMYCIN) IV  100 mg IntraVENous Q12H     Continuous Infusions:    sodium chloride       PRN Meds: sodium chloride flush, sodium chloride, potassium chloride **OR** potassium alternative oral replacement **OR** potassium chloride, magnesium sulfate, ondansetron **OR** ondansetron, polyethylene glycol, acetaminophen **OR** acetaminophen    Data:     Past Medical History:   has a past medical history of AICD (automatic cardioverter/defibrillator) present, Atrial fibrillation ([facility] [address] Atrial fibrillation ([facility] [address] Cancer ([facility] [address] Chronic kidney disease, H/O cardiovascular stress test, Hx of long term use of blood thinners, Hypertension, Nonischemic dilated cardiomyopathy ([facility] [address] Other screening mammogram, and Poor historian. Social History:   reports that she quit smoking about 34 years ago. She has a 10.00 pack-year smoking history. She has never used smokeless tobacco. She reports current alcohol use of about 1.0 standard drinks of alcohol per week. She reports that she does not use drugs. Family History:   Family History   Adopted: Yes       Vitals:  /61   Pulse 98   Temp 98.1 °F (36.7 °C) (Oral)   Resp 18   Ht 5' 9\" (1.753 m)   Wt 143 lb 4.8 oz (65 kg)   SpO2 98%   BMI 21.16 kg/m²   Temp (24hrs), Av.2 °F (36.8 °C), Min:97.4 °F (36.3 °C), Max:98.7 °F (37.1 °C)    No results for input(s): POCGLU in the last 72 hours. I/O (24Hr):     Intake/Output Summary (Last 24 hours) at 10/14/2021 0942  Last data filed at 10/14/2021 0910  Gross per 24 hour   Intake 1984.59 ml   Output 1100 ml   Net 884.59 ml       Labs:  Hematology:  Recent Labs     10/12/21  1321 10/14/21  0521   WBC 7.7 5.8   RBC 3.78* 3.53*   HGB 13.6 12.7   HCT 39.7 37.3   .0* 105.8*   MCH 35.9* 36.1*   MCHC 34.2 34.2   RDW 11.8* 11.7*    307   MPV 7.2 7.1   SEDRATE 28  --    CRP 63.9*  --      Chemistry:  Recent Labs     10/12/21  1321 10/13/21  0933    135   K 3.5* 3.9   CL 96* 102   CO2 23 22   GLUCOSE 101* 98   BUN 21 14   CREATININE 0.86 0.75   ANIONGAP 19* 11   LABGLOM >60 >60   GFRAA >60 >60   CALCIUM 9.9 9.1   DIGOXIN  --  0.7     Recent Labs     10/12/21  1321   PROT 7.6   LABALBU 4.4   AST 18   ALT 11   ALKPHOS 74   BILITOT 0.60     ABG:No results found for: POCPH, PHART, PH, POCPCO2, MMO4CYU, PCO2, POCPO2, PO2ART, PO2, POCHCO3, TJI0PDY, HCO3, NBEA, PBEA, BEART, BE, THGBART, THB, TAD8TGU, NOFS1ETT, L1OVFZTN, O2SAT, FIO2  Lab Results   Component Value Date/Time    SPECIAL 20ML Right Arm 10/12/2021 01:58 PM     Lab Results   Component Value Date/Time    CULTURE NO GROWTH 2 DAYS 10/12/2021 01:58 PM       Radiology:  XR FOOT LEFT (MIN 3 VIEWS)    Result Date: 10/13/2021  Soft tissue ulceration at the medial aspect of the great toe with small focus of osseous destructive change in the medial aspect of the 1st distal phalanx, concerning for osteomyelitis.        Physical Examination:       General appearance:  alert, cooperative and no distress  Mental Status:  oriented to person, place and time and normal affect  Lungs:  clear to auscultation bilaterally, normal effort  Heart:  regular rate and rhythm, no murmur  Abdomen:  soft, nontender, nondistended, normal bowel sounds, no masses, hepatomegaly, splenomegaly  Extremities:  no edema, redness, tenderness in the calves  Skin:  no gross lesions, rashes, induration    Assessment:     Hospital Problems         Last Modified POA    * (Principal) Osteomyelitis ([address] 10/13/2021 Yes    AICD (automatic cardioverter/defibrillator) Implant 10/13/2021 Yes    Nonischemic cardiomyopathy ([address] 10/13/2021 Yes    Overview Addendum 5/14/2018 10:02 PM by JOSÉ Tucker - MARIA E     Correspondence dated November 30, 2017 from Dr. Stuart Lopez reviewed on 12/18/17, see Media tab of Chart Review for more information. Correspondence dated January 9, 2018 from Dr. Stuart Lopez, reviewed on 1/11/2018, see media tab of chart for more information   EF 10-15%  Correspondence dated April 3, 2018 from Dr. Stuart Lopez, reviewed on 4/4/2018, see media tab of chart for more information   Admitted to hospital for ICD placement. Correspondence dated May 8, 2018 from Dr. Stuart Lopez, reviewed on 5/14/2018, see media tab of chart for more information          Systolic CHF, chronic ([address] (Chronic) 10/13/2021 Yes    CKD (chronic kidney disease), stage III ([address] 10/13/2021 Yes    Overview Addendum 6/3/2020  1:43 PM by Kristan Maria MD     From ischemic nephrosclerosis related to cardiorenal syndrome. Baseline creatinine 1.1-1.3 GFR 40-45 mL per minute  Correspondence dated March 7, 2018 from Dr. Twin Mack, reviewed on 3/7/2018, see media tab of chart for more information          Essential hypertension 10/13/2021 Yes    Vitamin B deficiency 10/13/2021 Yes          Plan:     Osteomyelitis: Infectious disease consulted. Infectious disease to evaluate and offer further recommendations. Monitor for fever. Control pain. Podiatry following closely. Appreciate assistance. MRI of foot completed and osteomyelitis confirmed. Pain control. PT/OT. Podiatry following. Plan for vascular testing as outpatient as arterial vascular testing not available at this facility. Further treatment plans to be determined by outcome of vascular testing. AICD/permanent pacemaker: Continuous telemetry. Pacemaker is a [employer]. Patient's own cardiologist is Dr. Radha Edwards. Chronic systolic CHF: Continue cardiac meds as ordered. Daily weights. Monitor intake and output. CKD: Monitor BMP daily. Replace electrolytes as needed. Trend renal function. Avoid nephrotoxic agents. Hypertension: Continue antihypertensives: Monitor vital signs every 8 hours and as needed.   Blood pressure appears to be well controlled at this time. Vitamin B deficiency: Continue vitamin B supplementation as ordered.     JOSÉ Carrion - NP  10/14/2021  9:42 AM

## 2021-10-14 NOTE — PLAN OF CARE
Problem: Infection:  Goal: Will remain free from infection  Description: Will remain free from infection  Outcome: Ongoing     Problem: Safety:  Goal: Free from accidental physical injury  Description: Free from accidental physical injury  Outcome: Ongoing     Problem: Daily Care:  Goal: Daily care needs are met  Description: Daily care needs are met  Outcome: Ongoing     Problem: Pain:  Goal: Patient's pain/discomfort is manageable  Description: Patient's pain/discomfort is manageable  Outcome: Ongoing     Problem: Skin Integrity:  Goal: Skin integrity will stabilize  Description: Skin integrity will stabilize  Outcome: Ongoing     Problem: Nutritional:  Goal: Nutritional status will improve  Description: Nutritional status will improve  Outcome: Ongoing     Problem: Respiratory:  Goal: Ability to maintain normal respiratory secretions will improve  Description: Ability to maintain normal respiratory secretions will improve  Outcome: Ongoing     Problem: Skin Integrity:  Goal: Demonstration of wound healing without infection will improve  Description: Demonstration of wound healing without infection will improve  Outcome: Ongoing

## 2021-10-14 NOTE — CARE COORDINATION
[facility] ICU Quality Flow/Interdisciplinary Rounds Progress Note    Quality Flow Rounds held on October 14, 2021 at [address] Attending:  Bedside Nurse, , , Nursing Unit Leadership, Dietary, Respiratory Therapy, Physical Therapy, Occupational Therapy and Spiritual Care/    Anticipated Discharge Date:  Expected Discharge Date: 10/16/21    Anticipated Discharge Disposition:    Readmission Risk              Risk of Unplanned Readmission:  11           Discussed patient goal for the day, patient clinical progression, and barriers to discharge.   The following Goal(s) of the Day/Commitment(s) have been identified:  Diagnostics - Report Results and Other  MRI, dressing change, PT/OT consult       Irene Bell RN  October 14, 2021

## 2021-10-14 NOTE — PROGRESS NOTES
This RN will be monitoring the patient while in MRI. Per [employer], patient in underlying a fib rhythm. Pacemaker set to a rate of 110 and defibrillator turned off for MRI by [employer].

## 2021-10-15 ENCOUNTER — TELEPHONE (OUTPATIENT)
Dept: PRIMARY CARE CLINIC | Age: 76
End: 2021-10-15

## 2021-10-15 ENCOUNTER — ANESTHESIA EVENT (OUTPATIENT)
Dept: OPERATING ROOM | Age: 76
End: 2021-10-15
Payer: MEDICARE

## 2021-10-15 VITALS
RESPIRATION RATE: 16 BRPM | OXYGEN SATURATION: 95 % | TEMPERATURE: 98.4 F | DIASTOLIC BLOOD PRESSURE: 84 MMHG | HEART RATE: 88 BPM | BODY MASS INDEX: 20.8 KG/M2 | HEIGHT: 69 IN | SYSTOLIC BLOOD PRESSURE: 131 MMHG | WEIGHT: 140.43 LBS

## 2021-10-15 LAB
EKG ATRIAL RATE: 234 BPM
EKG Q-T INTERVAL: 378 MS
EKG QRS DURATION: 82 MS
EKG QTC CALCULATION (BAZETT): 446 MS
EKG R AXIS: -89 DEGREES
EKG T AXIS: 70 DEGREES
EKG VENTRICULAR RATE: 84 BPM

## 2021-10-15 PROCEDURE — 2580000003 HC RX 258: Performed by: NURSE PRACTITIONER

## 2021-10-15 PROCEDURE — 99239 HOSP IP/OBS DSCHRG MGMT >30: CPT | Performed by: FAMILY MEDICINE

## 2021-10-15 PROCEDURE — APPSS30 APP SPLIT SHARED TIME 16-30 MINUTES: Performed by: NURSE PRACTITIONER

## 2021-10-15 PROCEDURE — 6360000002 HC RX W HCPCS: Performed by: NURSE PRACTITIONER

## 2021-10-15 PROCEDURE — 6370000000 HC RX 637 (ALT 250 FOR IP): Performed by: NURSE PRACTITIONER

## 2021-10-15 PROCEDURE — 93005 ELECTROCARDIOGRAM TRACING: CPT | Performed by: INTERNAL MEDICINE

## 2021-10-15 RX ORDER — CARVEDILOL 12.5 MG/1
12.5 TABLET ORAL ONCE
Status: COMPLETED | OUTPATIENT
Start: 2021-10-15 | End: 2021-10-15

## 2021-10-15 RX ORDER — DOXYCYCLINE HYCLATE 100 MG
100 TABLET ORAL EVERY 12 HOURS SCHEDULED
Status: DISCONTINUED | OUTPATIENT
Start: 2021-10-15 | End: 2021-10-15 | Stop reason: HOSPADM

## 2021-10-15 RX ORDER — CARVEDILOL 12.5 MG/1
25 TABLET ORAL 2 TIMES DAILY WITH MEALS
Status: DISCONTINUED | OUTPATIENT
Start: 2021-10-15 | End: 2021-10-15 | Stop reason: HOSPADM

## 2021-10-15 RX ORDER — DOXYCYCLINE HYCLATE 100 MG
100 TABLET ORAL 2 TIMES DAILY
Qty: 10 TABLET | Refills: 0 | Status: SHIPPED | OUTPATIENT
Start: 2021-10-15 | End: 2021-10-20

## 2021-10-15 RX ORDER — CARVEDILOL 25 MG/1
25 TABLET ORAL 2 TIMES DAILY WITH MEALS
Qty: 60 TABLET | Refills: 1 | Status: SHIPPED | OUTPATIENT
Start: 2021-10-15 | End: 2022-05-19 | Stop reason: SDUPTHER

## 2021-10-15 RX ORDER — CEFDINIR 300 MG/1
600 CAPSULE ORAL DAILY
Qty: 10 CAPSULE | Refills: 0 | Status: SHIPPED | OUTPATIENT
Start: 2021-10-15 | End: 2021-10-20

## 2021-10-15 RX ADMIN — ACETAMINOPHEN 650 MG: 325 TABLET ORAL at 11:22

## 2021-10-15 RX ADMIN — CYANOCOBALAMIN TAB 500 MCG 1000 MCG: 500 TAB at 10:07

## 2021-10-15 RX ADMIN — CARVEDILOL 12.5 MG: 12.5 TABLET, FILM COATED ORAL at 10:06

## 2021-10-15 RX ADMIN — OXYCODONE HYDROCHLORIDE AND ACETAMINOPHEN 500 MG: 500 TABLET ORAL at 10:06

## 2021-10-15 RX ADMIN — CARVEDILOL 25 MG: 12.5 TABLET, FILM COATED ORAL at 17:20

## 2021-10-15 RX ADMIN — SERTRALINE HYDROCHLORIDE 50 MG: 50 TABLET ORAL at 10:07

## 2021-10-15 RX ADMIN — APIXABAN 5 MG: 5 TABLET, FILM COATED ORAL at 10:06

## 2021-10-15 RX ADMIN — TORSEMIDE 20 MG: 20 TABLET ORAL at 10:07

## 2021-10-15 RX ADMIN — CARVEDILOL 12.5 MG: 12.5 TABLET, FILM COATED ORAL at 13:11

## 2021-10-15 RX ADMIN — DOXYCYCLINE HYCLATE 100 MG: 100 TABLET, COATED ORAL at 11:26

## 2021-10-15 RX ADMIN — DIGOXIN 125 MCG: 125 TABLET ORAL at 10:07

## 2021-10-15 RX ADMIN — SODIUM CHLORIDE, PRESERVATIVE FREE 10 ML: 5 INJECTION INTRAVENOUS at 06:53

## 2021-10-15 RX ADMIN — Medication 100 MG: at 10:07

## 2021-10-15 RX ADMIN — PIPERACILLIN AND TAZOBACTAM 3375 MG: 3; .375 INJECTION, POWDER, LYOPHILIZED, FOR SOLUTION INTRAVENOUS at 13:39

## 2021-10-15 RX ADMIN — PIPERACILLIN AND TAZOBACTAM 3375 MG: 3; .375 INJECTION, POWDER, LYOPHILIZED, FOR SOLUTION INTRAVENOUS at 06:53

## 2021-10-15 ASSESSMENT — PAIN DESCRIPTION - ORIENTATION: ORIENTATION: RIGHT

## 2021-10-15 ASSESSMENT — PAIN SCALES - GENERAL
PAINLEVEL_OUTOF10: 2
PAINLEVEL_OUTOF10: 3

## 2021-10-15 ASSESSMENT — PAIN DESCRIPTION - LOCATION: LOCATION: HIP

## 2021-10-15 ASSESSMENT — PAIN DESCRIPTION - PAIN TYPE: TYPE: CHRONIC PAIN

## 2021-10-15 NOTE — PROGRESS NOTES
[facility]  Office: [address], DO, Nilda Mason, DO, Elicia Russell, DO, [facility], DO, Gabriel Carmona MD, Joselyn Billy MD, Lauren Rice MD, Nuris Travis MD, Eliud Welch MD, Nohemi Xiong MD, Sanju Cruz MD, Hortensia Benavidez MD, Kiley Hanley, DO, Mara Corcoran, DO, Ese Schafer MD,  Santiago Hendrickson DO, Stacy Handley MD, Patric Richardson MD, Marv Palacios MD, Bryanna Vilchis MD, Carlos Gates MD, Cynthia Vallejo MD, Kei Pulido [facility], [facility], CNP, Castro Strickland, CNP, Guero Monsalve, CNS, Maru Robledo, CNP, Munira Marcus, CNP, Liv Rios, CNP, Shirley Miller, CNP, Faustino Baltazar, CNP, Nelsy Cheema, CNP, Edd Arroyo PA-C, Sourav Albarado, [facility], Shelbie Santana, CNP, Mahsa Felder, CNP, Neida Malloy, CNP, Krystin Hanson, CNP, Pradip Gamez, CNP, John Ladd, CNP, Zan Hyde [address]    Progress Note    10/15/2021    12:06 PM    Name:   Jaun Ibarra  MRN:     [id number]     Acct:      [de-identified]   Room:   [address] [place] [address] Day:  3  Admit Date:  10/12/2021  1:05 PM    PCP:   JOSÉ Camarillo CNP  Code Status:  Full Code    Subjective:     C/C: Feeling better  Chief Complaint   Patient presents with    Toe Pain     Interval History Status: improved. Patient resting in bed. She denies chest pain, dizziness, shortness of breath, abdominal pain, nausea, vomiting. Her pain in her foot is well controlled. Nursing reports that patient's heart rate went up to 170s overnight and improved after vagal maneuver performed. Today nursing reported patient's heart rate up to 140s while up to the bathroom. Patient heart rate noted to be 90s while at rest. Cardiology updated on elevated heart rate today and last night. Okay per cardiology to increase carvedilol from 12.5 mg twice daily to 25 mg twice daily.  Patient was asymptomatic with his elevated heart rate, and patient does have a permanent pacemaker/AICD in place. Brief History:     Previous documentation:    Priyank Avendano is a 77 y.o. Non- / non  female who presents with Toe Pain   and is admitted to the hospital for the management of Osteomyelitis ([address]    Patient reports wound on her toe started over a month ago. Latia Carolina denies injuring her toe.  Her son tells me that patient has a shuffling gait and wears boat shoes, so it is suspected that perhaps the wound was caused by the issue with her shuffling gait that is secondary to severe bilateral hip arthritis.  Patient is not a diabetic.  She has a significant history of permanent pacemaker/AICD, heart failure, arthritis. Latia Carolina is a former smoker and quit smoking 25 years ago. Latia Carolina also reports that she has a glass of wine nightly.  She came to the ED for evaluation due to increased swelling of the toe and increased pain.        While in the ED, X ray of the left foot was taken and revealed a soft tissue ulceration at the medial aspect of the great toe with a small focus of osseous destructive change in the medial aspect of the first distal phalanx, concerning for osteomyelitis.  Podiatry was consulted and she was started on IV Zosyn.  She has an allergy to vancomycin.  She was also started on doxycycline IV.  Infectious disease was consulted to assist with antibiotic management.      Podiatry plans for patient to obtain arterial studies as outpatient prior to any surgical procedure to the affected foot. Eliquis resumed. While inpatient, patient receiving IV Zosyn and doxycycline. Infectious disease evaluated and recommended Omnicef and doxycycline p.o. as outpatient. Audiology rounded and cleared patient for surgery. Patient noted to have elevated heart rate last evening that improved with vagal maneuver. Patient again with elevated heart rate in the 140s observed today.  Cardiology notified and plan for patient's carvedilol to be increased from 12.5 to 25 mg twice daily. Also okay per cardiology for patient to stop Eliquis 2 days prior to surgical procedure to foot. Arrangements being made for patient to go home with a walker today. Review of Systems:     Constitutional:  negative for chills, fevers, sweats  Respiratory:  negative for cough, dyspnea on exertion, shortness of breath, wheezing  Cardiovascular:  negative for chest pain, chest pressure/discomfort, lower extremity edema, palpitations  Gastrointestinal:  negative for abdominal pain, constipation, diarrhea, nausea, vomiting  Neurological:  negative for dizziness, headache    Medications: Allergies:     Allergies   Allergen Reactions    Contrast [Barium-Containing Compounds]      Unknown     Vancomycin Other (See Comments)     unknown    Codeine Rash       Current Meds:   Scheduled Meds:    doxycycline hyclate  100 mg Oral 2 times per day    apixaban  5 mg Oral BID    ascorbic acid  500 mg Oral Daily    carvedilol  12.5 mg Oral BID WC    digoxin  125 mcg Oral Daily    sertraline  50 mg Oral Daily    torsemide  20 mg Oral Daily    thiamine mononitrate  100 mg Oral Daily    vitamin B-12  1,000 mcg Oral Daily    sodium chloride flush  5-40 mL IntraVENous 2 times per day    piperacillin-tazobactam  3,375 mg IntraVENous Q8H     Continuous Infusions:    sodium chloride       PRN Meds: sodium chloride flush, sodium chloride, potassium chloride **OR** potassium alternative oral replacement **OR** potassium chloride, magnesium sulfate, ondansetron **OR** ondansetron, polyethylene glycol, acetaminophen **OR** acetaminophen    Data:     Past Medical History:   has a past medical history of AICD (automatic cardioverter/defibrillator) present, Atrial fibrillation ([facility] [facility] Atrial fibrillation ([facility] [address] [facility] Cancer ([facility] [address] [facility] Chronic kidney disease, H/O cardiovascular stress test, Hx of long term use of blood thinners, Hypertension, Nonischemic dilated cardiomyopathy ([address] Other screening mammogram, and Poor historian. Social History:   reports that she quit smoking about 34 years ago. She has a 10.00 pack-year smoking history. She has never used smokeless tobacco. She reports current alcohol use of about 1.0 standard drinks of alcohol per week. She reports that she does not use drugs. Family History:   Family History   Adopted: Yes       Vitals:  BP (!) 142/78   Pulse 96   Temp 97.8 °F (36.6 °C) (Oral)   Resp 18   Ht 5' 9\" (1.753 m)   Wt 140 lb 6.9 oz (63.7 kg)   SpO2 96%   BMI 20.74 kg/m²   Temp (24hrs), Av.1 °F (36.7 °C), Min:97.8 °F (36.6 °C), Max:98.3 °F (36.8 °C)        I/O (24Hr): Intake/Output Summary (Last 24 hours) at 10/15/2021 1206  Last data filed at 10/15/2021 1119  Gross per 24 hour   Intake 868.64 ml   Output 1150 ml   Net -281.36 ml       Labs:  Hematology:  Recent Labs     10/12/21  1321 10/14/21  0521   WBC 7.7 5.8   RBC 3.78* 3.53*   HGB 13.6 12.7   HCT 39.7 37.3   .0* 105.8*   MCH 35.9* 36.1*   MCHC 34.2 34.2   RDW 11.8* 11.7*    307   MPV 7.2 7.1   SEDRATE 28 33*   CRP 63.9* 28.4*     Chemistry:  Recent Labs     10/12/21  1321 10/13/21  0933    135   K 3.5* 3.9   CL 96* 102   CO2 23 22   GLUCOSE 101* 98   BUN 21 14   CREATININE 0.86 0.75   ANIONGAP 19* 11   LABGLOM >60 >60   GFRAA >60 >60   CALCIUM 9.9 9.1   DIGOXIN  --  0.7     Recent Labs     10/12/21  1321   PROT 7.6   LABALBU 4.4   AST 18   ALT 11   ALKPHOS 74   BILITOT 0.60       Lab Results   Component Value Date/Time    SPECIAL 20ML Right Arm 10/12/2021 01:58 PM     Lab Results   Component Value Date/Time    CULTURE NO GROWTH 3 DAYS 10/12/2021 01:58 PM       Radiology:  XR FOOT LEFT (MIN 3 VIEWS)    Result Date: 10/13/2021  Soft tissue ulceration at the medial aspect of the great toe with small focus of osseous destructive change in the medial aspect of the 1st distal phalanx, concerning for osteomyelitis. MRI FOOT LEFT W WO CONTRAST    Result Date: 10/14/2021  1.  Ulcer exposing the medial 1st IP joint with subjacent septic 1st IP joint and osteomyelitis as detailed above. Probable infectious tenosynovitis of the flexor and extensor tendons to the 1st digit at the level of the 1st IP joint. 2. Signal abnormality in the phalanges of the 3rd digit which could represent sequela of trauma, reactive osteitis or osteomyelitis. Osteomyelitis considered less likely as no associated ulceration is identified, however. 3. Mild degenerative changes as detailed above. 4. Moderate left great toe cellulitis. 5. Ravinder-bursal formation/pressure lesion subjacent to the 5th MTP joint. The findings were sent to the Radiology Results [address] at 1:18 pm on 10/14/2021to be communicated to a licensed caregiver. Physical Examination:       General appearance:  alert, cooperative and no distress  Mental Status:  oriented to person, place and time and normal affect  Lungs:  clear to auscultation bilaterally, normal effort  Heart: Irregular rate and rhythm, tachycardic with activity, no murmur  Abdomen:  soft, nontender, nondistended, normal bowel sounds, no masses, hepatomegaly, splenomegaly  Extremities:  no edema, redness, tenderness in the calves  Skin:  no  rashes, induration, dressing to left foot clean, dry, intact    Assessment:     Hospital Problems         Last Modified POA    * (Principal) Osteomyelitis ([address] 10/13/2021 Yes    AICD (automatic cardioverter/defibrillator) Implant 10/13/2021 Yes    Nonischemic cardiomyopathy ([address] 10/13/2021 Yes    Overview Addendum 5/14/2018 10:02 PM by JOSÉ Cha - CNP     Correspondence dated November 30, 2017 from Dr. Emy Ordonez reviewed on 12/18/17, see Media tab of Chart Review for more information.   Correspondence dated January 9, 2018 from Dr. Emy Ordonez, reviewed on 1/11/2018, see media tab of chart for more information   EF 10-15%  Correspondence dated April 3, 2018 from Dr. Emy Ordonez, reviewed on 4/4/2018, see media tab of chart for more information   Admitted to hospital for ICD placement. Correspondence dated May 8, 2018 from Dr. Nova Joseph, reviewed on 5/14/2018, see media tab of chart for more information          Systolic CHF, chronic ([facility] (Chronic) 10/13/2021 Yes    CKD (chronic kidney disease), stage III ([facility] [facility] 10/13/2021 Yes    Overview Addendum 6/3/2020  1:43 PM by Adonay Abarca MD     From ischemic nephrosclerosis related to cardiorenal syndrome. Baseline creatinine 1.1-1.3 GFR 40-45 mL per minute  Correspondence dated March 7, 2018 from Dr. Eric Camacho, reviewed on 3/7/2018, see media tab of chart for more information          Essential hypertension 10/13/2021 Yes    Vitamin B deficiency 10/13/2021 Yes                Plan:     Osteomyelitis: Infectious disease evaluated and patient to be discharged home on a 5-day course of p.o. doxycycline and Omnicef. Monitor for fever.  Control pain.  Podiatry following closely.  Appreciate assistance.  MRI of foot completed and osteomyelitis confirmed.   Pain control. PT/OT. Podiatry following and plan for further treatment of osteomyelitis as outpatient. Medical and cardiology clearance completed. AICD/permanent pacemaker: Continuous telemetry. Irena Morales is a [employer].  Patient's own cardiologist is Dr. Nova Joseph (per patient). Cardiology evaluated for cardiac clearance for upcoming surgery to left foot. Patient also noted to have increased heart rate as high as 170s overnight. Discussed with cardiology and carvedilol dosing increased. Per cardiology, okay to hold Eliquis 2 days prior to planned surgical procedure to left foot. Chronic systolic CHF: Continue cardiac meds as ordered.  Daily weights.  Monitor intake and output. CKD: Monitor BMP daily.  Replace electrolytes as needed.  Trend renal function.  Avoid nephrotoxic agents. Hypertension: Continue antihypertensives: Monitor vital signs every 8 hours and as needed.  Blood pressure appears to be well controlled at this time.   Vitamin B deficiency: Continue vitamin B supplementation as ordered.     JOSÉ Dixon NP  10/15/2021  12:06 PM

## 2021-10-15 NOTE — CARE COORDINATION
Aidan Acosta was evaluated today and a DME order was entered for a standard walker because she requires this to successfully complete daily living tasks of ambulating. A standard walker is necessary due to the patient's impaired ambulation and mobility restrictions and she can ambulate only by using a walker instead of a lesser assistive device, such as a cane or crutch. The need for this equipment was discussed with the patient and she understands and is in agreement.

## 2021-10-15 NOTE — PROGRESS NOTES
°C)    RESPIRATIONS RANGE: Resp  Av.8  Min: 16  Max: 18    PULSE RANGE: Pulse  Av.6  Min: 74  Max: 109    BLOOD PRESSURE RANGE:  Systolic (66XCK), ELM:895 , Min:106 , JAEL:059   ; Diastolic (99DHQ), KIT:06, Min:64, Max:84      PULSE OXIMETRY RANGE: SpO2  Av.8 %  Min: 92 %  Max: 97 %    I/O last 3 completed shifts:  In: -   Out: 1500 [Urine:1500]    CBC:  Recent Labs     10/12/21  1321 10/14/21  0521   WBC 7.7 5.8   HGB 13.6 12.7   HCT 39.7 37.3    307   CRP 63.9* 28.4*        BMP:  Recent Labs     10/12/21  1321 10/13/21  0933    135   K 3.5* 3.9   CL 96* 102   CO2 23 22   BUN 21 14   CREATININE 0.86 0.75   GLUCOSE 101* 98   CALCIUM 9.9 9.1        Coags:  Recent Labs     10/12/21  1321   PROT 7.6       Lab Results   Component Value Date    SEDRATE 33 (H) 10/14/2021     Recent Labs     10/12/21  1321 10/14/21  0521   CRP 63.9* 28.4*       Lower Extremity Physical Exam:  Vascular: DP and PT pulses are faintly palpable. CFT <3 seconds to all digits. Hair growth is absent to the level of the digits. No edema. Neuro: Saph/sural/SP/DP/plantar sensation diminished to light touch. Musculoskeletal: Muscle strength is 5/5 to all lower extremity muscle groups. Dermatologic: Full thickness ulcer #1 located left medial hallux, measures 1.3 x 1.0 x 0.3. Wound base fibronecrotic tissue. Periwound skin is mildly hyperkeratotic. Minimal seropurulent drainage noted with mild associated mal odor. Erythema noted up to the level of 1st MPJ with associated increase in warmth. Probes to capsule, no sinus track, or undermine. No fluctuance, crepitus, or induration. Clinical Images:              Imaging:   MRI FOOT LEFT W WO CONTRAST   Final Result   1. Ulcer exposing the medial 1st IP joint with subjacent septic 1st IP joint   and osteomyelitis as detailed above. Probable infectious tenosynovitis of   the flexor and extensor tendons to the 1st digit at the level of the 1st IP   joint. patient is stable from podiatry standpoint. Patient will need left hallux amputation, however, can be done as an outpatient pending IM, Cardiology, ID recommendations. Scheduled for OR on 10/19/2021 at 7:30 AM for left hallux amputation. All risks and benefits discussed at length with patient, to which the patient is amenable. Dressing applied to Left foot: Betadine, dsd, ace  Weight bearing to heel touch to Left lower extremity with surgical shoe. Discussed with Dr. Heber Jade DPM   Podiatric Medicine & Surgery   10/15/2021 at 9:24 AM     I performed a history and physical examination of the patient and discussed management with the resident. I reviewed the residents note and agree with the documented findings and plan of care. Any areas of disagreement are noted on the chart. I was personally present for the key portions of any procedures. I have documented in the chart those procedures where I was not present during the key portions. I have reviewed the Podiatry Resident progress note. I agree with the chief complaint, past medical history, past surgical history, allergies, medications, social and family history as documented unless otherwise noted below. Documentation of the HPI, Physical Exam and Medical Decision Making performed by medical students or scribes is based on my personal performance of the HPI, PE and MDM. I have personally evaluated this patient and have completed at least one if not all key elements of the E/M (history, physical exam, and MDM). Additional findings are as noted.      Phylicia Rowley DPM on 10/18/2021 at 2:61 AM  Board Certified, American Board of Podiatric Surgery  Fellow, [employer] [facility] and [facility]

## 2021-10-15 NOTE — CONSULTS
present, Atrial fibrillation ([facility] [address] Atrial fibrillation ([address] Cancer ([facility] [address] Chronic kidney disease, H/O cardiovascular stress test, Hx of long term use of blood thinners, Hypertension, Nonischemic dilated cardiomyopathy ([facility] [address] Other screening mammogram, and Poor historian. Past Surgical History:   has a past surgical history that includes knee surgery (Right); transesophageal echocardiogram (11/10/2017); other surgical history (10/30/2018); pr office/outpt visit,procedure only (Right, 10/30/2018); Cardiac catheterization (08/2017); Cardiac defibrillator placement (04/06/2018); and Colonoscopy (N/A, 02/06/2019). Home Medications:    Prior to Admission medications    Medication Sig Start Date End Date Taking?  Authorizing Provider   carvedilol (COREG) 12.5 MG tablet TAKE 1 TABLET TWICE DAILY  Patient taking differently: Indications: pt unsure of dosing  7/1/21   JOSÉ Snow CNP   sertraline (ZOLOFT) 50 MG tablet TAKE 1 TABLET EVERY DAY 7/1/21   JOSÉ Snow CNP   digoxin (LANOXIN) 125 MCG tablet TAKE 1 TABLET BY MOUTH DAILY MONDAY THROUGH FRIDAY 5/10/21   JOSÉ Gregory CNP   Acetaminophen (TYLENOL ARTHRITIS PAIN PO) Take by mouth 3 times daily    Historical Provider, MD   apixaban (ELIQUIS) 5 MG TABS tablet Take one tablet bid 5/3/19   Gideon Fothergill, APRN - CNP   torsemide (DEMADEX) 20 MG tablet Take 1 tablet by mouth daily 8/16/18   JOSÉ Chavez CNP   vitamin B-1 100 MG tablet Take 1 tablet by mouth daily 4/12/18   Shayla Arellano DO   vitamin B-12 (CYANOCOBALAMIN) 1000 MCG tablet Take 1,000 mcg by mouth daily    Historical Provider, MD   Multiple Vitamins-Minerals (THERAPEUTIC MULTIVITAMIN-MINERALS) tablet Take 1 tablet by mouth daily    Historical Provider, MD   calcium carbonate (OSCAL) 500 MG TABS tablet Take 600 mg by mouth daily     Historical Provider, MD   Biotin (BIOTIN 5000) 5 MG CAPS Take 5,000 mcg by mouth daily    Historical Provider, MD   ascorbic acid (VITAMIN C) 500 MG tablet Take 500 mg by mouth daily     Historical Provider, MD       Allergies:  Contrast [barium-containing compounds], Vancomycin, and Codeine    Social History:   reports that she quit smoking about 34 years ago. She has a 10.00 pack-year smoking history. She has never used smokeless tobacco. She reports current alcohol use of about 1.0 standard drinks of alcohol per week. She reports that she does not use drugs. Family History: family history is not on file. She was adopted. No h/o sudden cardiac death. No for premature CAD    REVIEW OF SYSTEMS:    Constitutional: there has been no unanticipated weight loss. There's been No change in energy level, No change in activity level. Eyes: No visual changes or diplopia. No scleral icterus. ENT: No Headaches, hearing loss or vertigo. No mouth sores or sore throat. Cardiovascular: see above  Respiratory: see above  Gastrointestinal: No abdominal pain, appetite loss, blood in stools. Genitourinary: No dysuria, trouble voiding, or hematuria. Musculoskeletal:  No gait disturbance, No weakness or joint complaints. Integumentary: No rash or pruritis. Neurological: No headache or diplopia. No tingling  Psychiatric: No anxiety, or depression. Endocrine: No temperature intolerance. Hematologic/Lymphatic: No abnormal bruising or bleeding, blood clots or swollen lymph nodes. Allergic/Immunologic: No nasal congestion or hives. PHYSICAL EXAM:      BP (!) 142/78   Pulse 96   Temp 97.8 °F (36.6 °C) (Oral)   Resp 18   Ht 5' 9\" (1.753 m)   Wt 140 lb 6.9 oz (63.7 kg)   SpO2 96%   BMI 20.74 kg/m²    Constitutional and General Appearance: alert, cooperative, no distress and appears stated age  HEENT: PERRL, no cervical lymphadenopathy. No masses palpable. Normal oral mucosa  Respiratory:  Normal excursion and expansion without use of accessory muscles  Resp Auscultation: Good respiratory effort.  No for increased work of breathing. On auscultation: clear to auscultation bilaterally  Cardiovascular:  Heart tones are crisp and normal. regular S1 and S2.  Jugular venous pulsation Normal  The carotid upstroke is normal in amplitude and contour without delay or bruit   Abdomen:   soft  Bowel sounds present  Extremities:   No edema  Neurological:  Alert and oriented. DATA:    Diagnostics:    EKG:   STRESS 7/26/17: Neg. EF 24%     CATH 8/04/17: Normal cors. EF 20%. TTE 11/10/17: EF 20%. Mod MR. Mild to mod AI. Mild TR. Segmental WMA. ICD 4/6/18: Medtronic. (AK)  Labs:     CBC:   Recent Labs     10/12/21  1321 10/14/21  0521   WBC 7.7 5.8   HGB 13.6 12.7   HCT 39.7 37.3    307     BMP:   Recent Labs     10/12/21  1321 10/13/21  0933    135   K 3.5* 3.9   CO2 23 22   BUN 21 14   CREATININE 0.86 0.75   LABGLOM >60 >60   GLUCOSE 101* 98     BNP: No results for input(s): BNP in the last 72 hours. PT/INR: No results for input(s): PROTIME, INR in the last 72 hours. APTT:No results for input(s): APTT in the last 72 hours. CARDIAC ENZYMES:No results for input(s): CKTOTAL, CKMB, CKMBINDEX, TROPONINI in the last 72 hours. FASTING LIPID PANEL:  Lab Results   Component Value Date    HDL 77 10/05/2017    LDLCALC 77 10/05/2017    TRIG 186 10/05/2017     LIVER PROFILE:  Recent Labs     10/12/21  1321   AST 18   ALT 11   LABALBU 4.4       IMPRESSION:    Patient Active Problem List   Diagnosis    Nonischemic cardiomyopathy ([facility]    Systolic CHF, chronic (HCC)    CKD (chronic kidney disease), stage III (HCC)    Vitamin B deficiency    Osteopenia    Arthritis of right hip    Bilateral carpal tunnel syndrome    AICD (automatic cardioverter/defibrillator) Implant    Basal cell carcinoma    Adopted    Migraine headache    Atrial fibrillation (HCC)    Essential hypertension    Osteomyelitis ([facility] [facility]     Medical hx  NICMP  Mitral insuffiency   HTN  PAF on eliquis     RECOMMENDATIONS:  NICMP with AICD in situ.   No signs of fluid overload. Continue current medications   PAF rate stable. Ok to hold Eliquis for 2 days prior to OR. Called for surgical clearance. Appears that surgery may be done as outpt. Discussed with Dr. Therese Dudley. Pt is cleared for OR at moderate risk from cardiology. Advised to hold Eliquis 2 days prior to surgery. Will sign off. Follow up as outpt after surgery       Discussed with patient and Nurse. Kamran Dipak, [address] Cardiology Consult           [phone]    I reviewed the patient history personally, and examined by me during the visit. I have reviewed the H&P/Consult / Progress note as completed, and made appropriate changes to the patient exam and treatment plans.       I have reviewed the case in details including physical exam and treatment plan with resident / fellow / NP    Patient treatment plan was explained to patient, correction in notes was made as appropriate, and discussed final arrangement based on  my evaluation and exam.    Additional Recommendations:      Ligia Dave MD  [facility]

## 2021-10-15 NOTE — PROGRESS NOTES
Physical Therapy        Physical Therapy Cancel Note      DATE: 10/15/2021    NAME: Keli Dia  MRN: [id number]   : 1945      Patient not seen this date for Physical Therapy due to:    Patient Declined: Upon arrival to answer call light this morning- pt was up in restroom. Pt declined participation in therapy at that time stating she wanted to rest but pt agreeable to writer return later. Upon writer's return in afternoon, pt states she is going home this afternoon and would rather rest. Writer educated again just on the importance of maintaining heel only weight bearing in surgical shoe for the left LE and writer's recommendation of RW to assist with maintaining WBing status and will likely assist with gait and maintaining precautions after her surgical procedure planned for next week. Pt verbalized understanding.        Electronically signed by Susa Kawasaki, PT on 10/15/2021 at 3:01 PM

## 2021-10-15 NOTE — DISCHARGE SUMMARY
[facility]  Office: [address], DO, Eri Brooke, DO, Lisseth Francisco, DO, Jennifer Summers Blood, DO, Jil Graff MD, Megha Álvarez MD, Charlie Gonzalez MD, Jacinda Miller MD, Mayelin Zamora MD, Yvan Carter MD, Ke Liang MD, Elma Velasquez MD, Natalee Fernando DO, Sacha Bloom DO, Peter Whitfield MD,  Tila Zarate DO, Karen Bullard MD, Pascual Fang MD, Kim Summers MD, Salma Garibay MD, Cammy Castano MD, Brittany Trotter MD, Emi Pretty [facility], [facility], CNP, Alem Trejo, CNP, Renae Ivy, CNS, Marisa Rg, [facility], Brianna Barahona, CNP, Roslyn Patel, CNP, Génesis Schneider, CNP, Magdalena Davidson, CNP, Victor Hugo Andres, CNP, Roland Bates PA-C, Cleone Denver, [place], Del Lozada, CNP, Rebecca Rodriguez, CNP, Paige Spenec, CNP, Emile Min, CNP, Abi Rivera, CNP, Tita Grey, CNP, Viet Whipple, [address]    Discharge Summary     Patient ID: Matthew Foster  :     MRN: [id number]     ACCOUNT:  [de-identified]   Patient's PCP: JOSÉ Bynum CNP  Admit Date: 10/12/2021   Discharge Date: 10/15/2021     Length of Stay: 3  Code Status:  Full Code  Admitting Physician: Jacinda Miller MD  Discharge Physician: Jacinda Miller MD     Active Discharge Diagnoses:     Hospital Problem Lists:  Principal Problem:    Osteomyelitis [facility])  Active Problems:    AICD (automatic cardioverter/defibrillator) Implant    Nonischemic cardiomyopathy ([facility]    Systolic CHF, chronic ([facility]    CKD (chronic kidney disease), stage III ([facility]    Vitamin B deficiency    Essential hypertension  Resolved Problems:    * No resolved hospital problems. *      Admission Condition:  fair     Discharged Condition: fair    Hospital Stay:     HPI:     Per chart     Dakota Avendano is a 77 y.o. Non- / non  female who presents with Toe Pain   and is admitted to the hospital for the management of Osteomyelitis ([facility]      Patient reports wound on her toe started over a month ago. Gab Florez denies injuring her toe.  Her son tells me that patient has a shuffling gait and wears boat shoes, so it is suspected that perhaps the wound was caused by the issue with her shuffling gait that is secondary to severe bilateral hip arthritis.  Patient is not a diabetic.  She has a significant history of permanent pacemaker/AICD, heart failure, arthritis. Gab Florez is a former smoker and quit smoking 25 years ago. Gab Florez also reports that she has a glass of wine nightly.  She came to the ED for evaluation due to increased swelling of the toe and increased pain.     While in the ED, X ray of the left foot was taken and revealed a soft tissue ulceration at the medial aspect of the great toe with a small focus of osseous destructive change in the medial aspect of the first distal phalanx, concerning for osteomyelitis.  Podiatry was consulted and she was started on IV Zosyn.  She has an allergy to vancomycin.  She was also started on doxycycline IV.  Infectious disease was consulted to assist with antibiotic management.      Podiatry plans for patient to obtain arterial studies as outpatient prior to any surgical procedure to the affected foot.  Eliquis resumed. While inpatient, patient receiving IV Zosyn and doxycycline. Infectious disease evaluated and recommended Omnicef and doxycycline p.o. as outpatient. Audiology rounded and cleared patient for surgery.      Patient noted to have elevated heart rate last evening that improved with vagal maneuver. Patient again with elevated heart rate in the 140s observed today. Cardiology notified and plan for patient's carvedilol to be increased from 12.5 to 25 mg twice daily. Also okay per cardiology for patient to stop Eliquis 2 days prior to surgical procedure to foot.     Arrangements being made for patient to go home with a walker today.       During the admission patient was managed as follow    Osteomyelitis: Infectious disease evaluated and patient to be discharged home on a 5-day course of p.o. doxycycline and Omnicef. Monitor for fever.  Control pain.  Podiatry following closely.  Appreciate assistance.  MRI of foot completed and osteomyelitis confirmed.   Pain control.  PT/OT.  Podiatry following and plan for further treatment of osteomyelitis as outpatient. Medical and cardiology clearance completed. AICD/permanent pacemaker: Continuous telemetry. Peter  is a [employer].  Patient's own cardiologist is Dr. Angela Barnhart (per patient). Cardiology evaluated for cardiac clearance for upcoming surgery to left foot. Patient also noted to have increased heart rate as high as 170s overnight. Discussed with cardiology and carvedilol dosing increased. Per cardiology, okay to hold Eliquis 2 days prior to planned surgical procedure to left foot. Chronic systolic CHF: Continue cardiac meds as ordered.  Daily weights.  Monitor intake and output. CKD: Monitor BMP daily.  Replace electrolytes as needed.  Trend renal function.  Avoid nephrotoxic agents. Hypertension: Continue antihypertensives: Monitor vital signs every 8 hours and as needed.  Blood pressure appears to be well controlled at this time. Vitamin B deficiency: Continue vitamin B supplementation as ordered.     Discussed with podiatry resident plan to discharge and arranged for left hallux amputation on Tuesday 10/19. I cleared patient with moderate risk, patient was also seen and evaluated by caardiology and cleared  Patient was discharged on p.o. antibiotic per ID recommendations.   Patient was directed to stop Eliquis on 10/17  Patient expressed understanding  All orders are in        Significant therapeutic interventions: as above    Significant Diagnostic Studies:   Labs / Micro:  CBC:   Lab Results   Component Value Date    WBC 5.8 10/14/2021    RBC 3.53 10/14/2021    RBC 3.78 09/22/2020    HGB 12.7 10/14/2021    HCT 37.3 10/14/2021    .8 10/14/2021    MCH 36.1 10/14/2021    MCHC 34.2 10/14/2021    RDW 11.7 10/14/2021     10/14/2021     BMP:    Lab Results   Component Value Date    GLUCOSE 98 10/13/2021    GLUCOSE 112 10/06/2021     10/13/2021    K 3.9 10/13/2021     10/13/2021    CO2 22 10/13/2021    ANIONGAP 11 10/13/2021    BUN 14 10/13/2021    CREATININE 0.75 10/13/2021    BUNCRER NOT REPORTED 10/13/2021    CALCIUM 9.1 10/13/2021    LABGLOM >60 10/13/2021    GFRAA >60 10/13/2021    GFR      10/13/2021    GFR NOT REPORTED 10/13/2021     PT/INR:    Lab Results   Component Value Date    PROTIME 12.8 04/09/2018    PROTIME 24.8 10/05/2017    INR 1.2 04/09/2018     PTT:   Lab Results   Component Value Date    APTT 25.8 04/09/2018     FLP:    Lab Results   Component Value Date    CHOL 191 10/05/2017    TRIG 186 10/05/2017    HDL 77 10/05/2017     U/A:    Lab Results   Component Value Date    COLORU YELLOW 04/05/2018    TURBIDITY CLEAR 04/05/2018    SPECGRAV 1.010 04/05/2018    HGBUR NEGATIVE 04/05/2018    PHUR 5.0 04/05/2018    PROTEINU NEGATIVE 04/05/2018    GLUCOSEU NEGATIVE 04/05/2018    KETUA NEGATIVE 04/05/2018    BILIRUBINUR NEGATIVE 04/05/2018    UROBILINOGEN Normal 04/05/2018    NITRU NEGATIVE 04/05/2018    LEUKOCYTESUR NEGATIVE 04/05/2018     TSH:    Lab Results   Component Value Date    TSH 0.77 07/08/2021        Radiology:  XR FOOT LEFT (MIN 3 VIEWS)    Result Date: 10/13/2021  Soft tissue ulceration at the medial aspect of the great toe with small focus of osseous destructive change in the medial aspect of the 1st distal phalanx, concerning for osteomyelitis. MRI FOOT LEFT W WO CONTRAST    Result Date: 10/14/2021  1. Ulcer exposing the medial 1st IP joint with subjacent septic 1st IP joint and osteomyelitis as detailed above. Probable infectious tenosynovitis of the flexor and extensor tendons to the 1st digit at the level of the 1st IP joint.  2. Signal abnormality in the phalanges of the 3rd digit which could represent sequela of trauma, reactive osteitis or osteomyelitis. Osteomyelitis considered less likely as no associated ulceration is identified, however. 3. Mild degenerative changes as detailed above. 4. Moderate left great toe cellulitis. 5. Ravnider-bursal formation/pressure lesion subjacent to the 5th MTP joint. The findings were sent to the Radiology Results [address] at 1:18 pm on 10/14/2021to be communicated to a licensed caregiver. Consultations:    Consults:     Final Specialist Recommendations/Findings:   IP CONSULT TO INFECTIOUS DISEASES  IP CONSULT TO PODIATRY  IP CONSULT TO CARDIOLOGY  IP CONSULT TO HOME CARE NEEDS      The patient was seen and examined on day of discharge and this discharge summary is in conjunction with any daily progress note from day of discharge.     Discharge plan:     Disposition: Home    Physician Follow Up:     Natasha Davies DPM  [facility] [facility] [address] [facility] [address] [phone]    In 2 days  for surgery at [facility]       Requiring Further Evaluation/Follow Up POST HOSPITALIZATION/Incidental Findings:     Diet: cardiac diet and diabetic diet    Activity: As tolerated    Instructions to Patient:     Discharge Medications:      Medication List      START taking these medications    cefdinir 300 MG capsule  Commonly known as: OMNICEF  Take 2 capsules by mouth daily for 5 days     doxycycline hyclate 100 MG tablet  Commonly known as: VIBRA-TABS  Take 1 tablet by mouth 2 times daily for 5 days        CONTINUE taking these medications    apixaban 5 MG Tabs tablet  Commonly known as: Eliquis  Take one tablet bid     ascorbic acid 500 MG tablet  Commonly known as: VITAMIN C     Biotin 5000 5 MG Caps  Generic drug: Biotin     calcium carbonate 500 MG Tabs tablet  Commonly known as: OSCAL     carvedilol 12.5 MG tablet  Commonly known as: COREG  TAKE 1 TABLET TWICE DAILY     digoxin 125 MCG tablet  Commonly known as: LANOXIN  TAKE 1 TABLET BY MOUTH DAILY [facility]  sertraline 50 MG tablet  Commonly known as: ZOLOFT  TAKE 1 TABLET EVERY DAY     therapeutic multivitamin-minerals tablet     thiamine 100 MG tablet  Take 1 tablet by mouth daily     torsemide 20 MG tablet  Commonly known as: DEMADEX  Take 1 tablet by mouth daily     TYLENOL ARTHRITIS PAIN PO     vitamin B-12 1000 MCG tablet  Commonly known as: CYANOCOBALAMIN           Where to Get Your Medications      These medications were sent to [address] - F [phone] [address] [facility] [address]    Phone: [phone]   cefdinir 300 MG capsule  doxycycline hyclate 100 MG tablet         No discharge procedures on file. Time Spent on discharge is  35 mins in patient examination, evaluation, counseling as well as medication reconciliation, prescriptions for required medications, discharge plan and follow up. Electronically signed by   Julio Rubin MD  10/15/2021  9:44 AM      Thank you Dr. Alton Reed, APRN - CNP for the opportunity to be involved in this patient's care.

## 2021-10-15 NOTE — DISCHARGE INSTR - COC
Continuity of Care Form    Patient Name: Anette Basurto   :    MRN:  [id number]    Admit date:  10/12/2021  Discharge date:  10/15/2021    Code Status Order: Full Code   Advance Directives:      Admitting Physician:  Carolan Schilder, MD  PCP: Ke Morocho, APRN - CNP    Discharging Nurse: Abhay Burks [facility] Unit/Room#: 330/330-01  Discharging Unit Phone Number: 10/15/2021    Emergency Contact:   Extended Emergency Contact Information  Primary Emergency Contact: Pamela Edgar [address] Phone: [phone]  Work Phone: [phone]  Mobile Phone: [phone]  Relation: Child  Secondary Emergency Contact: Alexsander Devi [address] Phone: [phone]  Work Phone: [phone]  Mobile Phone: [phone]  Relation: Child    Past Surgical History:  Past Surgical History:   Procedure Laterality Date    CARDIAC CATHETERIZATION  2017    NML CORS EF20%    CARDIAC DEFIBRILLATOR PLACEMENT  2018    DR Schuster Mason    COLONOSCOPY N/A 2019    COLONOSCOPY POLYPECTOMY SNARE/COLD BIOPSY performed by Delia Read MD at [place] [address] Right     OTHER SURGICAL HISTORY  10/30/2018    excision basal cell right temple    AL OFFICE/OUTPT VISIT,PROCEDURE ONLY Right 10/30/2018    EXCISION BASAL CELL RIGHT TEMPLE performed by Rashi Mistry MD at [address] [place] [address] TRANSESOPHAGEAL ECHOCARDIOGRAM  11/10/2017    EF 20%. Mod MR. Mild to mod AI. Mild TR. Segmental WMA.        Immunization History:   Immunization History   Administered Date(s) Administered    COVID-19, Pfizer, PF, 30mcg/0.3mL 2021, 03/15/2021, 2021    Pneumococcal Conjugate 13-valent (Ayidcjc44) 10/04/2017    Pneumococcal Polysaccharide (Ancavofub41) 2018    Tdap (Boostrix, Adacel) 2018       Active Problems:  Patient Active Problem List   Diagnosis Code    Nonischemic cardiomyopathy ([facility] A77.2    Systolic CHF, chronic (HCC) I50.22    CKD (chronic kidney disease), stage III (HCC) N18.30    Vitamin B deficiency E53.9    Osteopenia M85.80    Arthritis of right hip M16.11    Bilateral carpal tunnel syndrome G56.03    AICD (automatic cardioverter/defibrillator) Implant Z95.810    Basal cell carcinoma C44.91    Adopted Z02.82    Migraine headache G43.909    Atrial fibrillation (HCC) I48.91    Essential hypertension I10    Osteomyelitis ([address] M86.9       Isolation/Infection:   Isolation            No Isolation          Patient Infection Status       None to display            Nurse Assessment:  Last Vital Signs: BP (!) 142/78   Pulse 96   Temp 97.8 °F (36.6 °C) (Oral)   Resp 18   Ht 5' 9\" (1.753 m)   Wt 140 lb 6.9 oz (63.7 kg)   SpO2 96%   BMI 20.74 kg/m²     Last documented pain score (0-10 scale): Pain Level: 0  Last Weight:   Wt Readings from Last 1 Encounters:   10/15/21 140 lb 6.9 oz (63.7 kg)     Mental Status:  oriented and alert    IV Access:  - None    Nursing Mobility/ADLs:  Walking   Independent  Transfer  Independent  Bathing  Independent  Dressing  Independent  Toileting  Independent  Feeding  Independent  Med Admin  Independent  Med Delivery   whole    Wound Care Documentation and Therapy:  Wound 04/09/18 Laceration Elbow Left; Outer (Active)   Number of days: 1285       Incision 04/06/18 Chest Left (Active)   Number of days: 0053       Incision 10/30/18 Head Right (Active)   Number of days: 1080       Wound 10/12/21 Toe (Comment  which one) Left (Active)   Dressing Status New dressing applied 10/14/21 1935   Dressing/Treatment Betadine swabs/povidone iodine;Dry dressing; Ace wrap 10/14/21 1935   Number of days: 3        Elimination:  Continence:   · Bowel:  Yes  · Bladder: Yes  Urinary Catheter: None   Colostomy/Ileostomy/Ileal Conduit: No       Date of Last BM: 10/15/21    Intake/Output Summary (Last 24 hours) at 10/15/2021 0921  Last data filed at 10/15/2021 0029  Gross per 24 hour   Intake --   Output 1150 ml   Net -1150 ml Certification: I certify the above information and transfer of Raj Whitt  is necessary for the continuing treatment of the diagnosis listed and that she requires [address] for greater 30 days.      Update Admission H&P: Changes in H&P as follows - as in DC summary    PHYSICIAN SIGNATURE:  Electronically signed by Darren Tong MD on 10/15/21 at 5:27 PM EDT

## 2021-10-15 NOTE — TELEPHONE ENCOUNTER
Jil Dang from [facility] called asking for verbal orders for home health. Also requesting LOV notes faxed to [phone]. Writer faxed LOV notes.  Jil Dang states she will also send over home health orders, please call with any questions # [phone]

## 2021-10-16 LAB
EKG ATRIAL RATE: 115 BPM
EKG Q-T INTERVAL: 338 MS
EKG QRS DURATION: 84 MS
EKG QTC CALCULATION (BAZETT): 457 MS
EKG R AXIS: 179 DEGREES
EKG T AXIS: 25 DEGREES
EKG VENTRICULAR RATE: 110 BPM

## 2021-10-16 NOTE — CARE COORDINATION
Discharge [address] [facility]  Clinical Case Management Department  Written by: Arslan Beltran RN    Patient Name: Veena Medrano  Attending Provider: No att. providers found  Admit Date: 10/12/2021  1:05 PM  MRN: [id number]  Account: [de-identified]                     : 1945  Discharge Date: 10/15/2021      Disposition: home - [facility], DME walker to be delivered to hospital room.  Patient to be transported home by family    Arslan Beltran RN

## 2021-10-18 ENCOUNTER — TELEPHONE (OUTPATIENT)
Dept: PRIMARY CARE CLINIC | Age: 76
End: 2021-10-18

## 2021-10-18 ENCOUNTER — CARE COORDINATION (OUTPATIENT)
Dept: CASE MANAGEMENT | Age: 76
End: 2021-10-18

## 2021-10-18 LAB
CULTURE: NORMAL
CULTURE: NORMAL
Lab: NORMAL
Lab: NORMAL
SPECIMEN DESCRIPTION: NORMAL
SPECIMEN DESCRIPTION: NORMAL

## 2021-10-18 NOTE — CARE COORDINATION
[facility] Transitions Initial Follow Up Call    Call within 2 business days of discharge: Yes    Patient: Breann Mendoza Patient : [birth year]   MRN: <[id number]>  Reason for Admission: Osteomyelitis  Discharge Date: 10/15/21 RARS: Readmission Risk Score: 11      Last Discharge [facility]       Complaint Diagnosis Description Type Department Provider    10/12/21 Toe Pain Osteomyelitis of left foot, unspecified type ([facility] . .. ED to Hosp-Admission (Discharged) (ADMITTED) BRYON ORTIZ MS Taylor Chapman MD; [facility]. .. Spoke with:  Obdulia Ball who states she is doing well, she is using walker at home with no issues. Denies chest pain, dizziness , SOB, taking all medications as directed Med 1 care has been to home has scheduled outpatient toe amputation tomorrow no concerns       Facility: [facility]    Non-face-to-face services provided:  Obtained and reviewed discharge summary and/or continuity of care documents  Transitions of Care Initial Call    Was this an external facility discharge? No     Challenges to be reviewed by the provider   Additional needs identified to be addressed with provider: No  none             Method of communication with provider : none      Advance Care Planning:   Does patient have an Advance Directive: reviewed and needs to be updated, not on file; education provided, not on file, patient declined education, decision maker updated and referral to internal ACP facilitator. Was this a readmission? No  Patient stated reason for admission: osteomyelitis  Patients top risk factors for readmission: functional physical ability    Care Transition Nurse (CTN) contacted the patient by telephone to perform post hospital discharge assessment. Verified name and  with patient as identifiers. Provided introduction to self, and explanation of the CTN role.      CTN reviewed discharge instructions, medical action plan and red flags with patient who verbalized understanding. Patient given an opportunity to ask questions and does not have any further questions or concerns at this time. Were discharge instructions available to patient? Yes. Reviewed appropriate site of care based on symptoms and resources available to patient including: PCP and Specialist. The patient agrees to contact the PCP office for questions related to their healthcare. Medication reconciliation was performed with patient, who verbalizes understanding of administration of home medications. Advised obtaining a 90-day supply of all daily and as-needed medications. Covid Risk Education     Educated patient about risk for severe COVID-19 due to risk factors according to CDC guidelines. LPN CC reviewed discharge instructions, medical action plan and red flag symptoms with the patient who verbalized understanding. Discussed COVID vaccination status: Yes. Education provided on COVID-19 vaccination as appropriate. Discussed exposure protocols and quarantine with CDC Guidelines. Patient was given an opportunity to verbalize any questions and concerns and agrees to contact LPN CC or health care provider for questions related to their healthcare. Reviewed and educated patient on any new and changed medications related to discharge diagnosis. Was patient discharged with a pulse oximeter? No Discussed and confirmed pulse oximeter discharge instructions and when to notify provider or seek emergency care. LPN CC provided contact information. Plan for follow-up call in 3-5 days based on severity of symptoms and risk factors. Plan for next call: post op toe amputation tomorrow      Care Transitions 24 Hour Call    Do you have all of your prescriptions and are they filled?: No (Comment:  Thiamine and Folic acid sent  to mail away pharmacy)  Do you have support at home?: Alone  Are you an active caregiver in your home?: No  Care Transitions Interventions         Follow Up  Future Appointments   Date Time Provider Delfino Marrufo   11/11/2021  1:00 PM JOSÉ Hopper - CNP [facility] [place], LPN

## 2021-10-18 NOTE — TELEPHONE ENCOUNTER
[facility] Transitions Initial Follow Up Call    Outreach made within 2 business days of discharge: Yes    Patient: Pamela Fajardo Patient : [birth year]   MRN: [id number]  Reason for Admission: There are no discharge diagnoses documented for the most recent discharge. Discharge Date: 10/15/21       Spoke with: Patient    Discharge department/facility: [address] [place] [address],[address] Interactive Patient Contact:  Was patient able to fill all prescriptions: Yes  Was patient instructed to bring all medications to the follow-up visit: Yes  Is patient taking all medications as directed in the discharge summary?  Yes  Does patient understand their discharge instructions: Yes  Does patient have questions or concerns that need addressed prior to 7-14 day follow up office visit: no    Scheduled appointment with PCP within 7-14 days    Follow Up  Future Appointments   Date Time Provider Delfino Marrufo   2021  1:00 PM [address], APRN - [facility], [place]

## 2021-10-19 ENCOUNTER — HOSPITAL ENCOUNTER (OUTPATIENT)
Age: 76
Setting detail: OUTPATIENT SURGERY
Discharge: HOME | End: 2021-10-19
Attending: PODIATRIST | Admitting: PODIATRIST
Payer: MEDICARE

## 2021-10-19 ENCOUNTER — ANESTHESIA (OUTPATIENT)
Dept: OPERATING ROOM | Age: 76
End: 2021-10-19
Payer: MEDICARE

## 2021-10-19 VITALS
TEMPERATURE: 97.4 F | WEIGHT: 138.56 LBS | DIASTOLIC BLOOD PRESSURE: 46 MMHG | RESPIRATION RATE: 16 BRPM | OXYGEN SATURATION: 97 % | HEART RATE: 52 BPM | SYSTOLIC BLOOD PRESSURE: 100 MMHG | HEIGHT: 69 IN | BODY MASS INDEX: 20.52 KG/M2

## 2021-10-19 VITALS — OXYGEN SATURATION: 100 % | DIASTOLIC BLOOD PRESSURE: 85 MMHG | SYSTOLIC BLOOD PRESSURE: 142 MMHG | TEMPERATURE: 95.9 F

## 2021-10-19 DIAGNOSIS — G89.18 ACUTE POST-OPERATIVE PAIN: Primary | ICD-10-CM

## 2021-10-19 PROCEDURE — 88311 DECALCIFY TISSUE: CPT

## 2021-10-19 PROCEDURE — 6360000002 HC RX W HCPCS: Performed by: SPECIALIST

## 2021-10-19 PROCEDURE — 2580000003 HC RX 258: Performed by: SPECIALIST

## 2021-10-19 PROCEDURE — 3600000013 HC SURGERY LEVEL 3 ADDTL 15MIN: Performed by: PODIATRIST

## 2021-10-19 PROCEDURE — 3700000001 HC ADD 15 MINUTES (ANESTHESIA): Performed by: PODIATRIST

## 2021-10-19 PROCEDURE — 2500000003 HC RX 250 WO HCPCS: Performed by: PODIATRIST

## 2021-10-19 PROCEDURE — 3600000003 HC SURGERY LEVEL 3 BASE: Performed by: PODIATRIST

## 2021-10-19 PROCEDURE — 7100000011 HC PHASE II RECOVERY - ADDTL 15 MIN: Performed by: PODIATRIST

## 2021-10-19 PROCEDURE — 7100000010 HC PHASE II RECOVERY - FIRST 15 MIN: Performed by: PODIATRIST

## 2021-10-19 PROCEDURE — 6360000002 HC RX W HCPCS: Performed by: PODIATRIST

## 2021-10-19 PROCEDURE — 28820 AMPUTATION OF TOE: CPT | Performed by: PODIATRIST

## 2021-10-19 PROCEDURE — 2709999900 HC NON-CHARGEABLE SUPPLY: Performed by: PODIATRIST

## 2021-10-19 PROCEDURE — 88305 TISSUE EXAM BY PATHOLOGIST: CPT

## 2021-10-19 PROCEDURE — 3700000000 HC ANESTHESIA ATTENDED CARE: Performed by: PODIATRIST

## 2021-10-19 PROCEDURE — 2580000003 HC RX 258: Performed by: ANESTHESIOLOGY

## 2021-10-19 PROCEDURE — 7100000000 HC PACU RECOVERY - FIRST 15 MIN: Performed by: PODIATRIST

## 2021-10-19 PROCEDURE — 6370000000 HC RX 637 (ALT 250 FOR IP)

## 2021-10-19 RX ORDER — LABETALOL 20 MG/4 ML (5 MG/ML) INTRAVENOUS SYRINGE
10 EVERY 10 MIN PRN
Status: DISCONTINUED | OUTPATIENT
Start: 2021-10-19 | End: 2021-10-19 | Stop reason: HOSPADM

## 2021-10-19 RX ORDER — MEPERIDINE HYDROCHLORIDE 50 MG/ML
12.5 INJECTION INTRAMUSCULAR; INTRAVENOUS; SUBCUTANEOUS EVERY 5 MIN PRN
Status: DISCONTINUED | OUTPATIENT
Start: 2021-10-19 | End: 2021-10-19 | Stop reason: HOSPADM

## 2021-10-19 RX ORDER — BUPIVACAINE HYDROCHLORIDE 5 MG/ML
INJECTION, SOLUTION EPIDURAL; INTRACAUDAL
Status: DISCONTINUED
Start: 2021-10-19 | End: 2021-10-19 | Stop reason: HOSPADM

## 2021-10-19 RX ORDER — FENTANYL CITRATE 50 UG/ML
INJECTION, SOLUTION INTRAMUSCULAR; INTRAVENOUS PRN
Status: DISCONTINUED | OUTPATIENT
Start: 2021-10-19 | End: 2021-10-19 | Stop reason: SDUPTHER

## 2021-10-19 RX ORDER — SODIUM CHLORIDE, SODIUM LACTATE, POTASSIUM CHLORIDE, CALCIUM CHLORIDE 600; 310; 30; 20 MG/100ML; MG/100ML; MG/100ML; MG/100ML
INJECTION, SOLUTION INTRAVENOUS CONTINUOUS PRN
Status: DISCONTINUED | OUTPATIENT
Start: 2021-10-19 | End: 2021-10-19 | Stop reason: SDUPTHER

## 2021-10-19 RX ORDER — 0.9 % SODIUM CHLORIDE 0.9 %
500 INTRAVENOUS SOLUTION INTRAVENOUS
Status: DISCONTINUED | OUTPATIENT
Start: 2021-10-19 | End: 2021-10-19 | Stop reason: HOSPADM

## 2021-10-19 RX ORDER — HYDRALAZINE HYDROCHLORIDE 20 MG/ML
10 INJECTION INTRAMUSCULAR; INTRAVENOUS EVERY 10 MIN PRN
Status: DISCONTINUED | OUTPATIENT
Start: 2021-10-19 | End: 2021-10-19 | Stop reason: HOSPADM

## 2021-10-19 RX ORDER — LIDOCAINE HYDROCHLORIDE 10 MG/ML
1 INJECTION, SOLUTION EPIDURAL; INFILTRATION; INTRACAUDAL; PERINEURAL
Status: DISCONTINUED | OUTPATIENT
Start: 2021-10-19 | End: 2021-10-19 | Stop reason: HOSPADM

## 2021-10-19 RX ORDER — LINEZOLID 2 MG/ML
600 INJECTION, SOLUTION INTRAVENOUS ONCE
Status: COMPLETED | OUTPATIENT
Start: 2021-10-19 | End: 2021-10-19

## 2021-10-19 RX ORDER — BUPIVACAINE HYDROCHLORIDE 5 MG/ML
INJECTION, SOLUTION EPIDURAL; INTRACAUDAL PRN
Status: DISCONTINUED | OUTPATIENT
Start: 2021-10-19 | End: 2021-10-19 | Stop reason: ALTCHOICE

## 2021-10-19 RX ORDER — HYDROCODONE BITARTRATE AND ACETAMINOPHEN 5; 325 MG/1; MG/1
1 TABLET ORAL EVERY 6 HOURS PRN
Qty: 28 TABLET | Refills: 0 | Status: SHIPPED | OUTPATIENT
Start: 2021-10-19 | End: 2021-10-26

## 2021-10-19 RX ORDER — SODIUM CHLORIDE 0.9 % (FLUSH) 0.9 %
10 SYRINGE (ML) INJECTION PRN
Status: DISCONTINUED | OUTPATIENT
Start: 2021-10-19 | End: 2021-10-19 | Stop reason: HOSPADM

## 2021-10-19 RX ORDER — OXYCODONE HYDROCHLORIDE AND ACETAMINOPHEN 5; 325 MG/1; MG/1
2 TABLET ORAL PRN
Status: COMPLETED | OUTPATIENT
Start: 2021-10-19 | End: 2021-10-19

## 2021-10-19 RX ORDER — CEFDINIR 300 MG/1
300 CAPSULE ORAL 2 TIMES DAILY
Qty: 14 CAPSULE | Refills: 0 | Status: SHIPPED | OUTPATIENT
Start: 2021-10-19 | End: 2021-10-26

## 2021-10-19 RX ORDER — ONDANSETRON 2 MG/ML
INJECTION INTRAMUSCULAR; INTRAVENOUS PRN
Status: DISCONTINUED | OUTPATIENT
Start: 2021-10-19 | End: 2021-10-19 | Stop reason: SDUPTHER

## 2021-10-19 RX ORDER — SODIUM CHLORIDE 9 MG/ML
25 INJECTION, SOLUTION INTRAVENOUS PRN
Status: DISCONTINUED | OUTPATIENT
Start: 2021-10-19 | End: 2021-10-19 | Stop reason: HOSPADM

## 2021-10-19 RX ORDER — DIPHENHYDRAMINE HYDROCHLORIDE 50 MG/ML
12.5 INJECTION INTRAMUSCULAR; INTRAVENOUS
Status: DISCONTINUED | OUTPATIENT
Start: 2021-10-19 | End: 2021-10-19 | Stop reason: HOSPADM

## 2021-10-19 RX ORDER — ONDANSETRON 2 MG/ML
4 INJECTION INTRAMUSCULAR; INTRAVENOUS
Status: DISCONTINUED | OUTPATIENT
Start: 2021-10-19 | End: 2021-10-19 | Stop reason: HOSPADM

## 2021-10-19 RX ORDER — DOXYCYCLINE HYCLATE 100 MG
100 TABLET ORAL 2 TIMES DAILY
Qty: 14 TABLET | Refills: 0 | Status: SHIPPED | OUTPATIENT
Start: 2021-10-19 | End: 2021-10-26

## 2021-10-19 RX ORDER — LIDOCAINE HYDROCHLORIDE 10 MG/ML
INJECTION, SOLUTION INFILTRATION; PERINEURAL
Status: DISCONTINUED
Start: 2021-10-19 | End: 2021-10-19 | Stop reason: HOSPADM

## 2021-10-19 RX ORDER — OXYCODONE HYDROCHLORIDE AND ACETAMINOPHEN 5; 325 MG/1; MG/1
1 TABLET ORAL PRN
Status: COMPLETED | OUTPATIENT
Start: 2021-10-19 | End: 2021-10-19

## 2021-10-19 RX ORDER — LIDOCAINE HYDROCHLORIDE 10 MG/ML
INJECTION, SOLUTION EPIDURAL; INFILTRATION; INTRACAUDAL; PERINEURAL PRN
Status: DISCONTINUED | OUTPATIENT
Start: 2021-10-19 | End: 2021-10-19 | Stop reason: ALTCHOICE

## 2021-10-19 RX ORDER — OXYCODONE HYDROCHLORIDE AND ACETAMINOPHEN 5; 325 MG/1; MG/1
TABLET ORAL
Status: COMPLETED
Start: 2021-10-19 | End: 2021-10-19

## 2021-10-19 RX ORDER — SODIUM CHLORIDE 0.9 % (FLUSH) 0.9 %
10 SYRINGE (ML) INJECTION EVERY 12 HOURS SCHEDULED
Status: DISCONTINUED | OUTPATIENT
Start: 2021-10-19 | End: 2021-10-19 | Stop reason: HOSPADM

## 2021-10-19 RX ORDER — SODIUM CHLORIDE, SODIUM LACTATE, POTASSIUM CHLORIDE, CALCIUM CHLORIDE 600; 310; 30; 20 MG/100ML; MG/100ML; MG/100ML; MG/100ML
INJECTION, SOLUTION INTRAVENOUS CONTINUOUS
Status: DISCONTINUED | OUTPATIENT
Start: 2021-10-19 | End: 2021-10-19 | Stop reason: HOSPADM

## 2021-10-19 RX ORDER — PROMETHAZINE HYDROCHLORIDE 25 MG/ML
6.25 INJECTION, SOLUTION INTRAMUSCULAR; INTRAVENOUS
Status: DISCONTINUED | OUTPATIENT
Start: 2021-10-19 | End: 2021-10-19 | Stop reason: HOSPADM

## 2021-10-19 RX ORDER — PROPOFOL 10 MG/ML
INJECTION, EMULSION INTRAVENOUS PRN
Status: DISCONTINUED | OUTPATIENT
Start: 2021-10-19 | End: 2021-10-19 | Stop reason: SDUPTHER

## 2021-10-19 RX ADMIN — ONDANSETRON 4 MG: 2 INJECTION, SOLUTION INTRAMUSCULAR; INTRAVENOUS at 08:16

## 2021-10-19 RX ADMIN — FENTANYL CITRATE 25 MCG: 50 INJECTION, SOLUTION INTRAMUSCULAR; INTRAVENOUS at 07:58

## 2021-10-19 RX ADMIN — FENTANYL CITRATE 25 MCG: 50 INJECTION, SOLUTION INTRAMUSCULAR; INTRAVENOUS at 07:39

## 2021-10-19 RX ADMIN — PROPOFOL INJECTABLE EMULSION 70 MG: 10 INJECTION, EMULSION INTRAVENOUS at 07:39

## 2021-10-19 RX ADMIN — PROPOFOL INJECTABLE EMULSION 50 MG: 10 INJECTION, EMULSION INTRAVENOUS at 07:35

## 2021-10-19 RX ADMIN — SODIUM CHLORIDE, POTASSIUM CHLORIDE, SODIUM LACTATE AND CALCIUM CHLORIDE: 600; 310; 30; 20 INJECTION, SOLUTION INTRAVENOUS at 06:45

## 2021-10-19 RX ADMIN — LINEZOLID 600 MG: 600 INJECTION, SOLUTION INTRAVENOUS at 07:44

## 2021-10-19 RX ADMIN — OXYCODONE HYDROCHLORIDE AND ACETAMINOPHEN 1 TABLET: 5; 325 TABLET ORAL at 09:01

## 2021-10-19 RX ADMIN — SODIUM CHLORIDE 25 ML: 9 INJECTION, SOLUTION INTRAVENOUS at 07:00

## 2021-10-19 RX ADMIN — FENTANYL CITRATE 25 MCG: 50 INJECTION, SOLUTION INTRAMUSCULAR; INTRAVENOUS at 08:13

## 2021-10-19 RX ADMIN — FENTANYL CITRATE 25 MCG: 50 INJECTION, SOLUTION INTRAMUSCULAR; INTRAVENOUS at 07:35

## 2021-10-19 ASSESSMENT — PULMONARY FUNCTION TESTS
PIF_VALUE: 3
PIF_VALUE: 3
PIF_VALUE: 8
PIF_VALUE: 8
PIF_VALUE: 15
PIF_VALUE: 0
PIF_VALUE: 3
PIF_VALUE: 3
PIF_VALUE: 6
PIF_VALUE: 3
PIF_VALUE: 1
PIF_VALUE: 4
PIF_VALUE: 3
PIF_VALUE: 8
PIF_VALUE: 4
PIF_VALUE: 6
PIF_VALUE: 0
PIF_VALUE: 0
PIF_VALUE: 3
PIF_VALUE: 4
PIF_VALUE: 1
PIF_VALUE: 3
PIF_VALUE: 3
PIF_VALUE: 1
PIF_VALUE: 8
PIF_VALUE: 8
PIF_VALUE: 3
PIF_VALUE: 0
PIF_VALUE: 22
PIF_VALUE: 11
PIF_VALUE: 6
PIF_VALUE: 4
PIF_VALUE: 3
PIF_VALUE: 3
PIF_VALUE: 5
PIF_VALUE: 3
PIF_VALUE: 0
PIF_VALUE: 8
PIF_VALUE: 3
PIF_VALUE: 5
PIF_VALUE: 6
PIF_VALUE: 3
PIF_VALUE: 4
PIF_VALUE: 0
PIF_VALUE: 8
PIF_VALUE: 0
PIF_VALUE: 7
PIF_VALUE: 4
PIF_VALUE: 5
PIF_VALUE: 4
PIF_VALUE: 3
PIF_VALUE: 4
PIF_VALUE: 3
PIF_VALUE: 3
PIF_VALUE: 1

## 2021-10-19 ASSESSMENT — PAIN SCALES - GENERAL
PAINLEVEL_OUTOF10: 0
PAINLEVEL_OUTOF10: 5
PAINLEVEL_OUTOF10: 0
PAINLEVEL_OUTOF10: 3

## 2021-10-19 ASSESSMENT — PAIN - FUNCTIONAL ASSESSMENT: PAIN_FUNCTIONAL_ASSESSMENT: 0-10

## 2021-10-19 ASSESSMENT — PAIN DESCRIPTION - LOCATION: LOCATION: HIP

## 2021-10-19 NOTE — BRIEF OP NOTE
PODIATRY BRIEF OP NOTE    PATIENT NAME: Moe Miguel  BIRTH DATE: [birth year]  -  77 y.o. female  MRN: [id number]  DATE: 10/19/2021  BILLING #: [id number]    Surgeon(s):  Bharath Bhatt DPM     ASSISTANTS: Bello Hardy DPM PGY-1    PRE-OP DIAGNOSIS:   1. Osteomyelitis, left foot  2. Chronic non-healing wound down to subcutaneous layer, left foot    POST-OP DIAGNOSIS: Same as above. PROCEDURE:   1. Hallux amputation, left foot    ANESTHESIA: MAC with local    HEMOSTASIS: Direct pressure    ESTIMATED BLOOD LOSS: Less than 10cc. MATERIALS: 2-0, 4-0 vicryl, 2-0 nylone  * No implants in log *    INJECTABLES: 10cc 1:1 mix of 0.5% marcaine plain and 1% lidocaine plain    SPECIMEN:   ID Type Source Tests Collected by Time Destination   A : GREAT TOE LEFT FOOT Tissue Toe SURGICAL PATHOLOGY Bharath Bhatt DPM 10/19/2021 0253        COMPLICATIONS: none    FINDINGS: Nonhealing wound was noted to left medial hallux. Upon amputation, left hallux was found to be soft and necrotic which consists of osteomyelitis. The patient was counseled at length about the risks of roque Covid-19 during their perioperative period and any recovery window from their procedure. The patient was made aware that roque Covid-19  may worsen their prognosis for recovering from their procedure  and lend to a higher morbidity and/or mortality risk. All material risks, benefits, and reasonable alternatives including postponing the procedure were discussed. The patient does wish to proceed with the procedure at this time.     Bello Hardy DPM   Podiatric Medicine & Surgery   10/19/2021 at 8:28 AM

## 2021-10-19 NOTE — INTERVAL H&P NOTE
Update History & Physical    The patient's History and Physical of October 13, 2021 was reviewed with the patient and I examined the patient. There was no change. The surgical site was confirmed by the patient and me. Plan: The risks, benefits, expected outcome, and alternative to the recommended procedure have been discussed with the patient. Patient understands and wants to proceed with the procedure.      Electronically signed by Moy Alanis DPM on 10/19/2021 at 7:23 AM

## 2021-10-19 NOTE — PROGRESS NOTES
CLINICAL PHARMACY NOTE: MEDS TO BEDS    Total # of Prescriptions Filled: 3   The following medications were delivered to the patient:  · Doxycycline 100mg  · Cefdinir 300mg  · Norco 5/325mg

## 2021-10-19 NOTE — ANESTHESIA POSTPROCEDURE EVALUATION
Department of Anesthesiology  Postprocedure Note    Patient: Mary Madsen  MRN: [id number]  YOB: 1945  Date of evaluation: 10/19/2021  Time:  9:26 AM     Procedure Summary     Date: 10/19/21 Room / Location: [facility] [facility] / [address] [facility]    Anesthesia Start: 9696 Anesthesia Stop: 3327    Procedure: LEFT HALLUX AMPUTATION, DEBRIDEMENT OF NONVIABLE SOFT TISSUE AND BONE LEFT HALLUX (Left Foot) Diagnosis: (Osteomyelitis of left foot, unspecified type ([facility] [address] [M86.9])    Surgeons: Chip Fang DPM Responsible Provider: Azul Gallagher MD    Anesthesia Type: MAC ASA Status: 4          Anesthesia Type: MAC    Nick Phase I: Nick Score: 7    Nick Phase II: Nick Score: 9    Last vitals: Reviewed and per EMR flowsheets.        Anesthesia Post Evaluation    Patient location during evaluation: PACU  Patient participation: complete - patient cannot participate  Level of consciousness: awake and alert  Airway patency: patent  Nausea & Vomiting: no nausea and no vomiting  Complications: no  Cardiovascular status: hemodynamically stable  Respiratory status: nasal cannula and spontaneous ventilation  Hydration status: euvolemic

## 2021-10-19 NOTE — OP NOTE
PODIATRY OP NOTE    PATIENT NAME: Kannan Hernandes  BIRTH DATE: [birth year]  -  77 y.o. female  MRN: [id number]  DATE: 10/19/2021  BILLING #: [id number]    Surgeon(s):  Yoselin Salas DPM     ASSISTANTS: Frank Castillo DPM PGY-1    PRE-OP DIAGNOSIS:   1. Osteomyelitis, left foot  2. Chronic non-healing wound down to subcutaneous layer, left foot    POST-OP DIAGNOSIS: Same as above. PROCEDURE:   1. Hallux amputation, left foot    ANESTHESIA: MAC with local    HEMOSTASIS: Direct pressure    ESTIMATED BLOOD LOSS: Less than 10cc. MATERIALS: 2-0, 4-0 vicryl, 2-0 nylone  * No implants in log *    INJECTABLES: 10cc 1:1 mix of 0.5% marcaine plain and 1% lidocaine plain    SPECIMEN:   ID Type Source Tests Collected by Time Destination   A : GREAT TOE LEFT FOOT Tissue Toe SURGICAL PATHOLOGY Yoselin Salas DPM 10/19/2021 379        COMPLICATIONS: none    FINDINGS: Nonhealing wound was noted to left medial hallux. Upon amputation, left hallux was found to be soft and necrotic which consists of osteomyelitis. INDICATION FOR PROCEDURE: The patient has had an infection of the left hallux for some time. This has failed conservative treatments thus far, and the patient elects to undergo surgical correction. All risks and benefits discussed with the patient in detail. No guarantees were given or implied. Consent signed and in the chart. PROCEDURE IN DETAIL: The patient was transported from pre-op to the operating room and placed on the operating table in the supine position with a safety strap across the lap. After adequate sedation by the Anesthesia, a local block of  10cc of a 1:1 mixture of 1% lidocaine plain and 0.5% Marcaine plain was injected. The foot was then prepped and draped in the usual aseptic fashion. Attention was directed to the left dorsal hallux. There was a wound to medial aspect of the hallux which probed to bone.  A #15 blade was used to create two converging semieliptical incisions around the proximal phalanx. The hallux was disarticulated at the MPJ and passed from the table to be sent as specimen. The proximal phalanx was noted to be soft and necrotic which consists of signs of osteomyelitis. All nonviable soft tissue was removed using a combination of sharp excision and rongeur. The 1st metatarsal head appeared healthy without signs of osteomyelitis. The remaining soft tissues appear viable with bleeding noted and without signs of infection or necrosis. Copious amounts of sterile saline  was then used to irrigate the surgical sitevia pulse lavage. The surgical site was then closed in layers using 2-0,4-0 vicryl and 2-0 nylon. Dressings consisted of adaptic, 4 x 4s, Kerlix and ACE bandage were applied. The patient tolerated the above procedure and anesthesia well without complications. The patient was transported from the operating room to the PACU with vital signs stable and vascular status intact to the left foot. The patient was counseled at length about the risks of roque Covid-19 during their perioperative period and any recovery window from their procedure. The patient was made aware that roque Covid-19  may worsen their prognosis for recovering from their procedure  and lend to a higher morbidity and/or mortality risk. All material risks, benefits, and reasonable alternatives including postponing the procedure were discussed. The patient does wish to proceed with the procedure at this time.     Chevy Esquivel DPM   Podiatric Medicine & Surgery   10/19/2021 at 8:45 AM

## 2021-10-19 NOTE — ANESTHESIA PRE PROCEDURE
Department of Anesthesiology  Preprocedure Note       Name:  Wilber Salazar   Age:  77 y.o.  :  1945                                          MRN:  [id number]         Date:  10/19/2021      Surgeon: Gena Alexander):  Teena Yan, DPTRAVIS    Procedure: Procedure(s):  LEFT PARTIAL RAY 1ST TOE AMPUTATION    Medications prior to admission:   Prior to Admission medications    Medication Sig Start Date End Date Taking?  Authorizing Provider   doxycycline hyclate (VIBRA-TABS) 100 MG tablet Take 1 tablet by mouth 2 times daily for 5 days 10/15/21 10/20/21 Yes Froilan Weathers MD   cefdinir (OMNICEF) 300 MG capsule Take 2 capsules by mouth daily for 5 days 10/15/21 10/20/21 Yes Froilan Weathers MD   carvedilol (COREG) 25 MG tablet Take 1 tablet by mouth 2 times daily (with meals) 10/15/21  Yes JOSÉ Watson - NP   sertraline (ZOLOFT) 50 MG tablet TAKE 1 TABLET EVERY DAY 21  Yes Emily Caldera APRN - CNP   digoxin (LANOXIN) 125 MCG tablet TAKE 1 TABLET BY MOUTH DAILY MONDAY THROUGH FRIDAY 5/10/21  Yes JOSÉ Pettit - CNP   Acetaminophen (TYLENOL ARTHRITIS PAIN PO) Take by mouth 3 times daily   Yes Historical Provider, MD   apixaban (ELIQUIS) 5 MG TABS tablet Take one tablet bid 5/3/19  Yes JOSÉ Ramos - CNP   torsemide (DEMADEX) 20 MG tablet Take 1 tablet by mouth daily 18  Yes JOSÉ Ashley - CNP   vitamin B-1 100 MG tablet Take 1 tablet by mouth daily 18  Yes Shayla Arellano DO   vitamin B-12 (CYANOCOBALAMIN) 1000 MCG tablet Take 1,000 mcg by mouth daily   Yes Historical Provider, MD   Multiple Vitamins-Minerals (THERAPEUTIC MULTIVITAMIN-MINERALS) tablet Take 1 tablet by mouth daily   Yes Historical Provider, MD   calcium carbonate (OSCAL) 500 MG TABS tablet Take 600 mg by mouth daily    Yes Historical Provider, MD   Biotin (BIOTIN 5000) 5 MG CAPS Take 5,000 mcg by mouth daily   Yes Historical Provider, MD   ascorbic acid (VITAMIN C) 500 MG tablet Take 500 mg by mouth daily    Yes Historical Provider, MD       Current medications:    Current Facility-Administered Medications   Medication Dose Route Frequency Provider Last Rate Last Admin    lactated ringers infusion   IntraVENous Continuous Celina Gunn MD        sodium chloride flush 0.9 % injection 10 mL  10 mL IntraVENous 2 times per day Celina Gunn MD        sodium chloride flush 0.9 % injection 10 mL  10 mL IntraVENous PRN Celina Gunn MD        0.9 % sodium chloride infusion  25 mL IntraVENous PRN Celina Gunn  mL/hr at 10/19/21 0700 25 mL at 10/19/21 0700    lidocaine PF 1 % injection 1 mL  1 mL IntraDERmal Once PRN Celina Gunn MD        linezolid (ZYVOX) IVPB 600 mg  600 mg IntraVENous Once Bart Lee DPM         Facility-Administered Medications Ordered in Other Encounters   Medication Dose Route Frequency Provider Last Rate Last Admin    lactated ringers infusion   IntraVENous Continuous PRN JOSÉ Gonzalez - CRNA   New Bag at 10/19/21 0645       Allergies: Allergies   Allergen Reactions    Contrast [Barium-Containing Compounds]      Unknown     Vancomycin Other (See Comments)     unknown    Codeine Rash       Problem List:    Patient Active Problem List   Diagnosis Code    Nonischemic cardiomyopathy ([facility] [address] V76.9    Systolic CHF, chronic (HCC) I50.22    CKD (chronic kidney disease), stage III ([address] N18.30    Vitamin B deficiency E53.9    Osteopenia M85.80    Arthritis of right hip M16.11    Bilateral carpal tunnel syndrome G56.03    AICD (automatic cardioverter/defibrillator) Implant Z95.810    Basal cell carcinoma C44.91    Adopted Z02.82    Migraine headache G43.909    Atrial fibrillation (HCC) I48.91    Essential hypertension I10    Osteomyelitis ([address] M86.9       Past Medical History:        Diagnosis Date    AICD (automatic cardioverter/defibrillator) present     April 6. 2018.  Dr. Janet Lomax     Atrial fibrillation [facility]) 09/2012 Dr.David Lucas  CHRONIC ON ELIQUIS    Atrial fibrillation ([facility] [address]     Cancer ([facility] [address]     basal cell on face    CHF (congestive heart failure) (HCC)     Chronic kidney disease     H/O cardiovascular stress test 2017    Neg. EF 24%    Hx of long term use of blood thinners     Hypertension     Nonischemic dilated cardiomyopathy ([facility] [address]     Other screening mammogram 2012    Negative    Poor historian        Past Surgical History:        Procedure Laterality Date    CARDIAC CATHETERIZATION  2017    NML CORS EF20%    CARDIAC DEFIBRILLATOR PLACEMENT  2018    DR Clare Marie    CARDIAC DEFIBRILLATOR PLACEMENT  2018    COLONOSCOPY N/A 2019    COLONOSCOPY POLYPECTOMY SNARE/COLD BIOPSY performed by Anne Juárez MD at [place] [address] Right     OTHER SURGICAL HISTORY  10/30/2018    excision basal cell right temple    PACEMAKER PLACEMENT      medtronic AICD    DE OFFICE/OUTPT VISIT,PROCEDURE ONLY Right 10/30/2018    EXCISION BASAL CELL RIGHT TEMPLE performed by Ana Haskins MD at [facility] [address] TRANSESOPHAGEAL ECHOCARDIOGRAM  11/10/2017    EF 20%. Mod MR. Mild to mod AI. Mild TR. Segmental WMA. Social History:    Social History     Tobacco Use    Smoking status: Former Smoker     Packs/day: 0.50     Years: 20.00     Pack years: 10.00     Quit date: 10/4/1987     Years since quittin.0    Smokeless tobacco: Never Used   Substance Use Topics    Alcohol use:  Yes     Alcohol/week: 1.0 standard drinks     Types: 1 Glasses of wine per week     Comment: daily                                Counseling given: Not Answered      Vital Signs (Current):   Vitals:    10/19/21 0646   BP: 123/73   Pulse: 57   Resp: 15   Temp: 97.1 °F (36.2 °C)   TempSrc: Infrared   SpO2: 97%   Weight: 138 lb 9 oz (62.9 kg)   Height: 5' 9\" (1.753 m)                                              BP Readings from Last 3 Encounters:   10/19/21 123/73   10/15/21 131/84   10/12/21 122/74 NPO Status: Time of last liquid consumption: 0515 (sips of water with am meds)                        Time of last solid consumption: 1300                        Date of last liquid consumption: 10/19/21                        Date of last solid food consumption: 10/18/21    BMI:   Wt Readings from Last 3 Encounters:   10/19/21 138 lb 9 oz (62.9 kg)   10/15/21 140 lb 6.9 oz (63.7 kg)   10/12/21 142 lb (64.4 kg)     Body mass index is 20.46 kg/m². CBC:   Lab Results   Component Value Date    WBC 5.8 10/14/2021    RBC 3.53 10/14/2021    RBC 3.78 09/22/2020    HGB 12.7 10/14/2021    HCT 37.3 10/14/2021    .8 10/14/2021    RDW 11.7 10/14/2021     10/14/2021       CMP:   Lab Results   Component Value Date     10/13/2021    K 3.9 10/13/2021     10/13/2021    CO2 22 10/13/2021    BUN 14 10/13/2021    CREATININE 0.75 10/13/2021    GFRAA >60 10/13/2021    AGRATIO 1.6 02/01/2018    LABGLOM >60 10/13/2021    GLUCOSE 98 10/13/2021    GLUCOSE 112 10/06/2021    PROT 7.6 10/12/2021    CALCIUM 9.1 10/13/2021    BILITOT 0.60 10/12/2021    ALKPHOS 74 10/12/2021    AST 18 10/12/2021    ALT 11 10/12/2021       POC Tests: No results for input(s): POCGLU, POCNA, POCK, POCCL, POCBUN, POCHEMO, POCHCT in the last 72 hours.     Coags:   Lab Results   Component Value Date    PROTIME 12.8 04/09/2018    PROTIME 24.8 10/05/2017    INR 1.2 04/09/2018    APTT 25.8 04/09/2018       HCG (If Applicable): No results found for: PREGTESTUR, PREGSERUM, HCG, HCGQUANT     ABGs: No results found for: PHART, PO2ART, CVS6AKQ, ZUA1RSX, BEART, Y2MLSIFJ     Type & Screen (If Applicable):  No results found for: LABABO, LABRH    Drug/Infectious Status (If Applicable):  No results found for: HIV, HEPCAB    COVID-19 Screening (If Applicable):   Lab Results   Component Value Date    COVID19 Not Detected 10/13/2021           Anesthesia Evaluation  Patient summary reviewed and Nursing notes reviewed  Airway: Mallampati: I  TM distance: >3 FB   Neck ROM: full  Mouth opening: > = 3 FB Dental: normal exam         Pulmonary:Negative Pulmonary ROS and normal exam                              ROS comment: -[address]   Cardiovascular:    (+) hypertension:, pacemaker: AICD, dysrhythmias: atrial fibrillation, CHF:,       ECG reviewed                     ROS comment: -AFIB WITH RAPID VENTRICULAR RATE  -CHF - EF-20%  -CARDIOMYOPATHY  -EKG - AFIB @ 110, RAD, RVH, PVC     Neuro/Psych:                ROS comment: -OSTEOPENIA  -OSTEOMYELITIS GI/Hepatic/Renal:            ROS comment: -RENAL INSUFFICIENCY. Endo/Other:    (+) : arthritis:., malignancy/cancer. ROS comment: -SKIN CANCER  -NPO AFTER MIDNIGHT  -ALLERGIES - BARIUM CONTRAST, VANCO Abdominal:             Vascular:           ROS comment: -ANTICOAGULATED - STOPPED ELIQUIS 2 DAYS. Other Findings:             Anesthesia Plan      MAC     ASA 4     (IF UNABLE TO TOLERATE MAC, THEN PLACE LMA)  Induction: intravenous. MIPS: Postoperative opioids intended and Prophylactic antiemetics administered. Anesthetic plan and risks discussed with patient. Plan discussed with CRNA.     Attending anesthesiologist reviewed and agrees with Nichole Curran MD   10/19/2021

## 2021-10-20 ENCOUNTER — NURSE TRIAGE (OUTPATIENT)
Dept: OTHER | Facility: CLINIC | Age: 76
End: 2021-10-20

## 2021-10-20 NOTE — TELEPHONE ENCOUNTER
Received call from [address] at [facility] with [employer] Complaint. Brief description of triage itching from medication recently prescribed. Triage indicates for patient to talk with PCP nows. Called [facility] for after hours on call provider for [facility]. Spoke with Bernice Curtis from the answering service who took patient's information to pass onto [employer] to call patient back directly. Provided phone numbers listed in chart. Informed patient that Dr. Kishore Larson will be calling to discuss the possible medication reaction. Also informed patient if they haven't heard from Kishore Larson within 20 minutes to call the [place] office back to notify them. Care advice provided, patient verbalizes understanding; denies any other questions or concerns; instructed to call back for any new or worsening symptoms. Attention Provider: Thank you for allowing me to participate in the care of your patient. The patient was connected to triage in response to information provided to the E[facility]/PSC. Please do not respond through this encounter as the response is not directed to a shared pool. Reason for Disposition   [1] Caller has URGENT medication question about med that PCP or specialist prescribed AND [2] triager unable to answer question    Additional Information   Commented on: [1] Caller has NON-URGENT medication question about med that PCP prescribed AND [2] triager unable to answer question     Prescribed by surgeon, not PCP    Answer Assessment - Initial Assessment Questions  1. NAME of MEDICATION: \"What medicine are you calling about? \"      Potential new medications after surgery:   Cefdinir  Doxycycline  Hydrocodone-APAP     2. QUESTION: Kindra Son is your question? \"  Does she need to stop any medication         3. PRESCRIBING HCP: \"Who prescribed it? \" Reason: if prescribed by specialist, call should be referred to that group. Surgeon - status post toe amputation yesterday     4.  SYMPTOMS: \"Do you have any symptoms? \"      Itching on chest, arms, and back started at 3324-6234 this morning. Denies seeing rash or hives at this time. Denies other symptoms including sob, throat itching/tightening. 5. SEVERITY: If symptoms are present, ask \"Are they mild, moderate or severe? \"      Mild per son - reports she is not scratching often. 6. PREGNANCY:  \"Is there any chance that you are pregnant? \" \"When was your last menstrual period? \"  N/a    Protocols used: MEDICATION QUESTION CALL-ADULT-

## 2021-10-21 ENCOUNTER — CARE COORDINATION (OUTPATIENT)
Dept: CASE MANAGEMENT | Age: 76
End: 2021-10-21

## 2021-10-21 LAB — SURGICAL PATHOLOGY REPORT: NORMAL

## 2021-10-21 NOTE — CARE COORDINATION
[facility] Transitions Follow Up Call    10/21/2021    Patient: Kvng Mccarthy  Patient :    MRN: <[id number]>  Reason for Admission:  Osteomyelitis  Discharge Date: 10/19/21 RARS: Readmission Risk Score: 11         Spoke with: Yolande Yepez who states she is doing fine, denies pain in foot, foot is bandaged no drainage note. Using walker to ambulate. [facility] will be out this afternoon no fever no chills. Drew Lee states no issues at this call. Care Transitions Follow Up Call    Needs to be reviewed by the provider   Additional needs identified to be addressed with provider: No  none             Method of communication with provider : none      Care Transition Nurse (CTN) contacted the patient by telephone to follow up after admission on 10/18. Verified name and  with patient as identifiers. Addressed changes since last contact: none  Discussed follow-up appointments. If no appointment was previously scheduled, appointment scheduling offered: Yes. Is follow up appointment scheduled within 7 days of discharge? Yes. Advance Care Planning:   Does patient have an Advance Directive: reviewed and needs to be updated, not on file; education provided, not on file, patient declined education, decision maker updated and referral to internal ACP facilitator. CTN reviewed discharge instructions, medical action plan and red flags with patient and discussed any barriers to care and/or understanding of plan of care after discharge. Discussed appropriate site of care based on symptoms and resources available to patient including: PCP and Specialist. The patient agrees to contact the PCP office for questions related to their healthcare. Patients top risk factors for readmission: functional physical ability  Interventions to address risk factors: Obtained and reviewed discharge summary and/or continuity of care documents          CTN provided contact information for future needs.  Plan for follow-up call in 5-7 days based on severity of symptoms and risk factors. Plan for next call: symptom management-fever, pain, mobility          Care Transitions Subsequent and Final Call    Subsequent and Final Calls  Do you have any ongoing symptoms?: No  Have your medications changed?: No  Do you have any questions related to your medications?: No  Do you currently have any active services?: Yes  Are you currently active with any services?: Home Health  Do you have any needs or concerns that I can assist you with?: No  Identified Barriers: Other  Care Transitions Interventions  Other Interventions:            Follow Up  Future Appointments   Date Time Provider Delfino Marrufo   10/27/2021  2:15 PM Jj Eddy DPM [address] [facility]   11/11/2021  1:00 PM JOSÉ Painting - [facility] Joby Malik LPN

## 2021-10-27 ENCOUNTER — OFFICE VISIT (OUTPATIENT)
Dept: PODIATRY | Age: 76
End: 2021-10-27

## 2021-10-27 DIAGNOSIS — Z98.890 POST-OPERATIVE STATE: ICD-10-CM

## 2021-10-27 DIAGNOSIS — Z89.412 LEFT GREAT TOE AMPUTEE (HCC): ICD-10-CM

## 2021-10-27 DIAGNOSIS — R26.2 UNABLE TO WALK 150 FEET: Primary | ICD-10-CM

## 2021-10-27 PROCEDURE — 99024 POSTOP FOLLOW-UP VISIT: CPT | Performed by: PODIATRIST

## 2021-10-27 NOTE — PROGRESS NOTES
[address]  Post Op note  Chief Complaint   Patient presents with    Post-Op Check     post op initial left        Mal Hanson is a 77y.o. year old female who is 8 day(s) post op from foot surgery. Type: hallux amputation left. Vital signs are stable. Pain level is 0. Patient denies N/V/F/C. Patient has been compliant with postoperative instructions. Review of Systems    Vascular: DP and PT pulses palpable 2/4, Right Foot and 2/4 on the Left Foot. CFT <3 seconds, Right Foot and <3 seconds on the Left Foot. Edema is absent,  Right Foot and absenton the Left Foot. Neurological:   Sensation absent  to light touch to level of digits, both feet. Musculoskeletal:   Muscle strength is 5/5 on the Right Foot and 5/5 on the Left Foot. Structural deformities are present on the Right Foot and present on the Left Foot. Integument:  Warm, dry, supple both feet. Incisions are healing well. Wound dehiscence is absent. Infection is absent. Assesment : Post operative progress gradually improving. Diagnosis Orders   1. Unable to walk 150 feet  Handicap Placard MISC   2. Post-operative state     3. Left great toe amputee ([address]           Plan: Sutures in place. Dry sterile dressing applied. Keep surgical site dry. Ice and elevation as directed. Orders Placed This Encounter   Medications    Handicap Placard [facility]     Sig: by Does not apply route Duration:5 years ending 10/27/2026     Dispense:  1 each     Refill:  0       Follow up 1week(s).

## 2021-10-29 ENCOUNTER — CARE COORDINATION (OUTPATIENT)
Dept: CASE MANAGEMENT | Age: 76
End: 2021-10-29

## 2021-10-29 NOTE — CARE COORDINATION
[facility] Transitions Follow Up Call    10/29/2021    Patient: Kvng Mccarthy  Patient :    MRN: <[id number]>  Reason for Admission:  Osteomyelitis  Discharge Date: 10/19/21 RARS: Readmission Risk Score: 6         Spoke with: Called to speak with patient for update with transition of care. Left HIPPA compliant voice message with contact information [phone] for a call  Back with an update. Care Transitions Subsequent and Final Call    Subsequent and Final Calls  Care Transitions Interventions  Other Interventions:            Follow Up  Future Appointments   Date Time Provider Delfino Marrufo   11/3/2021  3:00 PM Alayna Lopez DPM [facility]   2021  1:00 PM JOSÉ Montoya - [facility] Jony Gil LPN

## 2021-11-03 ENCOUNTER — OFFICE VISIT (OUTPATIENT)
Dept: PODIATRY | Age: 76
End: 2021-11-03

## 2021-11-03 VITALS — WEIGHT: 138 LBS | HEIGHT: 69 IN | BODY MASS INDEX: 20.44 KG/M2

## 2021-11-03 DIAGNOSIS — Z98.890 POST-OPERATIVE STATE: Primary | ICD-10-CM

## 2021-11-03 DIAGNOSIS — Z89.412 LEFT GREAT TOE AMPUTEE (HCC): ICD-10-CM

## 2021-11-03 PROCEDURE — 99024 POSTOP FOLLOW-UP VISIT: CPT | Performed by: PODIATRIST

## 2021-11-03 NOTE — PROGRESS NOTES
[address]  Post Op note  Chief Complaint   Patient presents with    Wound Check     left foot        Katharina Garner is a 77y.o. year old female who is 2 weeks post op from foot surgery. Type: hallux amputation left. Vital signs are stable. Pain level is 0. Patient denies N/V/F/C. Patient has been compliant with postoperative instructions. Review of Systems    Vascular: DP and PT pulses palpable 2/4, Right Foot and 2/4 on the Left Foot. CFT <3 seconds, Right Foot and <3 seconds on the Left Foot. Edema is absent,  Right Foot and absenton the Left Foot. Neurological:   Sensation absent  to light touch to level of digits, both feet. Musculoskeletal:   Muscle strength is 5/5 on the Right Foot and 5/5 on the Left Foot. Structural deformities are present on the Right Foot and present on the Left Foot. Integument:  Warm, dry, supple both feet. Incisions are healing well. Wound dehiscence is absent. Infection is absent. Assesment : Post operative progress gradually improving. Diagnosis Orders   1. Post-operative state     2. Left great toe amputee ([facility] [address]           Plan: Sutures removed Dry sterile dressing applied. Keep surgical site dry. Ice and elevation as directed. Clean sock  Continue surgical shoe for 2 more weeks. OK to shower tomorrow. No orders of the defined types were placed in this encounter. Follow up 1week(s).

## 2021-11-05 ENCOUNTER — CARE COORDINATION (OUTPATIENT)
Dept: CASE MANAGEMENT | Age: 76
End: 2021-11-05

## 2021-11-05 NOTE — CARE COORDINATION
[facility] Transitions Follow Up Call    2021    Patient: Omar Mitchell  Patient :    MRN: <[id number]>  Reason for Admission: Osteomyelitis  Discharge Date: 10/19/21 RARS: Readmission Risk Score: 11         Spoke with: Troy Jack she states she is fine, has no pain, foot is healing well no longer bandaged using neosprorin to area per Podiatry patient has no concerns. Care Transitions Follow Up Call    Needs to be reviewed by the provider   Additional needs identified to be addressed with provider: No  none             Method of communication with provider : none      Care Transition Nurse (CTN) contacted the patient by telephone to follow up after admission on 10/19. Verified name and  with patient as identifiers. Addressed changes since last contact: none  Discussed follow-up appointments. If no appointment was previously scheduled, appointment scheduling offered: Yes. Is follow up appointment scheduled within 7 days of discharge? Yes. Advance Care Planning:   Does patient have an Advance Directive: reviewed and needs to be updated, not on file; education provided, not on file, patient declined education, decision maker updated and referral to internal ACP facilitator. CTN reviewed discharge instructions, medical action plan and red flags with patient and discussed any barriers to care and/or understanding of plan of care after discharge. Discussed appropriate site of care based on symptoms and resources available to patient including: PCP and Specialist. The patient agrees to contact the PCP office for questions related to their healthcare. Patients top risk factors for readmission: functional physical ability and lack of knowledge about disease  Interventions to address risk factors: Obtained and reviewed discharge summary and/or continuity of care documents          CTN provided contact information for future needs.  Plan for follow-up call in 7-10 days based on severity of symptoms and risk factors. Plan for next call: symptom management-foot healing        Care Transitions Subsequent and Final Call    Subsequent and Final Calls  Do you have any ongoing symptoms?: No  Have your medications changed?: No  Do you have any questions related to your medications?: No  Do you currently have any active services?: Yes  Are you currently active with any services?: Home Health  Do you have any needs or concerns that I can assist you with?: No  Identified Barriers: Other, Lack of Education  Care Transitions Interventions  Other Interventions:            Follow Up  Future Appointments   Date Time Provider Delfino Marrufo   11/11/2021  1:00 PM [facility]   11/23/2021  1:15 PM Ajit Cowan, [address] [place] [address] Dr Ketan Castañeda, [place]

## 2021-11-11 ENCOUNTER — OFFICE VISIT (OUTPATIENT)
Dept: PRIMARY CARE CLINIC | Age: 76
End: 2021-11-11
Payer: MEDICARE

## 2021-11-11 VITALS
HEART RATE: 90 BPM | SYSTOLIC BLOOD PRESSURE: 118 MMHG | WEIGHT: 140.2 LBS | OXYGEN SATURATION: 99 % | RESPIRATION RATE: 15 BRPM | DIASTOLIC BLOOD PRESSURE: 74 MMHG | BODY MASS INDEX: 20.7 KG/M2

## 2021-11-11 DIAGNOSIS — I42.8 NONISCHEMIC CARDIOMYOPATHY (HCC): Primary | ICD-10-CM

## 2021-11-11 DIAGNOSIS — I48.20 CHRONIC ATRIAL FIBRILLATION (HCC): ICD-10-CM

## 2021-11-11 DIAGNOSIS — Z87.39 HISTORY OF OSTEOMYELITIS: ICD-10-CM

## 2021-11-11 DIAGNOSIS — S98.132A AMPUTATED TOE, LEFT (HCC): ICD-10-CM

## 2021-11-11 DIAGNOSIS — I10 ESSENTIAL HYPERTENSION: ICD-10-CM

## 2021-11-11 DIAGNOSIS — Z95.810 AICD (AUTOMATIC CARDIOVERTER/DEFIBRILLATOR) PRESENT: ICD-10-CM

## 2021-11-11 DIAGNOSIS — N18.31 STAGE 3A CHRONIC KIDNEY DISEASE (HCC): ICD-10-CM

## 2021-11-11 PROBLEM — M86.9 OSTEOMYELITIS (HCC): Status: RESOLVED | Noted: 2021-10-12 | Resolved: 2021-11-11

## 2021-11-11 PROCEDURE — 3017F COLORECTAL CA SCREEN DOC REV: CPT | Performed by: NURSE PRACTITIONER

## 2021-11-11 PROCEDURE — G8399 PT W/DXA RESULTS DOCUMENT: HCPCS | Performed by: NURSE PRACTITIONER

## 2021-11-11 PROCEDURE — G8427 DOCREV CUR MEDS BY ELIG CLIN: HCPCS | Performed by: NURSE PRACTITIONER

## 2021-11-11 PROCEDURE — 1123F ACP DISCUSS/DSCN MKR DOCD: CPT | Performed by: NURSE PRACTITIONER

## 2021-11-11 PROCEDURE — G8420 CALC BMI NORM PARAMETERS: HCPCS | Performed by: NURSE PRACTITIONER

## 2021-11-11 PROCEDURE — 4040F PNEUMOC VAC/ADMIN/RCVD: CPT | Performed by: NURSE PRACTITIONER

## 2021-11-11 PROCEDURE — G8484 FLU IMMUNIZE NO ADMIN: HCPCS | Performed by: NURSE PRACTITIONER

## 2021-11-11 PROCEDURE — 1036F TOBACCO NON-USER: CPT | Performed by: NURSE PRACTITIONER

## 2021-11-11 PROCEDURE — 1090F PRES/ABSN URINE INCON ASSESS: CPT | Performed by: NURSE PRACTITIONER

## 2021-11-11 PROCEDURE — 1111F DSCHRG MED/CURRENT MED MERGE: CPT | Performed by: NURSE PRACTITIONER

## 2021-11-11 PROCEDURE — 99214 OFFICE O/P EST MOD 30 MIN: CPT | Performed by: NURSE PRACTITIONER

## 2021-11-11 ASSESSMENT — ENCOUNTER SYMPTOMS
CONSTIPATION: 0
COUGH: 0
BLOOD IN STOOL: 0
WHEEZING: 0
SHORTNESS OF BREATH: 0
DIARRHEA: 0
TROUBLE SWALLOWING: 0
SINUS PRESSURE: 0
NAUSEA: 0
SORE THROAT: 0
ABDOMINAL PAIN: 0
VOMITING: 0

## 2021-11-11 ASSESSMENT — PATIENT HEALTH QUESTIONNAIRE - PHQ9
SUM OF ALL RESPONSES TO PHQ QUESTIONS 1-9: 1
2. FEELING DOWN, DEPRESSED OR HOPELESS: 1
1. LITTLE INTEREST OR PLEASURE IN DOING THINGS: 0
SUM OF ALL RESPONSES TO PHQ QUESTIONS 1-9: 1
SUM OF ALL RESPONSES TO PHQ9 QUESTIONS 1 & 2: 1
SUM OF ALL RESPONSES TO PHQ QUESTIONS 1-9: 1

## 2021-11-11 NOTE — PROGRESS NOTES
[facility] PRIMARY CARE  [address]  [address] [address]  Dept: [phone]  Dept Fax: [phone]    Oly Bell is a 77 y.o. female who presentstoday for her medical conditions/complaints as noted below. Oly Bell is c/o of  Chief Complaint   Patient presents with    Follow-up       HPI:     Here today for follow up  She is status post left great toe amp about 3 weeks ago  Saw podiatry last week for suture removal  She states she feels she is progressing well, wearing Darco shoe  She does report the foot is mainly in a dependent position while sitting at home  She does not have a footstool or a recliner to elevate the foot on but she is concerned about the persistent swelling  Denies any pain or drainage from the area    Reports has appt with cardiologist in a couple week for aicd check  Has been well controlled for many years on her current medications  Last appointment with cardiology was 1 year ago  Hypertension  This is a chronic problem. The current episode started more than 1 year ago. The problem is controlled. Pertinent negatives include no chest pain, headaches, palpitations, peripheral edema or shortness of breath. Past treatments include beta blockers and diuretics. The current treatment provides significant improvement. There are no compliance problems. Hypertensive end-organ damage includes heart failure. There is no history of CVA.        Hemoglobin A1C (%)   Date Value   01/07/2020 4.9             ( goal A1C is < 7)   No results found for: LABMICR  LDL Calculated (mg/dL)   Date Value   10/05/2017 77       (goal LDL is <100)   AST (U/L)   Date Value   10/12/2021 18     ALT (U/L)   Date Value   10/12/2021 11     BUN (mg/dL)   Date Value   10/13/2021 14     BP Readings from Last 3 Encounters:   11/11/21 118/74   10/19/21 (!) 142/85   10/19/21 (!) 100/46          ([id number])    Past Medical History:   Diagnosis Date    AICD (automatic cardioverter/defibrillator) present     . Dr. Terrell White     Atrial fibrillation ([facility] 2012    Dr.David Maycol Dukes  CHRONIC ON [address]    Atrial fibrillation ([facility]     Cancer ([facility]     basal cell on face    [facility]     Chronic kidney disease     H/O cardiovascular stress test 2017    Neg. EF 24%    Hx of long term use of blood thinners     Hypertension     Nonischemic dilated cardiomyopathy ([facility]     Other screening mammogram 2012    Negative    Poor historian       Past Surgical History:   Procedure Laterality Date    CARDIAC CATHETERIZATION  2017    NML CORS EF20%    CARDIAC DEFIBRILLATOR PLACEMENT  2018       [address] PLACEMENT  2018    COLONOSCOPY N/A 2019    COLONOSCOPY POLYPECTOMY SNARE/COLD BIOPSY performed by Tiffany Calderon MD at [place] [address] Right     OTHER SURGICAL HISTORY  10/30/2018    excision basal cell right temple    PACEMAKER PLACEMENT      medtronic AICD    DE OFFICE/OUTPT VISIT,PROCEDURE ONLY Right 10/30/2018    EXCISION BASAL CELL RIGHT TEMPLE performed by Grupo Encinas MD at [address] [place] [address] Left 10/19/2021    LEFT PARTIAL RAY 1ST TOE AMPUTATION (Left )    TOE AMPUTATION Left 10/19/2021    LEFT HALLUX AMPUTATION, DEBRIDEMENT OF NONVIABLE SOFT TISSUE AND BONE LEFT HALLUX performed by Tommy Galeas DPM at [id number] [facility] TRANSESOPHAGEAL ECHOCARDIOGRAM  11/10/2017    EF 20%. Mod MR. Mild to mod AI. Mild TR. Segmental WMA. Family History   Adopted: Yes          Social History     Tobacco Use    Smoking status: Former Smoker     Packs/day: 0.50     Years: 20.00     Pack years: 10.00     Quit date: 10/4/1987     Years since quittin.1    Smokeless tobacco: Never Used   Substance Use Topics    Alcohol use:  Yes     Alcohol/week: 1.0 standard drink     Types: 1 Glasses of wine per week     Comment: daily      Current Outpatient Medications   Medication Sig Dispense Refill    Handicap Placard MISC by Does not apply route Duration:5 years ending 10/27/2026 1 each 0    carvedilol (COREG) 25 MG tablet Take 1 tablet by mouth 2 times daily (with meals) 60 tablet 1    sertraline (ZOLOFT) 50 MG tablet TAKE 1 TABLET EVERY DAY 90 tablet 3    digoxin (LANOXIN) 125 MCG tablet TAKE 1 TABLET BY MOUTH DAILY MONDAY THROUGH FRIDAY 65 tablet 3    Acetaminophen (TYLENOL ARTHRITIS PAIN PO) Take by mouth 3 times daily      apixaban (ELIQUIS) 5 MG TABS tablet Take one tablet bid 14 tablet 0    torsemide (DEMADEX) 20 MG tablet Take 1 tablet by mouth daily 30 tablet 1    vitamin B-1 100 MG tablet Take 1 tablet by mouth daily 30 tablet 3    vitamin B-12 (CYANOCOBALAMIN) 1000 MCG tablet Take 1,000 mcg by mouth daily      Multiple Vitamins-Minerals (THERAPEUTIC MULTIVITAMIN-MINERALS) tablet Take 1 tablet by mouth daily      calcium carbonate (OSCAL) 500 MG TABS tablet Take 600 mg by mouth daily       Biotin (BIOTIN 5000) 5 MG CAPS Take 5,000 mcg by mouth daily      ascorbic acid (VITAMIN C) 500 MG tablet Take 500 mg by mouth daily        No current facility-administered medications for this visit.      Allergies   Allergen Reactions    Contrast [Barium-Containing Compounds]      Unknown     Vancomycin Other (See Comments)     unknown    Codeine Rash       Health Maintenance   Topic Date Due    Shingles Vaccine (1 of 2) Never done    Flu vaccine (1) 10/12/2022 (Originally 9/1/2021)    Annual Wellness Visit (AWV)  07/09/2022    Lipid screen  10/05/2022    Potassium monitoring  10/13/2022    Creatinine monitoring  10/13/2022    DTaP/Tdap/Td vaccine (2 - Td or Tdap) 05/03/2028    Colon cancer screen colonoscopy  02/06/2029    DEXA (modify frequency per FRAX score)  Completed    Pneumococcal 65+ years Vaccine  Completed    COVID-19 Vaccine  Completed    Hepatitis A vaccine  Aged Out    Hepatitis B vaccine  Aged Out    Hib vaccine Aged Out    Meningococcal (ACWY) vaccine  Aged Out    Hepatitis C screen  Discontinued       Subjective:      Review of Systems   Constitutional: Negative for activity change, appetite change, chills, fatigue, fever and unexpected weight change. HENT: Negative for congestion, ear pain, hearing loss, sinus pressure, sore throat and trouble swallowing. Eyes: Negative for visual disturbance. Respiratory: Negative for cough, shortness of breath and wheezing. Cardiovascular: Negative for chest pain, palpitations and leg swelling. Gastrointestinal: Negative for abdominal pain, blood in stool, constipation, diarrhea, nausea and vomiting. Endocrine: Negative for cold intolerance, heat intolerance, polydipsia, polyphagia and polyuria. Genitourinary: Negative for difficulty urinating, frequency, hematuria and urgency. Musculoskeletal: Positive for gait problem (Darco shoe on left foot). Negative for arthralgias and myalgias. Skin: Negative for rash. Allergic/Immunologic: Negative for environmental allergies. Neurological: Negative for dizziness, weakness, light-headedness and headaches. Psychiatric/Behavioral: Negative for confusion. The patient is not nervous/anxious. Objective:     Physical Exam  Constitutional:       Appearance: She is well-developed. HENT:      Head: Normocephalic. Eyes:      Conjunctiva/sclera: Conjunctivae normal.      Pupils: Pupils are equal, round, and reactive to light. Cardiovascular:      Rate and Rhythm: Normal rate and regular rhythm. Heart sounds: Normal heart sounds. No murmur heard. Pulmonary:      Effort: Pulmonary effort is normal.      Breath sounds: Normal breath sounds. No wheezing. Abdominal:      General: Bowel sounds are normal. There is no distension. Palpations: Abdomen is soft. Musculoskeletal:         General: Swelling (Left foot) present. Normal range of motion. Cervical back: Normal range of motion.       Comments: Left foot with darco shoe   Skin:     General: Skin is warm and dry. Comments: Left great toe amp site well approximated, no drainage, mild redness   Neurological:      Mental Status: She is alert and oriented to person, place, and time. Psychiatric:         Behavior: Behavior normal.         Thought Content: Thought content normal.         Judgment: Judgment normal.       /74   Pulse 90   Resp 15   Wt 140 lb 3.2 oz (63.6 kg)   SpO2 99%   BMI 20.70 kg/m²     Assessment:       Diagnosis Orders   1. Nonischemic cardiomyopathy (HCC)     2. Stage 3a chronic kidney disease ([facility]     3. Amputated toe, left ([facility]     4. History of osteomyelitis     5. AICD (automatic cardioverter/defibrillator) Implant     6. Chronic atrial fibrillation (HCC)     7. Essential hypertension               Plan:      Return in about 4 months (around 3/11/2022) for hypertension check. Cardiomyopathy, AICD, atrial fib, hypertension-well-controlled on current medications, she is past due for office follow-up with cardiologist, reminded to schedule appointment. She does have appointment in 2 weeks for AICD check  CKD-has been stable per recent labs, she is past due for follow up with nephro, advised to schedule appointment ASAP  Left great toe amputation-reviewed recent note from podiatry, she appears to be progressing well. Strongly advised to elevate foot as much as possible while sitting, suggestions provided on methods of elevation given her limitations in her home. Advised if has increased redness, develops pain or drainage, increased swelling to call and will consider antibiotic therapy but low concern for infection at this time       Patient given educational materials - see patient instructions. Discussed use, benefit, and side effects of prescribed medications. All patientquestions answered. Pt voiced understanding. Reviewed health maintenance. Instructedto continue current medications, diet and exercise.   Patient agreed with treatmentplan. Follow up as directed.      Electronicallysigned by JOSÉ Pettit CNP on 11/11/2021 at 1:16 PM

## 2021-11-12 ENCOUNTER — CARE COORDINATION (OUTPATIENT)
Dept: CASE MANAGEMENT | Age: 76
End: 2021-11-12

## 2021-11-12 NOTE — CARE COORDINATION
[facility] Transitions Follow Up Call    2021    Patient: Oly Bell  Patient :    MRN: <[id number]>  Reason for Admission:  Osteomyelitis  Discharge Date: 10/19/21 RARS: Readmission Risk Score: 11         Spoke with: Preethi Nunez she states she is doing well denies chst pain, SOB, fever chills, dizziness, had f/u with  yesterday continues to have foot swelling advised to spen time in bed with leg elevated. Denies issues eating and drinking. Normal bowel and bladder no concerns at present time. Care Transitions Subsequent and Final Call    Subsequent and Final Calls  Do you have any ongoing symptoms?: Yes  Onset of Patient-reported symptoms: In the past 7 days  Patient-reported symptoms: Other  Interventions for patient-reported symptoms: Notified PCP/Physician  Have your medications changed?: No  Do you have any questions related to your medications?: No  Do you currently have any active services?: Yes  Are you currently active with any services?: Home Health  Do you have any needs or concerns that I can assist you with?: No  Identified Barriers: Other, Lack of Education  Care Transitions Interventions  Other Interventions:            Follow Up  Future Appointments   Date Time Provider Delfino Marrufo   2021  1:15 PM [address]   3/10/2022  1:00 PM JOSÉ Bynum - [facility] SERGIO Lentz LPN

## 2021-11-23 ENCOUNTER — OFFICE VISIT (OUTPATIENT)
Dept: PODIATRY | Age: 76
End: 2021-11-23

## 2021-11-23 VITALS — BODY MASS INDEX: 20.73 KG/M2 | WEIGHT: 140 LBS | HEIGHT: 69 IN

## 2021-11-23 DIAGNOSIS — Z98.890 POST-OPERATIVE STATE: ICD-10-CM

## 2021-11-23 DIAGNOSIS — Z89.412 LEFT GREAT TOE AMPUTEE (HCC): Primary | ICD-10-CM

## 2021-11-23 PROCEDURE — 99024 POSTOP FOLLOW-UP VISIT: CPT | Performed by: PODIATRIST

## 2021-11-23 NOTE — PROGRESS NOTES
[address] [facility]  Post Op note  Chief Complaint   Patient presents with    Post-Op Check     left foot        Moe Miguel is a 77y.o. year old female who is 4 weeks post op from foot surgery. Type: hallux amputation left. Vital signs are stable. Pain level is 0. Patient denies N/V/F/C. Patient has been compliant with postoperative instructions. Review of Systems    Vascular: DP and PT pulses palpable 2/4, Right Foot and 2/4 on the Left Foot. CFT <3 seconds, Right Foot and <3 seconds on the Left Foot. Edema is absent,  Right Foot and absenton the Left Foot. Neurological:   Sensation absent  to light touch to level of digits, both feet. Musculoskeletal:   Muscle strength is 5/5 on the Right Foot and 5/5 on the Left Foot. Structural deformities are present on the Right Foot and present on the Left Foot. Integument:  Warm, dry, supple both feet. Incisions are healing well. Wound dehiscence is absent. Infection is absent. Amputation site left first ray healed       Assesment : Post operative progress gradually improving. Diagnosis Orders   1. Left great toe amputee ([facility]     2. Post-operative state           Plan:     Clean sock  Slow transition to regular shoe  Foot measured    No orders of the defined types were placed in this encounter.       Follow up as needed

## 2022-03-15 ENCOUNTER — OFFICE VISIT (OUTPATIENT)
Dept: PRIMARY CARE CLINIC | Age: 77
End: 2022-03-15
Payer: MEDICARE

## 2022-03-15 VITALS
DIASTOLIC BLOOD PRESSURE: 88 MMHG | HEART RATE: 97 BPM | RESPIRATION RATE: 16 BRPM | OXYGEN SATURATION: 98 % | WEIGHT: 144.6 LBS | SYSTOLIC BLOOD PRESSURE: 132 MMHG | HEIGHT: 69 IN | BODY MASS INDEX: 21.42 KG/M2

## 2022-03-15 DIAGNOSIS — I10 ESSENTIAL HYPERTENSION: Primary | ICD-10-CM

## 2022-03-15 DIAGNOSIS — I42.8 NONISCHEMIC CARDIOMYOPATHY (HCC): ICD-10-CM

## 2022-03-15 DIAGNOSIS — I48.20 CHRONIC ATRIAL FIBRILLATION (HCC): ICD-10-CM

## 2022-03-15 DIAGNOSIS — N18.31 STAGE 3A CHRONIC KIDNEY DISEASE (HCC): ICD-10-CM

## 2022-03-15 DIAGNOSIS — Z95.810 AICD (AUTOMATIC CARDIOVERTER/DEFIBRILLATOR) PRESENT: ICD-10-CM

## 2022-03-15 DIAGNOSIS — M16.11 ARTHRITIS OF RIGHT HIP: ICD-10-CM

## 2022-03-15 DIAGNOSIS — I50.22 SYSTOLIC CHF, CHRONIC (HCC): Chronic | ICD-10-CM

## 2022-03-15 DIAGNOSIS — S98.132A AMPUTATED TOE, LEFT (HCC): ICD-10-CM

## 2022-03-15 PROCEDURE — 1090F PRES/ABSN URINE INCON ASSESS: CPT | Performed by: NURSE PRACTITIONER

## 2022-03-15 PROCEDURE — 1036F TOBACCO NON-USER: CPT | Performed by: NURSE PRACTITIONER

## 2022-03-15 PROCEDURE — G8420 CALC BMI NORM PARAMETERS: HCPCS | Performed by: NURSE PRACTITIONER

## 2022-03-15 PROCEDURE — 99214 OFFICE O/P EST MOD 30 MIN: CPT | Performed by: NURSE PRACTITIONER

## 2022-03-15 PROCEDURE — 3017F COLORECTAL CA SCREEN DOC REV: CPT | Performed by: NURSE PRACTITIONER

## 2022-03-15 PROCEDURE — G8484 FLU IMMUNIZE NO ADMIN: HCPCS | Performed by: NURSE PRACTITIONER

## 2022-03-15 PROCEDURE — 1123F ACP DISCUSS/DSCN MKR DOCD: CPT | Performed by: NURSE PRACTITIONER

## 2022-03-15 PROCEDURE — 4040F PNEUMOC VAC/ADMIN/RCVD: CPT | Performed by: NURSE PRACTITIONER

## 2022-03-15 PROCEDURE — G8399 PT W/DXA RESULTS DOCUMENT: HCPCS | Performed by: NURSE PRACTITIONER

## 2022-03-15 PROCEDURE — G8427 DOCREV CUR MEDS BY ELIG CLIN: HCPCS | Performed by: NURSE PRACTITIONER

## 2022-03-15 ASSESSMENT — ENCOUNTER SYMPTOMS
BLOOD IN STOOL: 0
TROUBLE SWALLOWING: 0
SORE THROAT: 0
DIARRHEA: 0
WHEEZING: 0
CONSTIPATION: 0
NAUSEA: 0
SHORTNESS OF BREATH: 0
SINUS PRESSURE: 0
COUGH: 0
ABDOMINAL PAIN: 0
VOMITING: 0

## 2022-03-15 NOTE — PROGRESS NOTES
[facility] PRIMARY CARE  [address]   [address]  [address] [address]  Dept: [phone]  Dept Fax: [phone]    Barry Baumgarten is a 77 y.o. female who presentstoday for her medical conditions/complaints as noted below. Barry Baumgarten is c/o of  Chief Complaint   Patient presents with    Hypertension       HPI:     Here today for follow up  Reports saw podiatry in November but has not returned as her amputated toe has fully healed  She feels well in general  Denies any concerns today other than chronic right hip pain  She has been using Tylenol intermittently which is somewhat helpful but she states is starting to interfere with her walking  She is not using an assistive device at this time    Hypertension  This is a chronic problem. The current episode started more than 1 year ago. The problem is controlled. Pertinent negatives include no chest pain, headaches, palpitations, peripheral edema or shortness of breath. Past treatments include beta blockers. The current treatment provides significant improvement. There are no compliance problems. Hypertensive end-organ damage includes heart failure. There is no history of CVA. Hemoglobin A1C (%)   Date Value   01/07/2020 4.9             ( goal A1C is < 7)   No results found for: LABMICR  LDL Calculated (mg/dL)   Date Value   10/05/2017 77       (goal LDL is <100)   AST (U/L)   Date Value   10/12/2021 18     ALT (U/L)   Date Value   10/12/2021 11     BUN (mg/dL)   Date Value   10/13/2021 14     BP Readings from Last 3 Encounters:   03/15/22 132/88   11/11/21 118/74   10/19/21 (!) 142/85          ([id number])    Past Medical History:   Diagnosis Date    AICD (automatic cardioverter/defibrillator) present     April 6. 2018.  Dr. Caitlyn Cheng     Atrial fibrillation ([facility] 09/2012    Dr.David Sb Rodriguez  CHRONIC ON Shantanu Bhakta    Atrial fibrillation ([facility]     Cancer ([facility]     basal cell on face    [facility]congestive [facility] failure) [facility]     Chronic kidney disease     H/O cardiovascular stress test 2017    Neg. EF 24%    Hx of long term use of blood thinners     Hypertension     Nonischemic dilated cardiomyopathy ([facility]     Other screening mammogram 2012    Negative    Poor historian       Past Surgical History:   Procedure Laterality Date    CARDIAC CATHETERIZATION  2017    NML CORS EF20%    CARDIAC DEFIBRILLATOR PLACEMENT  2018    DR Otilia Castellanos    CARDIAC DEFIBRILLATOR PLACEMENT      COLONOSCOPY N/A 2019    COLONOSCOPY POLYPECTOMY SNARE/COLD BIOPSY performed by Criselda Washburn MD at [facility] Right     OTHER SURGICAL HISTORY  10/30/2018    excision basal cell right temple    PACEMAKER PLACEMENT      medtronic AICD    [place] OFFICE/OUTPT VISIT,PROCEDURE ONLY Right 10/30/2018    EXCISION BASAL CELL RIGHT TEMPLE performed by Hattie Enciso MD at [place] [address] Left 10/19/2021    LEFT PARTIAL RAY 1ST TOE AMPUTATION (Left )    TOE AMPUTATION Left 10/19/2021    LEFT HALLUX AMPUTATION, DEBRIDEMENT OF NONVIABLE SOFT TISSUE AND BONE LEFT HALLUX performed by Taz Byrne DPM at [address] [employer] TRANSESOPHAGEAL ECHOCARDIOGRAM  11/10/2017    EF 20%. Mod MR. Mild to mod AI. Mild TR. Segmental WMA. Family History   Adopted: Yes          Social History     Tobacco Use    Smoking status: Former Smoker     Packs/day: 0.50     Years: 20.00     Pack years: 10.00     Quit date: 10/4/1987     Years since quittin.4    Smokeless tobacco: Never Used   Substance Use Topics    Alcohol use:  Yes     Alcohol/week: 1.0 standard drink     Types: 1 Glasses of wine per week     Comment: daily      Current Outpatient Medications   Medication Sig Dispense Refill    Handicap Placard MISC by Does not apply route Duration:5 years ending 10/27/2026 1 each 0    carvedilol (COREG) 25 MG tablet Take 1 tablet by mouth 2 times daily (with meals) 60 tablet 1    sertraline (ZOLOFT) 50 MG tablet TAKE 1 TABLET EVERY DAY 90 tablet 3    digoxin (LANOXIN) 125 MCG tablet TAKE 1 TABLET BY MOUTH DAILY MONDAY THROUGH FRIDAY 65 tablet 3    Acetaminophen (TYLENOL ARTHRITIS PAIN PO) Take by mouth 3 times daily      apixaban (ELIQUIS) 5 MG TABS tablet Take one tablet bid 14 tablet 0    torsemide (DEMADEX) 20 MG tablet Take 1 tablet by mouth daily 30 tablet 1    vitamin B-1 100 MG tablet Take 1 tablet by mouth daily 30 tablet 3    vitamin B-12 (CYANOCOBALAMIN) 1000 MCG tablet Take 1,000 mcg by mouth daily      Multiple Vitamins-Minerals (THERAPEUTIC MULTIVITAMIN-MINERALS) tablet Take 1 tablet by mouth daily      calcium carbonate (OSCAL) 500 MG TABS tablet Take 600 mg by mouth daily       Biotin (BIOTIN 5000) 5 MG CAPS Take 5,000 mcg by mouth daily      ascorbic acid (VITAMIN C) 500 MG tablet Take 500 mg by mouth daily        No current facility-administered medications for this visit.      Allergies   Allergen Reactions    Contrast [Barium-Containing Compounds]      Unknown     Vancomycin Other (See Comments)     unknown    Codeine Rash       Health Maintenance   Topic Date Due    Shingles Vaccine (1 of 2) Never done    Flu vaccine (1) 10/12/2022 (Originally 9/1/2021)    Annual Wellness Visit (AWV)  07/09/2022    Lipid screen  10/05/2022    Potassium monitoring  10/13/2022    Creatinine monitoring  10/13/2022    Depression Monitoring  11/11/2022    DTaP/Tdap/Td vaccine (2 - Td or Tdap) 05/03/2028    Colorectal Cancer Screen  02/06/2029    DEXA (modify frequency per FRAX score)  Completed    Pneumococcal 65+ years Vaccine  Completed    COVID-19 Vaccine  Completed    Hepatitis A vaccine  Aged Out    Hepatitis B vaccine  Aged Out    Hib vaccine  Aged Out    Meningococcal (ACWY) vaccine  Aged Out    Hepatitis C screen  Discontinued       Subjective:      Review of Systems   Constitutional: Negative for activity change, appetite change, chills, fatigue, fever and unexpected weight change. HENT: Negative for congestion, ear pain, hearing loss, sinus pressure, sore throat and trouble swallowing. Eyes: Negative for visual disturbance. Respiratory: Negative for cough, shortness of breath and wheezing. Cardiovascular: Negative for chest pain, palpitations and leg swelling. Gastrointestinal: Negative for abdominal pain, blood in stool, constipation, diarrhea, nausea and vomiting. Endocrine: Negative for cold intolerance, heat intolerance, polydipsia, polyphagia and polyuria. Genitourinary: Negative for difficulty urinating, frequency, hematuria and urgency. Musculoskeletal: Positive for arthralgias (right hip). Negative for myalgias. Skin: Negative for rash. Allergic/Immunologic: Negative for environmental allergies. Neurological: Negative for dizziness, weakness, light-headedness and headaches. Psychiatric/Behavioral: Negative for confusion. The patient is not nervous/anxious. Objective:     Physical Exam  Constitutional:       Appearance: She is well-developed. HENT:      Head: Normocephalic. Eyes:      Conjunctiva/sclera: Conjunctivae normal.      Pupils: Pupils are equal, round, and reactive to light. Cardiovascular:      Rate and Rhythm: Normal rate. Rhythm irregular. Heart sounds: Normal heart sounds. No murmur heard. Comments: Chronic atrial fibrillation  Pulmonary:      Effort: Pulmonary effort is normal.      Breath sounds: Normal breath sounds. No wheezing. Abdominal:      General: Bowel sounds are normal. There is no distension. Palpations: Abdomen is soft. Musculoskeletal:         General: Normal range of motion. Cervical back: Normal range of motion. Skin:     General: Skin is warm and dry. Neurological:      Mental Status: She is alert and oriented to person, place, and time. Psychiatric:         Behavior: Behavior normal.         Thought Content:  Thought content normal.         Judgment: Judgment normal.       /88 (Site: Left Upper Arm, Position: Sitting, Cuff Size: Medium Adult)   Pulse 97   Resp 16   Ht 5' 9\" (1.753 m)   Wt 144 lb 9.6 oz (65.6 kg)   SpO2 98%   Breastfeeding No   BMI 21.35 kg/m²     Assessment:       Diagnosis Orders   1. Essential hypertension     2. Nonischemic cardiomyopathy ([facility]     3. Chronic atrial fibrillation (HCC)     4. Stage 3a chronic kidney disease ([facility] [facility]     5. Arthritis of right hip     6. Systolic CHF, chronic ([facility]     7. AICD (automatic cardioverter/defibrillator) Implant     8. Amputated toe, left ([facility] [facility]               Plan:      Return in about 4 months (around 7/15/2022) for CHF. Hypertension, cardiomyopathy, atrial fib, CHF, AICD-has been stable many years on current medications. She has not been to cardiology recently, reminded to schedule follow-up appointment  CKD-has remained stable, she is not interested in seeing nephrology  Left toe amputation-reviewed recent note from podiatrist November visit, she has completely healed, reviewed with patient need to examine feet daily and to report any changes immediately       Patient given educational materials - see patient instructions. Discussed use, benefit, and side effects of prescribed medications. All patientquestions answered. Pt voiced understanding. Reviewed health maintenance. Instructedto continue current medications, diet and exercise. Patient agreed with treatmentplan. Follow up as directed.      Electronicallysigned by JOSÉ Downs CNP on 3/15/2022 at 2:23 PM

## 2022-05-19 RX ORDER — CARVEDILOL 25 MG/1
25 TABLET ORAL 2 TIMES DAILY WITH MEALS
Qty: 180 TABLET | Refills: 3 | Status: SHIPPED | OUTPATIENT
Start: 2022-05-19

## 2022-06-07 RX ORDER — DIGOXIN 125 MCG
TABLET ORAL
Qty: 65 TABLET | Refills: 3 | Status: SHIPPED | OUTPATIENT
Start: 2022-06-07

## 2022-06-09 NOTE — TELEPHONE ENCOUNTER
Please have [employer] send to Dr Vicenta Beltran office as I have not been prescribing this for her  Thanks

## 2022-06-09 NOTE — TELEPHONE ENCOUNTER
Please call her pharmacy and ask when she last received this medication and who prescribed it for her.  It does not look like she has a current rx

## 2022-07-25 ENCOUNTER — OFFICE VISIT (OUTPATIENT)
Dept: PRIMARY CARE CLINIC | Age: 77
End: 2022-07-25
Payer: MEDICARE

## 2022-07-25 VITALS
BODY MASS INDEX: 22.19 KG/M2 | RESPIRATION RATE: 13 BRPM | WEIGHT: 149.8 LBS | HEART RATE: 62 BPM | HEIGHT: 69 IN | OXYGEN SATURATION: 97 % | SYSTOLIC BLOOD PRESSURE: 136 MMHG | DIASTOLIC BLOOD PRESSURE: 82 MMHG

## 2022-07-25 DIAGNOSIS — I10 ESSENTIAL HYPERTENSION: Primary | ICD-10-CM

## 2022-07-25 DIAGNOSIS — Z95.810 AICD (AUTOMATIC CARDIOVERTER/DEFIBRILLATOR) PRESENT: ICD-10-CM

## 2022-07-25 DIAGNOSIS — I48.20 CHRONIC ATRIAL FIBRILLATION (HCC): ICD-10-CM

## 2022-07-25 DIAGNOSIS — E53.9 VITAMIN B DEFICIENCY: ICD-10-CM

## 2022-07-25 DIAGNOSIS — N18.31 STAGE 3A CHRONIC KIDNEY DISEASE (HCC): ICD-10-CM

## 2022-07-25 DIAGNOSIS — G47.9 SLEEP DIFFICULTIES: ICD-10-CM

## 2022-07-25 DIAGNOSIS — I50.22 SYSTOLIC CHF, CHRONIC (HCC): ICD-10-CM

## 2022-07-25 DIAGNOSIS — M16.11 ARTHRITIS OF RIGHT HIP: ICD-10-CM

## 2022-07-25 PROCEDURE — G8420 CALC BMI NORM PARAMETERS: HCPCS | Performed by: NURSE PRACTITIONER

## 2022-07-25 PROCEDURE — 1090F PRES/ABSN URINE INCON ASSESS: CPT | Performed by: NURSE PRACTITIONER

## 2022-07-25 PROCEDURE — 99214 OFFICE O/P EST MOD 30 MIN: CPT | Performed by: NURSE PRACTITIONER

## 2022-07-25 PROCEDURE — G8427 DOCREV CUR MEDS BY ELIG CLIN: HCPCS | Performed by: NURSE PRACTITIONER

## 2022-07-25 PROCEDURE — 1123F ACP DISCUSS/DSCN MKR DOCD: CPT | Performed by: NURSE PRACTITIONER

## 2022-07-25 PROCEDURE — G8399 PT W/DXA RESULTS DOCUMENT: HCPCS | Performed by: NURSE PRACTITIONER

## 2022-07-25 PROCEDURE — 1036F TOBACCO NON-USER: CPT | Performed by: NURSE PRACTITIONER

## 2022-07-25 RX ORDER — DIPHENHYDRAMINE HCL 25 MG
25-50 CAPSULE ORAL NIGHTLY PRN
Qty: 60 CAPSULE | Refills: 5 | Status: SHIPPED | OUTPATIENT
Start: 2022-07-25

## 2022-07-25 ASSESSMENT — PATIENT HEALTH QUESTIONNAIRE - PHQ9
SUM OF ALL RESPONSES TO PHQ9 QUESTIONS 1 & 2: 0
SUM OF ALL RESPONSES TO PHQ QUESTIONS 1-9: 0
1. LITTLE INTEREST OR PLEASURE IN DOING THINGS: 0
SUM OF ALL RESPONSES TO PHQ QUESTIONS 1-9: 0
SUM OF ALL RESPONSES TO PHQ QUESTIONS 1-9: 0
2. FEELING DOWN, DEPRESSED OR HOPELESS: 0
SUM OF ALL RESPONSES TO PHQ QUESTIONS 1-9: 0
1. LITTLE INTEREST OR PLEASURE IN DOING THINGS: 0
SUM OF ALL RESPONSES TO PHQ QUESTIONS 1-9: 0
2. FEELING DOWN, DEPRESSED OR HOPELESS: 0
SUM OF ALL RESPONSES TO PHQ9 QUESTIONS 1 & 2: 0
SUM OF ALL RESPONSES TO PHQ QUESTIONS 1-9: 0

## 2022-07-25 ASSESSMENT — ENCOUNTER SYMPTOMS
ABDOMINAL PAIN: 0
SORE THROAT: 0
VOMITING: 0
COUGH: 0
DIARRHEA: 0
SHORTNESS OF BREATH: 0
CONSTIPATION: 0
BLOOD IN STOOL: 0
TROUBLE SWALLOWING: 0
WHEEZING: 0
SINUS PRESSURE: 0
NAUSEA: 0

## 2022-07-25 NOTE — PROGRESS NOTES
,   [address] [place] [address] PRIMARY CARE  [facility] [address]  [address] [address]  Dept: [phone]  Dept Fax: [phone]    Demetrio Escobar is a 69 y.o. female who presentstoday for her medical conditions/complaints as noted below. Demetrio Escobar is c/o of  Chief Complaint   Patient presents with    Hypertension     4 month follow up. Pt reports to be doing well. HPI:     Here today for follow up  Reports having difficulty sleeping  Typically only sleeping 2 to 3 hours per night  States she is taking her sertraline as prescribed  Not tried any over-the-counter agents    Continues to have significant right hip pain  She states it tends to interfere with desired activities she has a lot of difficulty with ambulation, particularly going from sitting to standing  Tylenol arthritis is somewhat helpful    Reports has upcoming appointment in August with cardiology for pacemaker check  Denies any additional concerns    Hypertension  This is a chronic problem. The problem is controlled. Pertinent negatives include no chest pain, headaches, palpitations, peripheral edema or shortness of breath. Past treatments include beta blockers and diuretics. The current treatment provides significant improvement. There are no compliance problems. Hypertensive end-organ damage includes heart failure. There is no history of CAD/MI or CVA. Hemoglobin A1C (%)   Date Value   01/07/2020 4.9             ( goal A1C is < 7)   No results found for: LABMICR  LDL Calculated (mg/dL)   Date Value   10/05/2017 77       (goal LDL is <100)   AST (U/L)   Date Value   10/12/2021 18     ALT (U/L)   Date Value   10/12/2021 11     BUN (mg/dL)   Date Value   10/13/2021 14     BP Readings from Last 3 Encounters:   07/25/22 136/82   03/15/22 132/88   11/11/21 118/74          ([id number])    Past Medical History:   Diagnosis Date    AICD (automatic cardioverter/defibrillator) present     April 6. 2018.   Lucas Beal     Atrial fibrillation ([facility] [address] 2012    Dr.David Lucas  CHRONIC ON ELIQUIS    Atrial fibrillation ([facility] [address]     [facility]     basal cell on face    CHF [facility]congestive heart failure) [facility]     Chronic kidney disease     H/O cardiovascular stress test 2017    Neg. EF 24%    Hx of long term use of blood thinners     Hypertension     Nonischemic dilated cardiomyopathy ([facility]     Other screening mammogram 2012    Negative    Poor historian       Past Surgical History:   Procedure Laterality Date    CARDIAC CATHETERIZATION  2017    NML CORS EF20%    CARDIAC DEFIBRILLATOR PLACEMENT  2018     [address]      COLONOSCOPY N/A 2019    COLONOSCOPY POLYPECTOMY SNARE/COLD BIOPSY performed by Ishaan Scherer MD at [place] [address] Right     OTHER SURGICAL HISTORY  10/30/2018    excision basal cell right temple    PACEMAKER PLACEMENT      medtronic AICD    OR OFFICE/OUTPT VISIT,PROCEDURE ONLY Right 10/30/2018    EXCISION BASAL CELL RIGHT TEMPLE performed by Calvin Ball MD at [address] [place] [address] Left 10/19/2021    LEFT PARTIAL RAY 1ST TOE AMPUTATION (Left )    TOE AMPUTATION Left 10/19/2021    LEFT HALLUX AMPUTATION, DEBRIDEMENT OF NONVIABLE SOFT TISSUE AND BONE LEFT HALLUX performed by Gretchen Thomas DPM at [address] [place] [address].    TRANSESOPHAGEAL ECHOCARDIOGRAM  11/10/2017    EF 20%. Mod MR. Mild to mod AI. Mild TR. Segmental WMA. Family History   Adopted: Yes          Social History     Tobacco Use    Smoking status: Former     Packs/day: 0.50     Years: 20.00     Pack years: 10.00     Types: Cigarettes     Quit date: 10/4/1987     Years since quittin.8    Smokeless tobacco: Never   Substance Use Topics    Alcohol use:  Yes     Alcohol/week: 1.0 standard drink     Types: 1 Glasses of wine per week     Comment: daily      Current Outpatient Medications   Medication Sig Dispense Refill    diphenhydrAMINE (BENADRYL) 25 MG capsule Take 1-2 capsules by mouth nightly as needed for Sleep 60 capsule 5    digoxin (LANOXIN) 125 MCG tablet TAKE 1 TABLET BY MOUTH DAILY MONDAY THROUGH FRIDAY 65 tablet 3    carvedilol (COREG) 25 MG tablet Take 1 tablet by mouth 2 times daily (with meals) 180 tablet 3    Handicap Placard MISC by Does not apply route Duration:5 years ending 10/27/2026 1 each 0    sertraline (ZOLOFT) 50 MG tablet TAKE 1 TABLET EVERY DAY 90 tablet 3    Acetaminophen (TYLENOL ARTHRITIS PAIN PO) Take by mouth 3 times daily      apixaban (ELIQUIS) 5 MG TABS tablet Take one tablet bid 14 tablet 0    torsemide (DEMADEX) 20 MG tablet Take 1 tablet by mouth daily 30 tablet 1    vitamin B-1 100 MG tablet Take 1 tablet by mouth daily 30 tablet 3    vitamin B-12 (CYANOCOBALAMIN) 1000 MCG tablet Take 1,000 mcg by mouth daily      Multiple Vitamins-Minerals (THERAPEUTIC MULTIVITAMIN-MINERALS) tablet Take 1 tablet by mouth daily      calcium carbonate (OSCAL) 500 MG TABS tablet Take 600 mg by mouth daily       Biotin (BIOTIN 5000) 5 MG CAPS Take 5,000 mcg by mouth daily      ascorbic acid (VITAMIN C) 500 MG tablet Take 500 mg by mouth daily        No current facility-administered medications for this visit.      Allergies   Allergen Reactions    Contrast [Barium-Containing Compounds]      Unknown     Vancomycin Other (See Comments)     unknown    Codeine Rash       Health Maintenance   Topic Date Due    Shingles vaccine (1 of 2) Never done    COVID-19 Vaccine (4 - Booster) 01/26/2022    Annual Wellness Visit (AWV)  07/09/2022    Flu vaccine (1) 09/01/2022    Depression Screen  11/11/2022    DTaP/Tdap/Td vaccine (2 - Td or Tdap) 05/03/2028    DEXA (modify frequency per FRAX score)  Completed    Pneumococcal 65+ years Vaccine  Completed    Hepatitis A vaccine  Aged Out    Hepatitis B vaccine  Aged Out    Hib vaccine  Aged Out    Meningococcal (ACWY) vaccine  Aged Out    Hepatitis C screen  Discontinued       Subjective: Review of Systems   Constitutional:  Negative for activity change, appetite change, chills, fatigue, fever and unexpected weight change. HENT:  Negative for congestion, ear pain, hearing loss, sinus pressure, sore throat and trouble swallowing. Eyes:  Negative for visual disturbance. Respiratory:  Negative for cough, shortness of breath and wheezing. Cardiovascular:  Negative for chest pain, palpitations and leg swelling. Gastrointestinal:  Negative for abdominal pain, blood in stool, constipation, diarrhea, nausea and vomiting. Endocrine: Negative for cold intolerance, heat intolerance, polydipsia, polyphagia and polyuria. Genitourinary:  Negative for difficulty urinating, frequency, hematuria and urgency. Musculoskeletal:  Positive for arthralgias (right hip). Negative for myalgias. Skin:  Negative for rash. Allergic/Immunologic: Negative for environmental allergies. Neurological:  Negative for dizziness, weakness, light-headedness and headaches. Psychiatric/Behavioral:  Positive for sleep disturbance. Negative for confusion. The patient is not nervous/anxious. Objective:     Physical Exam  Constitutional:       Appearance: Normal appearance. She is well-developed. HENT:      Head: Normocephalic. Eyes:      Conjunctiva/sclera: Conjunctivae normal.      Pupils: Pupils are equal, round, and reactive to light. Cardiovascular:      Rate and Rhythm: Normal rate and regular rhythm. Heart sounds: Normal heart sounds. No murmur heard. Pulmonary:      Effort: Pulmonary effort is normal.      Breath sounds: Normal breath sounds. No wheezing. Abdominal:      General: Bowel sounds are normal. There is no distension. Palpations: Abdomen is soft. Musculoskeletal:         General: Normal range of motion. Cervical back: Normal range of motion.       Comments: Antalgic gait, notable right leg limp due to right hip pain, difficulty going from sitting to standing slow to start gait   Skin:     General: Skin is warm and dry. Neurological:      Mental Status: She is alert and oriented to person, place, and time. Psychiatric:         Behavior: Behavior normal.         Thought Content: Thought content normal.         Judgment: Judgment normal.   /82 (Site: Left Upper Arm, Position: Sitting, Cuff Size: Medium Adult)   Pulse 62   Resp 13   Ht 5' 9\" (1.753 m)   Wt 149 lb 12.8 oz (67.9 kg)   SpO2 97%   BMI 22.12 kg/m²     Assessment:       Diagnosis Orders   1. Essential hypertension  Comprehensive Metabolic Panel      2. Chronic atrial fibrillation (HCC)  CBC with Auto Differential      3. Stage 3a chronic kidney disease (HCC)  Comprehensive Metabolic Panel      4. Vitamin B deficiency  CBC with Auto Differential      5. Arthritis of right hip  Gregory Mortimer, MD, Orthopedic Surgery, [place]      6. Sleep difficulties  diphenhydrAMINE (BENADRYL) 25 MG capsule      7. Systolic CHF, chronic (HCC)  CBC with Auto Differential      8. AICD (automatic cardioverter/defibrillator) present                  Plan:      Return in about 4 months (around 11/25/2022) for hypertension check. Hypertension, atrial fibs CHF, AICD-has been stable and well-controlled on current medications, she has upcoming appoint with cardiology for AICD check and follow-up  CKD-she has deferred nephrology evaluation, will check a CMP  Vitamin D deficiency-due for labs  Right hip arthritis-referral to Ortho for next steps, she refuses PT but is willing to consider injection therapy if appropriate  Sleep difficulties-start Benadryl at at bedtime 1 to 2 capsules along with Tylenol arthritis.   Discussed if no improvement to return to office to consider alternative treatment options  Orders Placed This Encounter   Procedures    CBC with Auto Differential     Standing Status:   Future     Standing Expiration Date:   7/25/2023    Comprehensive Metabolic Panel     Standing Status:   Future     Standing

## 2022-08-17 DIAGNOSIS — F32.A ANXIETY AND DEPRESSION: ICD-10-CM

## 2022-08-17 DIAGNOSIS — F41.9 ANXIETY AND DEPRESSION: ICD-10-CM

## 2022-12-05 ENCOUNTER — OFFICE VISIT (OUTPATIENT)
Dept: PRIMARY CARE CLINIC | Age: 77
End: 2022-12-05
Payer: MEDICARE

## 2022-12-05 ENCOUNTER — HOSPITAL ENCOUNTER (OUTPATIENT)
Age: 77
Setting detail: SPECIMEN
Discharge: HOME | End: 2022-12-05

## 2022-12-05 VITALS
SYSTOLIC BLOOD PRESSURE: 130 MMHG | HEIGHT: 69 IN | HEART RATE: 91 BPM | WEIGHT: 148.5 LBS | DIASTOLIC BLOOD PRESSURE: 78 MMHG | BODY MASS INDEX: 22 KG/M2 | OXYGEN SATURATION: 97 %

## 2022-12-05 DIAGNOSIS — I50.22 SYSTOLIC CHF, CHRONIC (HCC): Chronic | ICD-10-CM

## 2022-12-05 DIAGNOSIS — Z87.39 HISTORY OF OSTEOMYELITIS: ICD-10-CM

## 2022-12-05 DIAGNOSIS — E53.9 VITAMIN B DEFICIENCY: ICD-10-CM

## 2022-12-05 DIAGNOSIS — I48.20 CHRONIC ATRIAL FIBRILLATION (HCC): ICD-10-CM

## 2022-12-05 DIAGNOSIS — S98.132A AMPUTATED TOE, LEFT (HCC): ICD-10-CM

## 2022-12-05 DIAGNOSIS — F41.9 ANXIETY AND DEPRESSION: ICD-10-CM

## 2022-12-05 DIAGNOSIS — N18.31 STAGE 3A CHRONIC KIDNEY DISEASE (HCC): ICD-10-CM

## 2022-12-05 DIAGNOSIS — I10 ESSENTIAL HYPERTENSION: Primary | ICD-10-CM

## 2022-12-05 DIAGNOSIS — G47.9 SLEEP DIFFICULTIES: ICD-10-CM

## 2022-12-05 DIAGNOSIS — I50.22 SYSTOLIC CHF, CHRONIC (HCC): ICD-10-CM

## 2022-12-05 DIAGNOSIS — Z95.810 AICD (AUTOMATIC CARDIOVERTER/DEFIBRILLATOR) PRESENT: ICD-10-CM

## 2022-12-05 DIAGNOSIS — F32.A ANXIETY AND DEPRESSION: ICD-10-CM

## 2022-12-05 DIAGNOSIS — I42.8 NONISCHEMIC CARDIOMYOPATHY (HCC): ICD-10-CM

## 2022-12-05 DIAGNOSIS — I10 ESSENTIAL HYPERTENSION: ICD-10-CM

## 2022-12-05 LAB
ABSOLUTE EOS #: 0.06 K/UL (ref 0–0.44)
ABSOLUTE IMMATURE GRANULOCYTE: 0.04 K/UL (ref 0–0.3)
ABSOLUTE LYMPH #: 1.37 K/UL (ref 1.1–3.7)
ABSOLUTE MONO #: 0.85 K/UL (ref 0.1–1.2)
ALBUMIN SERPL-MCNC: 4.4 G/DL (ref 3.5–5.2)
ALBUMIN/GLOBULIN RATIO: 1.8 (ref 1–2.5)
ALP BLD-CCNC: 75 U/L (ref 35–104)
ALT SERPL-CCNC: 14 U/L (ref 5–33)
ANION GAP SERPL CALCULATED.3IONS-SCNC: 18 MMOL/L (ref 9–17)
AST SERPL-CCNC: 18 U/L
BASOPHILS # BLD: 1 % (ref 0–2)
BASOPHILS ABSOLUTE: 0.07 K/UL (ref 0–0.2)
BILIRUB SERPL-MCNC: 0.8 MG/DL (ref 0.3–1.2)
BUN BLDV-MCNC: 18 MG/DL (ref 8–23)
CALCIUM SERPL-MCNC: 9.8 MG/DL (ref 8.6–10.4)
CHLORIDE BLD-SCNC: 97 MMOL/L (ref 98–107)
CO2: 24 MMOL/L (ref 20–31)
CREAT SERPL-MCNC: 1.09 MG/DL (ref 0.5–0.9)
EOSINOPHILS RELATIVE PERCENT: 1 % (ref 1–4)
FOLATE: >20 NG/ML
GFR SERPL CREATININE-BSD FRML MDRD: 53 ML/MIN/1.73M2
GLUCOSE BLD-MCNC: 103 MG/DL (ref 70–99)
HCT VFR BLD CALC: 43.7 % (ref 36.3–47.1)
HEMOGLOBIN: 14 G/DL (ref 11.9–15.1)
IMMATURE GRANULOCYTES: 0 %
LYMPHOCYTES # BLD: 13 % (ref 24–43)
MCH RBC QN AUTO: 35.1 PG (ref 25.2–33.5)
MCHC RBC AUTO-ENTMCNC: 32 G/DL (ref 28.4–34.8)
MCV RBC AUTO: 109.5 FL (ref 82.6–102.9)
MONOCYTES # BLD: 8 % (ref 3–12)
NRBC AUTOMATED: 0 PER 100 WBC
PDW BLD-RTO: 12.5 % (ref 11.8–14.4)
PLATELET # BLD: 275 K/UL (ref 138–453)
PMV BLD AUTO: 9.8 FL (ref 8.1–13.5)
POTASSIUM SERPL-SCNC: 4.8 MMOL/L (ref 3.7–5.3)
RBC # BLD: 3.99 M/UL (ref 3.95–5.11)
RBC # BLD: ABNORMAL 10*6/UL
SEG NEUTROPHILS: 77 % (ref 36–65)
SEGMENTED NEUTROPHILS ABSOLUTE COUNT: 8.15 K/UL (ref 1.5–8.1)
SODIUM BLD-SCNC: 139 MMOL/L (ref 135–144)
TOTAL PROTEIN: 6.8 G/DL (ref 6.4–8.3)
VITAMIN B-12: 1384 PG/ML (ref 232–1245)
WBC # BLD: 10.5 K/UL (ref 3.5–11.3)

## 2022-12-05 PROCEDURE — G8484 FLU IMMUNIZE NO ADMIN: HCPCS | Performed by: NURSE PRACTITIONER

## 2022-12-05 PROCEDURE — 1036F TOBACCO NON-USER: CPT | Performed by: NURSE PRACTITIONER

## 2022-12-05 PROCEDURE — 1123F ACP DISCUSS/DSCN MKR DOCD: CPT | Performed by: NURSE PRACTITIONER

## 2022-12-05 PROCEDURE — 3078F DIAST BP <80 MM HG: CPT | Performed by: NURSE PRACTITIONER

## 2022-12-05 PROCEDURE — G8420 CALC BMI NORM PARAMETERS: HCPCS | Performed by: NURSE PRACTITIONER

## 2022-12-05 PROCEDURE — 1090F PRES/ABSN URINE INCON ASSESS: CPT | Performed by: NURSE PRACTITIONER

## 2022-12-05 PROCEDURE — G8427 DOCREV CUR MEDS BY ELIG CLIN: HCPCS | Performed by: NURSE PRACTITIONER

## 2022-12-05 PROCEDURE — G8399 PT W/DXA RESULTS DOCUMENT: HCPCS | Performed by: NURSE PRACTITIONER

## 2022-12-05 PROCEDURE — 3074F SYST BP LT 130 MM HG: CPT | Performed by: NURSE PRACTITIONER

## 2022-12-05 PROCEDURE — 99214 OFFICE O/P EST MOD 30 MIN: CPT | Performed by: NURSE PRACTITIONER

## 2022-12-05 SDOH — ECONOMIC STABILITY: FOOD INSECURITY: WITHIN THE PAST 12 MONTHS, YOU WORRIED THAT YOUR FOOD WOULD RUN OUT BEFORE YOU GOT MONEY TO BUY MORE.: NEVER TRUE

## 2022-12-05 SDOH — ECONOMIC STABILITY: FOOD INSECURITY: WITHIN THE PAST 12 MONTHS, THE FOOD YOU BOUGHT JUST DIDN'T LAST AND YOU DIDN'T HAVE MONEY TO GET MORE.: NEVER TRUE

## 2022-12-05 ASSESSMENT — ENCOUNTER SYMPTOMS
SHORTNESS OF BREATH: 0
ABDOMINAL PAIN: 0
SORE THROAT: 0
WHEEZING: 0
VOMITING: 0
SINUS PRESSURE: 0
TROUBLE SWALLOWING: 0
COUGH: 0
BLOOD IN STOOL: 0
CONSTIPATION: 0
NAUSEA: 0
DIARRHEA: 0

## 2022-12-05 ASSESSMENT — SOCIAL DETERMINANTS OF HEALTH (SDOH): HOW HARD IS IT FOR YOU TO PAY FOR THE VERY BASICS LIKE FOOD, HOUSING, MEDICAL CARE, AND HEATING?: NOT HARD AT ALL

## 2022-12-05 NOTE — PROGRESS NOTES
[facility] PRIMARY CARE  . [address]  [address] [address]  Dept: [phone]  Dept Fax: [phone]    Saba Echevarria is a 69 y.o. female who presentstoday for her medical conditions/complaints as noted below. Saba Echevarria is c/o of  Chief Complaint   Patient presents with    3 Month Follow-Up       HPI:     Here today for follow up  Reports sleeping some better but benadryl was not helpful  She is taking sertraline but in AM  At this time does not desire any further treatment options    Reports her left great toe amputation site continues to get a scab that will sometimes fall off and then recur  Denies any pain or drainage  Has not seen her podiatrist for about 1 year    Continues to struggle with arthritis pain, particularly her right hip  Did not follow through with orthopedic referral as ordered last visit    Hypertension  This is a chronic problem. The current episode started more than 1 year ago. The problem is controlled. Associated symptoms include peripheral edema. Pertinent negatives include no chest pain, headaches, palpitations or shortness of breath. Past treatments include beta blockers and diuretics. The current treatment provides significant improvement. There are no compliance problems. Hypertensive end-organ damage includes heart failure. There is no history of CAD/MI or CVA. Hemoglobin A1C (%)   Date Value   01/07/2020 4.9             ( goal A1C is < 7)   No results found for: LABMICR  LDL Calculated (mg/dL)   Date Value   10/05/2017 77       (goal LDL is <100)   AST (U/L)   Date Value   10/12/2021 18     ALT (U/L)   Date Value   10/12/2021 11     BUN (mg/dL)   Date Value   10/13/2021 14     BP Readings from Last 3 Encounters:   12/05/22 130/78   07/25/22 136/82   03/15/22 132/88          ([id number])    Past Medical History:   Diagnosis Date    AICD (automatic cardioverter/defibrillator) present     April 6. 2018.  Dr. Jose Rangel Tan     Atrial fibrillation ([facility] 2012    Dr.David Lucas  CHRONIC ON ELIQUIS    Atrial fibrillation ([facility]     Cancer ([facility]     basal cell on face    CHF (congestive heart failure) ([facility]     Chronic kidney disease     H/O cardiovascular stress test 2017    Neg. EF 24%    Hx of long term use of blood thinners     Hypertension     Nonischemic dilated cardiomyopathy ([facility]     Other screening mammogram 2012    Negative    Poor historian       Past Surgical History:   Procedure Laterality Date    CARDIAC CATHETERIZATION  2017    NML CORS EF20%    CARDIAC DEFIBRILLATOR PLACEMENT  2018     [address]      COLONOSCOPY N/A 2019    COLONOSCOPY POLYPECTOMY SNARE/COLD BIOPSY performed by Glenwood Frankel, MD at [place] [address] Right     OTHER SURGICAL HISTORY  10/30/2018    excision basal cell right temple    PACEMAKER PLACEMENT      medtronic AICD    UT OFFICE/OUTPT VISIT,PROCEDURE ONLY Right 10/30/2018    EXCISION BASAL CELL RIGHT TEMPLE performed by Ish Burnett MD at [address] [place] [address] Left 10/19/2021    LEFT PARTIAL RAY 1ST TOE AMPUTATION (Left )    TOE AMPUTATION Left 10/19/2021    LEFT HALLUX AMPUTATION, DEBRIDEMENT OF NONVIABLE SOFT TISSUE AND BONE LEFT HALLUX performed by Lee Barkley DPM at [address] [place] [address].    TRANSESOPHAGEAL ECHOCARDIOGRAM  11/10/2017    EF 20%. Mod MR. Mild to mod AI. Mild TR. Segmental WMA. Family History   Adopted: Yes          Social History     Tobacco Use    Smoking status: Former     Packs/day: 0.50     Years: 20.00     Pack years: 10.00     Types: Cigarettes     Quit date: 10/4/1987     Years since quittin.1    Smokeless tobacco: Never   Substance Use Topics    Alcohol use:  Yes     Alcohol/week: 1.0 standard drink     Types: 1 Glasses of wine per week     Comment: daily      Current Outpatient Medications   Medication Sig Dispense Refill    sertraline (ZOLOFT) 50 MG tablet TAKE 1 TABLET EVERY DAY 90 tablet 3    diphenhydrAMINE (BENADRYL) 25 MG capsule Take 1-2 capsules by mouth nightly as needed for Sleep 60 capsule 5    digoxin (LANOXIN) 125 MCG tablet TAKE 1 TABLET BY MOUTH DAILY MONDAY THROUGH FRIDAY 65 tablet 3    carvedilol (COREG) 25 MG tablet Take 1 tablet by mouth 2 times daily (with meals) 180 tablet 3    Handicap Placard MISC by Does not apply route Duration:5 years ending 10/27/2026 1 each 0    Acetaminophen (TYLENOL ARTHRITIS PAIN PO) Take by mouth 3 times daily      apixaban (ELIQUIS) 5 MG TABS tablet Take one tablet bid 14 tablet 0    torsemide (DEMADEX) 20 MG tablet Take 1 tablet by mouth daily 30 tablet 1    vitamin B-1 100 MG tablet Take 1 tablet by mouth daily 30 tablet 3    vitamin B-12 (CYANOCOBALAMIN) 1000 MCG tablet Take 1,000 mcg by mouth daily      Multiple Vitamins-Minerals (THERAPEUTIC MULTIVITAMIN-MINERALS) tablet Take 1 tablet by mouth daily      calcium carbonate (OSCAL) 500 MG TABS tablet Take 600 mg by mouth daily       Biotin 5 MG CAPS Take 5,000 mcg by mouth daily      ascorbic acid (VITAMIN C) 500 MG tablet Take 500 mg by mouth daily        No current facility-administered medications for this visit.      Allergies   Allergen Reactions    Contrast [Barium-Containing Compounds]      Unknown     Vancomycin Other (See Comments)     unknown    Codeine Rash       Health Maintenance   Topic Date Due    Shingles vaccine (1 of 2) Never done    COVID-19 Vaccine (4 - Booster) 11/21/2021    Annual Wellness Visit (AWV)  07/09/2022    Flu vaccine (1) Never done    Depression Monitoring  07/25/2023    DTaP/Tdap/Td vaccine (2 - Td or Tdap) 05/03/2028    DEXA (modify frequency per FRAX score)  Completed    Pneumococcal 65+ years Vaccine  Completed    Hepatitis A vaccine  Aged Out    Hib vaccine  Aged Out    Meningococcal (ACWY) vaccine  Aged Out    Hepatitis C screen  Discontinued       Subjective:      Review of Systems   Constitutional:  Negative for activity change, appetite change, chills, fatigue, fever and unexpected weight change. HENT:  Negative for congestion, ear pain, hearing loss, sinus pressure, sore throat and trouble swallowing. Eyes:  Negative for visual disturbance. Respiratory:  Negative for cough, shortness of breath and wheezing. Cardiovascular:  Negative for chest pain, palpitations and leg swelling. Gastrointestinal:  Negative for abdominal pain, blood in stool, constipation, diarrhea, nausea and vomiting. Endocrine: Negative for cold intolerance, heat intolerance, polydipsia, polyphagia and polyuria. Genitourinary:  Negative for difficulty urinating, frequency, hematuria and urgency. Musculoskeletal:  Positive for arthralgias. Negative for myalgias. Skin:  Negative for rash. Recurring \"scab\" left great toe amputation site   Allergic/Immunologic: Negative for environmental allergies. Neurological:  Negative for dizziness, weakness, light-headedness and headaches. Psychiatric/Behavioral:  Positive for sleep disturbance. Negative for confusion. The patient is not nervous/anxious. Objective:     Physical Exam  Constitutional:       Appearance: Normal appearance. She is well-developed. HENT:      Head: Normocephalic. Eyes:      Conjunctiva/sclera: Conjunctivae normal.      Pupils: Pupils are equal, round, and reactive to light. Cardiovascular:      Rate and Rhythm: Normal rate and regular rhythm. Heart sounds: Normal heart sounds. No murmur heard. Pulmonary:      Effort: Pulmonary effort is normal.      Breath sounds: Normal breath sounds. No wheezing. Abdominal:      General: Bowel sounds are normal. There is no distension. Palpations: Abdomen is soft. Musculoskeletal:         General: Normal range of motion. Cervical back: Normal range of motion. Skin:     General: Skin is warm and dry. Comments: Left great toe amputation site with thick callus covering what appears to be eschar.   No surrounding erythema, no drainage, nontender   Neurological:      Mental Status: She is alert and oriented to person, place, and time. Psychiatric:         Behavior: Behavior normal.         Thought Content: Thought content normal.         Judgment: Judgment normal.     /78 (Site: Right Upper Arm, Position: Sitting, Cuff Size: Medium Adult)   Pulse 91   Ht 5' 9\" (1.753 m)   Wt 148 lb 8 oz (67.4 kg)   SpO2 97%   BMI 21.93 kg/m²     Assessment:       Diagnosis Orders   1. Essential hypertension        2. Amputated toe, left [facility])  Wayne Calderón DPM, [place], [place]      3. Anxiety and depression        4. Chronic atrial fibrillation (HCC)        5. Stage 3a chronic kidney disease ([address]        6. Sleep difficulties        7. History of osteomyelitis  Genet Fide Emmy Elliot, [facility], [place], [place]      8. Vitamin B deficiency  Vitamin B12 & Folate      9. AICD (automatic cardioverter/defibrillator) Implant        10. Nonischemic cardiomyopathy ([facility]        11. Systolic CHF, chronic ([facility]                  Plan:      Return in about 4 months (around 4/5/2023) for hypertension check. Hypertension, atrial fibrillation, AICD, CHF, cardiomyopathy-remains stable on current medications, she is not sure when her last cardiology follow-up was. Left great toe amputation, history of osteomyelitis-does not appear infected at this time but has thick callus that requires further evaluation. Referred back to previous podiatrist and urged to schedule appointment in the next month. Advised patient if develops drainage, redness or pain to call office and we will start antibiotics  Anxiety/depression, sleep difficulty-appears stable on sertraline, she reports improved sleep without desire any alternative treatments at this time  CKD-we will complete labs after today's visit, she is past due for CMP.   She has deferred any referrals to nephrology at this point  Orders Placed This Encounter   Procedures Vitamin B12 & Folate     Standing Status:   Future     Standing Expiration Date:   12/5/2023    Damon Miller DPM, Podiatry, [place]     Referral Priority:   Routine     Referral Type:   Eval and Treat     Referral Reason:   Specialty Services Required     Referred to Provider:   Stanley Mazariegos DPM     Requested Specialty:   Podiatry     Number of Visits Requested:   1      No orders of the defined types were placed in this encounter. Patient given educational materials - see patient instructions. Discussed use, benefit, and side effects of prescribed medications. All patientquestions answered. Pt voiced understanding. Reviewed health maintenance. Instructedto continue current medications, diet and exercise. Patient agreed with treatmentplan. Follow up as directed.      Electronicallysigned by JOÉS Zuleta CNP on 12/5/2022 at 1:17 PM

## 2022-12-07 NOTE — RESULT ENCOUNTER NOTE
Patient called in for her results infromed her on what her PCP advised and patient understood and verbalized.

## 2023-04-03 ENCOUNTER — OFFICE VISIT (OUTPATIENT)
Dept: PRIMARY CARE CLINIC | Age: 78
End: 2023-04-03
Payer: MEDICARE

## 2023-04-03 VITALS
RESPIRATION RATE: 16 BRPM | HEART RATE: 98 BPM | SYSTOLIC BLOOD PRESSURE: 122 MMHG | HEIGHT: 69 IN | WEIGHT: 131.6 LBS | OXYGEN SATURATION: 97 % | DIASTOLIC BLOOD PRESSURE: 72 MMHG | BODY MASS INDEX: 19.49 KG/M2

## 2023-04-03 DIAGNOSIS — R63.4 WEIGHT LOSS, NON-INTENTIONAL: ICD-10-CM

## 2023-04-03 DIAGNOSIS — M16.11 ARTHRITIS OF RIGHT HIP: ICD-10-CM

## 2023-04-03 DIAGNOSIS — N18.31 STAGE 3A CHRONIC KIDNEY DISEASE (HCC): ICD-10-CM

## 2023-04-03 DIAGNOSIS — Z00.00 MEDICARE ANNUAL WELLNESS VISIT, SUBSEQUENT: Primary | ICD-10-CM

## 2023-04-03 DIAGNOSIS — I42.8 NONISCHEMIC CARDIOMYOPATHY (HCC): ICD-10-CM

## 2023-04-03 DIAGNOSIS — Z95.810 AICD (AUTOMATIC CARDIOVERTER/DEFIBRILLATOR) PRESENT: ICD-10-CM

## 2023-04-03 DIAGNOSIS — M19.90 ARTHRITIS: ICD-10-CM

## 2023-04-03 DIAGNOSIS — I48.20 CHRONIC ATRIAL FIBRILLATION (HCC): ICD-10-CM

## 2023-04-03 DIAGNOSIS — E53.9 VITAMIN B DEFICIENCY: ICD-10-CM

## 2023-04-03 PROCEDURE — 1123F ACP DISCUSS/DSCN MKR DOCD: CPT | Performed by: NURSE PRACTITIONER

## 2023-04-03 PROCEDURE — G0439 PPPS, SUBSEQ VISIT: HCPCS | Performed by: NURSE PRACTITIONER

## 2023-04-03 PROCEDURE — 3078F DIAST BP <80 MM HG: CPT | Performed by: NURSE PRACTITIONER

## 2023-04-03 PROCEDURE — 3074F SYST BP LT 130 MM HG: CPT | Performed by: NURSE PRACTITIONER

## 2023-04-03 RX ORDER — LIDOCAINE 50 MG/G
1 PATCH TOPICAL DAILY
Qty: 30 PATCH | Refills: 2 | Status: SHIPPED | OUTPATIENT
Start: 2023-04-03

## 2023-04-03 SDOH — ECONOMIC STABILITY: HOUSING INSECURITY
IN THE LAST 12 MONTHS, WAS THERE A TIME WHEN YOU DID NOT HAVE A STEADY PLACE TO SLEEP OR SLEPT IN A SHELTER (INCLUDING NOW)?: NO

## 2023-04-03 SDOH — ECONOMIC STABILITY: INCOME INSECURITY: HOW HARD IS IT FOR YOU TO PAY FOR THE VERY BASICS LIKE FOOD, HOUSING, MEDICAL CARE, AND HEATING?: NOT HARD AT ALL

## 2023-04-03 SDOH — ECONOMIC STABILITY: FOOD INSECURITY: WITHIN THE PAST 12 MONTHS, THE FOOD YOU BOUGHT JUST DIDN'T LAST AND YOU DIDN'T HAVE MONEY TO GET MORE.: NEVER TRUE

## 2023-04-03 SDOH — ECONOMIC STABILITY: FOOD INSECURITY: WITHIN THE PAST 12 MONTHS, YOU WORRIED THAT YOUR FOOD WOULD RUN OUT BEFORE YOU GOT MONEY TO BUY MORE.: NEVER TRUE

## 2023-04-03 ASSESSMENT — PATIENT HEALTH QUESTIONNAIRE - PHQ9
5. POOR APPETITE OR OVEREATING: 1
9. THOUGHTS THAT YOU WOULD BE BETTER OFF DEAD, OR OF HURTING YOURSELF: 0
SUM OF ALL RESPONSES TO PHQ QUESTIONS 1-9: 9
2. FEELING DOWN, DEPRESSED OR HOPELESS: 1
1. LITTLE INTEREST OR PLEASURE IN DOING THINGS: 1
SUM OF ALL RESPONSES TO PHQ QUESTIONS 1-9: 9
6. FEELING BAD ABOUT YOURSELF - OR THAT YOU ARE A FAILURE OR HAVE LET YOURSELF OR YOUR FAMILY DOWN: 0
4. FEELING TIRED OR HAVING LITTLE ENERGY: 3
10. IF YOU CHECKED OFF ANY PROBLEMS, HOW DIFFICULT HAVE THESE PROBLEMS MADE IT FOR YOU TO DO YOUR WORK, TAKE CARE OF THINGS AT HOME, OR GET ALONG WITH OTHER PEOPLE: 1
SUM OF ALL RESPONSES TO PHQ QUESTIONS 1-9: 9
8. MOVING OR SPEAKING SO SLOWLY THAT OTHER PEOPLE COULD HAVE NOTICED. OR THE OPPOSITE, BEING SO FIGETY OR RESTLESS THAT YOU HAVE BEEN MOVING AROUND A LOT MORE THAN USUAL: 0
7. TROUBLE CONCENTRATING ON THINGS, SUCH AS READING THE NEWSPAPER OR WATCHING TELEVISION: 0
3. TROUBLE FALLING OR STAYING ASLEEP: 3
SUM OF ALL RESPONSES TO PHQ QUESTIONS 1-9: 9
SUM OF ALL RESPONSES TO PHQ9 QUESTIONS 1 & 2: 2

## 2023-04-03 NOTE — PATIENT INSTRUCTIONS
accomplishments. Staying motivated. If you feel like skipping your activity, remember your goal. Maybe you want to move better and stay independent. Every activity gets you one step closer. Not feeling your best.  Start with 5 minutes of an activity you enjoy. Prove to yourself you can do it. As you get comfortable, increase your time. You may not be where you want to be. But you're in the process of getting there. Everyone starts somewhere. How can you find safe ways to stay active? Talk with your doctor about any physical challenges you're facing. Make a plan with your doctor if you have a health problem or aren't sure how to get started with activity. If you're already active, ask your doctor if there is anything you should change to stay safe as your body and health change. If you tend to feel dizzy after you take medicine, avoid activity at that time. Try being active before you take your medicine. This will reduce your risk of falls. If you plan to be active at home, make sure to clear your space before you get started. Remove things like TV cords, coffee tables, and throw rugs. It's safest to have plenty of space to move freely. The key to getting more active is to take it slow and steady. Try to improve only a little bit at a time. Pick just one area to improve on at first. And if an activity hurts, stop and talk to your doctor. Where can you learn more? Go to http://www.valero.com/ and enter P600 to learn more about \"Learning About Being Active as an Older Adult. \"  Current as of: October 10, 2022               Content Version: 13.6  © 2989-8534 Healthwise, Incorporated. Care instructions adapted under license by [facility] ([facility]). If you have questions about a medical condition or this instruction, always ask your healthcare professional. [place] [address] any warranty or liability for your use of this information.            Learning About Dental Care for Older

## 2023-04-03 NOTE — PROGRESS NOTES
addressed today:  She has lost about 17 pounds since her last visit,reports poor appetite and chronic diarrhea. Has been taking Immodium and this helps some. Denies any abd pain, no nausea or vomiting. She states that she eats \"just not as much as I used to\"    Complains of worsening arthritis pain, Tylenol helps minimally. She has not tried any over-the-counter topical agents    Patient's complete Health Risk Assessment and screening values have been reviewed and are found in Flowsheets. The following problems were reviewed today and where indicated follow up appointments were made and/or referrals ordered. Positive Risk Factor Screenings with Interventions:       Cognitive:    Words recalled: 1 Word Recalled   Clock Drawing Test (CDT): (!) Abnormal   Total Score: (!) 1   Total Score Interpretation: Abnormal Mini-Cog      Interventions:  Patient declines any further evaluation or treatment, she denies forgetting any major events, is able to drive short distances around town, manages independently though her sone assists with bill paying    Depression:  PHQ-2 Score: 2  PHQ-9 Total Score: 9    Interpretation:   1-4 = minimal  5-9 = mild  10-14 = moderate  15-19 = moderately severe  20-27 = severe  Interventions:  Patient declines any further evaluation or treatment            Weight and Activity:  Physical Activity: Inactive    Days of Exercise per Week: 0 days    Minutes of Exercise per Session: 0 min     On average, how many days per week do you engage in moderate to strenuous exercise (like a brisk walk)?: 0 days  Have you lost any weight without trying in the past 3 months?: (!) Yes  Body mass index: 19.43    Inactivity Interventions:  Patient declined any further interventions or treatment    Unintentional Weight Loss Interventions:  Patient declined any further interventions or treatment, offered referral to explore meal delivery services but she declines      Dentist Screen:  Have you seen the dentist

## 2023-04-04 ENCOUNTER — TELEPHONE (OUTPATIENT)
Dept: PRIMARY CARE CLINIC | Age: 78
End: 2023-04-04

## 2023-04-06 RX ORDER — CARVEDILOL 25 MG/1
TABLET ORAL
Qty: 180 TABLET | Refills: 3 | Status: SHIPPED | OUTPATIENT
Start: 2023-04-06

## 2023-05-23 ENCOUNTER — HOSPITAL ENCOUNTER (OUTPATIENT)
Age: 78
Setting detail: OBSERVATION
Discharge: HOME HEALTH | End: 2023-05-25
Attending: EMERGENCY MEDICINE | Admitting: HOSPITALIST
Payer: MEDICARE

## 2023-05-23 ENCOUNTER — APPOINTMENT (OUTPATIENT)
Dept: GENERAL RADIOLOGY | Age: 78
End: 2023-05-23
Payer: MEDICARE

## 2023-05-23 ENCOUNTER — APPOINTMENT (OUTPATIENT)
Dept: CT IMAGING | Age: 78
End: 2023-05-23
Payer: MEDICARE

## 2023-05-23 DIAGNOSIS — S09.90XA INJURY OF HEAD, INITIAL ENCOUNTER: ICD-10-CM

## 2023-05-23 DIAGNOSIS — S80.01XA CONTUSION OF RIGHT KNEE, INITIAL ENCOUNTER: ICD-10-CM

## 2023-05-23 DIAGNOSIS — R55 SYNCOPE AND COLLAPSE: Primary | ICD-10-CM

## 2023-05-23 DIAGNOSIS — S01.01XA LACERATION OF SCALP, INITIAL ENCOUNTER: ICD-10-CM

## 2023-05-23 LAB
ALBUMIN SERPL-MCNC: 4 G/DL (ref 3.5–5.2)
ALBUMIN/GLOB SERPL: 1.5 {RATIO} (ref 1–2.5)
ALP SERPL-CCNC: 53 U/L (ref 35–104)
ALT SERPL-CCNC: 15 U/L (ref 5–33)
ANION GAP SERPL CALCULATED.3IONS-SCNC: 14 MMOL/L (ref 9–17)
AST SERPL-CCNC: 19 U/L
BASOPHILS # BLD: 0 K/UL (ref 0–0.2)
BASOPHILS NFR BLD: 0 % (ref 0–2)
BILIRUB SERPL-MCNC: 0.6 MG/DL (ref 0.3–1.2)
BNP SERPL-MCNC: 2614 PG/ML
BUN SERPL-MCNC: 35 MG/DL (ref 8–23)
CALCIUM SERPL-MCNC: 9.3 MG/DL (ref 8.6–10.4)
CHLORIDE SERPL-SCNC: 97 MMOL/L (ref 98–107)
CO2 SERPL-SCNC: 26 MMOL/L (ref 20–31)
CREAT SERPL-MCNC: 1.13 MG/DL (ref 0.5–0.9)
EOSINOPHIL # BLD: 0.1 K/UL (ref 0–0.4)
EOSINOPHILS RELATIVE PERCENT: 1 % (ref 1–4)
ERYTHROCYTE [DISTWIDTH] IN BLOOD BY AUTOMATED COUNT: 12 % (ref 12.5–15.4)
GFR SERPL CREATININE-BSD FRML MDRD: 50 ML/MIN/1.73M2
GLUCOSE SERPL-MCNC: 110 MG/DL (ref 70–99)
HCT VFR BLD AUTO: 37.2 % (ref 36–46)
HGB BLD-MCNC: 12.6 G/DL (ref 12–16)
LYMPHOCYTES # BLD: 14 % (ref 24–44)
LYMPHOCYTES NFR BLD: 1.4 K/UL (ref 1–4.8)
MCH RBC QN AUTO: 33.2 PG (ref 26–34)
MCHC RBC AUTO-ENTMCNC: 33.9 G/DL (ref 31–37)
MCV RBC AUTO: 97.9 FL (ref 80–100)
MONOCYTES NFR BLD: 1.1 K/UL (ref 0.1–1.2)
MONOCYTES NFR BLD: 11 % (ref 2–11)
NEUTROPHILS NFR BLD: 74 % (ref 36–66)
NEUTS SEG NFR BLD: 7.6 K/UL (ref 1.8–7.7)
PLATELET # BLD AUTO: 224 K/UL (ref 140–450)
PMV BLD AUTO: 7.2 FL (ref 6–12)
POTASSIUM SERPL-SCNC: 3.4 MMOL/L (ref 3.7–5.3)
PROT SERPL-MCNC: 6.6 G/DL (ref 6.4–8.3)
RBC # BLD AUTO: 3.8 M/UL (ref 4–5.2)
SODIUM SERPL-SCNC: 137 MMOL/L (ref 135–144)
TROPONIN I SERPL HS-MCNC: 20 NG/L (ref 0–14)
WBC OTHER # BLD: 10.2 K/UL (ref 3.5–11)

## 2023-05-23 PROCEDURE — 36415 COLL VENOUS BLD VENIPUNCTURE: CPT

## 2023-05-23 PROCEDURE — 12002 RPR S/N/AX/GEN/TRNK2.6-7.5CM: CPT

## 2023-05-23 PROCEDURE — 99285 EMERGENCY DEPT VISIT HI MDM: CPT

## 2023-05-23 PROCEDURE — 72125 CT NECK SPINE W/O DYE: CPT

## 2023-05-23 PROCEDURE — 6370000000 HC RX 637 (ALT 250 FOR IP): Performed by: PHYSICIAN ASSISTANT

## 2023-05-23 PROCEDURE — G0378 HOSPITAL OBSERVATION PER HR: HCPCS

## 2023-05-23 PROCEDURE — 85025 COMPLETE CBC W/AUTO DIFF WBC: CPT

## 2023-05-23 PROCEDURE — 90715 TDAP VACCINE 7 YRS/> IM: CPT | Performed by: PHYSICIAN ASSISTANT

## 2023-05-23 PROCEDURE — 93005 ELECTROCARDIOGRAM TRACING: CPT | Performed by: PHYSICIAN ASSISTANT

## 2023-05-23 PROCEDURE — 70450 CT HEAD/BRAIN W/O DYE: CPT

## 2023-05-23 PROCEDURE — 83880 ASSAY OF NATRIURETIC PEPTIDE: CPT

## 2023-05-23 PROCEDURE — 96361 HYDRATE IV INFUSION ADD-ON: CPT

## 2023-05-23 PROCEDURE — 6360000002 HC RX W HCPCS: Performed by: PHYSICIAN ASSISTANT

## 2023-05-23 PROCEDURE — 90471 IMMUNIZATION ADMIN: CPT | Performed by: PHYSICIAN ASSISTANT

## 2023-05-23 PROCEDURE — 2500000003 HC RX 250 WO HCPCS

## 2023-05-23 PROCEDURE — 80053 COMPREHEN METABOLIC PANEL: CPT

## 2023-05-23 PROCEDURE — 84484 ASSAY OF TROPONIN QUANT: CPT

## 2023-05-23 PROCEDURE — 71045 X-RAY EXAM CHEST 1 VIEW: CPT

## 2023-05-23 PROCEDURE — 2580000003 HC RX 258: Performed by: PHYSICIAN ASSISTANT

## 2023-05-23 RX ORDER — LIDOCAINE HYDROCHLORIDE 10 MG/ML
5 INJECTION, SOLUTION INFILTRATION; PERINEURAL ONCE
Status: COMPLETED | OUTPATIENT
Start: 2023-05-23 | End: 2023-05-23

## 2023-05-23 RX ORDER — ACETAMINOPHEN 650 MG/1
650 SUPPOSITORY RECTAL EVERY 6 HOURS PRN
Status: DISCONTINUED | OUTPATIENT
Start: 2023-05-23 | End: 2023-05-25 | Stop reason: HOSPADM

## 2023-05-23 RX ORDER — SODIUM CHLORIDE 9 MG/ML
INJECTION, SOLUTION INTRAVENOUS PRN
Status: DISCONTINUED | OUTPATIENT
Start: 2023-05-23 | End: 2023-05-25 | Stop reason: HOSPADM

## 2023-05-23 RX ORDER — POTASSIUM CHLORIDE 7.45 MG/ML
10 INJECTION INTRAVENOUS PRN
Status: DISCONTINUED | OUTPATIENT
Start: 2023-05-23 | End: 2023-05-25 | Stop reason: HOSPADM

## 2023-05-23 RX ORDER — ACETAMINOPHEN 325 MG/1
650 TABLET ORAL EVERY 6 HOURS PRN
Status: DISCONTINUED | OUTPATIENT
Start: 2023-05-23 | End: 2023-05-25 | Stop reason: HOSPADM

## 2023-05-23 RX ORDER — ONDANSETRON 4 MG/1
4 TABLET, ORALLY DISINTEGRATING ORAL EVERY 8 HOURS PRN
Status: DISCONTINUED | OUTPATIENT
Start: 2023-05-23 | End: 2023-05-25 | Stop reason: HOSPADM

## 2023-05-23 RX ORDER — CARVEDILOL 3.12 MG/1
6.25 TABLET ORAL 2 TIMES DAILY
Status: DISCONTINUED | OUTPATIENT
Start: 2023-05-24 | End: 2023-05-25

## 2023-05-23 RX ORDER — POTASSIUM CHLORIDE 20 MEQ/1
40 TABLET, EXTENDED RELEASE ORAL PRN
Status: DISCONTINUED | OUTPATIENT
Start: 2023-05-23 | End: 2023-05-25 | Stop reason: HOSPADM

## 2023-05-23 RX ORDER — LIDOCAINE HYDROCHLORIDE 10 MG/ML
INJECTION, SOLUTION EPIDURAL; INFILTRATION; INTRACAUDAL; PERINEURAL
Status: COMPLETED
Start: 2023-05-23 | End: 2023-05-23

## 2023-05-23 RX ORDER — POLYETHYLENE GLYCOL 3350 17 G/17G
17 POWDER, FOR SOLUTION ORAL DAILY PRN
Status: DISCONTINUED | OUTPATIENT
Start: 2023-05-23 | End: 2023-05-25 | Stop reason: HOSPADM

## 2023-05-23 RX ORDER — ACETAMINOPHEN 500 MG
1000 TABLET ORAL ONCE
Status: COMPLETED | OUTPATIENT
Start: 2023-05-23 | End: 2023-05-23

## 2023-05-23 RX ORDER — ASCORBIC ACID 500 MG
500 TABLET ORAL DAILY
Status: DISCONTINUED | OUTPATIENT
Start: 2023-05-24 | End: 2023-05-25 | Stop reason: HOSPADM

## 2023-05-23 RX ORDER — LIDOCAINE 4 G/G
1 PATCH TOPICAL DAILY
Status: DISCONTINUED | OUTPATIENT
Start: 2023-05-23 | End: 2023-05-25 | Stop reason: HOSPADM

## 2023-05-23 RX ORDER — ONDANSETRON 2 MG/ML
4 INJECTION INTRAMUSCULAR; INTRAVENOUS EVERY 6 HOURS PRN
Status: DISCONTINUED | OUTPATIENT
Start: 2023-05-23 | End: 2023-05-25 | Stop reason: HOSPADM

## 2023-05-23 RX ORDER — SODIUM CHLORIDE 0.9 % (FLUSH) 0.9 %
10 SYRINGE (ML) INJECTION PRN
Status: DISCONTINUED | OUTPATIENT
Start: 2023-05-23 | End: 2023-05-25 | Stop reason: HOSPADM

## 2023-05-23 RX ORDER — MAGNESIUM SULFATE 1 G/100ML
1000 INJECTION INTRAVENOUS PRN
Status: DISCONTINUED | OUTPATIENT
Start: 2023-05-23 | End: 2023-05-25 | Stop reason: HOSPADM

## 2023-05-23 RX ORDER — SODIUM CHLORIDE 0.9 % (FLUSH) 0.9 %
5-40 SYRINGE (ML) INJECTION EVERY 12 HOURS SCHEDULED
Status: DISCONTINUED | OUTPATIENT
Start: 2023-05-24 | End: 2023-05-25 | Stop reason: HOSPADM

## 2023-05-23 RX ORDER — DIGOXIN 125 MCG
125 TABLET ORAL DAILY
Status: DISCONTINUED | OUTPATIENT
Start: 2023-05-24 | End: 2023-05-25 | Stop reason: HOSPADM

## 2023-05-23 RX ORDER — 0.9 % SODIUM CHLORIDE 0.9 %
1000 INTRAVENOUS SOLUTION INTRAVENOUS ONCE
Status: COMPLETED | OUTPATIENT
Start: 2023-05-23 | End: 2023-05-23

## 2023-05-23 RX ADMIN — Medication 3 ML: at 19:13

## 2023-05-23 RX ADMIN — LIDOCAINE HYDROCHLORIDE 5 ML: 10 INJECTION, SOLUTION EPIDURAL; INFILTRATION; INTRACAUDAL; PERINEURAL at 19:45

## 2023-05-23 RX ADMIN — SODIUM CHLORIDE 1000 ML: 9 INJECTION, SOLUTION INTRAVENOUS at 19:24

## 2023-05-23 RX ADMIN — LIDOCAINE HYDROCHLORIDE 5 ML: 10 INJECTION, SOLUTION INFILTRATION; PERINEURAL at 19:45

## 2023-05-23 RX ADMIN — TETANUS TOXOID, REDUCED DIPHTHERIA TOXOID AND ACELLULAR PERTUSSIS VACCINE, ADSORBED 0.5 ML: 5; 2.5; 8; 8; 2.5 SUSPENSION INTRAMUSCULAR at 19:16

## 2023-05-23 RX ADMIN — ACETAMINOPHEN 1000 MG: 500 TABLET ORAL at 19:08

## 2023-05-23 ASSESSMENT — LIFESTYLE VARIABLES: HOW OFTEN DO YOU HAVE A DRINK CONTAINING ALCOHOL: NEVER

## 2023-05-23 NOTE — ED PROVIDER NOTES
[address] Emergency Department  [address] [address] [facility] [address] [facility] [address]  Phone: [phone]  Fax: [phone]        Pt Name: Ashley Brewer  MRN: [id number]  [place] [birth year]  Date of evaluation: 5/23/23    CHIEFCOMPLAINT       Chief Complaint   Patient presents with    Fall    Head Laceration     Pt brought to ED with son. Per son pt called him @ 2pm today and said she had a \"small cut\" on her head. Told son it was small and could wait for him to come after work. Son got to pt's house @ 6pm and brought pt straight to ER. Pt arrives with large laceration over R eyebrow but limited memory of falling. Pt states she \"woke up\" on dining room floor with \"lots of blood\". HISTORY OF PRESENT ILLNESS (Location/Symptom, Timing/Onset, Context/Setting, Quality, Duration, Modifying Factors, Severity)      Ashley Brewer is a 69 y.o. female with no pertinent PMH who presents to the ED via private auto with complaints of a laceration above her right eye after a fall. She is unable to recall events of the fall and shortly after the fall. She does not recall being dizzy or lightheaded. Son believes she most likely lost consciousness. She is feeling fine now. She has no history of TIA or stroke. She does have history of atrial fibrillation for which she uses Eliquis. She is currently taking this. She does have difficulty with ambulation and she has a very arthritic right hip. She does have a walker at home which she uses intermittently.     PAST MEDICAL / SURGICAL / SOCIAL / FAMILY HISTORY     PMH:  has a past medical history of AICD (automatic cardioverter/defibrillator) present, Atrial fibrillation ([address] Atrial fibrillation ([address] Cancer ([address] CHF (congestive heart failure) ([address] Chronic kidney disease, H/O cardiovascular stress test, Hx of long term use of blood thinners, Hypertension, Nonischemic dilated cardiomyopathy ([address] Other screening mammogram, and Poor

## 2023-05-24 ENCOUNTER — APPOINTMENT (OUTPATIENT)
Dept: GENERAL RADIOLOGY | Age: 78
End: 2023-05-24
Payer: MEDICARE

## 2023-05-24 ENCOUNTER — APPOINTMENT (OUTPATIENT)
Dept: NON INVASIVE DIAGNOSTICS | Age: 78
End: 2023-05-24
Payer: MEDICARE

## 2023-05-24 PROBLEM — M79.601 RIGHT ARM PAIN: Status: ACTIVE | Noted: 2023-05-24

## 2023-05-24 PROBLEM — M79.601 RIGHT ARM PAIN: Status: RESOLVED | Noted: 2023-05-24 | Resolved: 2023-05-24

## 2023-05-24 LAB
ANION GAP SERPL CALCULATED.3IONS-SCNC: 17 MMOL/L (ref 9–17)
BASOPHILS # BLD: 0 K/UL (ref 0–0.2)
BASOPHILS NFR BLD: 0 % (ref 0–2)
BNP SERPL-MCNC: 2063 PG/ML
BUN SERPL-MCNC: 26 MG/DL (ref 8–23)
CALCIUM SERPL-MCNC: 9.1 MG/DL (ref 8.6–10.4)
CHLORIDE SERPL-SCNC: 104 MMOL/L (ref 98–107)
CO2 SERPL-SCNC: 19 MMOL/L (ref 20–31)
CREAT SERPL-MCNC: 0.83 MG/DL (ref 0.5–0.9)
EKG ATRIAL RATE: 102 BPM
EKG ATRIAL RATE: 69 BPM
EKG Q-T INTERVAL: 350 MS
EKG Q-T INTERVAL: 422 MS
EKG QRS DURATION: 86 MS
EKG QRS DURATION: 90 MS
EKG QTC CALCULATION (BAZETT): 432 MS
EKG QTC CALCULATION (BAZETT): 458 MS
EKG R AXIS: 118 DEGREES
EKG R AXIS: 122 DEGREES
EKG T AXIS: -62 DEGREES
EKG T AXIS: 89 DEGREES
EKG VENTRICULAR RATE: 71 BPM
EKG VENTRICULAR RATE: 92 BPM
EOSINOPHIL # BLD: 0.1 K/UL (ref 0–0.4)
EOSINOPHILS RELATIVE PERCENT: 1 % (ref 1–4)
ERYTHROCYTE [DISTWIDTH] IN BLOOD BY AUTOMATED COUNT: 12.2 % (ref 12.5–15.4)
GFR SERPL CREATININE-BSD FRML MDRD: >60 ML/MIN/1.73M2
GLUCOSE SERPL-MCNC: 108 MG/DL (ref 70–99)
HCT VFR BLD AUTO: 36.7 % (ref 36–46)
HGB BLD-MCNC: 12.4 G/DL (ref 12–16)
LV EF: 45 %
LVEF MODALITY: NORMAL
LYMPHOCYTES # BLD: 13 % (ref 24–44)
LYMPHOCYTES NFR BLD: 1.2 K/UL (ref 1–4.8)
MAGNESIUM SERPL-MCNC: 2 MG/DL (ref 1.6–2.6)
MCH RBC QN AUTO: 33.4 PG (ref 26–34)
MCHC RBC AUTO-ENTMCNC: 33.8 G/DL (ref 31–37)
MCV RBC AUTO: 98.9 FL (ref 80–100)
MONOCYTES NFR BLD: 0.9 K/UL (ref 0.1–1.2)
MONOCYTES NFR BLD: 10 % (ref 2–11)
NEUTROPHILS NFR BLD: 76 % (ref 36–66)
NEUTS SEG NFR BLD: 6.6 K/UL (ref 1.8–7.7)
PLATELET # BLD AUTO: 216 K/UL (ref 140–450)
PMV BLD AUTO: 7.9 FL (ref 6–12)
POTASSIUM SERPL-SCNC: 3.4 MMOL/L (ref 3.7–5.3)
RBC # BLD AUTO: 3.71 M/UL (ref 4–5.2)
SODIUM SERPL-SCNC: 140 MMOL/L (ref 135–144)
WBC OTHER # BLD: 8.7 K/UL (ref 3.5–11)

## 2023-05-24 PROCEDURE — 6370000000 HC RX 637 (ALT 250 FOR IP)

## 2023-05-24 PROCEDURE — 99254 IP/OBS CNSLTJ NEW/EST MOD 60: CPT | Performed by: SURGERY

## 2023-05-24 PROCEDURE — 73080 X-RAY EXAM OF ELBOW: CPT

## 2023-05-24 PROCEDURE — 83880 ASSAY OF NATRIURETIC PEPTIDE: CPT

## 2023-05-24 PROCEDURE — 6370000000 HC RX 637 (ALT 250 FOR IP): Performed by: PHYSICIAN ASSISTANT

## 2023-05-24 PROCEDURE — 97162 PT EVAL MOD COMPLEX 30 MIN: CPT

## 2023-05-24 PROCEDURE — G0378 HOSPITAL OBSERVATION PER HR: HCPCS

## 2023-05-24 PROCEDURE — 73562 X-RAY EXAM OF KNEE 3: CPT

## 2023-05-24 PROCEDURE — 97535 SELF CARE MNGMENT TRAINING: CPT

## 2023-05-24 PROCEDURE — 36415 COLL VENOUS BLD VENIPUNCTURE: CPT

## 2023-05-24 PROCEDURE — 99222 1ST HOSP IP/OBS MODERATE 55: CPT | Performed by: HOSPITALIST

## 2023-05-24 PROCEDURE — 97166 OT EVAL MOD COMPLEX 45 MIN: CPT

## 2023-05-24 PROCEDURE — 85025 COMPLETE CBC W/AUTO DIFF WBC: CPT

## 2023-05-24 PROCEDURE — 6370000000 HC RX 637 (ALT 250 FOR IP): Performed by: NURSE PRACTITIONER

## 2023-05-24 PROCEDURE — 2580000003 HC RX 258: Performed by: NURSE PRACTITIONER

## 2023-05-24 PROCEDURE — 80048 BASIC METABOLIC PNL TOTAL CA: CPT

## 2023-05-24 PROCEDURE — 93005 ELECTROCARDIOGRAM TRACING: CPT | Performed by: NURSE PRACTITIONER

## 2023-05-24 PROCEDURE — 97116 GAIT TRAINING THERAPY: CPT

## 2023-05-24 PROCEDURE — 73060 X-RAY EXAM OF HUMERUS: CPT

## 2023-05-24 PROCEDURE — 83735 ASSAY OF MAGNESIUM: CPT

## 2023-05-24 PROCEDURE — 93306 TTE W/DOPPLER COMPLETE: CPT

## 2023-05-24 RX ORDER — MORPHINE SULFATE 4 MG/ML
4 INJECTION, SOLUTION INTRAMUSCULAR; INTRAVENOUS
Status: DISCONTINUED | OUTPATIENT
Start: 2023-05-24 | End: 2023-05-24

## 2023-05-24 RX ORDER — HYDROCODONE BITARTRATE AND ACETAMINOPHEN 5; 325 MG/1; MG/1
1 TABLET ORAL EVERY 6 HOURS PRN
Status: DISCONTINUED | OUTPATIENT
Start: 2023-05-24 | End: 2023-05-25

## 2023-05-24 RX ORDER — MORPHINE SULFATE 2 MG/ML
2 INJECTION, SOLUTION INTRAMUSCULAR; INTRAVENOUS
Status: DISCONTINUED | OUTPATIENT
Start: 2023-05-24 | End: 2023-05-24

## 2023-05-24 RX ORDER — METOPROLOL TARTRATE 5 MG/5ML
5 INJECTION INTRAVENOUS EVERY 6 HOURS PRN
Status: DISCONTINUED | OUTPATIENT
Start: 2023-05-24 | End: 2023-05-25 | Stop reason: HOSPADM

## 2023-05-24 RX ADMIN — APIXABAN 5 MG: 5 TABLET, FILM COATED ORAL at 00:40

## 2023-05-24 RX ADMIN — Medication 600 MG: at 08:36

## 2023-05-24 RX ADMIN — HYDROCODONE BITARTRATE AND ACETAMINOPHEN 1 TABLET: 5; 325 TABLET ORAL at 22:34

## 2023-05-24 RX ADMIN — Medication 500 MG: at 08:36

## 2023-05-24 RX ADMIN — ACETAMINOPHEN 650 MG: 325 TABLET ORAL at 08:37

## 2023-05-24 RX ADMIN — POTASSIUM CHLORIDE 40 MEQ: 1500 TABLET, EXTENDED RELEASE ORAL at 15:23

## 2023-05-24 RX ADMIN — DIGOXIN 125 MCG: 125 TABLET ORAL at 08:36

## 2023-05-24 RX ADMIN — SODIUM CHLORIDE, PRESERVATIVE FREE 10 ML: 5 INJECTION INTRAVENOUS at 22:37

## 2023-05-24 RX ADMIN — APIXABAN 5 MG: 5 TABLET, FILM COATED ORAL at 10:58

## 2023-05-24 RX ADMIN — SODIUM CHLORIDE, PRESERVATIVE FREE 10 ML: 5 INJECTION INTRAVENOUS at 08:52

## 2023-05-24 RX ADMIN — SODIUM CHLORIDE, PRESERVATIVE FREE 10 ML: 5 INJECTION INTRAVENOUS at 08:47

## 2023-05-24 RX ADMIN — HYDROCODONE BITARTRATE AND ACETAMINOPHEN 1 TABLET: 5; 325 TABLET ORAL at 15:22

## 2023-05-24 ASSESSMENT — PAIN - FUNCTIONAL ASSESSMENT
PAIN_FUNCTIONAL_ASSESSMENT: PREVENTS OR INTERFERES SOME ACTIVE ACTIVITIES AND ADLS
PAIN_FUNCTIONAL_ASSESSMENT: PREVENTS OR INTERFERES SOME ACTIVE ACTIVITIES AND ADLS

## 2023-05-24 ASSESSMENT — PAIN SCALES - GENERAL
PAINLEVEL_OUTOF10: 1
PAINLEVEL_OUTOF10: 0
PAINLEVEL_OUTOF10: 6
PAINLEVEL_OUTOF10: 3
PAINLEVEL_OUTOF10: 0
PAINLEVEL_OUTOF10: 4
PAINLEVEL_OUTOF10: 1
PAINLEVEL_OUTOF10: 6
PAINLEVEL_OUTOF10: 0
PAINLEVEL_OUTOF10: 0

## 2023-05-24 ASSESSMENT — PAIN DESCRIPTION - LOCATION
LOCATION: ARM

## 2023-05-24 ASSESSMENT — PAIN DESCRIPTION - DESCRIPTORS
DESCRIPTORS: THROBBING
DESCRIPTORS: SHARP
DESCRIPTORS: ACHING

## 2023-05-24 ASSESSMENT — PAIN DESCRIPTION - ORIENTATION
ORIENTATION: RIGHT

## 2023-05-24 ASSESSMENT — PAIN SCALES - WONG BAKER: WONGBAKER_NUMERICALRESPONSE: 2

## 2023-05-24 NOTE — H&P
[facility]    [address] [place] [address]    HISTORY AND PHYSICAL EXAMINATION            Date:   5/24/2023  Patient name: Chiquita Wesley  Date of admission:  5/23/2023  6:19 PM  MRN:   [id number]  Account:  [de-identified]  YOB: 1945  PCP:    JOSÉ Carpenter CNP  Room:   [address] [place] [address]  Code Status:    Full Code      History Obtained From:     Patient and EHR as patient is a poor historian    History of Present Illness: Chiquita Wesley is a 46-year-old female with a past medical history of nonischemic cardiomyopathy, chronic A-fib on Eliquis, HFrEF, AICD in situ who presented following a fall and head laceration admitted for syncope. Patient is unable to recall the details of the syncopal episode. However, she does remember she was sitting in a chair when she started to feel nauseous. As she was attempting to get up to go to the bathroom she fell. She does not know how long she was down for. When she woke up she had a laceration to her right eyebrow as well as bruising over her right upper extremity. Denied any headache, acute vision changes, dizziness, lightheadedness, tongue biting, urinary or bowel incontinence. She was able to resume activity following the syncopal event and states she went and ate a cheeseburger. Patient called her son due to the facial laceration. She was subsequently brought to the emergency department to be evaluated. Of note, her AICD was recently interrogated at the cardiologist office about 3 months ago. It is functioning well. Patient was slightly hypotensive upon initial evaluation in the emergency department. She had a laceration to the right forehead through the eyebrow which was sutured. Labs were significant for RAO, hypokalemia, elevated proBNP and troponin. CT head and cervical spine were negative for any acute traumatic abnormalities.   Chest x-ray showed a mildly enlarged cardiomediastinal silhouette

## 2023-05-24 NOTE — ED PROVIDER NOTES
[facility]     Emergency Department     Faculty Attestation        I performed a history and physical examination of the patient and discussed management with the resident. I reviewed the residents note and agree with the documented findings and plan of care. Any areas of disagreement are noted on the chart. I was personally present for the key portions of any procedures. I have documented in the chart those procedures where I was not present during the key portions. I have reviewed the emergency nurses triage note. I agree with the chief complaint, past medical history, past surgical history, allergies, medications, social and family history as documented unless otherwise noted below. Documentation of the HPI, Physical Exam and Medical Decision Making performed by medical students or scribes is based on my personal performance of the HPI, PE and MDM. For Physician Assistant/ Nurse Practitioner cases/documentation I have have had a face to face evaluation with this patient and have completed at least one if not all key elements of the E/M (history, physical exam, and MDM). Additional findings are as noted. Vital Signs: BP (!) 101/45   Pulse 79   Temp 98.2 °F (36.8 °C) (Oral)   Resp 16   Ht 5' 9\" (1.753 m)   Wt 59.4 kg (131 lb)   SpO2 97%   BMI 19.35 kg/m²   PCP:  JOSÉ Echols - CNP    Pertinent Comments:     History: This is a 67-year-old female who was brought to the ED with son for a fall and laceration to the right forehead. She does not remember what happened. She says she woke up in the dining room floor with lots of blood. The son relates he found her on the couch. She denies any pain. Exam: Patient is slightly hypotensive. The rest of her vitals are stable. She appears to be in no acute distress. She is amnestic to events but for me she really has no discomfort and is nontoxic.   She does have a

## 2023-05-24 NOTE — PLAN OF CARE
Problem: Discharge Planning  Goal: Discharge to home or other facility with appropriate resources  5/24/2023 0234 by Kyle Cast RN  Outcome: Progressing  5/24/2023 0234 by Kyle Cast RN  Outcome: Progressing     Problem: Safety - Adult  Goal: Free from fall injury  5/24/2023 0234 by Kyle Cast RN  Outcome: Progressing  5/24/2023 0234 by Kyle Cast RN  Outcome: Progressing

## 2023-05-24 NOTE — PLAN OF CARE
Problem: Discharge Planning  Goal: Discharge to home or other facility with appropriate resources  5/24/2023 1554 by Doug Rodney RN  Outcome: Progressing     Problem: Pain  Goal: Verbalizes/displays adequate comfort level or baseline comfort level  Outcome: Progressing     Problem: Safety - Adult  Goal: Free from fall injury  5/24/2023 1554 by Doug Rodney RN  Outcome: Progressing

## 2023-05-24 NOTE — CONSULTS
[facility]    Consult                        Today's Date: 5/24/2023  Patient Name: Jacinda Coburn  Date of admission: 5/23/2023  6:19 PM  Patient's age: 69 y.o., 1945  Admission Dx: Syncope and collapse [R55]  Injury of head, initial encounter [S09.90XA]  Laceration of scalp, initial encounter [S01.01XA]    Reason for Consult:  Cardiac evaluation    Requesting Physician: Jeny Ahmadi DO    CHIEF COMPLAINT:  fall     History Obtained From:  patient, electronic medical record    HISTORY OF PRESENT ILLNESS:      The patient is a 69 y.o.  female  with history of Nonischemic cardiomyopathy, chronic afib on Ac , HFrF, CHF, AICD in situ was interogated at our office 3 month ago  and functioning well who is admitted to the hospital for fall and laceration to the right forehead with LOC . Patient doesn't recall the event but does recall was sitting in chair and felt nauseous stated that she was  sitting   in chair and got up to go to bathroom and doesn't recall what happened after that, was found to be hypotensive in ER and   Cardiology was consulted for syncope       Patient seen and examined. Denies chest pain or shortness of breath. Tele/vitals/labs reviewed . Afib rate controlled on tele Discussed case with RN. Past Medical History:   has a past medical history of AICD (automatic cardioverter/defibrillator) present, Atrial fibrillation ([address] Atrial fibrillation ([address] Cancer ([address] CHF (congestive heart failure) ([address] Chronic kidney disease, H/O cardiovascular stress test, Hx of long term use of blood thinners, Hypertension, Nonischemic dilated cardiomyopathy ([address] Other screening mammogram, and Poor historian. Past Surgical History:   has a past surgical history that includes knee surgery (Right); transesophageal echocardiogram (11/10/2017); other surgical history (10/30/2018); pr office/outpt visit,procedure only (Right, 10/30/2018);  Cardiac catheterization

## 2023-05-24 NOTE — CARE COORDINATION
Case Management Assessment  Initial Evaluation    Date/Time of Evaluation: 5/24/2023 10:39 AM  Assessment Completed by: Margarette Pelayo RN    If patient is discharged prior to next notation, then this note serves as note for discharge by case management. Patient Name: Leopoldo Palm                   YOB: 1945  Diagnosis: Syncope and collapse [R55]  Injury of head, initial encounter [S09.90XA]  Laceration of scalp, initial encounter [S01.01XA]                   Date / Time: 5/23/2023  6:19 PM    Patient Admission Status: Observation   Readmission Risk (Low < 19, Mod (19-27), High > 27): No data recorded  Current PCP: JOSÉ Davila CNP  PCP verified by CM? Yes    Chart Reviewed: Yes      History Provided by: Patient  Patient Orientation: Alert and Oriented    Patient Cognition: Alert    Hospitalization in the last 30 days (Readmission):  No    If yes, Readmission Assessment in CM Navigator will be completed. Advance Directives:      Code Status: Full Code   Patient's Primary Decision Maker is: Named in [address] [place] [address]    [phone]    Discharge Planning:    Patient lives with: Alone Type of Home: House  Primary Care Giver: Self  Patient Support Systems include: Children   Current Financial resources: Medicare  Current community resources:    Current services prior to admission: Durable Medical Equipment            Current DME: Walker            Type of Home Care services:  None    ADLS  Prior functional level: Independent in ADLs/IADLs  Current functional level: Independent in ADLs/IADLs    PT AM-PAC:   /24  OT AM-PAC:   /24    Family can provide assistance at DC: Other (comment) ([facility] but limited)  Would you like Case Management to discuss the discharge plan with any other family members/significant others, and if so, who?     Plans to Return to Present Housing: Yes  Other Identified Issues/Barriers to RETURNING to current

## 2023-05-25 VITALS
RESPIRATION RATE: 16 BRPM | HEART RATE: 95 BPM | OXYGEN SATURATION: 93 % | DIASTOLIC BLOOD PRESSURE: 59 MMHG | BODY MASS INDEX: 19.4 KG/M2 | WEIGHT: 131 LBS | TEMPERATURE: 97.9 F | SYSTOLIC BLOOD PRESSURE: 133 MMHG | HEIGHT: 69 IN

## 2023-05-25 LAB
ANION GAP SERPL CALCULATED.3IONS-SCNC: 10 MMOL/L (ref 9–17)
BACTERIA URNS QL MICRO: ABNORMAL
BILIRUB UR QL STRIP: NEGATIVE
BUN SERPL-MCNC: 14 MG/DL (ref 8–23)
CALCIUM SERPL-MCNC: 9.3 MG/DL (ref 8.6–10.4)
CHARACTER UR: ABNORMAL
CHLORIDE SERPL-SCNC: 107 MMOL/L (ref 98–107)
CLARITY UR: CLEAR
CO2 SERPL-SCNC: 26 MMOL/L (ref 20–31)
COLOR UR: YELLOW
CREAT SERPL-MCNC: 0.73 MG/DL (ref 0.5–0.9)
EPI CELLS #/AREA URNS HPF: ABNORMAL /HPF (ref 0–5)
GFR SERPL CREATININE-BSD FRML MDRD: >60 ML/MIN/1.73M2
GLUCOSE SERPL-MCNC: 104 MG/DL (ref 70–99)
GLUCOSE UR STRIP-MCNC: NEGATIVE MG/DL
HGB UR QL STRIP.AUTO: ABNORMAL
KETONES UR STRIP-MCNC: NEGATIVE MG/DL
LEUKOCYTE ESTERASE UR QL STRIP: ABNORMAL
NITRITE UR QL STRIP: NEGATIVE
PH UR STRIP: 6 [PH] (ref 5–8)
POTASSIUM SERPL-SCNC: 4.3 MMOL/L (ref 3.7–5.3)
PROT UR STRIP-MCNC: NEGATIVE MG/DL
RBC #/AREA URNS HPF: ABNORMAL /HPF (ref 0–2)
SODIUM SERPL-SCNC: 143 MMOL/L (ref 135–144)
SP GR UR STRIP: 1.01 (ref 1–1.03)
UROBILINOGEN UR STRIP-ACNC: NORMAL
WBC #/AREA URNS HPF: ABNORMAL /HPF (ref 0–5)

## 2023-05-25 PROCEDURE — 87086 URINE CULTURE/COLONY COUNT: CPT

## 2023-05-25 PROCEDURE — 97116 GAIT TRAINING THERAPY: CPT

## 2023-05-25 PROCEDURE — 6360000002 HC RX W HCPCS: Performed by: HOSPITALIST

## 2023-05-25 PROCEDURE — 99232 SBSQ HOSP IP/OBS MODERATE 35: CPT | Performed by: NURSE PRACTITIONER

## 2023-05-25 PROCEDURE — 99232 SBSQ HOSP IP/OBS MODERATE 35: CPT | Performed by: HOSPITALIST

## 2023-05-25 PROCEDURE — 6370000000 HC RX 637 (ALT 250 FOR IP): Performed by: NURSE PRACTITIONER

## 2023-05-25 PROCEDURE — 6370000000 HC RX 637 (ALT 250 FOR IP)

## 2023-05-25 PROCEDURE — 96365 THER/PROPH/DIAG IV INF INIT: CPT

## 2023-05-25 PROCEDURE — 81001 URINALYSIS AUTO W/SCOPE: CPT

## 2023-05-25 PROCEDURE — G0378 HOSPITAL OBSERVATION PER HR: HCPCS

## 2023-05-25 PROCEDURE — 6370000000 HC RX 637 (ALT 250 FOR IP): Performed by: PHYSICIAN ASSISTANT

## 2023-05-25 PROCEDURE — 80048 BASIC METABOLIC PNL TOTAL CA: CPT

## 2023-05-25 PROCEDURE — 36415 COLL VENOUS BLD VENIPUNCTURE: CPT

## 2023-05-25 PROCEDURE — 2580000003 HC RX 258: Performed by: HOSPITALIST

## 2023-05-25 PROCEDURE — 97110 THERAPEUTIC EXERCISES: CPT

## 2023-05-25 RX ORDER — CARVEDILOL 3.12 MG/1
3.12 TABLET ORAL 2 TIMES DAILY WITH MEALS
Status: DISCONTINUED | OUTPATIENT
Start: 2023-05-25 | End: 2023-05-25 | Stop reason: HOSPADM

## 2023-05-25 RX ORDER — CARVEDILOL 3.12 MG/1
3.12 TABLET ORAL 2 TIMES DAILY WITH MEALS
Qty: 60 TABLET | Refills: 3 | Status: SHIPPED | OUTPATIENT
Start: 2023-05-25

## 2023-05-25 RX ORDER — OXYCODONE HYDROCHLORIDE AND ACETAMINOPHEN 5; 325 MG/1; MG/1
1 TABLET ORAL EVERY 4 HOURS PRN
Status: DISCONTINUED | OUTPATIENT
Start: 2023-05-25 | End: 2023-05-25 | Stop reason: HOSPADM

## 2023-05-25 RX ORDER — OXYCODONE HYDROCHLORIDE AND ACETAMINOPHEN 5; 325 MG/1; MG/1
1 TABLET ORAL EVERY 6 HOURS PRN
Qty: 20 TABLET | Refills: 0 | Status: SHIPPED | OUTPATIENT
Start: 2023-05-25 | End: 2023-05-30

## 2023-05-25 RX ORDER — CEPHALEXIN 500 MG/1
500 CAPSULE ORAL 3 TIMES DAILY
Qty: 21 CAPSULE | Refills: 0 | Status: SHIPPED | OUTPATIENT
Start: 2023-05-25 | End: 2023-06-01

## 2023-05-25 RX ADMIN — Medication 500 MG: at 08:43

## 2023-05-25 RX ADMIN — DIGOXIN 125 MCG: 125 TABLET ORAL at 08:43

## 2023-05-25 RX ADMIN — CARVEDILOL 3.12 MG: 3.12 TABLET, FILM COATED ORAL at 08:44

## 2023-05-25 RX ADMIN — HYDROCODONE BITARTRATE AND ACETAMINOPHEN 1 TABLET: 5; 325 TABLET ORAL at 08:43

## 2023-05-25 RX ADMIN — CEFTRIAXONE SODIUM 1000 MG: 1 INJECTION, POWDER, FOR SOLUTION INTRAMUSCULAR; INTRAVENOUS at 08:46

## 2023-05-25 RX ADMIN — CARVEDILOL 3.12 MG: 3.12 TABLET, FILM COATED ORAL at 17:42

## 2023-05-25 RX ADMIN — APIXABAN 5 MG: 5 TABLET, FILM COATED ORAL at 08:44

## 2023-05-25 RX ADMIN — Medication 600 MG: at 08:43

## 2023-05-25 ASSESSMENT — PAIN SCALES - GENERAL
PAINLEVEL_OUTOF10: 8
PAINLEVEL_OUTOF10: 0
PAINLEVEL_OUTOF10: 5
PAINLEVEL_OUTOF10: 0

## 2023-05-25 ASSESSMENT — PAIN DESCRIPTION - ORIENTATION: ORIENTATION: RIGHT

## 2023-05-25 ASSESSMENT — PAIN DESCRIPTION - DESCRIPTORS: DESCRIPTORS: ACHING

## 2023-05-25 ASSESSMENT — PAIN DESCRIPTION - LOCATION: LOCATION: HIP

## 2023-05-25 NOTE — CARE COORDINATION
Discharge planning    Updated per RN that patient now agreeable to home care per therapy. Met back with patient and again offered home care in which she declines. Spoke with therapy and she walked little bit over 60 feet. If declining home care would recommend snf. Spoke with patient again regarding potential snf placement. She is declining all aspects of home care or rehab. Updated attending.

## 2023-05-25 NOTE — CARE COORDINATION
Discharge planning    Patient chart reviewed. Patient lives alone and has RW at home. Independent prior to admission. HC was discussed and geraldine cary as discussion about moving to [place] to be son. She has had MED 1 in past.     Therapy did see and notes decreased awareness but used Min assistance. Walked 10 ft with RW>     Will need to follow up with patient to finalize plan of care. Cardiology consulted for syncope. Has NICMP with AICD. A fib with ac at home with eliquis echo was completed.

## 2023-05-25 NOTE — PROGRESS NOTES
[facility]  Progress Note                   Date:   5/25/2023  Patient name: Jeison Jay  Date of admission:  5/23/2023  6:19 PM  MRN:   [id number]  YOB: 1945  PCP: Farrah Trotter, APRN - CNP    Reason for Admission: Syncope and collapse [R55]  Injury of head, initial encounter [S09.90XA]  Laceration of scalp, initial encounter [S01.01XA]    Subjective:       Clinical Changes /Abnormalities: Seen & examine din room. No acute CV issues/concerns overnight. Labs, vitals, & tele reviewed. Review of Systems    Medications:   Scheduled Meds:   cefTRIAXone (ROCEPHIN) IV  1,000 mg IntraVENous Q24H    lidocaine  1 patch TransDERmal Daily    [Held by provider] carvedilol  6.25 mg Oral BID    [Held by provider] apixaban  5 mg Oral BID    ascorbic acid  500 mg Oral Daily    calcium carbonate  600 mg Oral Daily    digoxin  125 mcg Oral Daily    sodium chloride flush  5-40 mL IntraVENous 2 times per day     Continuous Infusions:   sodium chloride       CBC:   Recent Labs     05/23/23 1928 05/24/23  0507   WBC 10.2 8.7   HGB 12.6 12.4    216     BMP:    Recent Labs     05/23/23 1928 05/24/23  0507 05/25/23  0511    140 143   K 3.4* 3.4* 4.3   CL 97* 104 107   CO2 26 19* 26   BUN 35* 26* 14   CREATININE 1.13* 0.83 0.73   GLUCOSE 110* 108* 104*     Hepatic:  Recent Labs     05/23/23 1928   AST 19   ALT 15   BILITOT 0.6   ALKPHOS 53     Troponin:   Recent Labs     05/23/23 1928   TROPHS 20*     BNP: No results for input(s): BNP in the last 72 hours. Lipids: No results for input(s): CHOL, HDL in the last 72 hours. Invalid input(s): LDLCALCU  INR: No results for input(s): INR in the last 72 hours.     Objective:   Vitals: /88   Pulse 54   Temp 99 °F (37.2 °C) (Oral)   Resp 17   Ht 5' 9\" (1.753 m)   Wt 131 lb (59.4 kg)   SpO2 92%   BMI 19.35 kg/m²   General appearance: alert and cooperative with exam  HEENT: Head: Normocephalic, no lesions, without obvious
Occupational Therapy  Facility/Department: [facility]  Occupational Therapy Initial Assessment    Name: Chuck Bahena  :   MRN: [id number]  Date of Service: 2023    Chief Complaint   Patient presents with    Fall    Head Laceration     Pt brought to ED with son. Per son pt called him @ 2pm today and said she had a \"small cut\" on her head. Told son it was small and could wait for him to come after work. Son got to pt's house @ 6pm and brought pt straight to ER. Pt arrives with large laceration over R eyebrow but limited memory of falling. Pt states she \"woke up\" on dining room floor with \"lots of blood\". Discharge Recommendations:  Patient would benefit from continued therapy after discharge       Patient Diagnosis(es): The primary encounter diagnosis was Syncope and collapse. Diagnoses of Laceration of scalp, initial encounter and Injury of head, initial encounter were also pertinent to this visit. Past Medical History:  has a past medical history of AICD (automatic cardioverter/defibrillator) present, Atrial fibrillation ([address] Atrial fibrillation ([address] Cancer ([address] CHF (congestive heart failure) ([address] Chronic kidney disease, H/O cardiovascular stress test, Hx of long term use of blood thinners, Hypertension, Nonischemic dilated cardiomyopathy ([address] Other screening mammogram, and Poor historian. 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 Assessment   Performance deficits / Impairments: Decreased functional mobility ; Decreased ADL
Patient discharged home in stable condition. Son, Kathy Escobedo, present to transport patient home. Discharge instructions reviewed with patient and son. Both state understanding of all instructions. Discharging home with home PT/OT. Son transporting patient home via private vehicle. Patient escorted out via wheelchair.
Physical Therapy  Facility/Department: [facility] MED SURG ICU  Daily Treatment Note  NAME: Elroy Lawrence  :   MRN: [id number]    Date of Service: 2023    Discharge Recommendations:  Patient would benefit from continued therapy after discharge   PT Equipment Recommendations  Other: Pt reports having a RW at home but does not use it. Pt would benefit from use of RW at this time and encouraged its use upon discharge. Patient Diagnosis(es): The primary encounter diagnosis was Syncope and collapse. Diagnoses of Laceration of scalp, initial encounter and Injury of head, initial encounter were also pertinent to this visit. Assessment     Assessment: Pt with slightly improved mobility this date with SBA-CGA with transfers and gait with RW 60' CGA. Pt continues to c/o (R) LE/UE pain and ambulates with (R) forefoot contact only but no LOB. Pt will require 24/7 assistance upon discharge and encouraged to use RW at all times for ambulation. Pt would benefit from further therapy upon discharge. Activity Tolerance: Patient limited by fatigue;Patient limited by pain; Patient limited by endurance  Other: Pt reports having a RW at home but does not use it. Pt would benefit from use of RW at this time and encouraged its use upon discharge. Plan    Physcial Therapy Plan  General Plan:  (5-6x/week)  Current Treatment Recommendations: Strengthening;Balance training;Functional mobility training;Transfer training;Gait training; Endurance training; Safety education & training;Patient/Caregiver education & training; Therapeutic activities     Restrictions  Restrictions/Precautions  Restrictions/Precautions: Fall Risk  Required Braces or Orthoses?: No  Position Activity Restriction  Other position/activity restrictions: Up with assistance     Subjective    Subjective  Subjective: Pt supine in bed and agreeable to therapy. Pt reports not feeeling any better.   Pain: Pt reports pain in (R) arm and (R) leg rated
Received call from [facility] [employer]  with results after ICD was interrogated. Notified Dr Pagan  in person about ICD results. No new orders received. Pt c/o pain at Rt arm and rt leg. Dr Pagan  notified .  states he will order Xray. Echo done by [facility].
[facility]  Office: [address], DO, Crispinsara Cora, DO, Kalani Sarah, DO, Misti All Bradford, DO, Dipak Collado MD, Alicia Stevenson MD, Seferino Harris MD, Miguelito Cooper MD,  Juan Francisco Uirbe MD, Lorrie Mendez MD, Maribel King, DO, Fabi Valencia MD,  Carol Gonzalez MD, Dolores Frazier MD, Carol Dash DO, Wily Hill MD, Verito Harris MD, Juaquin Cuevas DO, Pasquale Jung MD, Shanti Laird MD, Rafi Coy MD, Theo Morgan MD,  Carlos Qiunn, DO, Sukhi Leon MD,  Dottie Ulloa, CNP,  Eddie Kay, CNP, Maximo Key, CNP, Keith Carrillo, CNP,  [facility], [facility], Ana Jose, CNP, Matthew Lo, CNP, Reddy Briceno, CNP, Juan Francisco Walton, CNP, Emir Bradley, CNP, Jodee Vang PA-C, Saskia Oneal, CNS, [facility], [facility], Edgar Damon, [address] [place] [address]    Progress Note    5/25/2023    11:50 AM    Name:   Kamlesh Chaudhari  MRN:     [id number]     Acct:      [de-identified]   Room:   [address] [place] [address] Day:  0  Admit Date:  5/23/2023  6:19 PM    PCP:   JOSÉ Cai CNP  Code Status:  DNR-CCA    Subjective:     Patient seen in follow-up for syncope likely secondary to intravascular depletion, patient states \"I am sore\"    Imaging studies reviewed with the patient. She does not have a fracture involving her right humerus, elbow or knee. She does have contusions associated with her fall. The laceration over her right eye has been repaired in the emergency room. Case has been discussed with cardiology. The patient reaffirms that she wishes to be on Eliquis and is not willing to accept the stroke risk. Although there is a significant risk due to her fall she acknowledges the risk and wishes to stay on Eliquis at this point in time.   The patient states that she is quite sore and at this point in time I am going to transition her to Percocet to determine if this will provide her with
LLE  Strength LLE: Exception  Comment: grossly 4-/5           Bed mobility  Supine to Sit: Stand by assistance  Sit to Supine: Stand by assistance  Scooting: Stand by assistance  Bed Mobility Comments: Head of bed elevated 30 degrees. Transfers  Sit to Stand: Minimal Assistance;Contact guard assistance  Stand to Sit: Contact guard assistance  Stand Pivot Transfers: Minimal Assistance;Contact guard assistance  Comment: Transfers with RW. Increased time and effort with decreased LE thrust to stand; pt with weight bearing on (R) forefoot only with c/o (R) hip pain. Ambulation  Surface: Level tile  Device: Rolling Walker  Assistance: Contact guard assistance;Minimal assistance  Quality of Gait: slow speed, decreased step length and height; decreased weight bearing and (R) forefoot to foot flat contact  Gait Deviations: Slow Sheila;Decreased step length;Decreased step height  Distance: 10' x 1  Comments: mild unsteadiness  More Ambulation?: No  Stairs/Curb  Stairs?: No     Balance  Posture: Fair  Sitting - Static: Good  Sitting - Dynamic: Good;-  Standing - Static: Fair  Standing - Dynamic: Fair;-  Comments: standing balance assessed with RW           OutComes Score                                                  AM-PAC Score  AM-PAC Inpatient Mobility Raw Score : 16 (05/24/23 1504)  AM-PAC Inpatient T-Scale Score : 40.78 (05/24/23 1504)  Mobility Inpatient CMS 0-100% Score: 54.16 (05/24/23 1504)  Mobility Inpatient CMS G-Code Modifier : CK (05/24/23 1504)          Tinneti Score       Goals  Short Term Goals  Time Frame for Short Term Goals: 14 visits  Short Term Goal 1: Pt to be Independent with transfers without LOB. Short Term Goal 2: Pt to tolerate 30 minutes of therapy activity to improve strength and endurance for mobility. Short Term Goal 3: Pt to ambulate with appropriate device Modified (I) 150' without LOB.   Short Term Goal 4: Improve standing static/dynamic balance to Fair+ to minimize fall

## 2023-05-25 NOTE — PLAN OF CARE
Problem: Discharge Planning  Goal: Discharge to home or other facility with appropriate resources  Outcome: Completed     Problem: Safety - Adult  Goal: Free from fall injury  Outcome: Completed     Problem: Chronic Conditions and Co-morbidities  Goal: Patient's chronic conditions and co-morbidity symptoms are monitored and maintained or improved  Outcome: Completed     Problem: Pain  Goal: Verbalizes/displays adequate comfort level or baseline comfort level  Outcome: Completed

## 2023-05-25 NOTE — CARE COORDINATION
Discharge planning    Met with patient to discuss a plan of care. She is s/p fall. Has RW. Lives alone. Discussed short term home care. She does not want any home care at this time. Asked if their were barriers with home care and she stated she does not like people, nor people coming to her home. Will follow. Encouraged patient that if she should change her mind to let discharge planner know.

## 2023-05-25 NOTE — DISCHARGE INSTR - COC
12 y+), 30mcg/0.3mL 03/08/2021, 03/15/2021    Pneumococcal, PCV-13, PREVNAR 13, (age 6w+), IM, 0.5mL 10/04/2017    Pneumococcal, PPSV23, PNEUMOVAX 23, (age 2y+), SC/IM, 0.5mL 11/09/2018    TDaP, ADACEL (age 10y-63y), [address] (age 10y+), IM, 0.5mL 05/03/2018, 05/23/2023       Active Problems:  Patient Active Problem List   Diagnosis Code    Nonischemic cardiomyopathy ([facility] B27.1    Systolic CHF, chronic (HCC) I50.22    CKD (chronic kidney disease), stage III ([facility] N18.30    Vitamin B deficiency E53.9    Osteopenia M85.80    Arthritis of right hip M16.11    Bilateral carpal tunnel syndrome G56.03    AICD (automatic cardioverter/defibrillator) Implant Z95.810    Basal cell carcinoma C44.91    Adopted Z02.82    Migraine headache G43.909    Atrial fibrillation (HCC) I48.91    Essential hypertension I10    History of osteomyelitis Z87.39    Syncope and collapse R55       Isolation/Infection:   Isolation            No Isolation          Patient Infection Status       None to display            Nurse Assessment:  Last Vital Signs: BP (!) 112/58   Pulse 77   Temp 98.1 °F (36.7 °C) (Oral)   Resp 16   Ht 5' 9\" (1.753 m)   Wt 131 lb (59.4 kg)   SpO2 93%   BMI 19.35 kg/m²     Last documented pain score (0-10 scale): Pain Level: 5  Last Weight:   Wt Readings from Last 1 Encounters:   05/23/23 131 lb (59.4 kg)     Mental Status:  oriented, alert, and able to concentrate and follow conversation    IV Access:  - None    Nursing Mobility/ADLs:  Walking   Assisted  Transfer  Assisted  Bathing  Independent  Dressing  Independent  Toileting  Independent  Feeding  Independent  Med Admin  Independent  Med Delivery   none    Wound Care Documentation and Therapy:  Wound 10/12/21 Toe (Comment  which one) Left (Active)   Number of days: 590       Wound 05/23/23 Face Right;Upper laceration above righ eye (Active)   Wound Etiology Other 05/24/23 2000   Dressing Status Other (Comment) 05/24/23 1600   Dressing/Treatment Open to air

## 2023-05-25 NOTE — DISCHARGE SUMMARY
[facility]  Office: [address], DO, Mary Piotr, DO, Zuleima Navarrete, DO, Virgene Crease Blood, DO, Brigida Morris MD, Allegra Castro MD, Jennifer León MD, Niko Espinal MD,  Trisha Cheney MD, Andrews Reynoso MD, Yazan Andre, DO, Tremayne Luna MD,  Chandra Chambers MD, Kash Singh MD, Karli Norwood, DO, Julisa Vieyra MD, Soraya Schofield MD, Georgia Lopez DO, Pepe Hurst MD, Jd Pompa MD, Theodore Britton MD, José Luis Valdez MD,  Crystal Lewis DO, Latosha Zimmerman MD,  Nidia George, CNP,  Cecilia Dandy, CNP, Nancy Rai, CNP, Sourav Betancur, CNP,  Jeremy Severin, [facility], Vania Giron, CNP, Radha Green, CNP, Daryle Jurist, CNP, Gaviota Palmer, CNP, Jessica Humphrey, CNP, Joe Carrasco PA-C, Sandra Kirby, CNS, Charlotte Valderrama, CNP, Jeremy Meier, [facility]    Discharge Summary     Patient ID: Derian Malone  :     MRN: [id number]     ACCOUNT:  [de-identified]   Patient's PCP: JOSÉ Leonard CNP  Admit Date: 2023   Discharge Date: 2023  Length of Stay: 0  Code Status:  DNR-CCA  Admitting Physician: Sherrie Bullock DO  Discharge Physician: Sherrie Bullock DO     Active Discharge Diagnoses:     Hospital Problem Lists:  Principal Problem:    Syncope and collapse  Active Problems:    Systolic CHF, chronic (HCC)    CKD (chronic kidney disease), stage III [facility])    Atrial fibrillation [facility])  Resolved Problems:    * No resolved hospital problems. *      Admission Condition:  fair     Discharged Condition: good    Hospital Stay:     Hospital Course: Derian Malone is a 69 y.o. female who was admitted for the management of Syncope and collapse , presented to ER with Fall and Head Laceration (Pt brought to ED with son. Per son pt called him @ 2pm today and said she had a \"small cut\" on her head. Told son it was small and could wait for him to come after work.

## 2023-05-26 ENCOUNTER — CARE COORDINATION (OUTPATIENT)
Dept: CASE MANAGEMENT | Age: 78
End: 2023-05-26

## 2023-05-26 ENCOUNTER — TELEPHONE (OUTPATIENT)
Dept: PRIMARY CARE CLINIC | Age: 78
End: 2023-05-26

## 2023-05-26 DIAGNOSIS — R55 SYNCOPE AND COLLAPSE: Primary | ICD-10-CM

## 2023-05-26 LAB
MICROORGANISM SPEC CULT: NORMAL
SPECIMEN DESCRIPTION: NORMAL

## 2023-05-26 PROCEDURE — 1111F DSCHRG MED/CURRENT MED MERGE: CPT | Performed by: NURSE PRACTITIONER

## 2023-05-26 NOTE — CARE COORDINATION
[facility] Care Transitions Initial Follow Up Call    Call within 2 business days of discharge: Yes    Patient Current Location:  Home: [address] [place] [address]    Care Transition Nurse contacted the patient by telephone to perform post hospital discharge assessment. Verified name and  with patient as identifiers. Provided introduction to self, and explanation of the Care Transition Nurse role. Patient: Angelina Torres Patient :    MRN: [id number]  Reason for Admission: Syncope and collapse and scalp laceration  Discharge Date: 23 RARS: No data recorded    Last Discharge [address]       Date Complaint Diagnosis Description Type Department Provider    23 Fall; Head Laceration Syncope and collapse . .. ED to Hosp-Admission (Discharged) (ADMITTED) [facility] MS Castorenalg Bailey DO; Julia Tee Gr. .. Was this an external facility discharge? No Discharge Facility: [facility]    Challenges to be reviewed by the provider   Additional needs identified to be addressed with provider: Yes  7 day hospital follow up appointment. Method of communication with provider: staff message. Was able to contact John Leach for initial transitional outreach. She stated that she was doing all right. She denied any dizziness, chest pain, shortness of breath, and was weak/tired. She said that she was incontinent of urine last night. Her laceration was still swollen, with no drainage or s/s of infection. She said that her appetite was not good and she does not drink much in way of fluids. Reviewed that she was on a diuretic and that she had to drink some fluids. Encouraged her to drink 2 -2 1/2 water bottles of water a day. Also reviewed daily weights and monitoring for leg swelling. Reviewed medications and she stated that she had all her medications. She said that her son help her set them up. He will be coming today.   She had no follow up appointments and

## 2023-05-26 NOTE — TELEPHONE ENCOUNTER
1 Moiz Samuels and gave the ok for home health. Called pt son, there was so answer left a voicemail for him to give the office a call.

## 2023-05-26 NOTE — TELEPHONE ENCOUNTER
Per another encounter from Triage, \"Pt will need 7 day hospital follow up appointment. Was discharged from [address] [place] [address] on 5/25/23 for syncope/collapse with scalp laceration  Thank you \".

## 2023-05-26 NOTE — TELEPHONE ENCOUNTER
Mary Anne with [facility] called to ask if PCP will agree to pt getting home health care.     [phone] [id number]

## 2023-05-30 ENCOUNTER — CARE COORDINATION (OUTPATIENT)
Dept: CASE MANAGEMENT | Age: 78
End: 2023-05-30

## 2023-05-30 ENCOUNTER — TELEPHONE (OUTPATIENT)
Dept: PRIMARY CARE CLINIC | Age: 78
End: 2023-05-30

## 2023-05-30 DIAGNOSIS — M16.11 ARTHRITIS OF RIGHT HIP: ICD-10-CM

## 2023-05-30 DIAGNOSIS — M19.90 ARTHRITIS: ICD-10-CM

## 2023-05-30 RX ORDER — LIDOCAINE 50 MG/G
1 PATCH TOPICAL DAILY
Qty: 30 PATCH | Refills: 2 | Status: SHIPPED | OUTPATIENT
Start: 2023-05-30

## 2023-05-30 NOTE — CARE COORDINATION
[facility] Care Transitions Follow Up Call    Patient Current Location:  Home: [address] [place] [address] [address] [place] [address]    Care Transition Nurse contacted the patient by telephone to follow up after admission on 23. Verified name and  with patient as identifiers. Patient: Atif Osuna  Patient : [birth year]   MRN: [id number]  Reason for Admission: syncope and collapse  Discharge Date: 23 RARS: No data recorded    Needs to be reviewed by the provider   Additional needs identified to be addressed with provider: No  none             Method of communication with provider: none. Spoke to Robertlg SalazarLa Vernia for transitions call. Stated she had VN there this morning. Pt is taking Keflex tid, has pain medication but has not taken any today. Denies falls since home. Denies dizziness, light headedness. Stated she is a little nauseous. Advised to eat small bland meals, push fluids. Stated she is urinating OK, no burning, pressure, bleeding. Pt has PCP HFU on 23. Son will take her to appt. Advised to use caution if taking pain medication, has RW. Son checks in on her daily. Addressed changes since last contact:  home health [address] was there today per pt  Discussed follow-up appointments. Follow Up  Future Appointments   Date Time Provider Delfino Marrufo   2023  3:40 PM Viry Castorena, APRN - CNP [facility]   2023  1:00 PM Clarita Dickerson APRN - CNP [facility]   2023  8:45 AM SCHEDULE, [facility]       Care Transition Nurse reviewed discharge instructions with patient and discussed any barriers to care and/or understanding of plan of care after discharge. Discussed appropriate site of care based on symptoms and resources available to patient including: PCP  Specialist  Home health  When to call [address] The patient agrees to contact the PCP office for questions related to their healthcare.      [facility]

## 2023-05-30 NOTE — TELEPHONE ENCOUNTER
Care Transitions Initial Follow Up Call    Outreach made within 2 business days of discharge: Yes    Patient: Caity Willingham Patient : 1945   MRN: [id number]  Reason for Admission: There are no discharge diagnoses documented for the most recent discharge. Discharge Date: 23       Spoke with: Chuck Yun    Discharge department/facility: [facility] Interactive Patient Contact:  Was patient able to fill all prescriptions: Yes  Was patient instructed to bring all medications to the follow-up visit: Yes  Is patient taking all medications as directed in the discharge summary?  Yes  Does patient understand their discharge instructions: Yes  Does patient have questions or concerns that need addressed prior to 7-14 day follow up office visit: yes -     Scheduled appointment with PCP within 7-14 days    Follow Up  Future Appointments   Date Time Provider Delfino Marrufo   2023  4:00 PM Facundo Saver, APRN - CNP [facility]   2023  1:00 PM Facundo Saver, APRN - CNP [facility]   2023  8:45 AM SCHEDULE, [address] LYNETTE BLUM [place], [place]

## 2023-06-05 ENCOUNTER — CARE COORDINATION (OUTPATIENT)
Dept: CASE MANAGEMENT | Age: 78
End: 2023-06-05

## 2023-06-05 NOTE — CARE COORDINATION
[facility] Care Transitions Follow Up Call    Patient Current Location:  Home: [address] [place] [address]    Care Transition Nurse contacted the patient by telephone to follow up after admission on 23. Verified name and  with patient as identifiers. Patient: Altamease Lennox  Patient :    MRN: [id number]  Reason for Admission: Syncope and collapse and scalp laceration  Discharge Date: 23 RARS: No data recorded    Needs to be reviewed by the provider   Additional needs identified to be addressed with provider: No  none             Method of communication with provider: none. Was able to contact Parag Talley for transitional outreach. She stated that she was doing \"ok\". She denied any dizziness/lightheadedness, chest pain, shortness of breath, fever/chills or any urinary symptoms. She said that her fatigue and weakness was about the same and the laceration had no s/s of infection or swelling. She said that the nurse continues to to home visits, but she declined therapy. She said that she is eating and drinking water. She is planning on going to there appointment with PCP and son will be taking her. She had no further questions or concerns. Addressed changes since last contact:  none  Discussed follow-up appointments. If no appointment was previously scheduled, appointment scheduling offered: Yes. Is follow up appointment scheduled within 7 days of discharge? No.    Follow Up  Future Appointments   Date Time Provider Delfino Marrufo   2023  3:40 PM [facility]   2023  1:00 PM JOSÉ [facility]   2023  8:45 AM SCHEDULE, [facility]     Non-[facility] follow up appointment(s):     Care Transition Nurse reviewed medical action plan with patient and discussed any barriers to care and/or understanding of plan of care after discharge.  Discussed appropriate site of

## 2023-06-06 ENCOUNTER — OFFICE VISIT (OUTPATIENT)
Dept: PRIMARY CARE CLINIC | Age: 78
End: 2023-06-06

## 2023-06-06 VITALS
HEIGHT: 69 IN | WEIGHT: 123 LBS | OXYGEN SATURATION: 96 % | HEART RATE: 95 BPM | BODY MASS INDEX: 18.22 KG/M2 | DIASTOLIC BLOOD PRESSURE: 70 MMHG | SYSTOLIC BLOOD PRESSURE: 115 MMHG

## 2023-06-06 DIAGNOSIS — S01.111D LACERATION OF RIGHT EYEBROW, SUBSEQUENT ENCOUNTER: ICD-10-CM

## 2023-06-06 DIAGNOSIS — I48.20 CHRONIC ATRIAL FIBRILLATION (HCC): ICD-10-CM

## 2023-06-06 DIAGNOSIS — I42.8 NONISCHEMIC CARDIOMYOPATHY (HCC): ICD-10-CM

## 2023-06-06 DIAGNOSIS — Z48.02 VISIT FOR SUTURE REMOVAL: ICD-10-CM

## 2023-06-06 DIAGNOSIS — Z95.810 AICD (AUTOMATIC CARDIOVERTER/DEFIBRILLATOR) PRESENT: ICD-10-CM

## 2023-06-06 DIAGNOSIS — I10 ESSENTIAL HYPERTENSION: ICD-10-CM

## 2023-06-06 DIAGNOSIS — Z09 HOSPITAL DISCHARGE FOLLOW-UP: Primary | ICD-10-CM

## 2023-06-06 DIAGNOSIS — M16.11 ARTHRITIS OF RIGHT HIP: ICD-10-CM

## 2023-06-06 SDOH — ECONOMIC STABILITY: FOOD INSECURITY: WITHIN THE PAST 12 MONTHS, YOU WORRIED THAT YOUR FOOD WOULD RUN OUT BEFORE YOU GOT MONEY TO BUY MORE.: NEVER TRUE

## 2023-06-06 SDOH — ECONOMIC STABILITY: FOOD INSECURITY: WITHIN THE PAST 12 MONTHS, THE FOOD YOU BOUGHT JUST DIDN'T LAST AND YOU DIDN'T HAVE MONEY TO GET MORE.: NEVER TRUE

## 2023-06-06 SDOH — ECONOMIC STABILITY: INCOME INSECURITY: HOW HARD IS IT FOR YOU TO PAY FOR THE VERY BASICS LIKE FOOD, HOUSING, MEDICAL CARE, AND HEATING?: NOT HARD AT ALL

## 2023-06-06 ASSESSMENT — ENCOUNTER SYMPTOMS
CONSTIPATION: 0
NAUSEA: 0
WHEEZING: 0
SINUS PRESSURE: 0
BLOOD IN STOOL: 0
COUGH: 0
VOMITING: 0
SHORTNESS OF BREATH: 0
TROUBLE SWALLOWING: 0
DIARRHEA: 0
SORE THROAT: 0
ABDOMINAL PAIN: 0

## 2023-06-06 ASSESSMENT — PATIENT HEALTH QUESTIONNAIRE - PHQ9
5. POOR APPETITE OR OVEREATING: 0
8. MOVING OR SPEAKING SO SLOWLY THAT OTHER PEOPLE COULD HAVE NOTICED. OR THE OPPOSITE, BEING SO FIGETY OR RESTLESS THAT YOU HAVE BEEN MOVING AROUND A LOT MORE THAN USUAL: 0
3. TROUBLE FALLING OR STAYING ASLEEP: 0
SUM OF ALL RESPONSES TO PHQ QUESTIONS 1-9: 0
SUM OF ALL RESPONSES TO PHQ9 QUESTIONS 1 & 2: 0
SUM OF ALL RESPONSES TO PHQ QUESTIONS 1-9: 0
6. FEELING BAD ABOUT YOURSELF - OR THAT YOU ARE A FAILURE OR HAVE LET YOURSELF OR YOUR FAMILY DOWN: 0
2. FEELING DOWN, DEPRESSED OR HOPELESS: 0
4. FEELING TIRED OR HAVING LITTLE ENERGY: 0
9. THOUGHTS THAT YOU WOULD BE BETTER OFF DEAD, OR OF HURTING YOURSELF: 0
1. LITTLE INTEREST OR PLEASURE IN DOING THINGS: 0
10. IF YOU CHECKED OFF ANY PROBLEMS, HOW DIFFICULT HAVE THESE PROBLEMS MADE IT FOR YOU TO DO YOUR WORK, TAKE CARE OF THINGS AT HOME, OR GET ALONG WITH OTHER PEOPLE: 0
7. TROUBLE CONCENTRATING ON THINGS, SUCH AS READING THE NEWSPAPER OR WATCHING TELEVISION: 0

## 2023-06-06 NOTE — PROGRESS NOTES
Post-Discharge Transitional Care Follow Up      Susen Oppenheim   YOB: 1945    Date of Office Visit:  6/6/2023  Date of Hospital Admission: 5/23/23  Date of Hospital Discharge: 5/25/23  Readmission Risk Score (high >=14%. Medium >=10%):No data recorded    Care management risk score Rising risk (score 2-5) and Complex Care (Scores >=6): No Risk Score On File     Non face to face  following discharge, date last encounter closed (first attempt may have been earlier): 05/30/2023     Call initiated 2 business days of discharge: Yes     Hospital discharge caenxs-ut-[id number] notes, labs and diagnostics from hospital stay. At this time she appears stable. She does not desire in-home physical therapy. They are in process of looking at assisted living environment to move her to. She has not had any further falls since returning home  -     [place] DISCHARGE MEDS RECONCILED W/ CURRENT OUTPATIENT MED LIST  Arthritis of right hip-advised will not refill Percocet as this medication could be potentially dangerous for her. They are agreeable to try regular use of Tylenol arthritis every 6-8 hours  Nonischemic cardiomyopathy (HCC)-reviewed medications, she is taking reduced dosage of carvedilol  Chronic atrial fibrillation (HCC)-has continued long-term anticoagulation  Essential hypertension  Laceration of right eyebrow, subsequent encounter  Visit for suture removal-sutures removed, no sign of infection, area is well approximated  AICD (automatic cardioverter/defibrillator) Implant      Medical Decision Making: high complexity  Return in about 2 months (around 8/7/2023) for as scheduled, atrial fib, right hip arthritis.            Subjective:   Presents today for follow up after hospital stay with her son  They report she had a syncopal episode, fell and lacerated her right forehead region  She was admitted to the hospital for couple nights observation  She did see cardiologist while there  They do not yet have

## 2023-06-08 ENCOUNTER — CARE COORDINATION (OUTPATIENT)
Dept: CASE MANAGEMENT | Age: 78
End: 2023-06-08

## 2023-06-08 NOTE — CARE COORDINATION
[facility] Care Transitions Follow Up Call    Patient Current Location:  Home: [address] [place] [address]    Care Transition Nurse contacted the patient and family by telephone to follow up after admission on 23. Verified name and  with patient as identifiers. Patient: Domenico Benavides  Patient :    MRN: [id number]  Reason for Admission: Syncope and collapse and scalp laceration  Discharge Date: 23 RARS: No data recorded    Needs to be reviewed by the provider   Additional needs identified to be addressed with provider: No  none             Method of communication with provider: none. Was able to contact Lamont Murphy for transitional outreach. She stated that she was doing \"fine\". She denied any dizziness/lightheadedness, chest pain/palpitations, shortness of breath, or any falls. She said that she was still fatigued. She said that she went to the PCP appointment and the stitches were removed. She was complaining of Rt hip pain. Asked if she had an orthopedic and she said that she did, but did not remember the name. Asked about injections and she has no had them. She did get the Lidoderm patches, but so far they are not working. Also noted that at the PCP appointment that that son was looking into assisted living for Lamont Murphy. Asked Lamont Murphy if writer could call her some [place] to discuss [employer] and ortho referral.  She was in agreement. Jeet called and writer introduced self. He stated that he was currently working with an Advisor for [place]. Also discussed possible ortho referral for injections. He stated that his mother was told by previous orthopedic that injections would not work for her due to severity of her joint and no cartilage. He said that she would need a hip replacement and they have to get cardiac clearance. He had no further questions or concerns.     Addressed changes since last contact:  none  Discussed follow-up

## 2023-06-19 RX ORDER — DIGOXIN 125 MCG
TABLET ORAL
Qty: 65 TABLET | Refills: 3 | Status: SHIPPED | OUTPATIENT
Start: 2023-06-19

## 2023-06-21 ENCOUNTER — CARE COORDINATION (OUTPATIENT)
Dept: CASE MANAGEMENT | Age: 78
End: 2023-06-21

## 2023-06-21 NOTE — CARE COORDINATION
[facility] Care Transitions Follow Up Call    Patient Current Location:  Home: [address] [place] [address]    Care Transition Nurse contacted the patient by telephone to follow up after admission on 23. Verified name and  with patient as identifiers. Patient: Nolan Menard  Patient :    MRN: [id number]  Reason for Admission: Syncope and collapse   Discharge Date: 23 RARS: No data recorded    Needs to be reviewed by the provider   Additional needs identified to be addressed with provider: No  none             Method of communication with provider: none. Was able to contact Reba Welch for final outreach. She stated that she was doing \"fine\". She denied any dizziness, falls, headaches, visual problems, chest pain/palpitations or swelling. She said that the injury to her head was healed and the bruising was gone. She continues with the hip pain. She said that her son, Hieu Reyes may have found an assisted living, but she has not seen it yet. She had no further questions or concerns. Informed of final outreach. Addressed changes since last contact:  none  Discussed follow-up appointments. If no appointment was previously scheduled, appointment scheduling offered: Yes. Follow Up  Future Appointments   Date Time Provider Delfino Marrufo   2023  3:45 PM MD [facility]   2023  1:00 PM Cherylle Mcburney, APRN - CNP [id number]   2023  8:45 AM SCHEDULE, KULWANT O[facility]A [facility] CARELINK [facility]         Care Transition Nurse reviewed medical action plan with patient and discussed any barriers to care and/or understanding of plan of care after discharge. Discussed appropriate site of care based on symptoms and resources available to patient including: PCP  Specialist  Home health  When to call 911. The patient agrees to contact the PCP office for questions related to their healthcare.        Patients top risk factors for

## 2023-06-22 DIAGNOSIS — F41.9 ANXIETY AND DEPRESSION: ICD-10-CM

## 2023-06-22 DIAGNOSIS — F32.A ANXIETY AND DEPRESSION: ICD-10-CM

## 2023-06-22 NOTE — TELEPHONE ENCOUNTER
Pt son Severo Mir called and said Pt used to be on Zoloft for minor depression a while ago and stopped it and Pt son is asking if Pt can get a refill on it as she is experiencing more depression again, please advise      Last Visit Date: 6/6/2023   Next Visit Date: 8/7/2023

## 2023-06-23 ENCOUNTER — TELEPHONE (OUTPATIENT)
Dept: PRIMARY CARE CLINIC | Age: 78
End: 2023-06-23

## 2023-06-23 NOTE — TELEPHONE ENCOUNTER
Nelda from [facility] calling in states they wanted to give FYI that they are doing a PRN visit to discharge patient from home health to go to an assisted living facility.

## 2023-06-23 NOTE — TELEPHONE ENCOUNTER
Discharge from [facility] appropriate. Please let son know letter has been completed.   Thanks

## 2023-06-23 NOTE — TELEPHONE ENCOUNTER
[address] called stating they would like Pcp permission to dismiss pt as she is moving to a facility in [facility] to be closer to family.

## 2023-06-23 NOTE — TELEPHONE ENCOUNTER
Patient's son [place] called requesting letter so patient can break her lease at apartment without a fee due needing to move into a assisted living facility. States was discussed with provider at last [address] Please advise. Son will  letter. Please call him at his work #.

## 2023-07-19 RX ORDER — CARVEDILOL 3.12 MG/1
3.12 TABLET ORAL 2 TIMES DAILY WITH MEALS
Qty: 60 TABLET | Refills: 3 | Status: SHIPPED | OUTPATIENT
Start: 2023-07-19

## 2023-08-07 ENCOUNTER — OFFICE VISIT (OUTPATIENT)
Dept: PRIMARY CARE CLINIC | Age: 78
End: 2023-08-07
Payer: MEDICARE

## 2023-08-07 VITALS
OXYGEN SATURATION: 98 % | RESPIRATION RATE: 16 BRPM | SYSTOLIC BLOOD PRESSURE: 126 MMHG | DIASTOLIC BLOOD PRESSURE: 88 MMHG | HEIGHT: 69 IN | WEIGHT: 119 LBS | HEART RATE: 89 BPM | BODY MASS INDEX: 17.63 KG/M2

## 2023-08-07 DIAGNOSIS — M16.11 ARTHRITIS OF RIGHT HIP: ICD-10-CM

## 2023-08-07 DIAGNOSIS — I48.20 CHRONIC ATRIAL FIBRILLATION (HCC): ICD-10-CM

## 2023-08-07 DIAGNOSIS — Z95.810 AICD (AUTOMATIC CARDIOVERTER/DEFIBRILLATOR) PRESENT: ICD-10-CM

## 2023-08-07 DIAGNOSIS — I50.22 SYSTOLIC CHF, CHRONIC (HCC): Chronic | ICD-10-CM

## 2023-08-07 DIAGNOSIS — F32.A ANXIETY AND DEPRESSION: Primary | ICD-10-CM

## 2023-08-07 DIAGNOSIS — I10 ESSENTIAL HYPERTENSION: ICD-10-CM

## 2023-08-07 DIAGNOSIS — N18.31 STAGE 3A CHRONIC KIDNEY DISEASE (HCC): ICD-10-CM

## 2023-08-07 DIAGNOSIS — F41.9 ANXIETY AND DEPRESSION: Primary | ICD-10-CM

## 2023-08-07 PROCEDURE — 3079F DIAST BP 80-89 MM HG: CPT | Performed by: NURSE PRACTITIONER

## 2023-08-07 PROCEDURE — G8427 DOCREV CUR MEDS BY ELIG CLIN: HCPCS | Performed by: NURSE PRACTITIONER

## 2023-08-07 PROCEDURE — 1090F PRES/ABSN URINE INCON ASSESS: CPT | Performed by: NURSE PRACTITIONER

## 2023-08-07 PROCEDURE — 99214 OFFICE O/P EST MOD 30 MIN: CPT | Performed by: NURSE PRACTITIONER

## 2023-08-07 PROCEDURE — G8419 CALC BMI OUT NRM PARAM NOF/U: HCPCS | Performed by: NURSE PRACTITIONER

## 2023-08-07 PROCEDURE — 1123F ACP DISCUSS/DSCN MKR DOCD: CPT | Performed by: NURSE PRACTITIONER

## 2023-08-07 PROCEDURE — 3074F SYST BP LT 130 MM HG: CPT | Performed by: NURSE PRACTITIONER

## 2023-08-07 PROCEDURE — G8399 PT W/DXA RESULTS DOCUMENT: HCPCS | Performed by: NURSE PRACTITIONER

## 2023-08-07 PROCEDURE — 1036F TOBACCO NON-USER: CPT | Performed by: NURSE PRACTITIONER

## 2023-08-07 RX ORDER — DULOXETIN HYDROCHLORIDE 30 MG/1
30 CAPSULE, DELAYED RELEASE ORAL DAILY
Qty: 90 CAPSULE | Refills: 1 | Status: SHIPPED | OUTPATIENT
Start: 2023-08-07

## 2023-08-07 ASSESSMENT — ENCOUNTER SYMPTOMS
SORE THROAT: 0
VOMITING: 0
TROUBLE SWALLOWING: 0
BLOOD IN STOOL: 0
WHEEZING: 0
CONSTIPATION: 0
SHORTNESS OF BREATH: 0
COUGH: 0
ABDOMINAL PAIN: 0
DIARRHEA: 1
SINUS PRESSURE: 0
NAUSEA: 0

## 2023-08-07 ASSESSMENT — PATIENT HEALTH QUESTIONNAIRE - PHQ9
SUM OF ALL RESPONSES TO PHQ QUESTIONS 1-9: 8
3. TROUBLE FALLING OR STAYING ASLEEP: 1
2. FEELING DOWN, DEPRESSED OR HOPELESS: 3
10. IF YOU CHECKED OFF ANY PROBLEMS, HOW DIFFICULT HAVE THESE PROBLEMS MADE IT FOR YOU TO DO YOUR WORK, TAKE CARE OF THINGS AT HOME, OR GET ALONG WITH OTHER PEOPLE: 1
SUM OF ALL RESPONSES TO PHQ9 QUESTIONS 1 & 2: 6
9. THOUGHTS THAT YOU WOULD BE BETTER OFF DEAD, OR OF HURTING YOURSELF: 0
6. FEELING BAD ABOUT YOURSELF - OR THAT YOU ARE A FAILURE OR HAVE LET YOURSELF OR YOUR FAMILY DOWN: 0
SUM OF ALL RESPONSES TO PHQ QUESTIONS 1-9: 8
5. POOR APPETITE OR OVEREATING: 0
4. FEELING TIRED OR HAVING LITTLE ENERGY: 1
SUM OF ALL RESPONSES TO PHQ QUESTIONS 1-9: 8
7. TROUBLE CONCENTRATING ON THINGS, SUCH AS READING THE NEWSPAPER OR WATCHING TELEVISION: 0
SUM OF ALL RESPONSES TO PHQ QUESTIONS 1-9: 8
8. MOVING OR SPEAKING SO SLOWLY THAT OTHER PEOPLE COULD HAVE NOTICED. OR THE OPPOSITE, BEING SO FIGETY OR RESTLESS THAT YOU HAVE BEEN MOVING AROUND A LOT MORE THAN USUAL: 0
1. LITTLE INTEREST OR PLEASURE IN DOING THINGS: 3

## 2023-08-07 NOTE — PROGRESS NOTES
Pfizer series) 11/21/2021    Flu vaccine (1) Never done    Annual Wellness Visit (AWV)  04/03/2024    Depression Monitoring  06/06/2024    DTaP/Tdap/Td vaccine (3 - Td or Tdap) 05/23/2033    DEXA (modify frequency per FRAX score)  Completed    Pneumococcal 65+ years Vaccine  Completed    Hepatitis A vaccine  Aged Out    Hib vaccine  Aged Out    Meningococcal (ACWY) vaccine  Aged Out    A1C test (Diabetic or Prediabetic)  Discontinued    Lipids  Discontinued    Depression Screen  Discontinued    Breast cancer screen  Discontinued    Colorectal Cancer Screen  Discontinued    Hepatitis C screen  Discontinued       Subjective:      Review of Systems   Constitutional:  Positive for fatigue (Tires easily). Negative for activity change, appetite change, chills, fever and unexpected weight change. HENT:  Negative for congestion, ear pain, hearing loss, sinus pressure, sore throat and trouble swallowing. Eyes:  Negative for visual disturbance. Respiratory:  Negative for cough, shortness of breath and wheezing. Cardiovascular:  Negative for chest pain, palpitations and leg swelling. Gastrointestinal:  Positive for diarrhea (controlled with immodium). Negative for abdominal pain, blood in stool, constipation, nausea and vomiting. Endocrine: Negative for cold intolerance, heat intolerance, polydipsia, polyphagia and polyuria. Genitourinary:  Negative for difficulty urinating, frequency, hematuria and urgency. Musculoskeletal:  Positive for arthralgias and gait problem. Negative for myalgias. Skin:  Negative for rash. Allergic/Immunologic: Negative for environmental allergies. Neurological:  Negative for dizziness, weakness, light-headedness and headaches. Psychiatric/Behavioral:  Negative for confusion. The patient is not nervous/anxious. Objective:     Physical Exam  Constitutional:       Appearance: Normal appearance. She is well-developed. HENT:      Head: Normocephalic.    Eyes:

## 2023-09-12 SDOH — HEALTH STABILITY: PHYSICAL HEALTH: ON AVERAGE, HOW MANY DAYS PER WEEK DO YOU ENGAGE IN MODERATE TO STRENUOUS EXERCISE (LIKE A BRISK WALK)?: 0 DAYS

## 2023-09-12 SDOH — HEALTH STABILITY: PHYSICAL HEALTH: ON AVERAGE, HOW MANY MINUTES DO YOU ENGAGE IN EXERCISE AT THIS LEVEL?: 0 MIN

## 2023-09-12 ASSESSMENT — SOCIAL DETERMINANTS OF HEALTH (SDOH)
WITHIN THE LAST YEAR, HAVE TO BEEN RAPED OR FORCED TO HAVE ANY KIND OF SEXUAL ACTIVITY BY YOUR PARTNER OR EX-PARTNER?: NO
WITHIN THE LAST YEAR, HAVE YOU BEEN KICKED, HIT, SLAPPED, OR OTHERWISE PHYSICALLY HURT BY YOUR PARTNER OR EX-PARTNER?: NO
WITHIN THE LAST YEAR, HAVE YOU BEEN AFRAID OF YOUR PARTNER OR EX-PARTNER?: NO
WITHIN THE LAST YEAR, HAVE YOU BEEN HUMILIATED OR EMOTIONALLY ABUSED IN OTHER WAYS BY YOUR PARTNER OR EX-PARTNER?: NO

## 2023-09-15 ENCOUNTER — OFFICE VISIT (OUTPATIENT)
Dept: ORTHOPEDIC SURGERY | Age: 78
End: 2023-09-15
Payer: MEDICARE

## 2023-09-15 DIAGNOSIS — M25.551 RIGHT HIP PAIN: Primary | ICD-10-CM

## 2023-09-15 PROCEDURE — G8419 CALC BMI OUT NRM PARAM NOF/U: HCPCS | Performed by: ORTHOPAEDIC SURGERY

## 2023-09-15 PROCEDURE — 1123F ACP DISCUSS/DSCN MKR DOCD: CPT | Performed by: ORTHOPAEDIC SURGERY

## 2023-09-15 PROCEDURE — 1036F TOBACCO NON-USER: CPT | Performed by: ORTHOPAEDIC SURGERY

## 2023-09-15 PROCEDURE — 99203 OFFICE O/P NEW LOW 30 MIN: CPT | Performed by: ORTHOPAEDIC SURGERY

## 2023-09-15 PROCEDURE — G8399 PT W/DXA RESULTS DOCUMENT: HCPCS | Performed by: ORTHOPAEDIC SURGERY

## 2023-09-15 PROCEDURE — G8427 DOCREV CUR MEDS BY ELIG CLIN: HCPCS | Performed by: ORTHOPAEDIC SURGERY

## 2023-09-15 PROCEDURE — 1090F PRES/ABSN URINE INCON ASSESS: CPT | Performed by: ORTHOPAEDIC SURGERY

## 2023-09-15 NOTE — PROGRESS NOTES
Atif Dela Cruz M.D.            [address] [facility], [address] [facility], [address] [place] [address] [address]           Dept Phone: [phone]           Dept Fax:  [address] [facility], [address] [facility]          Dept Phone: [phone]           Dept Fax:  [phone]      Chief Compliant:  Chief Complaint   Patient presents with    Pain     RT HIP        History of Present Illness: This is a 69 y.o. female who presents to the clinic today for evaluation / follow up of for severe right hip pain. Patient is a 67-year-old who states for the last 3 to 4 years have difficulty ambulating. She states she is cannot cross her legs she her activities comes severely restricted she is here today with her son who states that she is come limited to the utilizing a walker and has have some complaints of severe pain. .       Review of Systems   Constitutional: Negative for fever, chills, sweats. Eyes: Negative for changes in vision, or pain. HENT: Negative for ear ache, epistaxis, or sore throat. Respiratory/Cardio: Negative for Chest pain, palpitations, SOB, or cough. Gastrointestinal: Negative for abdominal pain, N/V/D. Genitourinary: Negative for dysuria, frequency, urgency, or hematuria. Neurological: Negative for headache, numbness, or weakness. Integumentary: Negative for rash, itching, laceration, or abrasion. Musculoskeletal: Positive for Pain (RT HIP)       Physical Exam:  Constitutional: Patient is oriented to person, place, and time. Patient appears well-developed and well nourished. HENT: Negative otherwise noted  Head: Normocephalic and Atraumatic  Nose: Normal  Eyes: Conjunctivae and EOM are normal  Neck: Normal range of motion Neck supple. Respiratory/Cardio: Effort normal. No respiratory distress.   Musculoskeletal: Examination shows the patient has essentially no

## 2023-09-25 ENCOUNTER — TELEPHONE (OUTPATIENT)
Dept: PRIMARY CARE CLINIC | Age: 78
End: 2023-09-25

## 2023-09-25 NOTE — TELEPHONE ENCOUNTER
Called patient to reschedule appt on 10/13. Patient states that she does not drive and we would need to call her son to reschedule. Call to patients son, did not answer. LVM to call the office and reschedule appt.

## 2023-10-02 DIAGNOSIS — F41.9 ANXIETY AND DEPRESSION: ICD-10-CM

## 2023-10-02 DIAGNOSIS — F32.A ANXIETY AND DEPRESSION: ICD-10-CM

## 2023-10-02 RX ORDER — CARVEDILOL 3.12 MG/1
3.12 TABLET ORAL 2 TIMES DAILY WITH MEALS
Qty: 180 TABLET | Refills: 3 | Status: SHIPPED | OUTPATIENT
Start: 2023-10-02

## 2023-10-09 ENCOUNTER — HOSPITAL ENCOUNTER (OUTPATIENT)
Dept: PREADMISSION TESTING | Age: 78
Discharge: HOME | End: 2023-10-13
Payer: MEDICARE

## 2023-10-09 ENCOUNTER — OFFICE VISIT (OUTPATIENT)
Dept: PRIMARY CARE CLINIC | Age: 78
End: 2023-10-09
Payer: MEDICARE

## 2023-10-09 VITALS
HEART RATE: 73 BPM | DIASTOLIC BLOOD PRESSURE: 80 MMHG | BODY MASS INDEX: 17.09 KG/M2 | WEIGHT: 115.4 LBS | SYSTOLIC BLOOD PRESSURE: 122 MMHG | HEIGHT: 69 IN | OXYGEN SATURATION: 94 %

## 2023-10-09 VITALS
RESPIRATION RATE: 18 BRPM | BODY MASS INDEX: 17.03 KG/M2 | HEIGHT: 69 IN | TEMPERATURE: 97.7 F | WEIGHT: 115 LBS | HEART RATE: 69 BPM | OXYGEN SATURATION: 97 % | SYSTOLIC BLOOD PRESSURE: 110 MMHG | DIASTOLIC BLOOD PRESSURE: 57 MMHG

## 2023-10-09 DIAGNOSIS — I48.20 CHRONIC ATRIAL FIBRILLATION (HCC): ICD-10-CM

## 2023-10-09 DIAGNOSIS — Z01.818 PREOP EXAMINATION: ICD-10-CM

## 2023-10-09 DIAGNOSIS — I42.8 NONISCHEMIC CARDIOMYOPATHY (HCC): ICD-10-CM

## 2023-10-09 DIAGNOSIS — I10 ESSENTIAL HYPERTENSION: ICD-10-CM

## 2023-10-09 DIAGNOSIS — M16.11 ARTHRITIS OF RIGHT HIP: ICD-10-CM

## 2023-10-09 DIAGNOSIS — Z01.818 PRE-OP EXAM: Primary | ICD-10-CM

## 2023-10-09 LAB
ABO + RH BLD: NORMAL
ANION GAP SERPL CALCULATED.3IONS-SCNC: 10 MMOL/L (ref 9–17)
ARM BAND NUMBER: NORMAL
BACTERIA URNS QL MICRO: ABNORMAL
BASOPHILS # BLD: 0.1 K/UL (ref 0–0.2)
BASOPHILS NFR BLD: 1 % (ref 0–2)
BILIRUB UR QL STRIP: NEGATIVE
BLOOD BANK SAMPLE EXPIRATION: NORMAL
BLOOD GROUP ANTIBODIES SERPL: NEGATIVE
BUN SERPL-MCNC: 20 MG/DL (ref 8–23)
CALCIUM SERPL-MCNC: 9.4 MG/DL (ref 8.6–10.4)
CASTS #/AREA URNS LPF: ABNORMAL /LPF
CHLORIDE SERPL-SCNC: 99 MMOL/L (ref 98–107)
CLARITY UR: CLEAR
CO2 SERPL-SCNC: 33 MMOL/L (ref 20–31)
COLOR UR: YELLOW
CREAT SERPL-MCNC: 1 MG/DL (ref 0.5–0.9)
DATE LAST DOSE: NORMAL
DIGOXIN DOSE TIME: NORMAL
DIGOXIN DOSE: NORMAL MG
DIGOXIN SERPL-MCNC: 0.8 NG/ML (ref 0.5–2)
EOSINOPHIL # BLD: 0.1 K/UL (ref 0–0.4)
EOSINOPHILS RELATIVE PERCENT: 1 % (ref 0–4)
EPI CELLS #/AREA URNS HPF: ABNORMAL /HPF
ERYTHROCYTE [DISTWIDTH] IN BLOOD BY AUTOMATED COUNT: 12.6 % (ref 11.5–14.9)
GFR SERPL CREATININE-BSD FRML MDRD: 58 ML/MIN/1.73M2
GLUCOSE SERPL-MCNC: 127 MG/DL (ref 70–99)
GLUCOSE UR STRIP-MCNC: NEGATIVE MG/DL
HCT VFR BLD AUTO: 41.1 % (ref 36–46)
HGB BLD-MCNC: 14 G/DL (ref 12–16)
HGB UR QL STRIP.AUTO: NEGATIVE
KETONES UR STRIP-MCNC: NEGATIVE MG/DL
LEUKOCYTE ESTERASE UR QL STRIP: ABNORMAL
LYMPHOCYTES NFR BLD: 1.8 K/UL (ref 1–4.8)
LYMPHOCYTES RELATIVE PERCENT: 23 % (ref 24–44)
MCH RBC QN AUTO: 33.2 PG (ref 26–34)
MCHC RBC AUTO-ENTMCNC: 34.1 G/DL (ref 31–37)
MCV RBC AUTO: 97.5 FL (ref 80–100)
MONOCYTES NFR BLD: 0.5 K/UL (ref 0.1–1.3)
MONOCYTES NFR BLD: 6 % (ref 1–7)
NEUTROPHILS NFR BLD: 69 % (ref 36–66)
NEUTS SEG NFR BLD: 5.2 K/UL (ref 1.3–9.1)
NITRITE UR QL STRIP: NEGATIVE
PH UR STRIP: 6.5 [PH] (ref 5–8)
PLATELET # BLD AUTO: 214 K/UL (ref 150–450)
PMV BLD AUTO: 7.6 FL (ref 6–12)
POTASSIUM SERPL-SCNC: 3.6 MMOL/L (ref 3.7–5.3)
PROT UR STRIP-MCNC: NEGATIVE MG/DL
RBC # BLD AUTO: 4.21 M/UL (ref 4–5.2)
RBC #/AREA URNS HPF: ABNORMAL /HPF
SODIUM SERPL-SCNC: 142 MMOL/L (ref 135–144)
SP GR UR STRIP: 1.01 (ref 1–1.03)
UROBILINOGEN UR STRIP-ACNC: NORMAL EU/DL (ref 0–1)
WBC #/AREA URNS HPF: ABNORMAL /HPF
WBC OTHER # BLD: 7.5 K/UL (ref 3.5–11)

## 2023-10-09 PROCEDURE — 81001 URINALYSIS AUTO W/SCOPE: CPT

## 2023-10-09 PROCEDURE — 1123F ACP DISCUSS/DSCN MKR DOCD: CPT | Performed by: NURSE PRACTITIONER

## 2023-10-09 PROCEDURE — 86850 RBC ANTIBODY SCREEN: CPT

## 2023-10-09 PROCEDURE — G8484 FLU IMMUNIZE NO ADMIN: HCPCS | Performed by: NURSE PRACTITIONER

## 2023-10-09 PROCEDURE — 87641 MR-STAPH DNA AMP PROBE: CPT

## 2023-10-09 PROCEDURE — 85025 COMPLETE CBC W/AUTO DIFF WBC: CPT

## 2023-10-09 PROCEDURE — 86901 BLOOD TYPING SEROLOGIC RH(D): CPT

## 2023-10-09 PROCEDURE — G8399 PT W/DXA RESULTS DOCUMENT: HCPCS | Performed by: NURSE PRACTITIONER

## 2023-10-09 PROCEDURE — 80162 ASSAY OF DIGOXIN TOTAL: CPT

## 2023-10-09 PROCEDURE — G8419 CALC BMI OUT NRM PARAM NOF/U: HCPCS | Performed by: NURSE PRACTITIONER

## 2023-10-09 PROCEDURE — 80048 BASIC METABOLIC PNL TOTAL CA: CPT

## 2023-10-09 PROCEDURE — 36415 COLL VENOUS BLD VENIPUNCTURE: CPT

## 2023-10-09 PROCEDURE — 3074F SYST BP LT 130 MM HG: CPT | Performed by: NURSE PRACTITIONER

## 2023-10-09 PROCEDURE — 86900 BLOOD TYPING SEROLOGIC ABO: CPT

## 2023-10-09 PROCEDURE — 3079F DIAST BP 80-89 MM HG: CPT | Performed by: NURSE PRACTITIONER

## 2023-10-09 PROCEDURE — 1036F TOBACCO NON-USER: CPT | Performed by: NURSE PRACTITIONER

## 2023-10-09 PROCEDURE — 99214 OFFICE O/P EST MOD 30 MIN: CPT | Performed by: NURSE PRACTITIONER

## 2023-10-09 PROCEDURE — G8427 DOCREV CUR MEDS BY ELIG CLIN: HCPCS | Performed by: NURSE PRACTITIONER

## 2023-10-09 PROCEDURE — 1090F PRES/ABSN URINE INCON ASSESS: CPT | Performed by: NURSE PRACTITIONER

## 2023-10-09 RX ORDER — DULOXETIN HYDROCHLORIDE 30 MG/1
30 CAPSULE, DELAYED RELEASE ORAL DAILY
COMMUNITY

## 2023-10-09 RX ORDER — LISINOPRIL 2.5 MG/1
2.5 TABLET ORAL DAILY
COMMUNITY

## 2023-10-09 SDOH — ECONOMIC STABILITY: INCOME INSECURITY: HOW HARD IS IT FOR YOU TO PAY FOR THE VERY BASICS LIKE FOOD, HOUSING, MEDICAL CARE, AND HEATING?: NOT HARD AT ALL

## 2023-10-09 SDOH — ECONOMIC STABILITY: FOOD INSECURITY: WITHIN THE PAST 12 MONTHS, THE FOOD YOU BOUGHT JUST DIDN'T LAST AND YOU DIDN'T HAVE MONEY TO GET MORE.: NEVER TRUE

## 2023-10-09 SDOH — ECONOMIC STABILITY: FOOD INSECURITY: WITHIN THE PAST 12 MONTHS, YOU WORRIED THAT YOUR FOOD WOULD RUN OUT BEFORE YOU GOT MONEY TO BUY MORE.: NEVER TRUE

## 2023-10-09 ASSESSMENT — PATIENT HEALTH QUESTIONNAIRE - PHQ9
2. FEELING DOWN, DEPRESSED OR HOPELESS: 1
7. TROUBLE CONCENTRATING ON THINGS, SUCH AS READING THE NEWSPAPER OR WATCHING TELEVISION: 0
SUM OF ALL RESPONSES TO PHQ QUESTIONS 1-9: 4
1. LITTLE INTEREST OR PLEASURE IN DOING THINGS: 0
SUM OF ALL RESPONSES TO PHQ QUESTIONS 1-9: 4
9. THOUGHTS THAT YOU WOULD BE BETTER OFF DEAD, OR OF HURTING YOURSELF: 0
4. FEELING TIRED OR HAVING LITTLE ENERGY: 1
SUM OF ALL RESPONSES TO PHQ9 QUESTIONS 1 & 2: 1
8. MOVING OR SPEAKING SO SLOWLY THAT OTHER PEOPLE COULD HAVE NOTICED. OR THE OPPOSITE, BEING SO FIGETY OR RESTLESS THAT YOU HAVE BEEN MOVING AROUND A LOT MORE THAN USUAL: 0
10. IF YOU CHECKED OFF ANY PROBLEMS, HOW DIFFICULT HAVE THESE PROBLEMS MADE IT FOR YOU TO DO YOUR WORK, TAKE CARE OF THINGS AT HOME, OR GET ALONG WITH OTHER PEOPLE: 1
SUM OF ALL RESPONSES TO PHQ QUESTIONS 1-9: 4
SUM OF ALL RESPONSES TO PHQ QUESTIONS 1-9: 4
6. FEELING BAD ABOUT YOURSELF - OR THAT YOU ARE A FAILURE OR HAVE LET YOURSELF OR YOUR FAMILY DOWN: 0
5. POOR APPETITE OR OVEREATING: 1
3. TROUBLE FALLING OR STAYING ASLEEP: 1

## 2023-10-09 ASSESSMENT — ENCOUNTER SYMPTOMS
SORE THROAT: 0
DIARRHEA: 0
GASTROINTESTINAL NEGATIVE: 1
COUGH: 0
SHORTNESS OF BREATH: 0
VOMITING: 0
NAUSEA: 0
CONSTIPATION: 0
WHEEZING: 0
BLOOD IN STOOL: 0
TROUBLE SWALLOWING: 0
ABDOMINAL PAIN: 0
SINUS PRESSURE: 0

## 2023-10-09 ASSESSMENT — PAIN SCALES - GENERAL: PAINLEVEL_OUTOF10: 5

## 2023-10-09 ASSESSMENT — PAIN DESCRIPTION - LOCATION: LOCATION: HIP

## 2023-10-09 ASSESSMENT — PAIN DESCRIPTION - PAIN TYPE: TYPE: ACUTE PAIN

## 2023-10-09 ASSESSMENT — PAIN DESCRIPTION - ORIENTATION: ORIENTATION: RIGHT

## 2023-10-09 ASSESSMENT — PAIN DESCRIPTION - DESCRIPTORS: DESCRIPTORS: ACHING

## 2023-10-09 NOTE — H&P (VIEW-ONLY)
HISTORY and [address]       NAME:  Radha No  MRN: [id number]   YOB: 1945   Date: 10/9/2023   Age: 69 y.o. Gender: female     COMPLAINT AND PRESENT HISTORY:   Radha No is 69 y.o.,  female, presents for pre-anesthesia/admission testing for HIP TOTAL ARTHROPLASTY ASI Right  per Dr. Asa Hassan. Primary dx: Primary osteoarthritis of right hip     HPI:  See portion of the note below per Dr Asa Hassan from office visit on 9/15/2023  History of Present Illness: This is a 69 y.o. female who presents to the clinic today for evaluation / follow up of for severe right hip pain. Patient is a 72-year-old who states for the last 3 to 4 years have difficulty ambulating. She states she is cannot cross her legs she her activities comes severely restricted she is here today with her son who states that she is come limited to the utilizing a walker and has have some complaints of severe pain. Eugene Bustamante Update HPI:  Right hip pain     Radha No is 69 y.o.,  female, C/O of intermittent pain aching stiffness, in the right Hip with limitation in the ROM. Pain started long time ago and it getting worse in the last two years. Pt describes the pain as 10/10 in intensity at times. Pain radiated to her right knee. Pain aggravated by walking or standing. Treatment used Tylenol Arthritis without pain relief  The Hip does not lock up, No recent falls or trauma. No redness, swelling or rashes. No Hx of MRSA infections in the past.  Pt denies chest pain fever/chills, or SOB. Eugene Bustamante RECENT IMAGING R/T HPI   XR PELVIS (1-2 VIEWS) [[id number]]    X-rays taken today reviewed by me show a standing AP of the pelvis. Patient has a severely destructive right right hip. She has severe arthrosis erosion of the right femoral head with loss of joint space and elevation of the femoral head what appears to be a dysplastic cup.   She has a significant leg length discrepancy based on her lesser

## 2023-10-09 NOTE — PROGRESS NOTES
[address]  PRIMARY CARE  [address]  [place] [address]  Dept: [phone]  Dept Fax: [phone]    Yovana Gatica is a 69 y.o. female who presentstoday for her medical conditions/complaints as noted below. Yovana Gatica is c/o of  Chief Complaint   Patient presents with    Pre-op Exam     Right total hip replacement    Depression    Discuss Medications     Eliquis is expensive           HPI:     Here today for pre-op clearance for right hip replacement 10/24   Presents with son today  They report she has already been to cardio for clearance  Has preop testing scheduled later today  Asking about when they should stop Eliquis patient  Also expressing some nervousness she has had very few surgeries in her life, asking about recovery time        Hemoglobin A1C (%)   Date Value   01/07/2020 4.9             ( goal A1C is < 7)   No components found for: \"LABMICR\"  LDL Calculated (mg/dL)   Date Value   10/05/2017 77       (goal LDL is <100)   AST (U/L)   Date Value   05/23/2023 19     ALT (U/L)   Date Value   05/23/2023 15     BUN (mg/dL)   Date Value   05/25/2023 14     BP Readings from Last 3 Encounters:   10/09/23 122/80   08/16/23 118/68   08/07/23 126/88          ([id number])    Past Medical History:   Diagnosis Date    AICD (automatic cardioverter/defibrillator) present     April 6. 2018.  Dr. Jonatan Kim     Atrial fibrillation ([address]) 09/2012    Dr.David [facility]borne [facility]  CHRONIC ON ELIQUIS    Atrial fibrillation ([address])     Cancer ([address])     basal cell on face    CHF (congestive heart failure) ([address])     Chronic kidney disease     H/O cardiovascular stress test 07/26/2017    Neg. EF 24%    Hx of long term use of blood thinners     Hypertension     Nonischemic dilated cardiomyopathy ([address])     Other screening mammogram March 6, 2012    Negative    Poor historian       Past Surgical History:   Procedure Laterality Date    CARDIAC CATHETERIZATION  08/2017    NML CORS

## 2023-10-09 NOTE — DISCHARGE INSTRUCTIONS
Pre-op Instructions For Out-Patient Surgery    Medication Instructions:  Please stop herbs and any supplements now (includes vitamins and minerals). Please contact your surgeon and prescribing physician for pre-op instructions for any blood thinners. Eliquis as directed. If you have inhalers/aerosol treatments at home, please use them the morning of your surgery and bring the inhalers with you to the hospital.    Please take the following medications the morning of your surgery with a sip of water:    carvedilol, digoxin    Surgery Instructions:  After midnight before surgery:  Do not eat or drink anything, including water, mints, gum, and hard candy. You may brush your teeth without swallowing. No smoking, chewing tobacco, or street drugs. Please shower or bathe before surgery. If you were given Surgical Scrub Chlorhexidine Gluconate Liquid (CHG), please shower the night before and the morning of your surgery following the detailed instructions you received during your pre-admission visit. Please do not wear any cologne, lotion, powder, deodorant, jewelry, piercings, perfume, makeup, nail polish, hair accessories, or hair spray on the day of surgery. Wear loose comfortable clothing. Leave your valuables at home but bring a payment source for any after-surgery prescriptions you plan to fill at [facility]. Bring a storage case for any glasses/contacts. An adult who is responsible for you MUST drive you home and should be with you for the first 24 hours after surgery. If having out-patient knee and foot surgeries, please arrange for planned crutches, walker, or wheelchair before arriving to the hospital.    The Day of Surgery:  Arrive at [facility] Surgery Entrance at the time directed by your surgeon and check in at the desk. If you have a living will or healthcare power of , please bring a copy.     You will be taken to the

## 2023-10-09 NOTE — H&P
HISTORY and [address]       NAME:  Tori Ramsey  MRN: [id number]   YOB: 1945   Date: 10/9/2023   Age: 69 y.o. Gender: female     COMPLAINT AND PRESENT HISTORY:   Tori Ramsey is 69 y.o.,  female, presents for pre-anesthesia/admission testing for HIP TOTAL ARTHROPLASTY ASI Right  per Dr. Rosendo Arreola. Primary dx: Primary osteoarthritis of right hip     HPI:  See portion of the note below per Dr Rosendo Arreola from office visit on 9/15/2023  History of Present Illness: This is a 69 y.o. female who presents to the clinic today for evaluation / follow up of for severe right hip pain. Patient is a 78-year-old who states for the last 3 to 4 years have difficulty ambulating. She states she is cannot cross her legs she her activities comes severely restricted she is here today with her son who states that she is come limited to the utilizing a walker and has have some complaints of severe pain. Mary Nichole Update HPI:  Right hip pain     Tori Ramsey is 69 y.o.,  female, C/O of intermittent pain aching stiffness, in the right Hip with limitation in the ROM. Pain started long time ago and it getting worse in the last two years. Pt describes the pain as 10/10 in intensity at times. Pain radiated to her right knee. Pain aggravated by walking or standing. Treatment used Tylenol Arthritis without pain relief  The Hip does not lock up, No recent falls or trauma. No redness, swelling or rashes. No Hx of MRSA infections in the past.  Pt denies chest pain fever/chills, or SOB. Mary Nichole RECENT IMAGING R/T HPI   XR PELVIS (1-2 VIEWS) [[id number]]    X-rays taken today reviewed by me show a standing AP of the pelvis. Patient has a severely destructive right right hip. She has severe arthrosis erosion of the right femoral head with loss of joint space and elevation of the femoral head what appears to be a dysplastic cup.   She has a significant leg length discrepancy based on her lesser

## 2023-10-09 NOTE — PROGRESS NOTES
Instructions were reviewed with patient and her son, per pt's son eliquis will be stopped 3 days prior to surgery per PCP.

## 2023-10-10 LAB
MRSA, DNA, NASAL: NEGATIVE
SPECIMEN DESCRIPTION: NORMAL

## 2023-10-16 NOTE — PROGRESS NOTES
Patient had 8 beat run of v-tach, now back in afib which is normal for her. Recently had defibrillator placed. Dr. Jhon Childers notified. Will continue to monitor. VTAMA Counseling: I discussed with the patient that VTAMA is not for use in the eyes, mouth or mouth. They should call the office if they develop any signs of allergic reactions to VTAMA. The patient verbalized understanding of the proper use and possible adverse effects of VTAMA.  All of the patient's questions and concerns were addressed.

## 2023-10-23 ENCOUNTER — ANESTHESIA EVENT (OUTPATIENT)
Dept: OPERATING ROOM | Age: 78
End: 2023-10-23
Payer: MEDICARE

## 2023-10-23 NOTE — PRE-PROCEDURE INSTRUCTIONS
Nothing to eat after midnight. yes  Are you taking any blood thinners? yesWhen was the last day? Last time Friday  Make sure to use Hibiclens prior to surgery. yes  Remove any jewelry and body piercings. yes  Do you wear glasses? If so, please bring a case to store them in. Are you having any Covid symptoms?no  Do you have any new rashes, infections, etc. that we should be aware of?no  Do you have a ride home the day of surgery?pt to be admitted It cannot be a cab or medical transportation. Verify surgery time and what time to arrive at hospital. 0715/0915. All information verified with son Adrien.

## 2023-10-24 ENCOUNTER — APPOINTMENT (OUTPATIENT)
Dept: GENERAL RADIOLOGY | Age: 78
End: 2023-10-24
Attending: ORTHOPAEDIC SURGERY
Payer: MEDICARE

## 2023-10-24 ENCOUNTER — HOSPITAL ENCOUNTER (OUTPATIENT)
Age: 78
Setting detail: OBSERVATION
Discharge: NURSING, REHAB OR LONG-TERM CARE | End: 2023-10-25
Attending: ORTHOPAEDIC SURGERY | Admitting: ORTHOPAEDIC SURGERY
Payer: MEDICARE

## 2023-10-24 ENCOUNTER — ANESTHESIA (OUTPATIENT)
Dept: OPERATING ROOM | Age: 78
End: 2023-10-24
Payer: MEDICARE

## 2023-10-24 DIAGNOSIS — M16.11 PRIMARY OSTEOARTHRITIS OF RIGHT HIP: Primary | ICD-10-CM

## 2023-10-24 PROCEDURE — 2500000003 HC RX 250 WO HCPCS: Performed by: ORTHOPAEDIC SURGERY

## 2023-10-24 PROCEDURE — 7100000001 HC PACU RECOVERY - ADDTL 15 MIN: Performed by: ORTHOPAEDIC SURGERY

## 2023-10-24 PROCEDURE — 6360000002 HC RX W HCPCS: Performed by: NURSE ANESTHETIST, CERTIFIED REGISTERED

## 2023-10-24 PROCEDURE — 97530 THERAPEUTIC ACTIVITIES: CPT

## 2023-10-24 PROCEDURE — 2580000003 HC RX 258: Performed by: ANESTHESIOLOGY

## 2023-10-24 PROCEDURE — 3700000000 HC ANESTHESIA ATTENDED CARE: Performed by: ORTHOPAEDIC SURGERY

## 2023-10-24 PROCEDURE — 2720000010 HC SURG SUPPLY STERILE: Performed by: ORTHOPAEDIC SURGERY

## 2023-10-24 PROCEDURE — C1776 JOINT DEVICE (IMPLANTABLE): HCPCS | Performed by: ORTHOPAEDIC SURGERY

## 2023-10-24 PROCEDURE — 6360000002 HC RX W HCPCS: Performed by: ORTHOPAEDIC SURGERY

## 2023-10-24 PROCEDURE — P9047 ALBUMIN (HUMAN), 25%, 50ML: HCPCS | Performed by: NURSE ANESTHETIST, CERTIFIED REGISTERED

## 2023-10-24 PROCEDURE — G0378 HOSPITAL OBSERVATION PER HR: HCPCS

## 2023-10-24 PROCEDURE — 2580000003 HC RX 258: Performed by: ORTHOPAEDIC SURGERY

## 2023-10-24 PROCEDURE — 97535 SELF CARE MNGMENT TRAINING: CPT

## 2023-10-24 PROCEDURE — 6370000000 HC RX 637 (ALT 250 FOR IP): Performed by: ORTHOPAEDIC SURGERY

## 2023-10-24 PROCEDURE — 97162 PT EVAL MOD COMPLEX 30 MIN: CPT

## 2023-10-24 PROCEDURE — 3700000001 HC ADD 15 MINUTES (ANESTHESIA): Performed by: ORTHOPAEDIC SURGERY

## 2023-10-24 PROCEDURE — 3600000013 HC SURGERY LEVEL 3 ADDTL 15MIN: Performed by: ORTHOPAEDIC SURGERY

## 2023-10-24 PROCEDURE — 2709999900 HC NON-CHARGEABLE SUPPLY: Performed by: ORTHOPAEDIC SURGERY

## 2023-10-24 PROCEDURE — 97166 OT EVAL MOD COMPLEX 45 MIN: CPT

## 2023-10-24 PROCEDURE — 7100000000 HC PACU RECOVERY - FIRST 15 MIN: Performed by: ORTHOPAEDIC SURGERY

## 2023-10-24 PROCEDURE — 2500000003 HC RX 250 WO HCPCS: Performed by: NURSE ANESTHETIST, CERTIFIED REGISTERED

## 2023-10-24 PROCEDURE — 3600000003 HC SURGERY LEVEL 3 BASE: Performed by: ORTHOPAEDIC SURGERY

## 2023-10-24 PROCEDURE — C1713 ANCHOR/SCREW BN/BN,TIS/BN: HCPCS | Performed by: ORTHOPAEDIC SURGERY

## 2023-10-24 DEVICE — G7 OSSEOTI 4 HOLE SHELL 58MM G: Type: IMPLANTABLE DEVICE | Site: HIP | Status: FUNCTIONAL

## 2023-10-24 DEVICE — LINER ACET NEUT G 40 MM LONGEVITY G7: Type: IMPLANTABLE DEVICE | Site: HIP | Status: FUNCTIONAL

## 2023-10-24 DEVICE — COCR FEM HD 40MM TYPE 1 STD: Type: IMPLANTABLE DEVICE | Site: HIP | Status: FUNCTIONAL

## 2023-10-24 DEVICE — BONE SCREW 6.5X35 SELF-TAP: Type: IMPLANTABLE DEVICE | Site: HIP | Status: FUNCTIONAL

## 2023-10-24 DEVICE — BONE SCREW 6.5X25 SELF-TAP: Type: IMPLANTABLE DEVICE | Site: HIP | Status: FUNCTIONAL

## 2023-10-24 DEVICE — STEM FEM SZ 10 L140MM STD OFFSET DST HIP TI PPS TYP 1 TAPR: Type: IMPLANTABLE DEVICE | Site: HIP | Status: FUNCTIONAL

## 2023-10-24 RX ORDER — EPHEDRINE SULFATE/0.9% NACL/PF 50 MG/5 ML
SYRINGE (ML) INTRAVENOUS PRN
Status: DISCONTINUED | OUTPATIENT
Start: 2023-10-24 | End: 2023-10-24 | Stop reason: SDUPTHER

## 2023-10-24 RX ORDER — DEXAMETHASONE SODIUM PHOSPHATE 10 MG/ML
10 INJECTION, SOLUTION INTRAMUSCULAR; INTRAVENOUS ONCE
Status: COMPLETED | OUTPATIENT
Start: 2023-10-24 | End: 2023-10-24

## 2023-10-24 RX ORDER — SODIUM CHLORIDE 0.9 % (FLUSH) 0.9 %
5-40 SYRINGE (ML) INJECTION PRN
Status: DISCONTINUED | OUTPATIENT
Start: 2023-10-24 | End: 2023-10-25 | Stop reason: HOSPADM

## 2023-10-24 RX ORDER — ONDANSETRON 2 MG/ML
4 INJECTION INTRAMUSCULAR; INTRAVENOUS
Status: DISCONTINUED | OUTPATIENT
Start: 2023-10-24 | End: 2023-10-24 | Stop reason: HOSPADM

## 2023-10-24 RX ORDER — OXYCODONE HYDROCHLORIDE 5 MG/1
5 TABLET ORAL EVERY 4 HOURS PRN
Status: DISCONTINUED | OUTPATIENT
Start: 2023-10-24 | End: 2023-10-25 | Stop reason: HOSPADM

## 2023-10-24 RX ORDER — SODIUM CHLORIDE 0.9 % (FLUSH) 0.9 %
5-40 SYRINGE (ML) INJECTION PRN
Status: DISCONTINUED | OUTPATIENT
Start: 2023-10-24 | End: 2023-10-24 | Stop reason: HOSPADM

## 2023-10-24 RX ORDER — ACETAMINOPHEN 500 MG
1000 TABLET ORAL ONCE
Status: COMPLETED | OUTPATIENT
Start: 2023-10-24 | End: 2023-10-24

## 2023-10-24 RX ORDER — SODIUM CHLORIDE 0.9 % (FLUSH) 0.9 %
5-40 SYRINGE (ML) INJECTION EVERY 12 HOURS SCHEDULED
Status: DISCONTINUED | OUTPATIENT
Start: 2023-10-24 | End: 2023-10-24 | Stop reason: HOSPADM

## 2023-10-24 RX ORDER — TRANEXAMIC ACID 100 MG/ML
INJECTION, SOLUTION INTRAVENOUS PRN
Status: DISCONTINUED | OUTPATIENT
Start: 2023-10-24 | End: 2023-10-24 | Stop reason: SDUPTHER

## 2023-10-24 RX ORDER — FENTANYL CITRATE 0.05 MG/ML
25 INJECTION, SOLUTION INTRAMUSCULAR; INTRAVENOUS EVERY 5 MIN PRN
Status: DISCONTINUED | OUTPATIENT
Start: 2023-10-24 | End: 2023-10-24 | Stop reason: HOSPADM

## 2023-10-24 RX ORDER — LIDOCAINE HYDROCHLORIDE 10 MG/ML
INJECTION, SOLUTION EPIDURAL; INFILTRATION; INTRACAUDAL; PERINEURAL PRN
Status: DISCONTINUED | OUTPATIENT
Start: 2023-10-24 | End: 2023-10-24 | Stop reason: SDUPTHER

## 2023-10-24 RX ORDER — CALCIUM CHLORIDE 100 MG/ML
INJECTION INTRAVENOUS; INTRAVENTRICULAR PRN
Status: DISCONTINUED | OUTPATIENT
Start: 2023-10-24 | End: 2023-10-24 | Stop reason: ALTCHOICE

## 2023-10-24 RX ORDER — SODIUM CHLORIDE 9 MG/ML
INJECTION, SOLUTION INTRAVENOUS PRN
Status: DISCONTINUED | OUTPATIENT
Start: 2023-10-24 | End: 2023-10-24 | Stop reason: HOSPADM

## 2023-10-24 RX ORDER — SODIUM CHLORIDE 9 MG/ML
INJECTION, SOLUTION INTRAVENOUS PRN
Status: DISCONTINUED | OUTPATIENT
Start: 2023-10-24 | End: 2023-10-25 | Stop reason: HOSPADM

## 2023-10-24 RX ORDER — ONDANSETRON 2 MG/ML
4 INJECTION INTRAMUSCULAR; INTRAVENOUS EVERY 6 HOURS PRN
Status: DISCONTINUED | OUTPATIENT
Start: 2023-10-24 | End: 2023-10-25 | Stop reason: HOSPADM

## 2023-10-24 RX ORDER — SCOLOPAMINE TRANSDERMAL SYSTEM 1 MG/1
1 PATCH, EXTENDED RELEASE TRANSDERMAL ONCE
Status: DISCONTINUED | OUTPATIENT
Start: 2023-10-24 | End: 2023-10-25 | Stop reason: HOSPADM

## 2023-10-24 RX ORDER — PROPOFOL 10 MG/ML
INJECTION, EMULSION INTRAVENOUS PRN
Status: DISCONTINUED | OUTPATIENT
Start: 2023-10-24 | End: 2023-10-24 | Stop reason: SDUPTHER

## 2023-10-24 RX ORDER — LIDOCAINE HYDROCHLORIDE 10 MG/ML
1 INJECTION, SOLUTION EPIDURAL; INFILTRATION; INTRACAUDAL; PERINEURAL
Status: DISCONTINUED | OUTPATIENT
Start: 2023-10-24 | End: 2023-10-24 | Stop reason: HOSPADM

## 2023-10-24 RX ORDER — OXYCODONE HYDROCHLORIDE 10 MG/1
10 TABLET ORAL EVERY 4 HOURS PRN
Status: DISCONTINUED | OUTPATIENT
Start: 2023-10-24 | End: 2023-10-25 | Stop reason: HOSPADM

## 2023-10-24 RX ORDER — ASPIRIN 81 MG/1
81 TABLET ORAL 2 TIMES DAILY
Status: DISCONTINUED | OUTPATIENT
Start: 2023-10-24 | End: 2023-10-24

## 2023-10-24 RX ORDER — SODIUM CHLORIDE, SODIUM LACTATE, POTASSIUM CHLORIDE, CALCIUM CHLORIDE 600; 310; 30; 20 MG/100ML; MG/100ML; MG/100ML; MG/100ML
INJECTION, SOLUTION INTRAVENOUS CONTINUOUS
Status: DISCONTINUED | OUTPATIENT
Start: 2023-10-24 | End: 2023-10-25 | Stop reason: HOSPADM

## 2023-10-24 RX ORDER — FENTANYL CITRATE 50 UG/ML
INJECTION, SOLUTION INTRAMUSCULAR; INTRAVENOUS PRN
Status: DISCONTINUED | OUTPATIENT
Start: 2023-10-24 | End: 2023-10-24 | Stop reason: SDUPTHER

## 2023-10-24 RX ORDER — OXYCODONE HYDROCHLORIDE AND ACETAMINOPHEN 5; 325 MG/1; MG/1
1 TABLET ORAL EVERY 4 HOURS PRN
Qty: 42 TABLET | Refills: 0 | Status: SHIPPED | OUTPATIENT
Start: 2023-10-24 | End: 2023-10-31

## 2023-10-24 RX ORDER — ACETAMINOPHEN 325 MG/1
650 TABLET ORAL EVERY 6 HOURS
Status: DISCONTINUED | OUTPATIENT
Start: 2023-10-24 | End: 2023-10-25 | Stop reason: HOSPADM

## 2023-10-24 RX ORDER — SODIUM CHLORIDE 0.9 % (FLUSH) 0.9 %
5-40 SYRINGE (ML) INJECTION EVERY 12 HOURS SCHEDULED
Status: DISCONTINUED | OUTPATIENT
Start: 2023-10-24 | End: 2023-10-25 | Stop reason: HOSPADM

## 2023-10-24 RX ORDER — ONDANSETRON 2 MG/ML
INJECTION INTRAMUSCULAR; INTRAVENOUS PRN
Status: DISCONTINUED | OUTPATIENT
Start: 2023-10-24 | End: 2023-10-24 | Stop reason: SDUPTHER

## 2023-10-24 RX ORDER — SODIUM CHLORIDE, SODIUM LACTATE, POTASSIUM CHLORIDE, CALCIUM CHLORIDE 600; 310; 30; 20 MG/100ML; MG/100ML; MG/100ML; MG/100ML
INJECTION, SOLUTION INTRAVENOUS CONTINUOUS
Status: DISCONTINUED | OUTPATIENT
Start: 2023-10-24 | End: 2023-10-24 | Stop reason: HOSPADM

## 2023-10-24 RX ORDER — FENTANYL CITRATE 0.05 MG/ML
50 INJECTION, SOLUTION INTRAMUSCULAR; INTRAVENOUS EVERY 5 MIN PRN
Status: DISCONTINUED | OUTPATIENT
Start: 2023-10-24 | End: 2023-10-24 | Stop reason: HOSPADM

## 2023-10-24 RX ORDER — DIPHENHYDRAMINE HYDROCHLORIDE 50 MG/ML
12.5 INJECTION INTRAMUSCULAR; INTRAVENOUS
Status: DISCONTINUED | OUTPATIENT
Start: 2023-10-24 | End: 2023-10-24 | Stop reason: HOSPADM

## 2023-10-24 RX ORDER — ALBUMIN (HUMAN) 12.5 G/50ML
SOLUTION INTRAVENOUS PRN
Status: DISCONTINUED | OUTPATIENT
Start: 2023-10-24 | End: 2023-10-24 | Stop reason: SDUPTHER

## 2023-10-24 RX ORDER — GABAPENTIN 600 MG/1
300 TABLET ORAL ONCE
Status: COMPLETED | OUTPATIENT
Start: 2023-10-24 | End: 2023-10-24

## 2023-10-24 RX ADMIN — FENTANYL CITRATE 25 MCG: 50 INJECTION, SOLUTION INTRAMUSCULAR; INTRAVENOUS at 10:50

## 2023-10-24 RX ADMIN — Medication 10 MG: at 09:32

## 2023-10-24 RX ADMIN — FENTANYL CITRATE 25 MCG: 50 INJECTION, SOLUTION INTRAMUSCULAR; INTRAVENOUS at 10:17

## 2023-10-24 RX ADMIN — ACETAMINOPHEN 1000 MG: 500 TABLET ORAL at 08:24

## 2023-10-24 RX ADMIN — DEXAMETHASONE SODIUM PHOSPHATE 10 MG: 10 INJECTION, SOLUTION INTRAMUSCULAR; INTRAVENOUS at 08:21

## 2023-10-24 RX ADMIN — SODIUM CHLORIDE, POTASSIUM CHLORIDE, SODIUM LACTATE AND CALCIUM CHLORIDE: 600; 310; 30; 20 INJECTION, SOLUTION INTRAVENOUS at 08:20

## 2023-10-24 RX ADMIN — SUGAMMADEX 200 MG: 100 INJECTION, SOLUTION INTRAVENOUS at 11:06

## 2023-10-24 RX ADMIN — FENTANYL CITRATE 25 MCG: 50 INJECTION, SOLUTION INTRAMUSCULAR; INTRAVENOUS at 09:39

## 2023-10-24 RX ADMIN — ONDANSETRON 4 MG: 2 INJECTION INTRAMUSCULAR; INTRAVENOUS at 11:00

## 2023-10-24 RX ADMIN — FENTANYL CITRATE 25 MCG: 50 INJECTION, SOLUTION INTRAMUSCULAR; INTRAVENOUS at 09:50

## 2023-10-24 RX ADMIN — FENTANYL CITRATE 25 MCG: 50 INJECTION, SOLUTION INTRAMUSCULAR; INTRAVENOUS at 09:46

## 2023-10-24 RX ADMIN — Medication 2000 MG: at 18:34

## 2023-10-24 RX ADMIN — Medication 2000 MG: at 09:20

## 2023-10-24 RX ADMIN — FENTANYL CITRATE 25 MCG: 50 INJECTION, SOLUTION INTRAMUSCULAR; INTRAVENOUS at 11:18

## 2023-10-24 RX ADMIN — ALBUMIN (HUMAN) 12.5 G: 0.25 INJECTION, SOLUTION INTRAVENOUS at 10:15

## 2023-10-24 RX ADMIN — TRANEXAMIC ACID 1000 MG: 100 INJECTION, SOLUTION INTRAVENOUS at 11:02

## 2023-10-24 RX ADMIN — PROPOFOL 150 MG: 10 INJECTION, EMULSION INTRAVENOUS at 09:08

## 2023-10-24 RX ADMIN — SODIUM CHLORIDE, POTASSIUM CHLORIDE, SODIUM LACTATE AND CALCIUM CHLORIDE: 600; 310; 30; 20 INJECTION, SOLUTION INTRAVENOUS at 12:49

## 2023-10-24 RX ADMIN — ACETAMINOPHEN 650 MG: 325 TABLET ORAL at 20:33

## 2023-10-24 RX ADMIN — LIDOCAINE HYDROCHLORIDE 40 MG: 10 INJECTION, SOLUTION EPIDURAL; INFILTRATION; INTRACAUDAL; PERINEURAL at 09:08

## 2023-10-24 RX ADMIN — TRANEXAMIC ACID 1000 MG: 100 INJECTION, SOLUTION INTRAVENOUS at 09:30

## 2023-10-24 RX ADMIN — GABAPENTIN 300 MG: 600 TABLET, FILM COATED ORAL at 08:24

## 2023-10-24 ASSESSMENT — PAIN SCALES - GENERAL
PAINLEVEL_OUTOF10: 0

## 2023-10-24 ASSESSMENT — PAIN - FUNCTIONAL ASSESSMENT: PAIN_FUNCTIONAL_ASSESSMENT: 0-10

## 2023-10-24 ASSESSMENT — PAIN DESCRIPTION - ORIENTATION: ORIENTATION: RIGHT

## 2023-10-24 ASSESSMENT — PAIN DESCRIPTION - LOCATION: LOCATION: HIP

## 2023-10-24 ASSESSMENT — PAIN DESCRIPTION - DESCRIPTORS: DESCRIPTORS: ACHING

## 2023-10-24 NOTE — OP NOTE
Operative Note      Patient: Milagro Gray  YOB: 1945  MRN: [id number]    Date of Procedure: 10/24/2023    Pre-Op Diagnosis Codes:     * Primary osteoarthritis of right hip [M16.11]    Post-Op Diagnosis:  Severe osteoarthritis right hip secondary to acetabular plasia and most likely some element of avascular process       Procedure(s):  HIP TOTAL ARTHROPLASTY ASI    Surgeon(s): Dg Mejia MD    Assistant:   Resident: MD Autumn Nicole [facility] CST    Anesthesia: General    Estimated Blood Loss (mL): 126 and    Complications: Other: No complications but past have required augmented screw fixation due to dysplasia    Specimens:   * No specimens in log *    Implants:  Implant Name Type Inv. Item Serial No.  Lot No. LRB No. Used Action   G7 OSSEOTI 4 HOLE SHELL 58MM G - [id number]  G7 OSSEOTI 4 HOLE SHELL 58MM G  [employer] [facility] [id number] Right 1 Implanted   BONE SCREW 6.5X35 SELF-TAP - [id number]  BONE SCREW 6.5X35 SELF-TAP  [employer]- [id number] Right 1 Implanted   BONE SCREW 6.5X25 SELF-TAP - [id number]  BONE SCREW 6.5X25 SELF-TAP  [employer]  Right 1 Implanted   LINER ACET NEUT G 40 MM LONGEVITY G7 - [id number]  LINER ACET NEUT G 40 MM LONGEVITY G7  [employer] [facility] [id number] Right 1 Implanted   STEM FEM SZ 10 L140MM STD OFFSET DST HIP TI PPS TYP 1 TAPR - [id number]  STEM FEM SZ 10 L140MM STD OFFSET DST HIP TI PPS TYP 1 TAPR  [employer]- [id number] Right 1 Implanted   COCR FEM HD 40MM TYPE 1 STD - [id number]  COCR FEM HD 40MM TYPE 1 STD  [employer]- [id number] Right 1 Implanted         Drains: * No LDAs found *    Findings: Severe head erosion and depression with acetabular lesion right hip        Detailed Description of Procedure:       Patient is a 69 y.o. female with a long standing history of DJD of the right hip.  The patient has failed all types of conservative treatments including

## 2023-10-24 NOTE — CARE COORDINATION
DISCHARGE PLANNING NOTE:    Insurance authorization submitted for the patient via [facility].  Patient has been accepted at [facility] for skilled nursing, awaiting insurance approval.    [facility] ID: [id number]    Electronically signed by Erik Leblanc RN on 10/24/2023 at 4:24 PM

## 2023-10-24 NOTE — FLOWSHEET NOTE
Dr Jordyn Hollingsworth notified of consult. Pt will be seen in the morning and home meds will be resumed.

## 2023-10-24 NOTE — INTERVAL H&P NOTE
Update History & Physical    The patient's History and Physical of October 9, 2023 was reviewed with the patient and I examined the patient. There was no change. Here today for HIP TOTAL ARTHROPLASTY ASI Right per Dr. Rosendo Arreola. Pt AAO x 3 in NAD. HR irregular. No adventitious lung sounds. No respiratory distress. NPO p MN. Took carvedilol, digoxin this am with sip of water. Stopped Eliquis as instructed. Denies recent or current chest pain/pressure, palpitations, SOB, recent URI, fever or chills. Review vitals per RN flowsheet.      Electronically signed by JOSÉ Barrera CNP on 10/24/2023 at 7:38 AM

## 2023-10-24 NOTE — FLOWSHEET NOTE
Patient admitted to room 2042 per bed from PACU.  VS, assessment, and orientation to the room and call system completed. Plan for the day and orders reviewed with the patient and her son.

## 2023-10-24 NOTE — DISCHARGE INSTR - COC
under the knee for TKA patients. [facility] go on in the a.m. and come off in the p.m. (Hand wash them every two or three days, roll in towel to remove most of moisture, and hang to dry.)    Incision Care: If patient has ACE bandage it may be removed POD #2  Keep incisional dressing intact until seen and removed by surgeon, unless saturated, in which case, call surgeon and request instructions. If dressing falls off, call surgeon. If using the Prevena dressing, with battery pack, place battery pack in waterproof bag during shower. If using Aquacel dressing then dressing is waterproof and patient may shower. If patient has a Prevena remove on POD #5 and replace with Aquacel dressing (patient was provided with dressing in hospital)  Elevated the leg and ice the affected area four times a day, for twenty minutes each time and after every physical therapy session. Normal Conditions - Will improve with provided comfort measures and time:  Some swelling in the operative leg is normal - this should reduce over time. Some post-operative pain is normal.  This will improve with prescribed medication, provided comfort measures and time. Constipation related to pain medications & decreased mobility is a common occurrence. (Increase your fiber & water intake and take a stool softener.)   Slight warmth of operative site is normal and will diminish with time. Fatigue and moderate pain after therapy is normal and will improve with time. Numbness near the incision site is normal and will improve with time. NOTE: Ensure/Remind patient to go to their follow-up appointment with their orthopedic surgeon, which is scheduled: 11/10/23    Abnormal Conditions - When to call the Surgeon:  Increasing/excessive redness, warmth or swelling at the incisional site not relieved with ice and elevation. Increasing/excessive pain not well-controlled by prescribed medications. Drainage or odor from or around the incision site.

## 2023-10-24 NOTE — ACP (ADVANCE CARE PLANNING)
Conversation Outcomes:  ACP discussion completed    Follow-up plan:    [] Schedule follow-up conversation to continue planning  [x] Referred individual to Provider for additional questions/concerns   [] Advised patient/agent/surrogate to review completed ACP document and update if needed with changes in condition, patient preferences or care setting    [] This note routed to one or more involved healthcare providers

## 2023-10-24 NOTE — CARE COORDINATION
Case Management Assessment  Initial Evaluation    Date/Time of Evaluation: 10/24/2023 2:30 PM  Assessment Completed by: Lo Stafford RN    If patient is discharged prior to next notation, then this note serves as note for discharge by case management. Patient Name: Phoebe Glass                   YOB: 1945  Diagnosis: Primary osteoarthritis of right hip [M16.11]                   Date / Time: 10/24/2023  7:24 AM    Patient Admission Status: Observation   Readmission Risk (Low < 19, Mod (19-27), High > 27): No data recorded  Current PCP: JOSÉ Ervin CNP  PCP verified by CM? Yes    Chart Reviewed: Yes      History Provided by: Patient, Child/Family  Patient Orientation: Alert and Oriented    Patient Cognition: Alert    Hospitalization in the last 30 days (Readmission):  No    If yes, Readmission Assessment in CM Navigator will be completed. Advance Directives:      Code Status: Full Code   Patient's Primary Decision Maker is: Legal Next of Kin    [facility] - [phone]    Discharge Planning:    Patient lives with: Alone Type of Home: Independent Living  Primary Care Giver: Self  Patient Support Systems include: Children, Family Members   Current Financial resources: Medicare  Current community resources: None  Current services prior to admission: Durable Medical Equipment            Current DME: Walker            Type of Home Care services:  PT, OT, Nursing Services    ADLS  Prior functional level: Independent in ADLs/IADLs  Current functional level: Assistance with the following:, Mobility    PT AM-PAC: 10 /24  OT AM-PAC: 12 /24    Family can provide assistance at DC: No  Would you like Case Management to discuss the discharge plan with any other family members/significant others, and if so, who?  Yes (Son, Maciej Benson)  Plans to Return to Present Housing: Unknown at present  Other Identified Issues/Barriers to RETURNING to current housing:

## 2023-10-24 NOTE — ANESTHESIA PRE PROCEDURE
02/01/2018 01:11 PM    LABGLOM 58 10/09/2023 02:30 PM    GLUCOSE 127 10/09/2023 02:30 PM    GLUCOSE 112 10/06/2021 10:23 AM    PROT 6.6 05/23/2023 07:28 PM    CALCIUM 9.4 10/09/2023 02:30 PM    BILITOT 0.6 05/23/2023 07:28 PM    ALKPHOS 53 05/23/2023 07:28 PM    AST 19 05/23/2023 07:28 PM    ALT 15 05/23/2023 07:28 PM       POC Tests: No results for input(s): \"POCGLU\", \"POCNA\", \"POCK\", \"POCCL\", \"POCBUN\", \"POCHEMO\", \"POCHCT\" in the last 72 hours.     Coags:   Lab Results   Component Value Date/Time    PROTIME 12.8 04/09/2018 10:14 AM    PROTIME 24.8 10/05/2017 12:43 PM    INR 1.2 04/09/2018 10:14 AM    APTT 25.8 04/09/2018 10:14 AM       HCG (If Applicable): No results found for: \"PREGTESTUR\", \"PREGSERUM\", \"HCG\", \"HCGQUANT\"     ABGs: No results found for: \"PHART\", \"PO2ART\", \"LYL2IWQ\", \"HSQ2XJL\", \"BEART\", \"C6BUENBN\"     Type & Screen (If Applicable):  No results found for: \"LABABO\", \"LABRH\"    Drug/Infectious Status (If Applicable):  No results found for: \"HIV\", \"HEPCAB\"    COVID-19 Screening (If Applicable):   Lab Results   Component Value Date/Time    COVID19 Not Detected 10/13/2021 03:27 AM           Anesthesia Evaluation  Patient summary reviewed and Nursing notes reviewed no history of anesthetic complications:   Airway: Mallampati: II  TM distance: >3 FB   Neck ROM: full  Mouth opening: > = 3 FB   Dental: normal exam   (+) caps      Pulmonary:Negative Pulmonary ROS and normal exam  breath sounds clear to auscultation                             Cardiovascular:    (+) hypertension:, valvular problems/murmurs (Moderate mitral regurgitation): MVP and MR, pacemaker (Medtronic, last checked within the year, never been shocked): AICD, dysrhythmias: atrial fibrillation, CHF:,       ECG reviewed  Rhythm: irregular  Rate: normal  Echocardiogram reviewed    Cleared by cardiology     Beta Blocker:  Dose within 24 Hrs      ROS comment: Non ischemic Cardiomyopathy      ECHO (05/24/23)    Normal left ventricle size, mildly

## 2023-10-25 ENCOUNTER — TELEPHONE (OUTPATIENT)
Dept: PRIMARY CARE CLINIC | Age: 78
End: 2023-10-25

## 2023-10-25 VITALS
HEIGHT: 69 IN | RESPIRATION RATE: 16 BRPM | HEART RATE: 88 BPM | TEMPERATURE: 97.5 F | DIASTOLIC BLOOD PRESSURE: 48 MMHG | WEIGHT: 115 LBS | SYSTOLIC BLOOD PRESSURE: 110 MMHG | OXYGEN SATURATION: 98 % | BODY MASS INDEX: 17.03 KG/M2

## 2023-10-25 PROCEDURE — 97110 THERAPEUTIC EXERCISES: CPT

## 2023-10-25 PROCEDURE — 97530 THERAPEUTIC ACTIVITIES: CPT

## 2023-10-25 PROCEDURE — 2580000003 HC RX 258: Performed by: ORTHOPAEDIC SURGERY

## 2023-10-25 PROCEDURE — 96361 HYDRATE IV INFUSION ADD-ON: CPT

## 2023-10-25 PROCEDURE — 97535 SELF CARE MNGMENT TRAINING: CPT

## 2023-10-25 PROCEDURE — G0378 HOSPITAL OBSERVATION PER HR: HCPCS

## 2023-10-25 PROCEDURE — 96360 HYDRATION IV INFUSION INIT: CPT

## 2023-10-25 PROCEDURE — 6370000000 HC RX 637 (ALT 250 FOR IP): Performed by: ORTHOPAEDIC SURGERY

## 2023-10-25 PROCEDURE — 6360000002 HC RX W HCPCS: Performed by: ORTHOPAEDIC SURGERY

## 2023-10-25 RX ADMIN — Medication 10 ML: at 07:52

## 2023-10-25 RX ADMIN — Medication 2000 MG: at 00:42

## 2023-10-25 RX ADMIN — ACETAMINOPHEN 650 MG: 325 TABLET ORAL at 07:52

## 2023-10-25 ASSESSMENT — PAIN SCALES - GENERAL: PAINLEVEL_OUTOF10: 9

## 2023-10-25 ASSESSMENT — PAIN DESCRIPTION - LOCATION: LOCATION: HIP

## 2023-10-25 ASSESSMENT — PAIN DESCRIPTION - ORIENTATION: ORIENTATION: RIGHT

## 2023-10-25 NOTE — PROGRESS NOTES
CLINICAL PHARMACY NOTE: MEDS TO BEDS    Total # of Prescriptions Filled: 1   The following medications were delivered to the patient:  Oxycodone-acetaminophen 5-325    Additional Documentation:  Patient is Eligible to Utilize Meds To Beds for Their Discharge MedicationsDelivered patients medication to surgery waiting room-10/24/
Physical Therapy  Facility/Department: [facility] MED SURG  Physical Therapy Initial Assessment    Name: Christiano Martins  :   MRN: [id number]  Date of Service: 10/24/2023    Discharge Recommendations:  Therapy recommended at discharge      Patient Diagnosis(es): The encounter diagnosis was Primary osteoarthritis of right hip. Past Medical History:  has a past medical history of AICD (automatic cardioverter/defibrillator) present, Atrial fibrillation ([address]), Atrial fibrillation ([address]), Cancer ([address]), CHF (congestive heart failure) ([address]), Chronic kidney disease, H/O cardiovascular stress test, Hx of long term use of blood thinners, Hypertension, Nonischemic dilated cardiomyopathy ([address]), Other screening mammogram, and Poor historian. 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 Assessment   Assessment: Pt demonstrates impaired mobility and substantial pain and strength deficits limiting safety and IND with mobility. Pt requires Christiano for bed mobility, 2A for transfers with RW however unable to tolerate standing for time 2* Low BP and brief LOC upon prompt return to sitting. Pt unsafe to return to prior living arrangements and will benfit from continued Inpatient PT services  Treatment Diagnosis: Impaired mobility and activity tolerance  Therapy Prognosis: Fair  Decision Making: Medium Complexity  Exam: ROM, MMT, Balance, and functional mobility assessments  Requires PT Follow-Up: Yes  Activity Tolerance  Activity Tolerance: Treatment limited secondary to medical complications; Patient limited
Report called to Deni Noel.
(bed>chair transfer)  Functional Mobility Comments: BUE support on RW, VCs for sequencing and safety, posture and straightening R knee prior to advancing           Patient Education  Patient Education  Education Given To: Patient  Education Provided: Role of Therapy, Plan of Care, Precautions, Transfer Training, ADL Adaptive Strategies, Fall Prevention Strategies (AE/DME, ice mgt and safety for pain/swelling mgt, CESAR hose mgt and safety & easy slide use, utilizing gait belt as leg  to progress RLE to EOB, hip precautions)  Education Method: Verbal, Demonstration  Barriers to Learning: Cognition  Education Outcome: Verbalized understanding, Continued education needed, Demonstrated understanding    Goals  Short Term Goals  Time Frame for Short Term Goals: By discharge, pt will  Short Term Goal 1: perform UB self care with supervision  Short Term Goal 2: perform LB self care with modA using adaptive techniques/equipment and Good safety  Short Term Goal 3: perform functional transfers/mobility modA, using least restrictive device  Short Term Goal 4: tolerate standing for 3+ minutes modA during functional activity of choice  Short Term Goal 5: actively participate in 10-15+ minutes of therapeutic exercise/functional activity to promote safety and independence  Short Term Goal 6: V/D fall prevention and home safety techniques that are applicable to her daily routine  Occupational Therapy Plan  Times Per Week: 5-7 (1-2x/day)  Current Treatment Recommendations: Strengthening, ROM, Balance training, Functional mobility training, Endurance training, Safety education & training, Patient/Caregiver education & training, Equipment evaluation, education, & procurement, Pain management, Positioning, Self-Care / ADL    Assessment  Activity Tolerance  Activity Tolerance: Patient limited by fatigue, Treatment limited secondary to medical complications (free text), Patient limited by pain  Assessment  Performance deficits /
from a bed to a chair?: Total  How much help is needed standing up from a chair using your arms?: Total  How much help is needed walking in hospital room?: Total  How much help is needed climbing 3-5 steps with a railing?: Total  AM-PAC Inpatient Mobility Raw Score : 10  AM-PAC Inpatient T-Scale Score : 32.29  Mobility Inpatient CMS 0-100% Score: 76.75  Mobility Inpatient CMS G-Code Modifier : CL       Goals  Short Term Goals  Time Frame for Short Term Goals: 6-8 visits  Short Term Goal 1: pt to demo bed mob with SBA  Short Term Goal 2: pt to demo Safe functional transfers with RW and CGA  Short Term Goal 3: pt to ambulate 25-50' with RW and Chritsiano + WC follow for safety  Short Term Goal 4: pt to demo G technique for ROM/STrength exercises  Short Term Goal 5: pt to verbalize G understanding of anterior hip precaution       10/25/23 1317   PT Individual Minutes   Time In [address] [place] [address] Dr   Time Out 1006   Minutes 39         Electronically signed by Jack Pradhan PTA on 10/25/23 at 1:18 PM EDT
Two-person assist for standing balance  Toileting: Dependent/Total  Additional Comments: ADL scores based on skilled observation and clinical reasoning, unless otherwise noted. Pt is primarily limited due to generalized weakness, impaired balance, and lethargy affecting patient's ability to optimally complete self care tasks         UE Function  LUE AROM (degrees)  LUE AROM : WFL  Left Hand AROM (degrees)  Left Hand AROM: WFL  Tone LUE  LUE Tone: Normotonic  LUE Strength  L Hand General: 4-/5  LUE Strength Comment: Grossly 4/5    RUE AROM (degrees)  RUE AROM : WFL  Right Hand AROM (degrees)  Right Hand AROM: WFL  Tone RUE  RUE Tone: Normotonic  RUE Strength  R Hand General: 4/5  RUE Strength Comment: Grossly 4/5         Fine Motor Skills/Coordination  Coordination  Movements Are Fluid And Coordinated: Yes              Bed Mobility  Bed mobility  Rolling to Left: Minimal assistance  Rolling to Right: Minimal assistance  Supine to Sit: Minimal assistance  Sit to Supine: 2 Person assistance, Dependent/Total  Scooting: Contact guard assistance  Bed Mobility Comments: HOB elevated with use of bed rails, pt slow to advance 2* pain requiring inc time to complete. Cues for initiation    Balance  Balance  Sitting Balance: Minimal assistance  Standing Balance: Dependent/Total (Two-person assist)  Standing Balance  Time: < 15 seconds  Activity: static standing  Comment: BUE support    Transfers  Transfers  Sit to stand: 2 Person assistance, Moderate assistance  Stand to sit: Moderate assistance, 2 Person assistance  Transfer Comments: Cued for hand/foot placement for safety. Increased time to complete. Pt unable to maintain 2* pain and progressively lowering BP in standing.     Functional Mobility  Functional Mobility Comments: Not appropriate at this time due to safety concerns    Assessment  Assessment  Performance deficits / Impairments: Decreased ADL status, Decreased functional mobility , Decreased strength, Decreased safe

## 2023-10-25 NOTE — CARE COORDINATION
DISCHARGE PLANNING NOTE:    The patient's transportation is set up for 12:00 PM via [employer]. The patient's bedside nurse, Kenia Bales RN, updated. Rosemarie Caruso, clinical lead updated as well. Packet at [facility]. AVS printed and Andrew Louis confirmed receipt. All of the patient's personal home medications, as well as walker will be taken home by her son. Report to be called to [phone]. AVS faxed to Deni Noel.     Electronically signed by Dionte Mccoy RN on 10/25/2023 at 11:19 AM

## 2023-10-25 NOTE — PLAN OF CARE
Problem: Discharge Planning  Goal: Discharge to home or other facility with appropriate resources  Outcome: Completed     Problem: Pain  Goal: Verbalizes/displays adequate comfort level or baseline comfort level  Outcome: Completed     Problem: Safety - Adult  Goal: Free from fall injury  Outcome: Completed  Flowsheets (Taken 10/24/2023 2133 by Alyx Sahu RN)  Free From Fall Injury: Instruct family/caregiver on patient safety     Problem: ABCDS Injury Assessment  Goal: Absence of physical injury  Outcome: Completed  Flowsheets (Taken 10/24/2023 2133 by Alyx Sahu RN)  Absence of Physical Injury: Implement safety measures based on patient assessment     Problem: Skin/Tissue Integrity  Goal: Absence of new skin breakdown  Description: 1. Monitor for areas of redness and/or skin breakdown  2. Assess vascular access sites hourly  3. Every 4-6 hours minimum:  Change oxygen saturation probe site  4. Every 4-6 hours:  If on nasal continuous positive airway pressure, respiratory therapy assess nares and determine need for appliance change or resting period.   Outcome: Completed     Problem: Chronic Conditions and Co-morbidities  Goal: Patient's chronic conditions and co-morbidity symptoms are monitored and maintained or improved  Outcome: Completed

## 2023-10-25 NOTE — TELEPHONE ENCOUNTER
Patient's son, Lester Dodd, called stating patient is about to be discharged from [facility] from having a hip replacement surgery yesterday. Lester Dodd states when nurse was reviewing meds, they asked if patient was taking lisinopril. Lester Dodd wanted to confirm if patient should be taking lisinopril or not    Writer reviewed chart, lisinopril d/c back in 2021 by PCP. BP at LOV was well controlled. Writer informed Lester Dodd that patient should not be taking lisinopril and to continue with meds as prescribed.  Jeet verbalized understanding

## 2023-10-26 NOTE — DISCHARGE SUMMARY
Discharge Summary     Patient ID:  Phoebe Glass  [id number]  89 y.o.  1945    Admit date: 10/24/2023    Discharge date and time: 10/25/2023 12:34 PM     Admitting Physician: Konrad Garces MD     Admission Diagnoses: Primary osteoarthritis of right hip [M16.11]    Discharge Diagnoses: same    Admission Condition: fair    Discharged Condition: fair    Indication for Admission: Status post right total hip arthroplasty    Hospital Course: No postoperative complications    Consults: [facility]    Treatments: surgery and PT    Disposition: SNF    Patient Instructions:   Discharge Medication List as of 10/25/2023 11:17 AM        START taking these medications    Details   oxyCODONE-acetaminophen (PERCOCET) 5-325 MG per tablet Take 1 tablet by mouth every 4 hours as needed for Pain for up to 7 days. Intended supply: 7 days.  Take lowest dose possible to manage pain Max Daily Amount: 6 tablets, Disp-42 tablet, R-0Print           CONTINUE these medications which have NOT CHANGED    Details   vitamin D 25 MCG (1000 UT) CAPS Take 1 capsule by mouth dailyHistorical Med      DULoxetine (CYMBALTA) 30 MG extended release capsule Take 1 capsule by mouth dailyHistorical Med      carvedilol (COREG) 3.125 MG tablet TAKE 1 TABLET TWICE DAILY WITH MEALS, Disp-180 tablet, R-3Normal      sertraline (ZOLOFT) 50 MG tablet TAKE 1 TABLET EVERY DAY, Disp-90 tablet, R-3Normal      apixaban (ELIQUIS) 5 MG TABS tablet TAKE 1 TABLET TWICE DAILY, Disp-180 tablet, R-3Pt needs appt for further refillsNormal      digoxin (LANOXIN) 125 MCG tablet TAKE 1 TABLET BY MOUTH DAILY MONDAY THROUGH FRIDAY, Disp-65 tablet, R-3Normal      Handicap Placard MISC Starting Wed 10/27/2021, Disp-1 each, R-0, PrintDuration:5 years ending 10/27/2026      Acetaminophen (TYLENOL ARTHRITIS PAIN PO) Take by mouth 3 times dailyHistorical Med      torsemide (DEMADEX) 20 MG tablet Take 1 tablet by mouth daily, Disp-30 tablet, R-1Normal      vitamin B-12

## 2023-10-30 ENCOUNTER — TELEPHONE (OUTPATIENT)
Dept: ORTHOPEDIC SURGERY | Age: 78
End: 2023-10-30

## 2023-10-30 NOTE — TELEPHONE ENCOUNTER
Alivia Lewis called from [facility] stating that Angela Fairchild keeps taking the dressing off of her wound site. Alivia Lewis asks if you could fax an order of instructions to her at [phone].

## 2023-11-02 NOTE — TELEPHONE ENCOUNTER
Please have staff apply steri strips to wound and keep covered with Aquacel or bordered gauze dressing.

## 2023-11-08 PROBLEM — Z01.818 PREOP EXAMINATION: Status: RESOLVED | Noted: 2023-10-09 | Resolved: 2023-11-08

## 2023-11-10 ENCOUNTER — OFFICE VISIT (OUTPATIENT)
Dept: ORTHOPEDIC SURGERY | Age: 78
End: 2023-11-10

## 2023-11-10 VITALS — BODY MASS INDEX: 17.04 KG/M2 | HEIGHT: 69 IN | WEIGHT: 115.08 LBS

## 2023-11-10 DIAGNOSIS — M25.551 RIGHT HIP PAIN: Primary | ICD-10-CM

## 2023-11-10 NOTE — PROGRESS NOTES
Talya Cesar M.D.            [address] [facility], [facility], [address] [place] [address] [address] [place]           Dept Phone: [phone]           Dept Fax:  [address]          Dept Phone: [phone]           Dept Fax:  [phone]      Chief Compliant:  Chief Complaint   Patient presents with    Post-Op Check     Right. JEFF 10/24/23        History of Present Illness:  Patient returns today 2 weeks status post right JEFF-ASI. Patient has no major complaints. Review of Systems   Constitutional: Negative for fever, chills, sweats, recent injury, recent illness  Neurological: Negative for Headaches, numbness, or weakness. Integumentary: Negative for rash, itching, ecchymosis, or wounds. Musculoskeletal: Positive for Post-Op Check (Right. JEFF 10/24/23)       Physical Exam:  Constitutional: Patient is oriented to person, place, and time. Patient appears well-developed and well nourished. Musculoskeletal: Normal gait. Expected degree of meralgia parasthetica. Expected mild pain with gentle ROM of hip. Calves negative. Paulina's negative. Neurovascular Intact. Leg lengths equal  Neurological: Patient is alert and oriented to person, place, and time. Normal strenght. No sensory deficit. Skin: Skin is warm and dry. ASI incision without redness or drainage. Nursing note and vitals reviewed. Labs and Imaging:     XR taken today:  XR HIP 1 VW W PELVIS RIGHT    Result Date: 11/10/2023  X-rays taken today reviewed by me show AP pelvis and lateral right hip. Patient is status post right total hip arthroplasty. She was seen on preop fumes and severe deformity of her right hip with significant leg length discrepancy. X-rays today show the prosthesis in excellent position on AP and lateral views. She has 2 acetabular screws to enhance fixation.   Overall

## 2023-11-17 ENCOUNTER — CARE COORDINATION (OUTPATIENT)
Dept: CARE COORDINATION | Age: 78
End: 2023-11-17

## 2023-11-17 ENCOUNTER — CARE COORDINATION (OUTPATIENT)
Dept: CASE MANAGEMENT | Age: 78
End: 2023-11-17

## 2023-11-17 NOTE — CARE COORDINATION
Care Transitions Post-Acute Facility Update Call    2023    Patient: Live Galindo Patient : [birth year]   MRN: <[id number]>  Reason for Admission:   Discharge Date: 10/25/23 RARS: No data recorded       Per patientping patient discharged from [facility] to their assisted living.  Request to  to obtain updated med list.

## 2023-11-17 NOTE — CARE COORDINATION
Attempted to contact [address] to obtain updated medication list.  Voicemail was left requesting a call back.

## 2023-11-24 ENCOUNTER — OUTSIDE SERVICES (OUTPATIENT)
Dept: PRIMARY CARE CLINIC | Age: 78
End: 2023-11-24

## 2023-11-24 DIAGNOSIS — Z96.641 S/P TOTAL RIGHT HIP ARTHROPLASTY: ICD-10-CM

## 2023-11-24 DIAGNOSIS — R29.6 FALLS FREQUENTLY: ICD-10-CM

## 2023-11-24 DIAGNOSIS — U07.1 COVID-19: Primary | ICD-10-CM

## 2023-11-25 ENCOUNTER — HOSPITAL ENCOUNTER (EMERGENCY)
Age: 78
Discharge: ANOTHER HOSPITAL | End: 2023-11-26
Attending: EMERGENCY MEDICINE
Payer: MEDICARE

## 2023-11-25 ENCOUNTER — APPOINTMENT (OUTPATIENT)
Dept: GENERAL RADIOLOGY | Age: 78
End: 2023-11-25
Payer: MEDICARE

## 2023-11-25 ENCOUNTER — APPOINTMENT (OUTPATIENT)
Dept: CT IMAGING | Age: 78
End: 2023-11-25
Payer: MEDICARE

## 2023-11-25 VITALS
DIASTOLIC BLOOD PRESSURE: 71 MMHG | OXYGEN SATURATION: 100 % | BODY MASS INDEX: 16.92 KG/M2 | WEIGHT: 114.64 LBS | SYSTOLIC BLOOD PRESSURE: 125 MMHG | HEART RATE: 114 BPM | RESPIRATION RATE: 18 BRPM | TEMPERATURE: 101.2 F

## 2023-11-25 DIAGNOSIS — R41.0 DELIRIUM: ICD-10-CM

## 2023-11-25 DIAGNOSIS — R21 RASH AND OTHER NONSPECIFIC SKIN ERUPTION: ICD-10-CM

## 2023-11-25 DIAGNOSIS — U07.1 COVID-19: Primary | ICD-10-CM

## 2023-11-25 LAB
ALBUMIN SERPL-MCNC: 3.7 G/DL (ref 3.5–5.2)
ALP SERPL-CCNC: 107 U/L (ref 35–104)
ALT SERPL-CCNC: 17 U/L (ref 5–33)
AMMONIA PLAS-SCNC: 36 UMOL/L (ref 11–51)
ANION GAP SERPL CALCULATED.3IONS-SCNC: 18 MMOL/L (ref 9–17)
AST SERPL-CCNC: 27 U/L
BASOPHILS # BLD: 0 K/UL (ref 0–0.2)
BASOPHILS NFR BLD: 0 % (ref 0–2)
BILIRUB DIRECT SERPL-MCNC: 0.1 MG/DL
BILIRUB INDIRECT SERPL-MCNC: 0.1 MG/DL (ref 0–1)
BILIRUB SERPL-MCNC: 0.2 MG/DL (ref 0.3–1.2)
BODY TEMPERATURE: 37
BUN SERPL-MCNC: 29 MG/DL (ref 8–23)
CALCIUM SERPL-MCNC: 8.7 MG/DL (ref 8.6–10.4)
CHLORIDE SERPL-SCNC: 94 MMOL/L (ref 98–107)
CO2 SERPL-SCNC: 22 MMOL/L (ref 20–31)
COHGB MFR BLD: 2.2 % (ref 0–5)
CREAT SERPL-MCNC: 1.4 MG/DL (ref 0.5–0.9)
CRP SERPL HS-MCNC: 36.6 MG/L (ref 0–5)
EKG ATRIAL RATE: 178 BPM
EKG Q-T INTERVAL: 328 MS
EKG QRS DURATION: 80 MS
EKG QTC CALCULATION (BAZETT): 519 MS
EKG R AXIS: 154 DEGREES
EKG T AXIS: 16 DEGREES
EKG VENTRICULAR RATE: 151 BPM
EOSINOPHIL # BLD: 0 K/UL (ref 0–0.4)
EOSINOPHILS RELATIVE PERCENT: 0 % (ref 0–4)
ERYTHROCYTE [DISTWIDTH] IN BLOOD BY AUTOMATED COUNT: 14.5 % (ref 11.5–14.9)
FLUAV RNA RESP QL NAA+PROBE: NOT DETECTED
FLUBV RNA RESP QL NAA+PROBE: NOT DETECTED
GAS FLOW.O2 O2 DELIVERY SYS: ABNORMAL L/MIN
GFR SERPL CREATININE-BSD FRML MDRD: 39 ML/MIN/1.73M2
GLUCOSE SERPL-MCNC: 185 MG/DL (ref 70–99)
HCO3 VENOUS: 23.5 MMOL/L (ref 24–30)
HCT VFR BLD AUTO: 46.3 % (ref 36–46)
HGB BLD-MCNC: 15 G/DL (ref 12–16)
INR PPP: 1.1
LACTATE BLDV-SCNC: 2.6 MMOL/L (ref 0.5–1.9)
LACTATE BLDV-SCNC: 3.7 MMOL/L (ref 0.5–2.2)
LYMPHOCYTES NFR BLD: 1.2 K/UL (ref 1–4.8)
LYMPHOCYTES RELATIVE PERCENT: 9 % (ref 24–44)
MAGNESIUM SERPL-MCNC: 1.8 MG/DL (ref 1.6–2.6)
MCH RBC QN AUTO: 32.1 PG (ref 26–34)
MCHC RBC AUTO-ENTMCNC: 32.4 G/DL (ref 31–37)
MCV RBC AUTO: 98.9 FL (ref 80–100)
METHEMOGLOBIN: 0.1 % (ref 0–1.9)
MONOCYTES NFR BLD: 0.6 K/UL (ref 0.1–1.3)
MONOCYTES NFR BLD: 5 % (ref 1–7)
NEGATIVE BASE EXCESS, VEN: 0.8 MMOL/L (ref 0–2)
NEUTROPHILS NFR BLD: 86 % (ref 36–66)
NEUTS SEG NFR BLD: 10.8 K/UL (ref 1.3–9.1)
O2 SAT, VEN: 92.4 % (ref 60–85)
PCO2, VEN: 35.3 MM HG (ref 39–55)
PH VENOUS: 7.43 (ref 7.32–7.42)
PLATELET # BLD AUTO: 338 K/UL (ref 150–450)
PMV BLD AUTO: 7.5 FL (ref 6–12)
PO2, VEN: 68.9 MM HG (ref 30–50)
POTASSIUM SERPL-SCNC: 4.5 MMOL/L (ref 3.7–5.3)
PROCALCITONIN SERPL-MCNC: 0.69 NG/ML
PROT SERPL-MCNC: 6.8 G/DL (ref 6.4–8.3)
PROTHROMBIN TIME: 14.7 SEC (ref 11.8–14.6)
RBC # BLD AUTO: 4.69 M/UL (ref 4–5.2)
SARS-COV-2 RNA RESP QL NAA+PROBE: DETECTED
SODIUM SERPL-SCNC: 134 MMOL/L (ref 135–144)
SOURCE: ABNORMAL
SPECIMEN DESCRIPTION: ABNORMAL
TROPONIN I SERPL HS-MCNC: 33 NG/L (ref 0–14)
WBC OTHER # BLD: 12.7 K/UL (ref 3.5–11)

## 2023-11-25 PROCEDURE — 83735 ASSAY OF MAGNESIUM: CPT

## 2023-11-25 PROCEDURE — 99285 EMERGENCY DEPT VISIT HI MDM: CPT

## 2023-11-25 PROCEDURE — 6370000000 HC RX 637 (ALT 250 FOR IP)

## 2023-11-25 PROCEDURE — 86140 C-REACTIVE PROTEIN: CPT

## 2023-11-25 PROCEDURE — 84484 ASSAY OF TROPONIN QUANT: CPT

## 2023-11-25 PROCEDURE — 36415 COLL VENOUS BLD VENIPUNCTURE: CPT

## 2023-11-25 PROCEDURE — 2700000000 HC OXYGEN THERAPY PER DAY

## 2023-11-25 PROCEDURE — 87040 BLOOD CULTURE FOR BACTERIA: CPT

## 2023-11-25 PROCEDURE — 80076 HEPATIC FUNCTION PANEL: CPT

## 2023-11-25 PROCEDURE — 2580000003 HC RX 258

## 2023-11-25 PROCEDURE — 80048 BASIC METABOLIC PNL TOTAL CA: CPT

## 2023-11-25 PROCEDURE — 85025 COMPLETE CBC W/AUTO DIFF WBC: CPT

## 2023-11-25 PROCEDURE — 70450 CT HEAD/BRAIN W/O DYE: CPT

## 2023-11-25 PROCEDURE — 96365 THER/PROPH/DIAG IV INF INIT: CPT

## 2023-11-25 PROCEDURE — 85610 PROTHROMBIN TIME: CPT

## 2023-11-25 PROCEDURE — 82805 BLOOD GASES W/O2 SATURATION: CPT

## 2023-11-25 PROCEDURE — 83605 ASSAY OF LACTIC ACID: CPT

## 2023-11-25 PROCEDURE — 82140 ASSAY OF AMMONIA: CPT

## 2023-11-25 PROCEDURE — 93005 ELECTROCARDIOGRAM TRACING: CPT

## 2023-11-25 PROCEDURE — 84145 PROCALCITONIN (PCT): CPT

## 2023-11-25 PROCEDURE — 94761 N-INVAS EAR/PLS OXIMETRY MLT: CPT

## 2023-11-25 PROCEDURE — 87636 SARSCOV2 & INF A&B AMP PRB: CPT

## 2023-11-25 PROCEDURE — 96375 TX/PRO/DX INJ NEW DRUG ADDON: CPT

## 2023-11-25 PROCEDURE — 96366 THER/PROPH/DIAG IV INF ADDON: CPT

## 2023-11-25 PROCEDURE — 96367 TX/PROPH/DG ADDL SEQ IV INF: CPT

## 2023-11-25 PROCEDURE — 71045 X-RAY EXAM CHEST 1 VIEW: CPT

## 2023-11-25 PROCEDURE — 6360000002 HC RX W HCPCS

## 2023-11-25 RX ORDER — CARVEDILOL 3.12 MG/1
3.12 TABLET ORAL ONCE
Status: COMPLETED | OUTPATIENT
Start: 2023-11-25 | End: 2023-11-25

## 2023-11-25 RX ORDER — 0.9 % SODIUM CHLORIDE 0.9 %
30 INTRAVENOUS SOLUTION INTRAVENOUS ONCE
Status: COMPLETED | OUTPATIENT
Start: 2023-11-25 | End: 2023-11-25

## 2023-11-25 RX ORDER — ACETAMINOPHEN 500 MG
1000 TABLET ORAL ONCE
Status: COMPLETED | OUTPATIENT
Start: 2023-11-25 | End: 2023-11-25

## 2023-11-25 RX ORDER — SODIUM CHLORIDE 9 MG/ML
INJECTION, SOLUTION INTRAVENOUS CONTINUOUS
Status: DISCONTINUED | OUTPATIENT
Start: 2023-11-25 | End: 2023-11-26 | Stop reason: HOSPADM

## 2023-11-25 RX ORDER — 0.9 % SODIUM CHLORIDE 0.9 %
1000 INTRAVENOUS SOLUTION INTRAVENOUS ONCE
Status: COMPLETED | OUTPATIENT
Start: 2023-11-25 | End: 2023-11-25

## 2023-11-25 RX ADMIN — SODIUM CHLORIDE 1560 ML: 9 INJECTION, SOLUTION INTRAVENOUS at 18:53

## 2023-11-25 RX ADMIN — SODIUM CHLORIDE: 9 INJECTION, SOLUTION INTRAVENOUS at 22:00

## 2023-11-25 RX ADMIN — ACETAMINOPHEN 1000 MG: 500 TABLET ORAL at 21:30

## 2023-11-25 RX ADMIN — SODIUM CHLORIDE 1000 ML: 9 INJECTION, SOLUTION INTRAVENOUS at 17:59

## 2023-11-25 RX ADMIN — WATER 250 MG: 1 INJECTION INTRAMUSCULAR; INTRAVENOUS; SUBCUTANEOUS at 17:59

## 2023-11-25 RX ADMIN — CARVEDILOL 3.12 MG: 3.12 TABLET, FILM COATED ORAL at 22:00

## 2023-11-25 RX ADMIN — CEFTRIAXONE SODIUM 1000 MG: 1 INJECTION, POWDER, FOR SOLUTION INTRAMUSCULAR; INTRAVENOUS at 20:09

## 2023-11-25 RX ADMIN — PIPERACILLIN AND TAZOBACTAM 4500 MG: 4; .5 INJECTION, POWDER, FOR SOLUTION INTRAVENOUS at 20:54

## 2023-11-25 ASSESSMENT — PAIN DESCRIPTION - ORIENTATION: ORIENTATION: RIGHT

## 2023-11-25 ASSESSMENT — ENCOUNTER SYMPTOMS
SINUS PAIN: 0
DIARRHEA: 0
CONSTIPATION: 0
WHEEZING: 0
SHORTNESS OF BREATH: 0
COUGH: 1
NAUSEA: 1
RHINORRHEA: 0
COLOR CHANGE: 0
SINUS PRESSURE: 0

## 2023-11-25 ASSESSMENT — PAIN DESCRIPTION - LOCATION: LOCATION: HIP

## 2023-11-25 ASSESSMENT — LIFESTYLE VARIABLES
HOW MANY STANDARD DRINKS CONTAINING ALCOHOL DO YOU HAVE ON A TYPICAL DAY: PATIENT DOES NOT DRINK
HOW OFTEN DO YOU HAVE A DRINK CONTAINING ALCOHOL: NEVER

## 2023-11-25 ASSESSMENT — PAIN SCALES - GENERAL: PAINLEVEL_OUTOF10: 3

## 2023-11-25 NOTE — ED PROVIDER NOTES
EMERGENCY DEPARTMENT COURSE / MDM     Medical Decision Making  See ED course    Amount and/or Complexity of Data Reviewed  Independent Historian: EMS  External Data Reviewed: labs. Labs: ordered. Decision-making details documented in ED Course. Radiology: ordered. ECG/medicine tests: ordered. Risk  OTC drugs. Prescription drug management. EKG    All EKG's are interpreted by the Emergency Department Physician who either signs or Co-signs this chart in the absence of a cardiologist.    EMERGENCY DEPARTMENT COURSE:    ED Course as of 11/25/23 2250   Sat Nov 25, 2023   166 67-year-old female presents to the ED via EMS from nursing facility for altered mental status and rash. Patient was recently diagnosed with COVID and was started on an antiviral medication, which was first given today. EMS was called due to the patient's change in mentation and the full body rash observed by staff. EMS reports the patient was hypotensive with systolics in the 12X and was given a fluid bolus with repeat blood pressure in the 290 systolic. Oxygen saturation varied due to the patient's cold fingers but on arrival in the ED she was satting at 100%. Patient was alert and oriented x2, GCS 14. Patient's heart rate elevated at 130-150. History of A-fib on chronic Eliquis. Patient has an AICD per chart review. Patient was given 50 of IV Benadryl per EMS for the rash. Physical exam: Mildly altered in mentation A&O x2 GCS 14. Tachycardic, atrial fibrillation. Full body rash that is minimally blanchable. Patient currently on nonrebreather and unable to assess mucous membranes. Lungs are clear sounding to auscultation. Abdomen: S, ND, NTTP. Palpable radial and DP pulses. No pitting edema bilateral lower extremities. Concern for: Urticarial rash, Valarie Sanchez syndrome, medication side effect, metabolic derangement, UTI, PNA, SDH/SAH/IPH    Plan: COVID, sepsis,  [AS]   3161 CBC shows leukocytosis of 12.7. PROCEDURES:  None    CONSULTS:  None    CRITICAL CARE:  There was significant risk of life threatening deterioration of patient's condition requiring my direct management. Critical care time  minutes, excluding any documented procedures. FINAL IMPRESSION      1. COVID-19    2. Rash and other nonspecific skin eruption    3. Delirium          DISPOSITION / PLAN     DISPOSITION Decision To Transfer 11/25/2023 07:56:37 PM      PATIENT REFERRED TO:  No follow-up provider specified.     DISCHARGE MEDICATIONS:  New Prescriptions    No medications on file       Geovanny Sparrow DO  Emergency Medicine Resident    (Please note that portions of this note were completed with a voice recognition program.  Efforts were made to edit the dictations but occasionally words are mis-transcribed.)       Markus Saeed DO  Resident  11/25/23 3725

## 2023-11-25 NOTE — PROGRESS NOTES
Pt arrives on 15lpm NRB. SpO2 100%    Titrated to 3lpm N/C. Remains 100% after 5 minutes.     Will continue to monitor and titrate as able

## 2023-11-26 LAB
MICROORGANISM SPEC CULT: NORMAL
MICROORGANISM SPEC CULT: NORMAL
SERVICE CMNT-IMP: NORMAL
SERVICE CMNT-IMP: NORMAL
SPECIMEN DESCRIPTION: NORMAL
SPECIMEN DESCRIPTION: NORMAL

## 2023-11-26 NOTE — ED NOTES
Report given to Gerardo Miguel at [facility].      Aníbal Colon RN  11/26/23 0003

## 2023-11-26 NOTE — ED PROVIDER NOTES
EMERGENCY DEPARTMENT ENCOUNTER   ATTENDING ATTESTATION     Pt Name: Pam Matthews  MRN: [id number]  [facility] [birth year]  Date of evaluation: 11/25/23       Pam Matthews is a 69 y.o. female who presents with Allergic Reaction (Pt to ED from [address] [place] [address] for allergic reaction. Pt started medication molnupiravir for COVID and was found today with redness spread throughout body. Pt was diagnosed with COVID yesterday. Pt denies any chest pain or SOB. Pt arrived on 15L nonrebreather, because EMS was unable to get accurate SPO2, but was placed on on 3L nasal canula upon arrival )    Patient transferred from [address] [place] [address]. PMH of AF s/p AICD, HTN, CHF. She tested positive for covid yesterday and today received her first dose of molnupiravir. Later in the day nursing staff noted that she was altered compared to her baseline and then developed a full body rash. EMS gave 50 mg IV benadryl en route due to concern for allergic reaction. Patient is a full code. MDM:   On arrival patient is tachycardic, BP wnl, transitioned to [place] and satting in the high 90s with good wave form. She is alert and oriented on my questioning, she appears tired. She has a non-blanching confluent rash over much of her trunk, arms and legs, no sloughing or blistering of the skin. Her tongue appears desiccated but there are some faint red spots noted, no other mucosal involvement of the lips or eyes noted. Sepsis orders initiated on arrival. Rash after new medication administration is concerning for SJS so high dose steroids initiated in addition to broad spectrum antibiotics. Given this concern I believe she should be admitted to burn center for higher level of care and she will be transferred to [facility]. Patient has remained stable throughout ED course. Stable o2 req. She has been tachycardic in the 130-140s consistently despite aggressive IVF resuscitation. Lactic acid improving.  We will provide her home dose of

## 2023-11-28 LAB
EKG ATRIAL RATE: 178 BPM
EKG Q-T INTERVAL: 328 MS
EKG QRS DURATION: 80 MS
EKG QTC CALCULATION (BAZETT): 519 MS
EKG R AXIS: 154 DEGREES
EKG T AXIS: 16 DEGREES
EKG VENTRICULAR RATE: 151 BPM

## 2023-11-28 PROCEDURE — 93010 ELECTROCARDIOGRAM REPORT: CPT | Performed by: INTERNAL MEDICINE

## 2023-12-15 ENCOUNTER — TELEPHONE (OUTPATIENT)
Dept: PRIMARY CARE CLINIC | Age: 78
End: 2023-12-15

## 2023-12-15 ENCOUNTER — CARE COORDINATION (OUTPATIENT)
Dept: CASE MANAGEMENT | Age: 78
End: 2023-12-15

## 2023-12-15 DIAGNOSIS — Z96.641 S/P TOTAL RIGHT HIP ARTHROPLASTY: Primary | ICD-10-CM

## 2023-12-15 DIAGNOSIS — R53.1 GENERALIZED WEAKNESS: ICD-10-CM

## 2023-12-15 DIAGNOSIS — R29.6 FALLS FREQUENTLY: ICD-10-CM

## 2023-12-15 NOTE — TELEPHONE ENCOUNTER
Pt son called and said Pt had hip surgery in October and was put in [facility] and did skilled nursing rehab for a few weeks then assistant living for a week then got covid, went to hosp, went back to skilled nursing rehab and back to assistant living. He said they were told she is still eligible for skilled nursing rehab for a bit and said insurance would need an order to approve. Please place order for the skilled nursing rehab at [facility].

## 2023-12-15 NOTE — TELEPHONE ENCOUNTER
LVM for Pt son to call back, order up front with me.  Need to know where to send it or if he wants to

## 2023-12-15 NOTE — CARE COORDINATION
Care Transitions Post-Acute Facility Update Call    12/15/2023    Patient:  Pam Matthews Patient :    MRN: <[id number]>  Reason for Admission:   Discharge Date: 23 RARS: No data recorded    Per Patientping patient  on 23 at [facility]

## 2023-12-15 NOTE — TELEPHONE ENCOUNTER
Pt's son Amira Norwood returned office call provided number for faxing information to [facility] [phone]     Writer faxed order to number provided

## 2023-12-15 NOTE — TELEPHONE ENCOUNTER
Jeet returned call to office, he states patient's assisted living nurse advised him to contact patient's PCP for the order.  He will call them to find out where it should be sent and return call to office

## 2024-01-01 DIAGNOSIS — I11.0 HYPERTENSIVE HEART DISEASE WITH HEART FAILURE (HCC): ICD-10-CM

## 2024-01-01 DIAGNOSIS — Z51.81 THERAPEUTIC DRUG MONITORING: ICD-10-CM

## 2024-01-01 DIAGNOSIS — R41.82 ALTERED MENTAL STATUS, UNSPECIFIED ALTERED MENTAL STATUS TYPE: ICD-10-CM

## 2024-01-01 LAB
ALBUMIN SERPL-MCNC: NORMAL G/DL
ALP BLD-CCNC: NORMAL U/L
ALT SERPL-CCNC: NORMAL U/L
ANION GAP SERPL CALCULATED.3IONS-SCNC: NORMAL MMOL/L
AST SERPL-CCNC: NORMAL U/L
BILIRUB SERPL-MCNC: NORMAL MG/DL
BUN BLDV-MCNC: NORMAL MG/DL
CALCIUM SERPL-MCNC: NORMAL MG/DL
CHLORIDE BLD-SCNC: NORMAL MMOL/L
CO2: NORMAL
CREAT SERPL-MCNC: NORMAL MG/DL
DIGOXIN LEVEL: 1.39
EGFR: NORMAL
GLUCOSE BLD-MCNC: 138 MG/DL
POTASSIUM SERPL-SCNC: NORMAL MMOL/L
SODIUM BLD-SCNC: NORMAL MMOL/L
TOTAL PROTEIN: NORMAL

## 2024-01-12 ENCOUNTER — OFFICE VISIT (OUTPATIENT)
Dept: ORTHOPEDIC SURGERY | Age: 79
End: 2024-01-12

## 2024-01-12 DIAGNOSIS — Z96.641 S/P TOTAL RIGHT HIP ARTHROPLASTY: Primary | ICD-10-CM

## 2024-01-12 NOTE — PROGRESS NOTES
Jessica returns today status post right total hip on 10/24/2023.  She states overall her hip pain is better but she still has a little bit of discomfort.  She does admit that she has not been doing her exercises and therapy is much as she should be.  She still utilizing a walker in the outside environment.    Physical examination notes on that I can motion her hip relatively indiscriminately without any significant discomfort.  She still has a little bit of weakness on hip flexion as well as knee extension.  She has some more meralgia paresthetica present as well no obvious leg discrepancy    No new x-rays taken today    Impression  Status post a right total hip on 10/24/2023  Plan  I informed the patient that her hip itself seems to be doing well but I think she could really benefit from getting into an exercise program as a this is her biggest antonio right now.  Will have her continue with this and and I would like to see her back here in the next 2 to 3 months call if any problems prior to that time

## 2024-02-09 ENCOUNTER — APPOINTMENT (OUTPATIENT)
Dept: CT IMAGING | Age: 79
End: 2024-02-09
Payer: MEDICARE

## 2024-02-09 ENCOUNTER — APPOINTMENT (OUTPATIENT)
Dept: GENERAL RADIOLOGY | Age: 79
End: 2024-02-09
Payer: MEDICARE

## 2024-02-09 ENCOUNTER — HOSPITAL ENCOUNTER (EMERGENCY)
Age: 79
Discharge: HOME | End: 2024-02-10
Attending: EMERGENCY MEDICINE
Payer: MEDICARE

## 2024-02-09 VITALS
BODY MASS INDEX: 18.81 KG/M2 | OXYGEN SATURATION: 97 % | HEART RATE: 64 BPM | TEMPERATURE: 97.8 F | DIASTOLIC BLOOD PRESSURE: 88 MMHG | HEIGHT: 69 IN | RESPIRATION RATE: 18 BRPM | WEIGHT: 127 LBS | SYSTOLIC BLOOD PRESSURE: 142 MMHG
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
## 2024-02-09 DIAGNOSIS — S01.01XA LACERATION OF SCALP, INITIAL ENCOUNTER: ICD-10-CM

## 2024-02-09 DIAGNOSIS — S09.90XA INJURY OF HEAD, INITIAL ENCOUNTER: Primary | ICD-10-CM

## 2024-02-09 DIAGNOSIS — S40.812A ABRASION OF LEFT UPPER EXTREMITY, INITIAL ENCOUNTER: ICD-10-CM

## 2024-02-09 PROCEDURE — 70450 CT HEAD/BRAIN W/O DYE: CPT

## 2024-02-09 PROCEDURE — 99284 EMERGENCY DEPT VISIT MOD MDM: CPT | Performed by: EMERGENCY MEDICINE

## 2024-02-09 PROCEDURE — 72125 CT NECK SPINE W/O DYE: CPT

## 2024-02-09 PROCEDURE — 71046 X-RAY EXAM CHEST 2 VIEWS: CPT

## 2024-02-09 PROCEDURE — 6370000000 HC RX 637 (ALT 250 FOR IP): Performed by: EMERGENCY MEDICINE

## 2024-02-09 RX ORDER — IBUPROFEN 200 MG
TABLET ORAL ONCE
Status: COMPLETED | OUTPATIENT
Start: 2024-02-09 | End: 2024-02-09

## 2024-02-09 RX ADMIN — NEOMYCIN AND POLYMYXIN B SULFATES AND BACITRACIN ZINC: 400; 3.5; 5 OINTMENT TOPICAL at 23:23

## 2024-02-09 ASSESSMENT — PAIN - FUNCTIONAL ASSESSMENT: PAIN_FUNCTIONAL_ASSESSMENT: 0-10

## 2024-02-09 ASSESSMENT — PAIN SCALES - GENERAL: PAINLEVEL_OUTOF10: 5

## 2024-02-10 LAB
FLUAV AG SPEC QL: NEGATIVE
FLUBV AG SPEC QL: NEGATIVE
SARS-COV-2 RDRP RESP QL NAA+PROBE: NOT DETECTED
SPECIMEN DESCRIPTION: NORMAL
SPECIMEN SOURCE: NORMAL
STREP A, MOLECULAR: NEGATIVE

## 2024-02-10 PROCEDURE — 87635 SARS-COV-2 COVID-19 AMP PRB: CPT

## 2024-02-10 PROCEDURE — 87804 INFLUENZA ASSAY W/OPTIC: CPT

## 2024-02-10 PROCEDURE — 87651 STREP A DNA AMP PROBE: CPT

## 2024-02-10 NOTE — ED PROVIDER NOTES
[facility] Emergency Department  [address]  [facility] [address]  Phone: [phone]  Fax: [phone]  EMERGENCY DEPARTMENT ENCOUNTER      Pt Name: Jessica Avendano  MRN: [id number]  Birthdate 1945  Date of evaluation: 2/9/2024    CHIEF COMPLAINT       Chief Complaint   Patient presents with    Fall     Happened at 2018. Denies LOC, fell and hit head on floor. Sts right leg gave out and she fell backward. Pateint is on eliquis       HISTORY OF PRESENT ILLNESS    Jessica Avendano is a 78 y.o. female who presents to the emergency department via private car with a complaint of head injury.  Patient is in assisted living, had hip surgery in October and since then has to ambulate with a walker because her leg gives out at times.  She states she was using her walker to go to the bathroom and her leg gave out and she fell.  She hit the back of her head.  Did not have any loss of consciousness.  Patient was able to get herself up and noted blood and call her son to let him know.  She denies any headache, or neck pain.  She denies any paresthesias, weakness.  She denies any visual disturbance, or speech difficulties.  She denies any chest pain, shortness of breath.  She denies any syncope.   Patient takes Eliquis.  REVIEW OF SYSTEMS     Review of Systems   All other systems reviewed and are negative.    PAST MEDICAL HISTORY    has a past medical history of AICD (automatic cardioverter/defibrillator) present, Atrial fibrillation (HCC), Atrial fibrillation (HCC), Cancer (HCC), CHF (congestive heart failure) (HCC), Chronic kidney disease, COVID-19, H/O cardiovascular stress test, Hx of long term use of blood thinners, Hypertension, Nonischemic dilated cardiomyopathy (HCC), Other screening mammogram, and Poor historian.    SURGICAL HISTORY      has a past surgical history that includes knee surgery (Right); transesophageal echocardiogram (11/10/2017); other surgical history (10/30/2018); pr  DIAGNOSIS/ MDM:       Differentials Considered but not limited to the following: Headache: Tension headache, migraine headache, cluster headache, sinusitis. Intracranial pathologies such as brain tumor, SAH, encephalitis, meningitis.    Chronic Conditions affecting care (DM,HTN,CA, etc):  see past medical history above    Social Determinants of Health affecting care (unable to care for self, lives alone, unemployed, homeless,etc): Assisted living    History source(s) (patient,spouse,parent,family,friend,EMS,etc): Patient and son    Review of external sources (ECF,Hospital records,EMS report, radiology reports, etc): Hospital records    Tests considered but not ordered: See below    Independent interpretation of tests (eg.  X-ray, CAT scan, Doppler studies, EKG): See below    Discussion of x-ray results with radiology: See below    Consults: See below    Consideration for admission/observation (even if discharged): considered admission, final decision will be based on test results and patient status    Prescription considerations: See below    Critical Care note written: See below    Sepsis considered: Considered, no criteria met      DIAGNOSTIC RESULTS     EKG: All EKG's are interpreted by the Emergency Department Physician who either signs or Co-signs this chart in the absence of a cardiologist.        Not indicated unless otherwise documented above    LABS:  Results for orders placed or performed during the hospital encounter of 02/09/24   COVID-19, Rapid    Specimen: Nasopharyngeal Swab   Result Value Ref Range    Specimen Description .NASOPHARYNGEAL SWAB     SARS-CoV-2, Rapid Not Detected Not Detected   Rapid Strep Screen    Specimen: Throat   Result Value Ref Range    Source .THROAT SWAB     Strep A, Molecular NEGATIVE NEGATIVE   Rapid Influenza A/B Antigens    Specimen: Nasopharyngeal   Result Value Ref Range    Flu A Antigen NEGATIVE NEGATIVE    Flu B Antigen NEGATIVE NEGATIVE       Not indicated unless

## 2024-02-22 ASSESSMENT — PATIENT HEALTH QUESTIONNAIRE - PHQ9
SUM OF ALL RESPONSES TO PHQ QUESTIONS 1-9: 4
7. TROUBLE CONCENTRATING ON THINGS, SUCH AS READING THE NEWSPAPER OR WATCHING TELEVISION: NOT AT ALL
6. FEELING BAD ABOUT YOURSELF - OR THAT YOU ARE A FAILURE OR HAVE LET YOURSELF OR YOUR FAMILY DOWN: NOT AT ALL
SUM OF ALL RESPONSES TO PHQ QUESTIONS 1-9: 4
3. TROUBLE FALLING OR STAYING ASLEEP: MORE THAN HALF THE DAYS
7. TROUBLE CONCENTRATING ON THINGS, SUCH AS READING THE NEWSPAPER OR WATCHING TELEVISION: 0
8. MOVING OR SPEAKING SO SLOWLY THAT OTHER PEOPLE COULD HAVE NOTICED. OR THE OPPOSITE - BEING SO FIDGETY OR RESTLESS THAT YOU HAVE BEEN MOVING AROUND A LOT MORE THAN USUAL: NOT AT ALL
5. POOR APPETITE OR OVEREATING: 0
2. FEELING DOWN, DEPRESSED OR HOPELESS: NOT AT ALL
5. POOR APPETITE OR OVEREATING: NOT AT ALL
4. FEELING TIRED OR HAVING LITTLE ENERGY: 2
SUM OF ALL RESPONSES TO PHQ QUESTIONS 1-9: 4
10. IF YOU CHECKED OFF ANY PROBLEMS, HOW DIFFICULT HAVE THESE PROBLEMS MADE IT FOR YOU TO DO YOUR WORK, TAKE CARE OF THINGS AT HOME, OR GET ALONG WITH OTHER PEOPLE: NOT DIFFICULT AT ALL
8. MOVING OR SPEAKING SO SLOWLY THAT OTHER PEOPLE COULD HAVE NOTICED. OR THE OPPOSITE, BEING SO FIGETY OR RESTLESS THAT YOU HAVE BEEN MOVING AROUND A LOT MORE THAN USUAL: 0
9. THOUGHTS THAT YOU WOULD BE BETTER OFF DEAD, OR OF HURTING YOURSELF: NOT AT ALL
SUM OF ALL RESPONSES TO PHQ QUESTIONS 1-9: 4
4. FEELING TIRED OR HAVING LITTLE ENERGY: MORE THAN HALF THE DAYS
SUM OF ALL RESPONSES TO PHQ QUESTIONS 1-9: 4
SUM OF ALL RESPONSES TO PHQ9 QUESTIONS 1 & 2: 0
1. LITTLE INTEREST OR PLEASURE IN DOING THINGS: NOT AT ALL
1. LITTLE INTEREST OR PLEASURE IN DOING THINGS: 0
10. IF YOU CHECKED OFF ANY PROBLEMS, HOW DIFFICULT HAVE THESE PROBLEMS MADE IT FOR YOU TO DO YOUR WORK, TAKE CARE OF THINGS AT HOME, OR GET ALONG WITH OTHER PEOPLE: 0
6. FEELING BAD ABOUT YOURSELF - OR THAT YOU ARE A FAILURE OR HAVE LET YOURSELF OR YOUR FAMILY DOWN: 0
2. FEELING DOWN, DEPRESSED OR HOPELESS: 0
3. TROUBLE FALLING OR STAYING ASLEEP: 2
9. THOUGHTS THAT YOU WOULD BE BETTER OFF DEAD, OR OF HURTING YOURSELF: 0

## 2024-02-26 ENCOUNTER — OFFICE VISIT (OUTPATIENT)
Dept: PRIMARY CARE CLINIC | Age: 79
End: 2024-02-26
Payer: MEDICARE

## 2024-02-26 ENCOUNTER — HOSPITAL ENCOUNTER (OUTPATIENT)
Age: 79
Setting detail: SPECIMEN
Discharge: HOME | End: 2024-02-26

## 2024-02-26 VITALS
WEIGHT: 127.8 LBS | BODY MASS INDEX: 18.93 KG/M2 | HEART RATE: 82 BPM | SYSTOLIC BLOOD PRESSURE: 124 MMHG | HEIGHT: 69 IN | DIASTOLIC BLOOD PRESSURE: 64 MMHG

## 2024-02-26 DIAGNOSIS — I48.20 CHRONIC ATRIAL FIBRILLATION (HCC): ICD-10-CM

## 2024-02-26 DIAGNOSIS — D64.9 ANEMIA, UNSPECIFIED TYPE: ICD-10-CM

## 2024-02-26 DIAGNOSIS — I10 ESSENTIAL HYPERTENSION: ICD-10-CM

## 2024-02-26 DIAGNOSIS — G89.29 CHRONIC PAIN OF LEFT KNEE: ICD-10-CM

## 2024-02-26 DIAGNOSIS — Z96.641 STATUS POST HIP REPLACEMENT, RIGHT: ICD-10-CM

## 2024-02-26 DIAGNOSIS — M25.562 CHRONIC PAIN OF LEFT KNEE: ICD-10-CM

## 2024-02-26 DIAGNOSIS — M16.11 PRIMARY OSTEOARTHRITIS OF RIGHT HIP: ICD-10-CM

## 2024-02-26 DIAGNOSIS — N18.31 STAGE 3A CHRONIC KIDNEY DISEASE (HCC): ICD-10-CM

## 2024-02-26 DIAGNOSIS — I42.8 NONISCHEMIC CARDIOMYOPATHY (HCC): ICD-10-CM

## 2024-02-26 DIAGNOSIS — I50.22 SYSTOLIC CHF, CHRONIC (HCC): Primary | ICD-10-CM

## 2024-02-26 DIAGNOSIS — I50.22 SYSTOLIC CHF, CHRONIC (HCC): ICD-10-CM

## 2024-02-26 PROCEDURE — G8399 PT W/DXA RESULTS DOCUMENT: HCPCS | Performed by: NURSE PRACTITIONER

## 2024-02-26 PROCEDURE — G8484 FLU IMMUNIZE NO ADMIN: HCPCS | Performed by: NURSE PRACTITIONER

## 2024-02-26 PROCEDURE — 1123F ACP DISCUSS/DSCN MKR DOCD: CPT | Performed by: NURSE PRACTITIONER

## 2024-02-26 PROCEDURE — G8420 CALC BMI NORM PARAMETERS: HCPCS | Performed by: NURSE PRACTITIONER

## 2024-02-26 PROCEDURE — 3078F DIAST BP <80 MM HG: CPT | Performed by: NURSE PRACTITIONER

## 2024-02-26 PROCEDURE — G8427 DOCREV CUR MEDS BY ELIG CLIN: HCPCS | Performed by: NURSE PRACTITIONER

## 2024-02-26 PROCEDURE — 3074F SYST BP LT 130 MM HG: CPT | Performed by: NURSE PRACTITIONER

## 2024-02-26 PROCEDURE — 1036F TOBACCO NON-USER: CPT | Performed by: NURSE PRACTITIONER

## 2024-02-26 PROCEDURE — 1090F PRES/ABSN URINE INCON ASSESS: CPT | Performed by: NURSE PRACTITIONER

## 2024-02-26 PROCEDURE — 99214 OFFICE O/P EST MOD 30 MIN: CPT | Performed by: NURSE PRACTITIONER

## 2024-02-26 SDOH — ECONOMIC STABILITY: FOOD INSECURITY: WITHIN THE PAST 12 MONTHS, THE FOOD YOU BOUGHT JUST DIDN'T LAST AND YOU DIDN'T HAVE MONEY TO GET MORE.: NEVER TRUE

## 2024-02-26 SDOH — ECONOMIC STABILITY: FOOD INSECURITY: WITHIN THE PAST 12 MONTHS, YOU WORRIED THAT YOUR FOOD WOULD RUN OUT BEFORE YOU GOT MONEY TO BUY MORE.: NEVER TRUE

## 2024-02-26 SDOH — ECONOMIC STABILITY: INCOME INSECURITY: HOW HARD IS IT FOR YOU TO PAY FOR THE VERY BASICS LIKE FOOD, HOUSING, MEDICAL CARE, AND HEATING?: NOT HARD AT ALL

## 2024-02-26 ASSESSMENT — ENCOUNTER SYMPTOMS
TROUBLE SWALLOWING: 0
WHEEZING: 0
COUGH: 0
SORE THROAT: 0
SHORTNESS OF BREATH: 0
BLOOD IN STOOL: 0
NAUSEA: 0
CONSTIPATION: 0
SINUS PRESSURE: 0
ABDOMINAL PAIN: 0
VOMITING: 0
DIARRHEA: 0

## 2024-02-26 NOTE — PROGRESS NOTES
[facility]  [facility] PRIMARY CARE  [address] [place] [address] [address]  [address]  [facility] [address]  Dept: [phone]  Dept Fax: [phone]    Jesisca Avendano is a 78 y.o. female who presentstoday for her medical conditions/complaints as noted below.  Jessica Avendano is c/o of  Chief Complaint   Patient presents with    Hypertension    Leg Pain     Right hip pain that radiates down to her knee. Takes tylenol. Would like to discuss medication for this. Describes pain as aching and if she sits down then it stops    balance     States that her balance has been off when she goes to get up to walk. States that her right leg wants to give out all the time           HPI:     Here today for follow up with her son  Since her last appt in August she had right hip replacement  She was in rehab initially and had several weeks of PT  She then developed covid and was rx'd paxlovid by the assisted living facility she was in  Had an adverse reaction and required hospitalization  She then was in rehab an additional week around Thanksgiving/early December  She was then stable for a few months  She did experience a fall a couple of weeks ago, hit her head so assisted living facility had her transported for evaluation.  She was not admitted at that time  States she continues to have some weakness in her right leg.  Her hip is no longer painful but the leg still feels weak at times, particularly towards the end of day  She also has a lot of pain in her left knee  She admits noncompliance with her home PT regimen  Physical therapy was ordered to be provided by the assisted living facility but son reports they have not been able to establish this yet  She will follow-up with her orthopedic surgeon again in April    Has appointment with cardiologist in April for follow-up, has been stable on anticoagulation    Hypertension  This is a chronic problem. The current episode started more than 1 year ago. The problem is

## 2024-02-27 LAB
ALBUMIN SERPL-MCNC: 4 G/DL (ref 3.5–5.2)
ALBUMIN/GLOB SERPL: 1.3 {RATIO} (ref 1–2.5)
ALP SERPL-CCNC: 88 U/L (ref 35–104)
ALT SERPL-CCNC: 33 U/L (ref 5–33)
ANION GAP SERPL CALCULATED.3IONS-SCNC: 16 MMOL/L (ref 9–17)
AST SERPL-CCNC: 29 U/L
BASOPHILS # BLD: 0.07 K/UL (ref 0–0.2)
BASOPHILS NFR BLD: 1 % (ref 0–2)
BILIRUB SERPL-MCNC: 0.9 MG/DL (ref 0.3–1.2)
BUN SERPL-MCNC: 18 MG/DL (ref 8–23)
CALCIUM SERPL-MCNC: 9.5 MG/DL (ref 8.6–10.4)
CHLORIDE SERPL-SCNC: 98 MMOL/L (ref 98–107)
CO2 SERPL-SCNC: 26 MMOL/L (ref 20–31)
CREAT SERPL-MCNC: 1.2 MG/DL (ref 0.5–0.9)
EOSINOPHIL # BLD: 0.09 K/UL (ref 0–0.44)
EOSINOPHILS RELATIVE PERCENT: 1 % (ref 1–4)
ERYTHROCYTE [DISTWIDTH] IN BLOOD BY AUTOMATED COUNT: 15.9 % (ref 11.8–14.4)
FERRITIN SERPL-MCNC: 36 NG/ML (ref 13–150)
GFR SERPL CREATININE-BSD FRML MDRD: 47 ML/MIN/1.73M2
GLUCOSE SERPL-MCNC: 103 MG/DL (ref 70–99)
HCT VFR BLD AUTO: 45.8 % (ref 36.3–47.1)
HGB BLD-MCNC: 13.6 G/DL (ref 11.9–15.1)
IMM GRANULOCYTES # BLD AUTO: <0.03 K/UL (ref 0–0.3)
IMM GRANULOCYTES NFR BLD: 0 %
IRON SATN MFR SERPL: 9 % (ref 20–55)
IRON SERPL-MCNC: 37 UG/DL (ref 37–145)
LYMPHOCYTES NFR BLD: 2.08 K/UL (ref 1.1–3.7)
LYMPHOCYTES RELATIVE PERCENT: 27 % (ref 24–43)
MCH RBC QN AUTO: 27.3 PG (ref 25.2–33.5)
MCHC RBC AUTO-ENTMCNC: 29.7 G/DL (ref 28.4–34.8)
MCV RBC AUTO: 91.8 FL (ref 82.6–102.9)
MONOCYTES NFR BLD: 0.75 K/UL (ref 0.1–1.2)
MONOCYTES NFR BLD: 10 % (ref 3–12)
NEUTROPHILS NFR BLD: 61 % (ref 36–65)
NEUTS SEG NFR BLD: 4.85 K/UL (ref 1.5–8.1)
NRBC BLD-RTO: 0 PER 100 WBC
PLATELET # BLD AUTO: 318 K/UL (ref 138–453)
PMV BLD AUTO: 10.6 FL (ref 8.1–13.5)
POTASSIUM SERPL-SCNC: 4.5 MMOL/L (ref 3.7–5.3)
PROT SERPL-MCNC: 7.1 G/DL (ref 6.4–8.3)
RBC # BLD AUTO: 4.99 M/UL (ref 3.95–5.11)
RBC # BLD: ABNORMAL 10*6/UL
SODIUM SERPL-SCNC: 140 MMOL/L (ref 135–144)
TIBC SERPL-MCNC: 402 UG/DL (ref 250–450)
UNSATURATED IRON BINDING CAPACITY: 365 UG/DL (ref 112–347)
WBC OTHER # BLD: 7.9 K/UL (ref 3.5–11.3)

## 2024-03-14 ENCOUNTER — TELEPHONE (OUTPATIENT)
Dept: PRIMARY CARE CLINIC | Age: 79
End: 2024-03-14

## 2024-03-14 RX ORDER — DULOXETIN HYDROCHLORIDE 30 MG/1
30 CAPSULE, DELAYED RELEASE ORAL DAILY
Qty: 90 CAPSULE | Refills: 3 | Status: SHIPPED | OUTPATIENT
Start: 2024-03-14

## 2024-03-14 NOTE — TELEPHONE ENCOUNTER
Pt daughter called and said where Pt is living now the nurse there needs Pt updated medication list faxed. Faxing to [facility], [phone].  faxed

## 2024-03-20 ENCOUNTER — TELEPHONE (OUTPATIENT)
Dept: PRIMARY CARE CLINIC | Age: 79
End: 2024-03-20

## 2024-03-20 DIAGNOSIS — R41.82 ALTERED MENTAL STATUS, UNSPECIFIED ALTERED MENTAL STATUS TYPE: Primary | ICD-10-CM

## 2024-03-20 NOTE — TELEPHONE ENCOUNTER
Ruby from patients independent living facility called and wants PCP to be aware that she is declining in overall health. She is now forgetting to take her medications, won't walk, isn't talking.     Ruby would like to know if we can get her scheduled for an appointment with her PCP asap. She also wants to know if PCP can place a referral for PT in the mean time to help get her mobility back. Ruby is concerned that patient might have a UTI and she states they are able to collect a sample or blood work if PCP needs.     Ruby is also sending over paperwork for PCP and MA to fill out in regards to the PT referral.     When we find an appointment for patient please call her son Jeet back and let him know the options since he is in charge of transportation for her    Please advise

## 2024-03-20 NOTE — TELEPHONE ENCOUNTER
----- Message from Nathalia Sriram sent at 3/20/2024  9:41 AM EDT -----  Subject: Appointment Request    Reason for Call: New Patient/New to Provider Appointment needed: New   Patient Request Appointment    QUESTIONS    Reason for appointment request? No appointments available during search     Additional Information for Provider? pts son called in to get her a np   appt with dr vance. No appts were available at the time of the call.   Please call her son Jeet to set up a time for her to be seen  ---------------------------------------------------------------------------  --------------  CALL BACK INFO  [phone]; OK to leave message on voicemail  ---------------------------------------------------------------------------  --------------  SCRIPT ANSWERS

## 2024-03-20 NOTE — TELEPHONE ENCOUNTER
Please let them know I have placed orders urine culture and UA  She does not need labs as had normal labs about 4 weeks ago when she was in office  See if son can bring her in Friday at 940 or 340. The pt currently in the 940 appt slot will not be coming but do not open this until we check if Alice can come at that time  Provide verbal order to start PT and we will sign whatever they need once received  Thanks

## 2024-03-21 NOTE — TELEPHONE ENCOUNTER
Pt son malinda called . He states he never talked to anyone or called anyone. Said he doesn't think his brother did either has he lives in [place]. Said he would call him but he's 90% sure he did not. Said can disregard note. Tried calling Ruby back at [facility] but had to [facility]. [phone] ) . Will need to ask her if she knows anything going on and that her son said he never called.

## 2024-03-22 ENCOUNTER — TELEPHONE (OUTPATIENT)
Dept: PRIMARY CARE CLINIC | Age: 79
End: 2024-03-22

## 2024-03-22 DIAGNOSIS — I48.20 CHRONIC ATRIAL FIBRILLATION (HCC): ICD-10-CM

## 2024-03-22 RX ORDER — CARVEDILOL 6.25 MG/1
6.25 TABLET ORAL DAILY
Qty: 60 TABLET | Refills: 3 | Status: CANCELLED | OUTPATIENT
Start: 2024-03-22

## 2024-03-22 NOTE — TELEPHONE ENCOUNTER
Whitley from [facility] in [place] called regarding patient's medication dosages. Due to patient's memory deficits, she is forgetting to take second dose of coreg and eliquis. Whitley is requesting if PCP would be ok increasing dosage of the coreg and eliquis so patient would only have to take the medication once a day. Whitley provided manager's, Ruby, phone number to call back with PCP response: [phone]. New scripts will need to be sent in to pharmacy if PCP is ok to increase    Whitley also asked if patient has an upcoming appt w/ PCP scheduled. Writer provided next OV date that is scheduled. Whitley verbalized understanding    Please advise

## 2024-03-22 NOTE — TELEPHONE ENCOUNTER
Unfortunately, both of these medications need to be administered twice a day because of how they are metabolized in the body.  She does have an appointment with her cardiologist coming up 4/24, dose adjustments or alternatives may be possible at that visit.  Please let the son know to discuss  Also let Chiquis know that we did speak with the patient's son and he is not concerned about any recent change in condition.  She will follow-up as planned 6/27

## 2024-03-25 DIAGNOSIS — R41.82 ALTERED MENTAL STATUS, UNSPECIFIED ALTERED MENTAL STATUS TYPE: Primary | ICD-10-CM

## 2024-03-25 DIAGNOSIS — R41.89 COGNITIVE AND BEHAVIORAL CHANGES: ICD-10-CM

## 2024-03-25 DIAGNOSIS — R46.89 COGNITIVE AND BEHAVIORAL CHANGES: ICD-10-CM

## 2024-03-25 NOTE — TELEPHONE ENCOUNTER
Pt daughter aurea called and asked for urine orders for uti. Advised they were placed other day and I will get them faxed to [facility], fax [phone]

## 2024-03-27 ENCOUNTER — TELEPHONE (OUTPATIENT)
Dept: PRIMARY CARE CLINIC | Age: 79
End: 2024-03-27

## 2024-03-27 DIAGNOSIS — R41.82 ALTERED MENTAL STATUS, UNSPECIFIED ALTERED MENTAL STATUS TYPE: Primary | ICD-10-CM

## 2024-03-27 DIAGNOSIS — I48.20 CHRONIC ATRIAL FIBRILLATION (HCC): ICD-10-CM

## 2024-03-27 NOTE — TELEPHONE ENCOUNTER
Number Of Prepaid Treatments (Will Not Render If 0): 0 Patient: Stephanie Cadena Date of Service: 2019   : 1996 MRN: [id number]       No chief complaint on file.         PPD skin test read: 19 4:48 PM    Results: Negative, 0 mm induration.    A negative test usually means the person is not infected. However, the test may be falsely negative if a person was infected recently. It usually takes 2-10 weeks after exposure to a person with TB disease for the skin test to react as positive. Recommend retested in 3 months, if indicated.     Even with a negative skin test, the presence of TB symptoms should be evaluated by the primary care provider: fever, coughing up blood, sweating at night, feeling tired, not wanting to eat, unexplained weight loss    Verbalized Understanding: Patient verbalized understanding and agrees.     Stephanie Cadena presents today for a reading of her Mantoux Tuberculin Skin Test.    See lab tab for result.     Signed: Sydnee Murray, CNP     Ruby the wellness nurse with [facility]  called stated she would like a dig order, CBC and CMP order and urine culture for patient.     Att:Ruby(Wellness)    Ruby  Phone:[phone]  Fax:[phone]   Detail Level: Simple Were Eye Shields Employed?: No Pulse Duration: 3 ms Fluence (Will Not Render If 0): 16 Cooling: DCD 30/20 Fluence (Will Not Render If 0): 20 Cooling Override: 20/10 Fluence (Will Not Render If 0): 10 Laser Type: Nd:Yag 1064nm Pulse Duration: 10 ms Spot Size: 15 mm Fluence (Will Not Render If 0): 22 Price (Use Numbers Only, No Special Characters Or $): 100.00 Spot Size: 18 mm Eye Shield Text: Given the treatment area eye shields were inserted prior to treatment. Cooling: DCD setting

## 2024-03-31 SDOH — HEALTH STABILITY: PHYSICAL HEALTH: ON AVERAGE, HOW MANY DAYS PER WEEK DO YOU ENGAGE IN MODERATE TO STRENUOUS EXERCISE (LIKE A BRISK WALK)?: 0 DAYS

## 2024-04-01 ENCOUNTER — TELEPHONE (OUTPATIENT)
Dept: NEUROLOGY | Age: 79
End: 2024-04-01

## 2024-04-01 NOTE — TELEPHONE ENCOUNTER
04 01 2024 I called the patient times 2 (03 25 2024 and 04 01 2024 at  [phone]) to schedule new patient appointment with one of our providers, needed a remote access code both times, no response.  I mailed the patient a letter asking them to call the office back to schedule this appointment.  KS

## 2024-04-03 ENCOUNTER — OFFICE VISIT (OUTPATIENT)
Dept: PRIMARY CARE CLINIC | Age: 79
End: 2024-04-03

## 2024-04-03 VITALS
HEIGHT: 69 IN | OXYGEN SATURATION: 80 % | DIASTOLIC BLOOD PRESSURE: 80 MMHG | WEIGHT: 108.6 LBS | SYSTOLIC BLOOD PRESSURE: 110 MMHG | TEMPERATURE: 98.3 F | BODY MASS INDEX: 16.08 KG/M2 | HEART RATE: 84 BPM

## 2024-04-03 DIAGNOSIS — N18.31 STAGE 3A CHRONIC KIDNEY DISEASE (HCC): ICD-10-CM

## 2024-04-03 DIAGNOSIS — R63.4 WEIGHT LOSS: ICD-10-CM

## 2024-04-03 DIAGNOSIS — I10 ESSENTIAL HYPERTENSION: Primary | ICD-10-CM

## 2024-04-03 DIAGNOSIS — Z51.81 THERAPEUTIC DRUG MONITORING: ICD-10-CM

## 2024-04-03 DIAGNOSIS — M25.561 ACUTE PAIN OF RIGHT KNEE: ICD-10-CM

## 2024-04-03 DIAGNOSIS — M62.81 MUSCLE WEAKNESS: ICD-10-CM

## 2024-04-03 DIAGNOSIS — R41.82 ALTERED MENTAL STATUS, UNSPECIFIED ALTERED MENTAL STATUS TYPE: ICD-10-CM

## 2024-04-03 DIAGNOSIS — I11.0 HYPERTENSIVE HEART DISEASE WITH HEART FAILURE (HCC): ICD-10-CM

## 2024-04-03 RX ORDER — LOPERAMIDE HYDROCHLORIDE 2 MG/1
2 CAPSULE ORAL 2 TIMES DAILY
COMMUNITY

## 2024-04-03 RX ORDER — LIDOCAINE 4 G/G
1 PATCH TOPICAL DAILY
Qty: 30 PATCH | Refills: 1 | Status: SHIPPED | OUTPATIENT
Start: 2024-04-03 | End: 2024-06-02

## 2024-04-03 RX ORDER — ACETAMINOPHEN 500 MG
500 TABLET ORAL 2 TIMES DAILY
Qty: 1 TABLET | Refills: 0
Start: 2024-04-03

## 2024-04-03 RX ORDER — MIRTAZAPINE 7.5 MG/1
7.5 TABLET, FILM COATED ORAL NIGHTLY
Qty: 30 TABLET | Refills: 3 | Status: SHIPPED | OUTPATIENT
Start: 2024-04-03

## 2024-04-03 ASSESSMENT — ENCOUNTER SYMPTOMS
DIARRHEA: 0
EYE REDNESS: 0
NAUSEA: 0
ABDOMINAL PAIN: 0
SORE THROAT: 0
RHINORRHEA: 0
VOMITING: 0
CONSTIPATION: 0
WHEEZING: 0
SHORTNESS OF BREATH: 0
COUGH: 0

## 2024-04-03 NOTE — PROGRESS NOTES
[facility]  [facility] PRIMARY CARE  [address] [address]  Dept: [phone]    Jessica Avendano is a 78 y.o. female Established patient, who presents today for her medical conditions/complaints as noted below.      Chief Complaint   Patient presents with    New Patient     They have noticed a huge decline in her condition in the last 2 weeks       HPI:     HPI   Patient is new to us as primary care provider in office.  We have seen her in the nursing home.    Independent living nurse her with her.   She started receiving independent nurse services about 3 weeks ago.   Nurse gives her morning meds and she is to take night time meds on her own, however it is hit an miss.    She is not getting off the couch to go to dining room.  She has no nutritional food in refrigerator.    She has lost about 20 lbs  in the past couple months.  Cognitively she is not as sharp as usual.    Reviewed prior notes: Previous PCP   Reviewed previous:  Labs    LDL Calculated (mg/dL)   Date Value   10/05/2017 77       (goal LDL is <100)   AST (U/L)   Date Value   02/26/2024 29     ALT (U/L)   Date Value   02/26/2024 33     BUN (mg/dL)   Date Value   02/26/2024 18     Hemoglobin A1C (%)   Date Value   01/07/2020 4.9     TSH (mIU/L)   Date Value   07/08/2021 0.77     BP Readings from Last 3 Encounters:   04/03/24 110/80   02/26/24 124/64   02/09/24 (!) 142/88          (goal 120/80)    Past Medical History:   Diagnosis Date    AICD (automatic cardioverter/defibrillator) present     April 6. 2018. Dr. Mecca Maddox     Atrial fibrillation (HCC) 09/2012    Dr.David Lucas  CHRONIC ON ELIQUIS    Atrial fibrillation (HCC)     Cancer (HCC)     basal cell on face    CHF (congestive heart failure) (HCC)     Chronic kidney disease     COVID-19     H/O cardiovascular stress test 07/26/2017    Neg. EF 24%    Hx of long term use of blood thinners     Hypertension     Nonischemic dilated cardiomyopathy (HCC)

## 2024-04-03 NOTE — PATIENT INSTRUCTIONS
Complete lab work    Hold torsemide x 7 days    Lidocaine patch 4% to right knee -  apply in the morning and off in the evening    Start mirtazapine 7.5 mg nightly    Aide to assist with showers 2x/day    May start physical therapy if feeling a little better in 2 weeks.

## 2024-04-04 ENCOUNTER — TELEPHONE (OUTPATIENT)
Dept: ORTHOPEDIC SURGERY | Age: 79
End: 2024-04-04

## 2024-04-04 LAB
ALBUMIN SERPL-MCNC: NORMAL G/DL
ALP BLD-CCNC: NORMAL U/L
ALT SERPL-CCNC: NORMAL U/L
ANION GAP SERPL CALCULATED.3IONS-SCNC: NORMAL MMOL/L
AST SERPL-CCNC: NORMAL U/L
BILIRUB SERPL-MCNC: NORMAL MG/DL
BUN BLDV-MCNC: NORMAL MG/DL
CALCIUM SERPL-MCNC: NORMAL MG/DL
CHLORIDE BLD-SCNC: NORMAL MMOL/L
CO2: NORMAL
CREAT SERPL-MCNC: NORMAL MG/DL
EGFR: NORMAL
GLUCOSE BLD-MCNC: 138 MG/DL
POTASSIUM SERPL-SCNC: NORMAL MMOL/L
SODIUM BLD-SCNC: NORMAL MMOL/L
TOTAL PROTEIN: NORMAL

## 2024-04-04 NOTE — TELEPHONE ENCOUNTER
LYNDA - received call from patients son Jeet requesting to cancel pts follow up appt w/ Dr. Lala on 4/26/2024 - stated patients health is declining rapidly and they do not expect her to make it much longer, son will call back to r/s if things change.

## 2024-04-05 ENCOUNTER — TELEPHONE (OUTPATIENT)
Dept: PRIMARY CARE CLINIC | Age: 79
End: 2024-04-05

## 2024-04-05 ENCOUNTER — HOSPITAL ENCOUNTER (OUTPATIENT)
Age: 79
Setting detail: SPECIMEN
Discharge: HOME | End: 2024-04-05

## 2024-04-05 DIAGNOSIS — R41.82 ALTERED MENTAL STATUS, UNSPECIFIED ALTERED MENTAL STATUS TYPE: ICD-10-CM

## 2024-04-05 LAB
BILIRUBIN, POC: NEGATIVE
BLOOD URINE, POC: NEGATIVE
CLARITY, POC: ABNORMAL
COLOR, POC: YELLOW
GLUCOSE URINE, POC: NEGATIVE
KETONES, POC: NEGATIVE
LEUKOCYTE EST, POC: ABNORMAL
NITRITE, POC: NEGATIVE
PH, POC: 5.5
PROTEIN, POC: NEGATIVE
SPECIFIC GRAVITY, POC: 1.01
UROBILINOGEN, POC: ABNORMAL

## 2024-04-05 RX ORDER — CEPHALEXIN 500 MG/1
500 CAPSULE ORAL 3 TIMES DAILY
Qty: 24 CAPSULE | Refills: 0 | Status: SHIPPED | OUTPATIENT
Start: 2024-04-05 | End: 2024-04-13

## 2024-04-05 NOTE — TELEPHONE ENCOUNTER
Ruby wellness nurse at [facility] calling asked for the UA results and states that because she has trace leukocytes that she has a UTI and needs an abx. Pharm wisam arenas

## 2024-04-06 LAB
MICROORGANISM SPEC CULT: NORMAL
SPECIMEN DESCRIPTION: NORMAL

## 2024-04-10 ENCOUNTER — APPOINTMENT (OUTPATIENT)
Dept: CT IMAGING | Age: 79
End: 2024-04-10
Payer: MEDICARE

## 2024-04-10 ENCOUNTER — HOSPITAL ENCOUNTER (EMERGENCY)
Age: 79
Discharge: HOME | End: 2024-04-11
Attending: EMERGENCY MEDICINE
Payer: MEDICARE

## 2024-04-10 ENCOUNTER — APPOINTMENT (OUTPATIENT)
Dept: GENERAL RADIOLOGY | Age: 79
End: 2024-04-10
Payer: MEDICARE

## 2024-04-10 DIAGNOSIS — E86.0 DEHYDRATION: Primary | ICD-10-CM

## 2024-04-10 DIAGNOSIS — N18.31 STAGE 3A CHRONIC KIDNEY DISEASE (HCC): ICD-10-CM

## 2024-04-10 DIAGNOSIS — R63.4 WEIGHT LOSS, ABNORMAL: ICD-10-CM

## 2024-04-10 DIAGNOSIS — I10 ESSENTIAL HYPERTENSION: ICD-10-CM

## 2024-04-10 DIAGNOSIS — R53.1 GENERALIZED WEAKNESS: ICD-10-CM

## 2024-04-10 DIAGNOSIS — R41.82 ALTERED MENTAL STATUS, UNSPECIFIED ALTERED MENTAL STATUS TYPE: ICD-10-CM

## 2024-04-10 DIAGNOSIS — R63.4 WEIGHT LOSS: ICD-10-CM

## 2024-04-10 LAB
ALBUMIN SERPL-MCNC: 3.8 G/DL (ref 3.5–5.2)
ALBUMIN/GLOB SERPL: 1.1 {RATIO} (ref 1–2.5)
ALP SERPL-CCNC: 87 U/L (ref 35–104)
ALT SERPL-CCNC: 20 U/L (ref 5–33)
ANION GAP SERPL CALCULATED.3IONS-SCNC: 11 MMOL/L (ref 9–17)
AST SERPL-CCNC: 31 U/L
BASOPHILS # BLD: 0.1 K/UL (ref 0–0.2)
BASOPHILS NFR BLD: 1 % (ref 0–2)
BILIRUB SERPL-MCNC: 0.3 MG/DL (ref 0.3–1.2)
BILIRUB UR QL STRIP: NEGATIVE
BUN SERPL-MCNC: 22 MG/DL (ref 8–23)
CALCIUM SERPL-MCNC: 10.1 MG/DL (ref 8.6–10.4)
CHLORIDE SERPL-SCNC: 101 MMOL/L (ref 98–107)
CLARITY UR: CLEAR
CO2 SERPL-SCNC: 30 MMOL/L (ref 20–31)
COLOR UR: YELLOW
COMMENT: ABNORMAL
CREAT SERPL-MCNC: 1 MG/DL (ref 0.5–0.9)
EOSINOPHIL # BLD: 0.1 K/UL (ref 0–0.4)
EOSINOPHILS RELATIVE PERCENT: 2 % (ref 1–4)
ERYTHROCYTE [DISTWIDTH] IN BLOOD BY AUTOMATED COUNT: 17.3 % (ref 12.5–15.4)
GFR SERPL CREATININE-BSD FRML MDRD: 58 ML/MIN/1.73M2
GLUCOSE SERPL-MCNC: 123 MG/DL (ref 70–99)
GLUCOSE UR STRIP-MCNC: NEGATIVE MG/DL
HCT VFR BLD AUTO: 44.1 % (ref 36–46)
HGB BLD-MCNC: 14.4 G/DL (ref 12–16)
HGB UR QL STRIP.AUTO: NEGATIVE
KETONES UR STRIP-MCNC: ABNORMAL MG/DL
LEUKOCYTE ESTERASE UR QL STRIP: NEGATIVE
LIPASE SERPL-CCNC: 50 U/L (ref 13–60)
LYMPHOCYTES NFR BLD: 2.1 K/UL (ref 1–4.8)
LYMPHOCYTES RELATIVE PERCENT: 28 % (ref 24–44)
MCH RBC QN AUTO: 27.2 PG (ref 26–34)
MCHC RBC AUTO-ENTMCNC: 32.6 G/DL (ref 31–37)
MCV RBC AUTO: 83.5 FL (ref 80–100)
MONOCYTES NFR BLD: 0.6 K/UL (ref 0.1–1.2)
MONOCYTES NFR BLD: 8 % (ref 2–11)
NEUTROPHILS NFR BLD: 61 % (ref 36–66)
NEUTS SEG NFR BLD: 4.7 K/UL (ref 1.8–7.7)
NITRITE UR QL STRIP: NEGATIVE
PH UR STRIP: 8 [PH] (ref 5–8)
PLATELET # BLD AUTO: 403 K/UL (ref 140–450)
PMV BLD AUTO: 7.6 FL (ref 6–12)
POTASSIUM SERPL-SCNC: 4.5 MMOL/L (ref 3.7–5.3)
PROT SERPL-MCNC: 7.3 G/DL (ref 6.4–8.3)
PROT UR STRIP-MCNC: NEGATIVE MG/DL
RBC # BLD AUTO: 5.28 M/UL (ref 4–5.2)
SODIUM SERPL-SCNC: 142 MMOL/L (ref 135–144)
SP GR UR STRIP: 1.01 (ref 1–1.03)
TROPONIN I SERPL HS-MCNC: 35 NG/L (ref 0–14)
TROPONIN I SERPL HS-MCNC: 41 NG/L (ref 0–14)
UROBILINOGEN UR STRIP-ACNC: NORMAL EU/DL (ref 0–1)
WBC OTHER # BLD: 7.7 K/UL (ref 3.5–11)

## 2024-04-10 PROCEDURE — 81003 URINALYSIS AUTO W/O SCOPE: CPT

## 2024-04-10 PROCEDURE — 97116 GAIT TRAINING THERAPY: CPT

## 2024-04-10 PROCEDURE — 83690 ASSAY OF LIPASE: CPT

## 2024-04-10 PROCEDURE — 97162 PT EVAL MOD COMPLEX 30 MIN: CPT

## 2024-04-10 PROCEDURE — 70450 CT HEAD/BRAIN W/O DYE: CPT

## 2024-04-10 PROCEDURE — 74176 CT ABD & PELVIS W/O CONTRAST: CPT

## 2024-04-10 PROCEDURE — 96360 HYDRATION IV INFUSION INIT: CPT

## 2024-04-10 PROCEDURE — 97530 THERAPEUTIC ACTIVITIES: CPT

## 2024-04-10 PROCEDURE — 99285 EMERGENCY DEPT VISIT HI MDM: CPT

## 2024-04-10 PROCEDURE — 97166 OT EVAL MOD COMPLEX 45 MIN: CPT

## 2024-04-10 PROCEDURE — 36415 COLL VENOUS BLD VENIPUNCTURE: CPT

## 2024-04-10 PROCEDURE — 96361 HYDRATE IV INFUSION ADD-ON: CPT

## 2024-04-10 PROCEDURE — 84484 ASSAY OF TROPONIN QUANT: CPT

## 2024-04-10 PROCEDURE — 80053 COMPREHEN METABOLIC PANEL: CPT

## 2024-04-10 PROCEDURE — 71045 X-RAY EXAM CHEST 1 VIEW: CPT

## 2024-04-10 PROCEDURE — 2580000003 HC RX 258: Performed by: NURSE PRACTITIONER

## 2024-04-10 PROCEDURE — 87086 URINE CULTURE/COLONY COUNT: CPT

## 2024-04-10 PROCEDURE — 85025 COMPLETE CBC W/AUTO DIFF WBC: CPT

## 2024-04-10 RX ORDER — 0.9 % SODIUM CHLORIDE 0.9 %
1000 INTRAVENOUS SOLUTION INTRAVENOUS ONCE
Status: COMPLETED | OUTPATIENT
Start: 2024-04-10 | End: 2024-04-10

## 2024-04-10 RX ORDER — THIAMINE MONONITRATE (VIT B1) 100 MG
100 TABLET ORAL DAILY
COMMUNITY

## 2024-04-10 RX ORDER — ASCORBIC ACID 500 MG
500 TABLET ORAL DAILY
COMMUNITY

## 2024-04-10 RX ADMIN — SODIUM CHLORIDE 1000 ML: 9 INJECTION, SOLUTION INTRAVENOUS at 09:15

## 2024-04-10 ASSESSMENT — ENCOUNTER SYMPTOMS
COUGH: 0
ABDOMINAL PAIN: 0
NAUSEA: 0
SHORTNESS OF BREATH: 0
BACK PAIN: 0
SORE THROAT: 0
DIARRHEA: 0
VOMITING: 0

## 2024-04-10 NOTE — ED PROVIDER NOTES
[facility] Emergency Department  [address]  [facility] [address]  Phone: [phone]  Fax: [phone]      Attending Physician Attestation    I performed a history and physical examination of the patient and discussed management with the mid level provider. I reviewed the mid level provider's note and agree with the documented findings and plan of care. Any areas of disagreement are noted on the chart. I was personally present for the key portions of any procedures. I have documented in the chart those procedures where I was not present during the key portions. I have reviewed the emergency nurses triage note. I agree with the chief complaint, past medical history, past surgical history, allergies, medications, social and family history as documented unless otherwise noted below. Documentation of the HPI, Physical Exam and Medical Decision Making performed by mid level providers is based on my personal performance of the HPI, PE and MDM. For Physician Assistant/ Nurse Practitioner cases/documentation I have personally evaluated this patient and have completed at least one if not all key elements of the E/M (history, physical exam, and MDM). Additional findings are as noted.      CHIEF COMPLAINT       Chief Complaint   Patient presents with    Failure To Thrive     Pt has had mental changes and unable to care for herself.  Pt being treated for uti         HISTORY OF PRESENT ILLNESS    Jessica Avendano is a 78 y.o. female who presents to the emergency department today because of failure to thrive.  Patient was recently diagnosed with urinary tract infection.  Despite treatment with Keflex, family and nursing home feels that she is not getting any better.  She is sitting up not able to get up move around.  She not eating or drinking.  Patient has no complaints and is pleasantly confused here.      PAST MEDICAL HISTORY    has a past medical history of AICD (automatic  cardioverter/defibrillator) present, Atrial fibrillation (HCC), Atrial fibrillation (HCC), Cancer (HCC), CHF (congestive heart failure) (HCC), Chronic kidney disease, COVID-19, H/O cardiovascular stress test, Hx of long term use of blood thinners, Hypertension, Nonischemic dilated cardiomyopathy (HCC), Other screening mammogram, and Poor historian.    SURGICAL HISTORY      has a past surgical history that includes knee surgery (Right); transesophageal echocardiogram (11/10/2017); other surgical history (10/30/2018); pr office/outpt visit,procedure only (Right, 10/30/2018); Cardiac catheterization (08/2017); Cardiac defibrillator placement (04/06/2018); Colonoscopy (N/A, 02/06/2019); pacemaker placement; Cardiac defibrillator placement (2018); Toe amputation (Left, 10/19/2021); Toe amputation (Left, 10/19/2021); and Total hip arthroplasty (Right, 10/24/2023).    CURRENT MEDICATIONS       Previous Medications    ACETAMINOPHEN (TYLENOL) 500 MG TABLET    Take 1 tablet by mouth in the morning and 1 tablet in the evening.    APIXABAN (ELIQUIS) 5 MG TABS TABLET    TAKE 1 TABLET TWICE DAILY    BIOTIN 5 MG CAPS    Take 5,000 mcg by mouth daily    CALCIUM CARBONATE (OSCAL) 500 MG TABS TABLET    Take 600 mg by mouth daily     CARVEDILOL (COREG) 3.125 MG TABLET    TAKE 1 TABLET TWICE DAILY WITH MEALS    CEPHALEXIN (KEFLEX) 500 MG CAPSULE    Take 1 capsule by mouth 3 times daily for 8 days    DIGOXIN (LANOXIN) 125 MCG TABLET    TAKE 1 TABLET BY MOUTH DAILY MONDAY THROUGH FRIDAY    DULOXETINE (CYMBALTA) 30 MG EXTENDED RELEASE CAPSULE    Take 1 capsule by mouth daily    HANDICAP PLACARD MISC    by Does not apply route Duration:5 years ending 10/27/2026    LIDOCAINE 4 % EXTERNAL PATCH    Place 1 patch onto the skin daily    LOPERAMIDE (ANTI-DIARRHEAL) 1 MG/5ML SOLUTION    Take by mouth 4 times daily as needed for Diarrhea    LOPERAMIDE (IMODIUM) 2 MG CAPSULE    Take 1 capsule by mouth in the morning and 1 capsule in the evening.

## 2024-04-10 NOTE — PROGRESS NOTES
Physical Therapy  Facility/Department: [facility] EMERGENCY DEPARTMENT  Physical Therapy Initial Assessment    Name: Jessica Avendano  : 1945  MRN: [id number]  Date of Service: 4/10/2024    Discharge Recommendations:  Patient would benefit from continued therapy after discharge   PT Equipment Recommendations  Equipment Needed: No (has RW that PT recommends the patient use at all times)      Chief Complaint   Patient presents with    Failure To Thrive     Pt has had mental changes and unable to care for herself.  Pt being treated for uti     Patient Diagnosis(es): The primary encounter diagnosis was Dehydration. Diagnoses of Weight loss, abnormal, Generalized weakness, and Altered mental status, unspecified altered mental status type were also pertinent to this visit.  Past Medical History:  has a past medical history of AICD (automatic cardioverter/defibrillator) present, Atrial fibrillation (HCC), Atrial fibrillation (HCC), Cancer (HCC), CHF (congestive heart failure) (HCC), Chronic kidney disease, COVID-19, H/O cardiovascular stress test, Hx of long term use of blood thinners, Hypertension, Nonischemic dilated cardiomyopathy (HCC), Other screening mammogram, and Poor historian.  Past Surgical History:  has a past surgical history that includes knee surgery (Right); transesophageal echocardiogram (11/10/2017); other surgical history (10/30/2018); pr office/outpt visit,procedure only (Right, 10/30/2018); Cardiac catheterization (2017); Cardiac defibrillator placement (2018); Colonoscopy (N/A, 2019); pacemaker placement; Cardiac defibrillator placement (); Toe amputation (Left, 10/19/2021); Toe amputation (Left, 10/19/2021); and Total hip arthroplasty (Right, 10/24/2023).    Assessment   Body Structures, Functions, Activity Limitations Requiring Skilled Therapeutic Intervention: Decreased functional mobility ;Decreased cognition;Decreased safe awareness;Decreased  visits  Short Term Goal 1: The patient will have good standing balance with AD for safety during ambulation.  Short Term Goal 2: The patient will be able to perform all bed mobility with supervision.  Short Term Goal 3: The patient will be able to perform transfers with supervision.  Short Term Goal 4: The patient will be able to ambulate 200ft with supervision and RW.       Education  Patient Education  Education Given To: Patient;Family  Education Provided: Role of Therapy;Plan of Care  Education Method: Verbal  Barriers to Learning: Cognition  Education Outcome: Verbalized understanding;Continued education needed      Therapy Time   Individual Concurrent Group Co-treatment   Time In 1151         Time Out 1220         Minutes 29         Timed Code Treatment Minutes: 9 Minutes       Shannon Velazquez, PT

## 2024-04-10 NOTE — ED PROVIDER NOTES
FACULTY SIGN-OUT  ADDENDUM     Care of this patient was assumed from Dr. Magana.  The patient was seen for Failure To Thrive (Pt has had mental changes and unable to care for herself.  Pt being treated for uti)  .  The patient's initial evaluation and plan have been discussed with the prior provider who initially evaluated the patient.  Nursing Notes, Past Medical Hx, Past Surgical Hx, Social Hx, Allergies, and Family Hx were all reviewed.        CHIEF COMPLAINT       Chief Complaint   Patient presents with    Failure To Thrive     Pt has had mental changes and unable to care for herself.  Pt being treated for uti         PAST MEDICAL HISTORY    has a past medical history of AICD (automatic cardioverter/defibrillator) present, Atrial fibrillation (HCC), Atrial fibrillation (HCC), Cancer (HCC), CHF (congestive heart failure) (HCC), Chronic kidney disease, COVID-19, H/O cardiovascular stress test, Hx of long term use of blood thinners, Hypertension, Nonischemic dilated cardiomyopathy (HCC), Other screening mammogram, and Poor historian.    SURGICAL HISTORY      has a past surgical history that includes knee surgery (Right); transesophageal echocardiogram (11/10/2017); other surgical history (10/30/2018); pr office/outpt visit,procedure only (Right, 10/30/2018); Cardiac catheterization (08/2017); Cardiac defibrillator placement (04/06/2018); Colonoscopy (N/A, 02/06/2019); pacemaker placement; Cardiac defibrillator placement (2018); Toe amputation (Left, 10/19/2021); Toe amputation (Left, 10/19/2021); and Total hip arthroplasty (Right, 10/24/2023).    CURRENT MEDICATIONS       Previous Medications    ACETAMINOPHEN (TYLENOL) 500 MG TABLET    Take 1 tablet by mouth in the morning and 1 tablet in the evening.    APIXABAN (ELIQUIS) 5 MG TABS TABLET    TAKE 1 TABLET TWICE DAILY    ASCORBIC ACID (VITAMIN C) 500 MG TABLET    Take 1 tablet by mouth daily    BIOTIN 5 MG CAPS    Take 5,000 mcg by mouth daily    CALCIUM  Protein 7.3 6.4 - 8.3 g/dL    Albumin 3.8 3.5 - 5.2 g/dL    Albumin/Globulin Ratio 1.1 1.0 - 2.5    Total Bilirubin 0.3 0.3 - 1.2 mg/dL    Alkaline Phosphatase 87 35 - 104 U/L    ALT 20 5 - 33 U/L    AST 31 <32 U/L   Lipase   Result Value Ref Range    Lipase 50 13 - 60 U/L   Troponin   Result Value Ref Range    Troponin, High Sensitivity 41 (H) 0 - 14 ng/L   Urinalysis   Result Value Ref Range    Color, UA Yellow Yellow    Turbidity UA Clear Clear    Glucose, Ur NEGATIVE NEGATIVE mg/dL    Bilirubin Urine NEGATIVE NEGATIVE    Ketones, Urine TRACE (A) NEGATIVE mg/dL    Specific Gravity, UA 1.010 1.005 - 1.030    Urine Hgb NEGATIVE NEGATIVE    pH, UA 8.0 5.0 - 8.0    Protein, UA NEGATIVE NEGATIVE mg/dL    Urobilinogen, Urine Normal 0.0 - 1.0 EU/dL    Nitrite, Urine NEGATIVE NEGATIVE    Leukocyte Esterase, Urine NEGATIVE NEGATIVE    Comment       Microscopic exam not performed based on chemical results unless requested in original order.   Troponin   Result Value Ref Range    Troponin, High Sensitivity 35 (H) 0 - 14 ng/L   EKG 12 Lead   Result Value Ref Range    Ventricular Rate 92 BPM    Atrial Rate 288 BPM    QRS Duration 88 ms    Q-T Interval 350 ms    QTc Calculation (Bazett) 432 ms    R Axis 178 degrees    T Axis 78 degrees         EMERGENCY DEPARTMENT COURSE:   Recent Vitals:    Vitals:    04/10/24 0844 04/10/24 1109 04/10/24 1800 04/10/24 1815   BP: (!) 148/91  105/67 (!) 113/49   Pulse: 100 94 79 82   Resp: 16      Temp: 97.4 °F (36.3 °C)      TempSrc: Temporal      SpO2: 98%      Weight: 49 kg (108 lb)        -------------------------  BP: (!) 113/49, Temp: 97.4 °F (36.3 °C), Pulse: 82, Respirations: 16      The patient was given the following medications:  Orders Placed This Encounter   Medications    sodium chloride 0.9 % bolus 1,000 mL           MEDICAL DECISION MAKING:     Patient presents with increased falls.  She is currently in an independent living facility.  We are trying to get her moved into an

## 2024-04-10 NOTE — CARE COORDINATION
Patient currently resides in independent living at [facility]. Referral made to skilled unit for increased needs. Patient accepted and pre cert will be started today per Niru in admissions.

## 2024-04-10 NOTE — PROGRESS NOTES
Exceptions  Arousal/Alertness: Delayed responses to stimuli  Following Commands: Follows one step commands with repetition;Follows one step commands with increased time;Follows multistep commands with repitition;Follows multistep commands with increased time  Attention Span: Attends with cues to redirect  Memory: Decreased long term memory;Decreased short term memory;Decreased recall of recent events  Safety Judgement: Decreased awareness of need for assistance;Decreased awareness of need for safety  Problem Solving: Assistance required to generate solutions;Assistance required to identify errors made;Decreased awareness of errors;Assistance required to implement solutions;Assistance required to correct errors made  Insights: Not aware of deficits  Initiation: Requires cues for all  Sequencing: Requires cues for some  Orientation  Overall Orientation Status: Impaired  Orientation Level: Oriented to person;Disoriented to situation;Disoriented to time;Oriented to place    Education Given To: Patient;Family  Education Provided: Role of Therapy;Transfer Training;Equipment;Plan of Care  Education Method: Verbal  Barriers to Learning: Cognition  Education Outcome: Continued education needed    Hand Dominance  Hand Dominance: Right    AM-PAC - ADL  AM-PAC Daily Activity - Inpatient   How much help is needed for putting on and taking off regular lower body clothing?: A Little  How much help is needed for bathing (which includes washing, rinsing, drying)?: A Little  How much help is needed for toileting (which includes using toilet, bedpan, or urinal)?: A Little  How much help is needed for putting on and taking off regular upper body clothing?: A Little  How much help is needed for taking care of personal grooming?: A Little  How much help for eating meals?: A Little  AM-[facility] Inpatient Daily Activity Raw Score: 18  AM-PAC Inpatient ADL T-Scale Score : 38.66  ADL Inpatient CMS 0-100% Score: 46.65  ADL Inpatient CMS G-Code

## 2024-04-10 NOTE — ED PROVIDER NOTES
[facility] EMERGENCY DEPARTMENT  EMERGENCY DEPARTMENT ENCOUNTER      Pt Name: Jessica Avendano  MRN: [id number]  Birthdate 1945  Date of evaluation: 4/10/2024  Provider: JOSÉ Montoya CNP  7:33 PM    CHIEF COMPLAINT       Chief Complaint   Patient presents with    Failure To Thrive     Pt has had mental changes and unable to care for herself.  Pt being treated for uti         HISTORY OF PRESENT ILLNESS    Jessica Avendano is a 78 y.o. female who presents to the emergency department      This is a nontoxic-appearing 78-year-old female presenting with her son the patient is coming from independent living, starting 2 weeks ago the patient started having a decline in mentation, and behavior changes, the assisted living facility believes this was due to a urinary tract infection, the son states that they were finally able to get a urine sample and the patient was started on Keflex antibiotic this past Saturday; the son reports that the patient has had a 20 pound weight loss over the past approximate 45 days is not eating and drinking, is incontinent of urine when she usually is not, and her mentation has minimally improved, patient continuing to set an incontinent urine, not able to get up off the couch herself in her independent living facility at [facility].  Patient was also started on Mirtazapine for appetite stimulation over the past week as well.  Patient is anticoagulated with Eliquis for A-fib; patient's son states that she had a fall approximately January timeframe with an ER evaluation after the fall with CT of the head negative.        The history is provided by the patient, medical records and a relative.       Nursing Notes were reviewed.    REVIEW OF SYSTEMS       Review of Systems   Constitutional:  Positive for appetite change and unexpected weight change. Negative for chills, fatigue and fever.   HENT:  Negative for congestion and sore throat.    Respiratory:  Negative for cough

## 2024-04-10 NOTE — ED NOTES
S/W Tayler FLOREZ regarding precert for pt to be increased from independent to skilled- states she is currently working on this

## 2024-04-11 VITALS
WEIGHT: 108 LBS | SYSTOLIC BLOOD PRESSURE: 115 MMHG | RESPIRATION RATE: 16 BRPM | DIASTOLIC BLOOD PRESSURE: 79 MMHG | OXYGEN SATURATION: 93 % | HEART RATE: 91 BPM | TEMPERATURE: 98.1 F | BODY MASS INDEX: 15.94 KG/M2

## 2024-04-11 LAB
EKG ATRIAL RATE: 288 BPM
EKG Q-T INTERVAL: 350 MS
EKG QRS DURATION: 88 MS
EKG QTC CALCULATION (BAZETT): 432 MS
EKG R AXIS: 178 DEGREES
EKG T AXIS: 78 DEGREES
EKG VENTRICULAR RATE: 92 BPM
MICROORGANISM SPEC CULT: NO GROWTH
SPECIMEN DESCRIPTION: NORMAL

## 2024-04-11 PROCEDURE — 6370000000 HC RX 637 (ALT 250 FOR IP): Performed by: EMERGENCY MEDICINE

## 2024-04-11 RX ORDER — LORAZEPAM 1 MG/1
1 TABLET ORAL ONCE
Status: COMPLETED | OUTPATIENT
Start: 2024-04-11 | End: 2024-04-11

## 2024-04-11 RX ORDER — HALOPERIDOL 5 MG/ML
5 INJECTION INTRAMUSCULAR ONCE
Status: DISCONTINUED | OUTPATIENT
Start: 2024-04-11 | End: 2024-04-11 | Stop reason: HOSPADM

## 2024-04-11 RX ADMIN — LORAZEPAM 1 MG: 1 TABLET ORAL at 04:01

## 2024-04-11 ASSESSMENT — PAIN - FUNCTIONAL ASSESSMENT
PAIN_FUNCTIONAL_ASSESSMENT: NONE - DENIES PAIN
PAIN_FUNCTIONAL_ASSESSMENT: NONE - DENIES PAIN

## 2024-04-11 NOTE — ED NOTES
Pt going to [facility].  Pt's will be private pay until precertification is approved by pt's insurance.  Pt being transported to [facility] by Pt's Son in good condition

## 2024-04-11 NOTE — CARE COORDINATION
Writer met with patients son to discuss additional options for discharge planning. States he has been in contact with [facility] and plans to take patient over for skilled care this evening. They will need to pay privately for skilled needs as insurance auth at this time has still not came back. Family is understanding that pre cert will need to be re-submitted once patient is seen by therapy at facility tomorrow morning and they are agreeable discharge plan.

## 2024-04-11 NOTE — ED PROVIDER NOTES
mouth in the morning and 1 capsule in the evening.      acetaminophen (TYLENOL) 500 MG tablet Take 1 tablet by mouth in the morning and 1 tablet in the evening.  Qty: 1 tablet, Refills: 0    Associated Diagnoses: Acute pain of right knee      mirtazapine (REMERON) 7.5 MG tablet Take 1 tablet by mouth nightly  Qty: 30 tablet, Refills: 3    Associated Diagnoses: Weight loss      lidocaine 4 % external patch Place 1 patch onto the skin daily  Qty: 30 patch, Refills: 1    Associated Diagnoses: Acute pain of right knee      DULoxetine (CYMBALTA) 30 MG extended release capsule Take 1 capsule by mouth daily  Qty: 90 capsule, Refills: 3      torsemide (DEMADEX) 20 MG tablet Take 1 tablet by mouth daily  Qty: 90 tablet, Refills: 0      Misc. Devices MISC Admit to skilled nursing and rehab [facility]  Qty: 1 each, Refills: 0    Associated Diagnoses: S/P total right hip arthroplasty; Falls frequently; Generalized weakness      vitamin D 25 MCG (1000 UT) CAPS Take 1 capsule by mouth daily      carvedilol (COREG) 3.125 MG tablet TAKE 1 TABLET TWICE DAILY WITH MEALS  Qty: 180 tablet, Refills: 3      sertraline (ZOLOFT) 50 MG tablet TAKE 1 TABLET EVERY DAY  Qty: 90 tablet, Refills: 3    Associated Diagnoses: Anxiety and depression      apixaban (ELIQUIS) 5 MG TABS tablet TAKE 1 TABLET TWICE DAILY  Qty: 180 tablet, Refills: 3    Comments: Pt needs appt for further refills  Associated Diagnoses: Chronic atrial fibrillation (HCC)      digoxin (LANOXIN) 125 MCG tablet TAKE 1 TABLET BY MOUTH DAILY MONDAY THROUGH FRIDAY  Qty: 65 tablet, Refills: 3      Handicap Placard MISC by Does not apply route Duration:5 years ending 10/27/2026  Qty: 1 each, Refills: 0    Associated Diagnoses: Unable to walk 150 feet      vitamin B-12 (CYANOCOBALAMIN) 1000 MCG tablet Take 1 tablet by mouth daily      Multiple Vitamins-Minerals (THERAPEUTIC MULTIVITAMIN-MINERALS) tablet Take 1 tablet by mouth daily      calcium carbonate (OSCAL) 500 MG TABS tablet  haloperidol lactate (HALDOL) injection 5 mg         RECENT VITALS:  /79   Pulse 91   Temp 98.1 °F (36.7 °C) (Oral)   Resp 16   Wt 49 kg (108 lb)   SpO2 93%   BMI 15.94 kg/m²       OARRS Report if indicated     I was called at 5 PM by  and nurse Carvalho to discuss the patient with the son.  The patient had been waiting in the emergency department for insurance precertification for skilled nursing facility.  They still have not been able to obtain this recertification.  However the family is willing to take the patient to the skilled nursing facility and pay out-of-pocket today in order to get the patient the care that she needs.  Patient is calm smiling cooperative does not have any complaints.  Son will drive the patient to the skilled nursing facility [facility].    Routine discharge counseling was given, and it is understood that worsening, changing or persistent symptoms should prompt an immediate call or follow up with their primary physician or return to the emergency department. The importance of appropriate follow up was also discussed.  I have reviewed the disposition diagnosis.  I have answered the questions and given discharge instructions.  There was voiced understanding of these instructions and no further questions or complaints.    Disposition     CLINICAL IMPRESSION:  1. Dehydration    2. Weight loss, abnormal    3. Generalized weakness    4. Altered mental status, unspecified altered mental status type        PATIENT REFERRED TO:  Milton Randhawa, APRN - CNP  [address] [address]  [phone]    Call today  For wound re-check      DISCHARGE MEDICATIONS:  Current Discharge Medication List          DISPOSITION:   DISPOSITION Decision To Discharge 04/11/2024 05:05:02 PM      (Please note that portions of this note were completed with a voice recognition program.  Efforts were made to edit the dictations but occasionally words are mis-transcribed.

## 2024-04-11 NOTE — CARE COORDINATION
Writer met with patient and son to discuss discharge planning. Hospice present in room. At this time, son does not wish to move forward with Hospice care until patient has been skilled. Pre cert is still pending per Niru in admissions at [facility].    Patient is able to return to independent living while waiting for insurance approval, however home care would need to be set up so PT/OT evals could be completed and pre cert would need to be resubmitted since patient is no longer coming from emergency room.    Writer provided the option for patient to return to independent living with 24 hr supervision until pre certification comes back from insurance. Son states he is unable to provide 24 hour care as he works and feels it would be unsafe for patient to return to independent living at this time.

## 2024-04-17 LAB
EKG ATRIAL RATE: 288 BPM
EKG Q-T INTERVAL: 350 MS
EKG QRS DURATION: 88 MS
EKG QTC CALCULATION (BAZETT): 432 MS
EKG R AXIS: 178 DEGREES
EKG T AXIS: 78 DEGREES
EKG VENTRICULAR RATE: 92 BPM

## 2024-04-19 ENCOUNTER — HOSPITAL ENCOUNTER (INPATIENT)
Age: 79
LOS: 1 days | DRG: 291 | End: 2024-04-20
Attending: EMERGENCY MEDICINE
Payer: MEDICARE

## 2024-04-19 ENCOUNTER — APPOINTMENT (OUTPATIENT)
Dept: GENERAL RADIOLOGY | Age: 79
DRG: 291 | End: 2024-04-19
Payer: MEDICARE

## 2024-04-19 DIAGNOSIS — J96.01 ACUTE HYPOXIC RESPIRATORY FAILURE (HCC): Primary | ICD-10-CM

## 2024-04-19 DIAGNOSIS — A41.9 SEPTICEMIA (HCC): ICD-10-CM

## 2024-04-19 DIAGNOSIS — Z71.89 DNR (DO NOT RESUSCITATE) DISCUSSION: ICD-10-CM

## 2024-04-19 PROBLEM — J96.00 ACUTE RESPIRATORY FAILURE (HCC): Status: ACTIVE | Noted: 2024-04-19

## 2024-04-19 LAB
ALLEN TEST: POSITIVE
ANION GAP SERPL CALCULATED.3IONS-SCNC: 34 MMOL/L (ref 9–16)
BNP SERPL-MCNC: ABNORMAL PG/ML (ref 0–300)
BUN BLD-MCNC: 30 MG/DL (ref 8–26)
BUN SERPL-MCNC: 31 MG/DL (ref 8–23)
CA-I BLD-SCNC: 0.99 MMOL/L (ref 1.15–1.33)
CALCIUM SERPL-MCNC: 9.2 MG/DL (ref 8.6–10.4)
CHLORIDE BLD-SCNC: 110 MMOL/L (ref 98–107)
CHLORIDE SERPL-SCNC: 98 MMOL/L (ref 98–107)
CO2 BLD CALC-SCNC: 9 MMOL/L (ref 22–30)
CO2 SERPL-SCNC: 8 MMOL/L (ref 20–31)
CREAT SERPL-MCNC: 2.2 MG/DL (ref 0.5–0.9)
EGFR, POC: 23 ML/MIN/1.73M2
EKG ATRIAL RATE: 131 BPM
EKG Q-T INTERVAL: 344 MS
EKG QRS DURATION: 100 MS
EKG QTC CALCULATION (BAZETT): 516 MS
EKG R AXIS: 180 DEGREES
EKG T AXIS: -16 DEGREES
EKG VENTRICULAR RATE: 135 BPM
ERYTHROCYTE [DISTWIDTH] IN BLOOD BY AUTOMATED COUNT: 18.4 % (ref 11.8–14.4)
FIO2: 15
GFR SERPL CREATININE-BSD FRML MDRD: 23 ML/MIN/1.73M2
GLUCOSE BLD-MCNC: 109 MG/DL (ref 65–105)
GLUCOSE BLD-MCNC: 158 MG/DL (ref 74–100)
GLUCOSE BLD-MCNC: 164 MG/DL (ref 74–100)
GLUCOSE BLD-MCNC: 82 MG/DL (ref 65–105)
GLUCOSE SERPL-MCNC: 150 MG/DL (ref 74–99)
HCO3 VENOUS: 8.5 MMOL/L (ref 22–29)
HCO3, MIXED: 9.9 MMOL/L (ref 23–29)
HCT VFR BLD AUTO: 42 % (ref 36–46)
HCT VFR BLD AUTO: 45.6 % (ref 36.3–47.1)
HGB BLD-MCNC: 12.7 G/DL (ref 11.9–15.1)
LACTIC ACID, WHOLE BLOOD: 18 MMOL/L (ref 0.7–2.1)
MCH RBC QN AUTO: 27.3 PG (ref 25.2–33.5)
MCHC RBC AUTO-ENTMCNC: 27.9 G/DL (ref 28.4–34.8)
MCV RBC AUTO: 97.9 FL (ref 82.6–102.9)
NEGATIVE BASE EXCESS, MIXED: 19.8 MMOL/L (ref 0–2)
NEGATIVE BASE EXCESS, VEN: 21.2 MMOL/L (ref 0–2)
NRBC BLD-RTO: 0.2 PER 100 WBC
O2 DELIVERY DEVICE: ABNORMAL
O2 SAT, MIXED: 33.5 % (ref 60–80)
O2 SAT, VEN: 59.9 % (ref 60–85)
PCO2 MIXED: 36 MM HG (ref 42–52)
PCO2, VEN: 31.6 MM HG (ref 41–51)
PH VENOUS: 7.04 (ref 7.32–7.43)
PH, MIXED: 7.05 (ref 7.31–7.41)
PLATELET # BLD AUTO: 249 K/UL (ref 138–453)
PMV BLD AUTO: 10.4 FL (ref 8.1–13.5)
PO2 MIXED: 29.1 MM HG (ref 35–45)
PO2, VEN: 44.2 MM HG (ref 30–50)
POC ANION GAP: 20 MMOL/L (ref 7–16)
POC CREATININE: 2.2 MG/DL (ref 0.51–1.19)
POC HEMOGLOBIN (CALC): 14.4 G/DL (ref 12–16)
POC LACTIC ACID: 15.6 MMOL/L (ref 0.56–1.39)
POTASSIUM BLD-SCNC: 5.4 MMOL/L (ref 3.5–4.5)
POTASSIUM SERPL-SCNC: 6 MMOL/L (ref 3.7–5.3)
RBC # BLD AUTO: 4.66 M/UL (ref 3.95–5.11)
SAMPLE SITE: ABNORMAL
SODIUM BLD-SCNC: 138 MMOL/L (ref 138–146)
SODIUM SERPL-SCNC: 140 MMOL/L (ref 136–145)
TROPONIN I SERPL HS-MCNC: 63 NG/L (ref 0–14)
WBC OTHER # BLD: 16.7 K/UL (ref 3.5–11.3)

## 2024-04-19 PROCEDURE — 80048 BASIC METABOLIC PNL TOTAL CA: CPT

## 2024-04-19 PROCEDURE — 96374 THER/PROPH/DIAG INJ IV PUSH: CPT

## 2024-04-19 PROCEDURE — 2580000003 HC RX 258: Performed by: PHYSICIAN ASSISTANT

## 2024-04-19 PROCEDURE — 99222 1ST HOSP IP/OBS MODERATE 55: CPT | Performed by: PHYSICIAN ASSISTANT

## 2024-04-19 PROCEDURE — 93005 ELECTROCARDIOGRAM TRACING: CPT | Performed by: STUDENT IN AN ORGANIZED HEALTH CARE EDUCATION/TRAINING PROGRAM

## 2024-04-19 PROCEDURE — 87040 BLOOD CULTURE FOR BACTERIA: CPT

## 2024-04-19 PROCEDURE — 83880 ASSAY OF NATRIURETIC PEPTIDE: CPT

## 2024-04-19 PROCEDURE — 84484 ASSAY OF TROPONIN QUANT: CPT

## 2024-04-19 PROCEDURE — 96375 TX/PRO/DX INJ NEW DRUG ADDON: CPT

## 2024-04-19 PROCEDURE — 84520 ASSAY OF UREA NITROGEN: CPT

## 2024-04-19 PROCEDURE — 6360000002 HC RX W HCPCS

## 2024-04-19 PROCEDURE — 6360000002 HC RX W HCPCS: Performed by: STUDENT IN AN ORGANIZED HEALTH CARE EDUCATION/TRAINING PROGRAM

## 2024-04-19 PROCEDURE — 71045 X-RAY EXAM CHEST 1 VIEW: CPT

## 2024-04-19 PROCEDURE — 82330 ASSAY OF CALCIUM: CPT

## 2024-04-19 PROCEDURE — 82947 ASSAY GLUCOSE BLOOD QUANT: CPT

## 2024-04-19 PROCEDURE — 80051 ELECTROLYTE PANEL: CPT

## 2024-04-19 PROCEDURE — 85014 HEMATOCRIT: CPT

## 2024-04-19 PROCEDURE — 93010 ELECTROCARDIOGRAM REPORT: CPT | Performed by: INTERNAL MEDICINE

## 2024-04-19 PROCEDURE — 6360000002 HC RX W HCPCS: Performed by: PHYSICIAN ASSISTANT

## 2024-04-19 PROCEDURE — 36415 COLL VENOUS BLD VENIPUNCTURE: CPT

## 2024-04-19 PROCEDURE — 85027 COMPLETE CBC AUTOMATED: CPT

## 2024-04-19 PROCEDURE — 96360 HYDRATION IV INFUSION INIT: CPT

## 2024-04-19 PROCEDURE — 1200000000 HC SEMI PRIVATE

## 2024-04-19 PROCEDURE — 96361 HYDRATE IV INFUSION ADD-ON: CPT

## 2024-04-19 PROCEDURE — 82565 ASSAY OF CREATININE: CPT

## 2024-04-19 PROCEDURE — 99285 EMERGENCY DEPT VISIT HI MDM: CPT

## 2024-04-19 PROCEDURE — 2580000003 HC RX 258: Performed by: STUDENT IN AN ORGANIZED HEALTH CARE EDUCATION/TRAINING PROGRAM

## 2024-04-19 PROCEDURE — 82803 BLOOD GASES ANY COMBINATION: CPT

## 2024-04-19 PROCEDURE — 83605 ASSAY OF LACTIC ACID: CPT

## 2024-04-19 RX ORDER — MORPHINE SULFATE 2 MG/ML
2 INJECTION, SOLUTION INTRAMUSCULAR; INTRAVENOUS EVERY 4 HOURS PRN
Status: DISCONTINUED | OUTPATIENT
Start: 2024-04-19 | End: 2024-04-20

## 2024-04-19 RX ORDER — ONDANSETRON 4 MG/1
4 TABLET, ORALLY DISINTEGRATING ORAL EVERY 8 HOURS PRN
Status: DISCONTINUED | OUTPATIENT
Start: 2024-04-19 | End: 2024-04-20 | Stop reason: HOSPADM

## 2024-04-19 RX ORDER — LINEZOLID 2 MG/ML
600 INJECTION, SOLUTION INTRAVENOUS ONCE
Status: COMPLETED | OUTPATIENT
Start: 2024-04-19 | End: 2024-04-19

## 2024-04-19 RX ORDER — DEXTROSE MONOHYDRATE 100 MG/ML
INJECTION, SOLUTION INTRAVENOUS CONTINUOUS
Status: DISCONTINUED | OUTPATIENT
Start: 2024-04-19 | End: 2024-04-20 | Stop reason: HOSPADM

## 2024-04-19 RX ORDER — MORPHINE SULFATE 4 MG/ML
4 INJECTION, SOLUTION INTRAMUSCULAR; INTRAVENOUS ONCE
Status: COMPLETED | OUTPATIENT
Start: 2024-04-19 | End: 2024-04-19

## 2024-04-19 RX ORDER — MORPHINE SULFATE 4 MG/ML
1 INJECTION, SOLUTION INTRAMUSCULAR; INTRAVENOUS EVERY 4 HOURS PRN
Status: DISCONTINUED | OUTPATIENT
Start: 2024-04-19 | End: 2024-04-19

## 2024-04-19 RX ORDER — 0.9 % SODIUM CHLORIDE 0.9 %
1000 INTRAVENOUS SOLUTION INTRAVENOUS ONCE
Status: COMPLETED | OUTPATIENT
Start: 2024-04-19 | End: 2024-04-19

## 2024-04-19 RX ORDER — LORAZEPAM 2 MG/1
2 TABLET ORAL EVERY 4 HOURS PRN
Status: DISCONTINUED | OUTPATIENT
Start: 2024-04-19 | End: 2024-04-20 | Stop reason: HOSPADM

## 2024-04-19 RX ORDER — SODIUM CHLORIDE 0.9 % (FLUSH) 0.9 %
5-40 SYRINGE (ML) INJECTION PRN
Status: DISCONTINUED | OUTPATIENT
Start: 2024-04-19 | End: 2024-04-20 | Stop reason: HOSPADM

## 2024-04-19 RX ORDER — ACETAMINOPHEN 650 MG/1
650 SUPPOSITORY RECTAL EVERY 6 HOURS PRN
Status: DISCONTINUED | OUTPATIENT
Start: 2024-04-19 | End: 2024-04-20 | Stop reason: HOSPADM

## 2024-04-19 RX ORDER — LORAZEPAM 0.5 MG/1
1 TABLET ORAL EVERY 4 HOURS PRN
Status: DISCONTINUED | OUTPATIENT
Start: 2024-04-19 | End: 2024-04-19

## 2024-04-19 RX ORDER — ONDANSETRON 2 MG/ML
4 INJECTION INTRAMUSCULAR; INTRAVENOUS EVERY 6 HOURS PRN
Status: DISCONTINUED | OUTPATIENT
Start: 2024-04-19 | End: 2024-04-20 | Stop reason: HOSPADM

## 2024-04-19 RX ORDER — SODIUM CHLORIDE 0.9 % (FLUSH) 0.9 %
5-40 SYRINGE (ML) INJECTION EVERY 12 HOURS SCHEDULED
Status: DISCONTINUED | OUTPATIENT
Start: 2024-04-19 | End: 2024-04-20 | Stop reason: HOSPADM

## 2024-04-19 RX ORDER — POLYETHYLENE GLYCOL 3350 17 G/17G
17 POWDER, FOR SOLUTION ORAL DAILY PRN
Status: DISCONTINUED | OUTPATIENT
Start: 2024-04-19 | End: 2024-04-20 | Stop reason: HOSPADM

## 2024-04-19 RX ORDER — LORAZEPAM 2 MG/ML
2 INJECTION INTRAMUSCULAR ONCE
Status: COMPLETED | OUTPATIENT
Start: 2024-04-19 | End: 2024-04-19

## 2024-04-19 RX ORDER — SODIUM CHLORIDE 9 MG/ML
INJECTION, SOLUTION INTRAVENOUS CONTINUOUS
Status: DISCONTINUED | OUTPATIENT
Start: 2024-04-19 | End: 2024-04-19

## 2024-04-19 RX ORDER — ACETAMINOPHEN 325 MG/1
650 TABLET ORAL EVERY 6 HOURS PRN
Status: DISCONTINUED | OUTPATIENT
Start: 2024-04-19 | End: 2024-04-20 | Stop reason: HOSPADM

## 2024-04-19 RX ORDER — LORAZEPAM 2 MG/ML
1 INJECTION INTRAMUSCULAR ONCE
Status: DISCONTINUED | OUTPATIENT
Start: 2024-04-19 | End: 2024-04-19

## 2024-04-19 RX ORDER — SODIUM CHLORIDE 9 MG/ML
INJECTION, SOLUTION INTRAVENOUS PRN
Status: DISCONTINUED | OUTPATIENT
Start: 2024-04-19 | End: 2024-04-20 | Stop reason: HOSPADM

## 2024-04-19 RX ORDER — LORAZEPAM 2 MG/ML
INJECTION INTRAMUSCULAR
Status: COMPLETED
Start: 2024-04-19 | End: 2024-04-19

## 2024-04-19 RX ADMIN — PIPERACILLIN AND TAZOBACTAM 4500 MG: 4; .5 INJECTION, POWDER, LYOPHILIZED, FOR SOLUTION INTRAVENOUS at 14:56

## 2024-04-19 RX ADMIN — LINEZOLID 600 MG: 600 INJECTION, SOLUTION INTRAVENOUS at 15:44

## 2024-04-19 RX ADMIN — SODIUM CHLORIDE, PRESERVATIVE FREE 10 ML: 5 INJECTION INTRAVENOUS at 23:06

## 2024-04-19 RX ADMIN — LORAZEPAM 2 MG: 2 INJECTION INTRAMUSCULAR at 14:10

## 2024-04-19 RX ADMIN — SODIUM CHLORIDE 1000 ML: 9 INJECTION, SOLUTION INTRAVENOUS at 13:07

## 2024-04-19 RX ADMIN — LORAZEPAM 2 MG: 2 INJECTION INTRAMUSCULAR; INTRAVENOUS at 14:10

## 2024-04-19 RX ADMIN — MORPHINE SULFATE 2 MG: 2 INJECTION, SOLUTION INTRAMUSCULAR; INTRAVENOUS at 23:05

## 2024-04-19 RX ADMIN — DEXTROSE MONOHYDRATE: 100 INJECTION, SOLUTION INTRAVENOUS at 13:07

## 2024-04-19 RX ADMIN — MORPHINE SULFATE 4 MG: 4 INJECTION INTRAVENOUS at 14:21

## 2024-04-19 ASSESSMENT — PAIN SCALES - WONG BAKER: WONGBAKER_NUMERICALRESPONSE: NO HURT

## 2024-04-19 NOTE — CARE COORDINATION
Case Management Assessment  Initial Evaluation    Date/Time of Evaluation: 4/19/2024 5:35 PM  Assessment Completed by: Vicki Carter RN    If patient is discharged prior to next notation, then this note serves as note for discharge by case management.    Patient Name: Jessica Avendano                   YOB: 1945  Diagnosis: Acute respiratory failure (HCC) [J96.00]                   Date / Time: 4/19/2024 12:47 PM    Patient Admission Status: Inpatient   Readmission Risk (Low < 19, Mod (19-27), High > 27): No data recorded  Current PCP: Milton Randhawa APRN - CNP  PCP verified by CM? (P) Yes    Chart Reviewed: Yes      History Provided by: (P) Child/Family  Patient Orientation: (P) Unresponsive    Patient Cognition:      Hospitalization in the last 30 days (Readmission):  Yes    If yes, Readmission Assessment in  Navigator will be completed.    Advance Directives:      Code Status: DNR-CC   Patient's Primary Decision Maker is: (P) Legal Next of Kin    Primary Decision Maker: Jeet Avendano - Child - [phone]    Discharge Planning:    Patient lives with:   Type of Home:    Primary Care Giver: (P) Other (Comment)  Patient Support Systems include: (P) Family Members   Current Financial resources:    Current community resources:    Current services prior to admission: (P) Skilled Nursing Facility            Current DME:              Type of Home Care services:       ADLS  Prior functional level:    Current functional level:      PT AM-PAC:   /24  OT AM-PAC:   /24    Family can provide assistance at DC:    Would you like Case Management to discuss the discharge plan with any other family members/significant others, and if so, who?    Plans to Return to Present Housing: (P) Unknown at present  Other Identified Issues/Barriers to RETURNING to current housing: hospice pending  Potential Assistance needed at discharge:              Potential DME:    Patient expects to discharge to:    Plan for

## 2024-04-19 NOTE — ED NOTES
The floor called down to the ED, questioning admit orders as pt is NPO and has oral medications ordered. They asked that we reach out to admitting team to have orders corrected prior to pt arriving to the floor. Admitting team contacted.

## 2024-04-19 NOTE — ED PROVIDER NOTES
[facility] ED     Emergency Department     Faculty Attestation    I performed a history and physical examination of the patient and discussed management with the resident. I reviewed the resident’s note and agree with the documented findings and plan of care. Any areas of disagreement are noted on the chart. I was personally present for the key portions of any procedures. I have documented in the chart those procedures where I was not present during the key portions. I have reviewed the emergency nurses triage note. I agree with the chief complaint, past medical history, past surgical history, allergies, medications, social and family history as documented unless otherwise noted below. For Physician Assistant/ Nurse Practitioner cases/documentation I have personally evaluated this patient and have completed at least one if not all key elements of the E/M (history, physical exam, and MDM). Additional findings are as noted.    Note Started: 12:50 PM EDT    Called to bedside patient arrived by EMS for altered mental status hypoxia profound hyperglycemia concern for need for intubation.  However on arrival patient is awake orients to voice much improved after D10 bolus given by EMS.  Of EMS also did provide verified information that the patient is a DNR CC only as well as verified with family do not intubation.  Patient does orient to voice follows simple commands but no other participation in exam.  Heart is tachycardic irregular irregular but equal radial pulses.  Diffuse rhonchi throughout requiring supplemental oxygen abdomen soft no rebound no guarding.  Will proceed with broad workup concern for sepsis monitor blood sugar, await family arrival for discussions for plans of care admit regardless      EKG interpretation: Atrial fibrillation ventricular rate of 135 with a right axis.  No acute ST or T changes there is T wave inversion in the inferior leads.  Overall similar compared with

## 2024-04-19 NOTE — ED NOTES
Pt placed on NR at 15L.   Pt pulse ox does not  good wave form, reading at 20-30%.   Attending and resident made aware that stable saturation level with good wave form has not been able to be obtained.

## 2024-04-19 NOTE — ED NOTES
1 mg ativan given IVP by Monica PITTS per verbal order Dr. Valenzuela.   Pt restless, anxious, thrashing around on cot, removed 1 IV.

## 2024-04-19 NOTE — ED NOTES
Pt son arrives to bedside.   He states up until last week she was in independent living but after stay at [facility] she was moved to skilled nursing. He states after her insurance is done covering skilled nursing she will have hospice consult as she is DNR but they are unable to do both. He states she is not fully awake, alert, oriented at baseline, states that in march she had a mentation change and ever since then she is in and out due to dementia. While in [place] he states he work up was NEG. Today he received a call that she was sob and not acting like her normal self. She was on the commode when she became sob, the physician was contacted and they ordered chest x ray and lab work at the facility. Pt then had an abrupt change in mentation, appeared anxious, restless

## 2024-04-19 NOTE — ED NOTES
Checked in with patient and family. Pt is resting in bed with personal items and call light within reach. Checked blood glucose and it was 109. Dextrose infusing. Zyvox infusing as well. will continue with plan of care

## 2024-04-19 NOTE — ED NOTES
Multiple attempts to get an spo2. Attempted forehead, ear, finger all unsuccessful. Physician notified.

## 2024-04-19 NOTE — PROGRESS NOTES
[facility]  [place]    Shift date: 2024  Shift day: Friday   Shift # 2    Room # 0326/0326-02   Name: Jessica Avendano                Methodist: [employer]   Place of Anabaptism: unknown    Referral: Palliative Care / End of Life    Admit Date & Time: 2024 12:47 PM    Assessment:  Jessica Avendano is a 78 y.o. female in the hospital. Upon entering the room writer observes the patient appearing tired and not responding.  Son is bedside and appears to be coping.  Son has questions regarding  home and end-of-life care.  States that he wants the patient to be comfortable and still trying to figure out the whole process.         Intervention:  Writer introduced self and title as  Writer offered space for the son  to express feelings, needs, and concerns and provided a ministry presence.  Provided information for  homes and explained steps in end of life care.      Outcome:  Son expressed gratitude and receptive to care.      Plan:  Chaplains will remain available to offer spiritual and emotional support as needed.      Electronically signed by Chaplain Tea, on 2024 at 7:11 PM.  [facility]  [phone]       24 1600   Encounter Summary   Encounter Overview/Reason  Grief, Loss, and Adjustments   Service Provided For: Patient and family together   Referral/Consult From: Other    Support System Children   Last Encounter  24   Complexity of Encounter High   Begin Time 1600   End Time  1630   Total Time Calculated 30 min   Grief, Loss, and Adjustments   Type Hospice   Assessment/Intervention/Outcome   Assessment Calm;Coping;Complicated grieving   Intervention Active listening;Discussed illness injury and it’s impact;Explored/Affirmed feelings, thoughts, concerns;End of Life Care   Outcome Engaged in conversation;Expressed feelings, needs, and concerns;Coping     Electronically signed by Bebeto Walker on 2024  at 7:11 PM

## 2024-04-19 NOTE — ED NOTES
VITAMIN B-12 (CYANOCOBALAMIN) 1000 MCG TABLET    Take 1 tablet by mouth daily    VITAMIN D 25 MCG (1000 UT) CAPS    Take 1 capsule by mouth daily     Orders Placed This Encounter   Medications    dextrose 10 % infusion    sodium chloride 0.9 % bolus 1,000 mL    piperacillin-tazobactam (ZOSYN) 4,500 mg in sodium chloride 0.9 % 100 mL IVPB ([id number])     Order Specific Question:   Antimicrobial Indications     Answer:   Sepsis of Unknown Etiology     Order Specific Question:   Sepsis duration of therapy     Answer:   Other    LORazepam (ATIVAN) 2 MG/ML injection     Jojo Schrader: cabinet override    DISCONTD: LORazepam (ATIVAN) injection 1 mg    linezolid (ZYVOX) IVPB 600 mg     Order Specific Question:   Antimicrobial Indications     Answer:   Sepsis of Unknown Etiology     Order Specific Question:   Sepsis duration of therapy     Answer:   Other    LORazepam (ATIVAN) injection 2 mg    morphine injection 4 mg       SURGICAL HISTORY       Past Surgical History:   Procedure Laterality Date    CARDIAC CATHETERIZATION  08/2017    NML CORS EF20%    CARDIAC DEFIBRILLATOR PLACEMENT  04/06/2018    DR ELLIS    CARDIAC DEFIBRILLATOR PLACEMENT  2018    COLONOSCOPY N/A 02/06/2019    COLONOSCOPY POLYPECTOMY SNARE/COLD BIOPSY performed by Cira Armando MD at [facility]    KNEE SURGERY Right     OTHER SURGICAL HISTORY  10/30/2018    excision basal cell right temple    PACEMAKER PLACEMENT      medtronic AICD    ID OFFICE/OUTPT VISIT,PROCEDURE ONLY Right 10/30/2018    EXCISION BASAL CELL RIGHT TEMPLE performed by Bebeto Ye MD at [facility] OR    TOE AMPUTATION Left 10/19/2021    LEFT PARTIAL RAY 1ST TOE AMPUTATION (Left )    TOE AMPUTATION Left 10/19/2021    LEFT HALLUX AMPUTATION, DEBRIDEMENT OF NONVIABLE SOFT TISSUE AND BONE LEFT HALLUX performed by Anna Shelton DPM at [facility] OR    TOTAL HIP ARTHROPLASTY Right 10/24/2023    HIP TOTAL ARTHROPLASTY ASI performed by Curtis Lala MD at [facility] OR     TRANSESOPHAGEAL ECHOCARDIOGRAM  11/10/2017    EF 20%. Mod MR. Mild to mod AI. Mild TR. Segmental WMA.       PAST MEDICAL HISTORY       Past Medical History:   Diagnosis Date    AICD (automatic cardioverter/defibrillator) present     April 6. 2018. Dr. Mecca Maddox     Atrial fibrillation (HCC) 09/2012    Dr.David Lucas  CHRONIC ON ELIQUIS    Atrial fibrillation (HCC)     Cancer (HCC)     basal cell on face    CHF (congestive heart failure) (HCC)     Chronic kidney disease     COVID-19     H/O cardiovascular stress test 07/26/2017    Neg. EF 24%    Hx of long term use of blood thinners     Hypertension     Nonischemic dilated cardiomyopathy (HCC)     Other screening mammogram 03/06/2012    Negative    Poor historian        Labs:  Labs Reviewed   CBC - Abnormal; Notable for the following components:       Result Value    WBC 16.7 (*)     MCHC 27.9 (*)     RDW 18.4 (*)     NRBC Automated 0.2 (*)     All other components within normal limits   BASIC METABOLIC PANEL - Abnormal; Notable for the following components:    Potassium 6.0 (*)     CO2 8 (*)     Anion Gap 34 (*)     Glucose 150 (*)     BUN 31 (*)     Creatinine 2.2 (*)     Est, Glom Filt Rate 23 (*)     All other components within normal limits   TROPONIN - Abnormal; Notable for the following components:    Troponin, High Sensitivity 63 (*)     All other components within normal limits   LACTIC ACID - Abnormal; Notable for the following components:    Lactic Acid, Whole Blood 18.0 (*)     All other components within normal limits   BRAIN NATRIURETIC PEPTIDE - Abnormal; Notable for the following components:    Pro-BNP >70,000 (*)     All other components within normal limits   ELECTROLYTES PLUS - Abnormal; Notable for the following components:    POC Potassium 5.4 (*)     POC Chloride 110 (*)     POC TCO2 9 (*)     POC Anion Gap 20 (*)     All other components within normal limits   CALCIUM, IONIC (POC) - Abnormal; Notable for the following components:    POC

## 2024-04-19 NOTE — ED TRIAGE NOTES
Pt presents to ED rm 15 via EMS after not acting right in her nursing home. Per EMS patient was short of breath and pulse ox was reading in the 50's on 4 L NC. Pt arrives to the ED and is visibly cyanotic in the extremities. Pt has little to no verbal response or pain response. Pt appears restless. Unable to obtain pulse ox at this time. Pt is a known DNR CC. Family in waiting room. Pt attached to full monitor A-fib RVR on monitor and EKG. Lab drawn and sent. Will continue with plan of care.

## 2024-04-19 NOTE — ED NOTES
After speaking with pt son at bedside, he spoke with his other brother they have decided to consult hospice and keep pt comfortable. They do not wish for pt to be intubated, they would like medications, fluids and comfort care measures.

## 2024-04-19 NOTE — PROGRESS NOTES
Writer was asked by other  to bring courtesy cart to room.  met with dietary for completion of courtesy cart. Writer was pulled away for emergent situation and contacted other  for completion.    Electronically signed by Bruno Montejo on 4/19/2024 at 7:39 PM

## 2024-04-19 NOTE — H&P
[facility]  Office: [phone]  Davis Go DO, Robbie Merino DO, Kilo Edmonds DO, Gurjit Bradford DO, Catalina Castaneda MD, Bonnie Russell MD, Aman Mccann MD, Janee Lin MD,  Torrey Dee MD, Raulito Wheeler MD, Gail Macias MD,  Kaylan Guido DO, Daisha Torres MD, Gabe Cantrell MD, Jonnathan Go DO, Geena Thakur MD,  Jerome Zarate DO, Shweta Stoddadr MD, Karla Gonzalez MD, Joseline Knight MD, Nitesh Steward MD,  Feliciano Ashley MD, Reuben Álvarez MD, Senthil Mcdowell MD, Neyda Hudson MD, Patrice Sanchez MD, Shar Metzgre MD, Durga Hill DO, Vishal Lee DO, Ekta Valles MD,  Harsha Anderson MD, Shirley Waterhouse, CNP,  Cierra Stokes CNP, [place], CNP,  Mily Mclaughlin, CHRISTOPHER, Palak Arias, CNP, Shawna Cabrera, CNP, Talia Paulino CNP, Yovana Handley CNP, Josie Sood, CNP, Maxine Jones, PA-C, Cathleen Hernandez PA-C, Janine Krishna, CNP, Jazmin Correia, CNS, Yaneth Zavala, CNP, Neeru Miles CNP, Tracy Schwab, CNP         [facility]   IN-PATIENT SERVICE   [facility]    HISTORY AND PHYSICAL EXAMINATION            Date:   4/19/2024  Patient name:  Jessica Avendano  Date of admission:  4/19/2024 12:47 PM  MRN:   [id number]  Account:  [id number]  YOB: 1945  PCP:    Milton Randhawa APRN - CNP  Room:   [address] [place] [address]  Code Status:    DNR-CC    Chief Complaint:     Chief Complaint   Patient presents with    Shortness of Breath    Hypoglycemia       History Obtained From:     patient, family member, electronic medical record    History of Present Illness:     Jessica Avendano is a 78 y.o. Non- / non  female who presents with Shortness of Breath and Hypoglycemia and is admitted to the hospital for the management of Acute respiratory failure (HCC).  Patient is a history significant for hypertension, CKD, systolic CHF, anemia, and atrial fibrillation.    Patient resides in SNF and has been having episodes of AMS  performed by Bebeto Ye MD at [facility] OR    TOE AMPUTATION Left 10/19/2021    LEFT PARTIAL RAY 1ST TOE AMPUTATION (Left )    TOE AMPUTATION Left 10/19/2021    LEFT HALLUX AMPUTATION, DEBRIDEMENT OF NONVIABLE SOFT TISSUE AND BONE LEFT HALLUX performed by Anna Shelton DPM at [facility] OR    TOTAL HIP ARTHROPLASTY Right 10/24/2023    HIP TOTAL ARTHROPLASTY ASI performed by Curtis Lala MD at [facility] OR    TRANSESOPHAGEAL ECHOCARDIOGRAM  11/10/2017    EF 20%. Mod MR. Mild to mod AI. Mild TR. Segmental WMA.        Medications Prior to Admission:     Prior to Admission medications    Medication Sig Start Date End Date Taking? Authorizing Provider   ascorbic acid (VITAMIN C) 500 MG tablet Take 1 tablet by mouth daily    Brandon Odell MD   diclofenac sodium (VOLTAREN) 1 % GEL Apply topically 2 times daily    Brandon Odell MD   vitamin B-1 (THIAMINE) 100 MG tablet Take 1 tablet by mouth daily    Brandon Odell MD   loperamide (ANTI-DIARRHEAL) 1 MG/5ML solution Take by mouth 4 times daily as needed for Diarrhea  Patient not taking: Reported on 4/10/2024    Brandon Odell MD   loperamide (IMODIUM) 2 MG capsule Take 1 capsule by mouth in the morning and 1 capsule in the evening.  Patient not taking: Reported on 4/10/2024    Brandon Odell MD   acetaminophen (TYLENOL) 500 MG tablet Take 1 tablet by mouth in the morning and 1 tablet in the evening. 4/3/24   Milton Randhawa APRN - CNP   mirtazapine (REMERON) 7.5 MG tablet Take 1 tablet by mouth nightly 4/3/24   Milton Randhawa APRN - CNP   lidocaine 4 % external patch Place 1 patch onto the skin daily 4/3/24 6/2/24  Milton Randhawa APRN - CNP   DULoxetine (CYMBALTA) 30 MG extended release capsule Take 1 capsule by mouth daily 3/14/24   Viry Rowan APRN - CNP   torsemide (DEMADEX) 20 MG tablet Take 1 tablet by mouth daily 3/14/24 6/12/24  Ar Membreno MD   Misc. Devices MISC Admit to skilled nursing and rehab,  1:08 PM   Result Value Ref Range    Sodium 140 136 - 145 mmol/L    Potassium 6.0 (HH) 3.7 - 5.3 mmol/L    Chloride 98 98 - 107 mmol/L    CO2 8 (LL) 20 - 31 mmol/L    Anion Gap 34 (H) 9 - 16 mmol/L    Glucose 150 (H) 74 - 99 mg/dL    BUN 31 (H) 8 - 23 mg/dL    Creatinine 2.2 (H) 0.50 - 0.90 mg/dL    Est, Glom Filt Rate 23 (L) >60 mL/min/1.73m2    Calcium 9.2 8.6 - 10.4 mg/dL   Troponin    Collection Time: 04/19/24  1:08 PM   Result Value Ref Range    Troponin, High Sensitivity 63 (HH) 0 - 14 ng/L   Lactic Acid    Collection Time: 04/19/24  1:08 PM   Result Value Ref Range    Lactic Acid, Whole Blood 18.0 (H) 0.7 - 2.1 mmol/L   Brain Natriuretic Peptide    Collection Time: 04/19/24  1:08 PM   Result Value Ref Range    Pro-BNP >70,000 (H) 0 - 300 pg/mL   Culture, Blood 1    Collection Time: 04/19/24  1:17 PM    Specimen: Blood   Result Value Ref Range    Specimen Description .BLOOD     Special Requests R FOREARM 4ML     Culture NO GROWTH <24 HRS    Culture, Blood 1    Collection Time: 04/19/24  1:30 PM    Specimen: Blood   Result Value Ref Range    Specimen Description .BLOOD     Special Requests L AC 10ML     Culture NO GROWTH <24 HRS    Venous Blood Gas, POC    Collection Time: 04/19/24  1:35 PM   Result Value Ref Range    pH, [place] 7.038 (LL) 7.320 - 7.430    pCO2, [place] 31.6 (L) 41.0 - 51.0 mm Hg    pO2, [place] 44.2 30.0 - 50.0 mm Hg    HCO3, Venous 8.5 (L) 22.0 - 29.0 mmol/L    Negative Base Excess, [place] 21.2 (H) 0.0 - 2.0 mmol/L    O2 Sat, [place] 59.9 (L) 60.0 - 85.0 %   ELECTROLYTES PLUS    Collection Time: 04/19/24  1:35 PM   Result Value Ref Range    POC Sodium 138 138 - 146 mmol/L    POC Potassium 5.4 (H) 3.5 - 4.5 mmol/L    POC Chloride 110 (H) 98 - 107 mmol/L    POC TCO2 9 (LL) 22 - 30 mmol/L    POC Anion Gap 20 (H) 7 - 16 mmol/L   Hemoglobin and hematocrit, blood    Collection Time: 04/19/24  1:35 PM   Result Value Ref Range    POC Hemoglobin (calc) 14.4 12.0 - 16.0 g/dL    POC Hematocrit 42 36 - 46 %   Creatinine  W/GFR Point of Care    Collection Time: 04/19/24  1:35 PM   Result Value Ref Range    POC Creatinine 2.2 (H) 0.51 - 1.19 mg/dL    eGFR, POC 23 mL/min/1.73m2   CALCIUM, IONIC (POC)    Collection Time: 04/19/24  1:35 PM   Result Value Ref Range    POC Ionized Calcium 0.99 (L) 1.15 - 1.33 mmol/L   POCT urea (BUN)    Collection Time: 04/19/24  1:35 PM   Result Value Ref Range    POC BUN 30 (H) 8 - 26 mg/dL   Lactic Acid, POC    Collection Time: 04/19/24  1:35 PM   Result Value Ref Range    POC Lactic Acid 15.6 (H) 0.56 - 1.39 mmol/L   POCT Glucose    Collection Time: 04/19/24  1:35 PM   Result Value Ref Range    POC Glucose 164 (H) 74 - 100 mg/dL   Mixed Venous Gas, POC    Collection Time: 04/19/24  2:08 PM   Result Value Ref Range    PH MIXED 7.046 (LL) 7.310 - 7.410    PCO2, Mixed 36.0 (L) 42.0 - 52.0 mm Hg    PO2, Mixed 29.1 (L) 35.0 - 45.0 mm Hg    HCO3, Mixed 9.9 (L) 23.0 - 29.0 mmol/L    Negative Base Excess, Mixed 19.8 (H) 0.0 - 2.0 mmol/L    O2 Sat, Mixed 33.5 (L) 60.0 - 80.0 %    O2 Delivery Device NRB     Tyson Test POSITIVE     Sample Site Right Brachial Artery     FIO2 15.0    POCT Glucose    Collection Time: 04/19/24  2:08 PM   Result Value Ref Range    POC Glucose 158 (H) 74 - 100 mg/dL   POC Glucose Fingerstick    Collection Time: 04/19/24  4:11 PM   Result Value Ref Range    POC Glucose 109 (H) 65 - 105 mg/dL       Imaging/Diagnostics:  XR CHEST PORTABLE    Result Date: 4/19/2024  No acute cardiopulmonary process.       Assessment :      Hospital Problems             Last Modified POA    * (Principal) Acute respiratory failure (HCC) 4/19/2024 Yes       Plan:     Patient status inpatient in the Med/Surge    Acute respiratory failure - Patient is DNR CC, no intubation. Patient currently on NRB. PRN medications for comfort.    Metabolic acidosis - Continue IV fluids and supportive measures.    Discussion with son at bedside regarding wishes. He confirmed DNR CC, no intubation. He is agreeable to medications

## 2024-04-19 NOTE — CARE COORDINATION
CM consult requested for hospice care per Dr Lora. Met with pts son Jeet at bedside pt is  nonresponsive at this time. Jeet states that the family wants [facility] and they have spoke with Chata recently. Jeet states he understands that pt is at end of life and they just want pt to be comfortable. CM contacted [facility] spoke with Chata, they do not have anyone that can meet with family tonight but will call cm in am for pt status and meet with family if still needed. Jeet is informed and agreeable,  requests pastoral care for further information on  arrangements and spiritual support. Pastoral care informed.

## 2024-04-20 VITALS
WEIGHT: 108.03 LBS | OXYGEN SATURATION: 30 % | SYSTOLIC BLOOD PRESSURE: 88 MMHG | BODY MASS INDEX: 15.95 KG/M2 | HEART RATE: 60 BPM | RESPIRATION RATE: 16 BRPM | TEMPERATURE: 97 F | DIASTOLIC BLOOD PRESSURE: 50 MMHG

## 2024-04-20 LAB
ANION GAP SERPL CALCULATED.3IONS-SCNC: 33 MMOL/L (ref 9–16)
BUN SERPL-MCNC: 38 MG/DL (ref 8–23)
CALCIUM SERPL-MCNC: 8.4 MG/DL (ref 8.6–10.4)
CHLORIDE SERPL-SCNC: 100 MMOL/L (ref 98–107)
CO2 SERPL-SCNC: 8 MMOL/L (ref 20–31)
CREAT SERPL-MCNC: 2.9 MG/DL (ref 0.5–0.9)
ERYTHROCYTE [DISTWIDTH] IN BLOOD BY AUTOMATED COUNT: 18.5 % (ref 11.8–14.4)
GFR SERPL CREATININE-BSD FRML MDRD: 16 ML/MIN/1.73M2
GLUCOSE SERPL-MCNC: 12 MG/DL (ref 74–99)
HCT VFR BLD AUTO: 45.2 % (ref 36.3–47.1)
HGB BLD-MCNC: 12.2 G/DL (ref 11.9–15.1)
MCH RBC QN AUTO: 27.6 PG (ref 25.2–33.5)
MCHC RBC AUTO-ENTMCNC: 27 G/DL (ref 28.4–34.8)
MCV RBC AUTO: 102.3 FL (ref 82.6–102.9)
NRBC BLD-RTO: 0.7 PER 100 WBC
PLATELET # BLD AUTO: 182 K/UL (ref 138–453)
PMV BLD AUTO: 11.3 FL (ref 8.1–13.5)
POTASSIUM SERPL-SCNC: 7.4 MMOL/L (ref 3.7–5.3)
RBC # BLD AUTO: 4.42 M/UL (ref 3.95–5.11)
SODIUM SERPL-SCNC: 141 MMOL/L (ref 136–145)
WBC OTHER # BLD: 25.7 K/UL (ref 3.5–11.3)

## 2024-04-20 PROCEDURE — 99239 HOSP IP/OBS DSCHRG MGMT >30: CPT | Performed by: STUDENT IN AN ORGANIZED HEALTH CARE EDUCATION/TRAINING PROGRAM

## 2024-04-20 PROCEDURE — 80048 BASIC METABOLIC PNL TOTAL CA: CPT

## 2024-04-20 PROCEDURE — 6360000002 HC RX W HCPCS: Performed by: NURSE PRACTITIONER

## 2024-04-20 PROCEDURE — 85027 COMPLETE CBC AUTOMATED: CPT

## 2024-04-20 PROCEDURE — 51798 US URINE CAPACITY MEASURE: CPT

## 2024-04-20 PROCEDURE — 2580000003 HC RX 258: Performed by: PHYSICIAN ASSISTANT

## 2024-04-20 PROCEDURE — 6360000002 HC RX W HCPCS: Performed by: PHYSICIAN ASSISTANT

## 2024-04-20 PROCEDURE — 2700000000 HC OXYGEN THERAPY PER DAY

## 2024-04-20 PROCEDURE — 36415 COLL VENOUS BLD VENIPUNCTURE: CPT

## 2024-04-20 RX ORDER — MORPHINE SULFATE 2 MG/ML
2 INJECTION, SOLUTION INTRAMUSCULAR; INTRAVENOUS
Status: DISCONTINUED | OUTPATIENT
Start: 2024-04-20 | End: 2024-04-20 | Stop reason: HOSPADM

## 2024-04-20 RX ORDER — LORAZEPAM 2 MG/ML
1 INJECTION INTRAMUSCULAR ONCE
Status: COMPLETED | OUTPATIENT
Start: 2024-04-20 | End: 2024-04-20

## 2024-04-20 RX ADMIN — SODIUM CHLORIDE, PRESERVATIVE FREE 10 ML: 5 INJECTION INTRAVENOUS at 02:43

## 2024-04-20 RX ADMIN — SODIUM CHLORIDE, PRESERVATIVE FREE 10 ML: 5 INJECTION INTRAVENOUS at 05:20

## 2024-04-20 RX ADMIN — MORPHINE SULFATE 2 MG: 2 INJECTION, SOLUTION INTRAMUSCULAR; INTRAVENOUS at 02:42

## 2024-04-20 RX ADMIN — SODIUM CHLORIDE, PRESERVATIVE FREE 10 ML: 5 INJECTION INTRAVENOUS at 00:12

## 2024-04-20 RX ADMIN — MORPHINE SULFATE 2 MG: 2 INJECTION, SOLUTION INTRAMUSCULAR; INTRAVENOUS at 05:19

## 2024-04-20 RX ADMIN — LORAZEPAM 1 MG: 2 INJECTION INTRAMUSCULAR; INTRAVENOUS at 00:12

## 2024-04-20 NOTE — PLAN OF CARE
Problem: Discharge Planning  Goal: Discharge to home or other facility with appropriate resources  4/20/2024 0946 by Kristina Ibarra RN  Outcome: Completed  4/20/2024 0203 by Cynthia Ortiz RN  Outcome: Progressing     Problem: Pain  Goal: Verbalizes/displays adequate comfort level or baseline comfort level  4/20/2024 0946 by Kristina Ibarra RN  Outcome: Completed  4/20/2024 0203 by Cynthia Ortiz RN  Outcome: Progressing     Problem: Safety - Adult  Goal: Free from fall injury  4/20/2024 0946 by Kristina Ibarra RN  Outcome: Completed  4/20/2024 0203 by Cynthia Ortiz RN  Outcome: Progressing     Problem: Skin/Tissue Integrity  Goal: Absence of new skin breakdown  Description: 1.  Monitor for areas of redness and/or skin breakdown  2.  Assess vascular access sites hourly  3.  Every 4-6 hours minimum:  Change oxygen saturation probe site  4.  Every 4-6 hours:  If on nasal continuous positive airway pressure, respiratory therapy assess nares and determine need for appliance change or resting period.  4/20/2024 0946 by Kristina Ibarra RN  Outcome: Completed  4/20/2024 0203 by Cynthia Ortiz RN  Outcome: Progressing     Problem: Respiratory - Adult  Goal: Achieves optimal ventilation and oxygenation  4/20/2024 0946 by Kristina Ibarra RN  Outcome: Completed  4/20/2024 0203 by Cynthia Ortiz RN  Outcome: Progressing

## 2024-04-20 NOTE — PROGRESS NOTES
Writer and Rosalba PITTS checked pt heart ryhthm, Lung sound, Pulse and response.   Both RN couldn't check any of them.  Writer notified to Dr. Zarate.  Writer messaged to .

## 2024-04-20 NOTE — PROGRESS NOTES
Informed by nursing staff that at 0840 patient had passed. Evaluate patient at bedside. Absent pulses and respiratory effort. Discussed with family. Time of death: 4/20/24 @ 0840.    Jerome Zarate DO  [facility], [place], [place]  4/20/2024 9:03 AM

## 2024-04-20 NOTE — PROGRESS NOTES
[facility]   Patient Death Note  DEATH   Shift date: 2024    Shift day: Saturday  Shift #: 1                 Room # 0326/0326-02   Name: Jessica Avendano            Age: 78 y.o.  Gender: female          Rastafari: Restoration      Place of Amish:   Admit Date & Time: 2024 12:47 PM     Referral: Nurse   Actual date of death: 2024   TOD: 8:40am       SITUATION AT DEATH:   Patient was under end of life care    IS THIS A 'S CASE?  No    SPIRITUAL/EMOTIONAL INTERVENTION    Patient was surrounded by family Sons Jeet([phone]), and Eddie and daughter Ekaterina    Family Received Grief Packet?  Yes       NAME AND PHONE NUMBER OF DOCTOR SIGNING DEATH CERTIFICATE:  (full name) Jerome Zarate     (phone)  [phone]       are now contacting the  home after a patient death.  spoke to Genoveva  at [facility]  indicating family's choice for their services.  called  home on [phone] 2024 at 11:45am .      Copy of COMPLETED Release of Body Form Received?  Yes    Patient's belongings: not sure     HOME:  Name: [facility]  City: [place]  Phone Number: [phone]    NEXT OF KIN:  Name: Jeet Avendano  Relationship: SON  Street Address: [address] [place] [address]  City: [place]  State: [place]  Zip code: [id number]   Phone Number: [phone]    ANY FOLLOW-UP NEEDED?  No    IF SO, WHAT?      Electronically signed by Chaplain RUBINA, on 2024 at 10:04 AM.  [facility]  [phone]      24 0959   Encounter Summary   Encounter Overview/Reason  Family Care   Service Provided For: Family   Referral/Consult From: Nurse   Support System Children   Last Encounter  24   Complexity of Encounter Moderate   Begin Time 0900   End Time  0920   Total Time Calculated 20 min   Assessment/Intervention/Outcome   Assessment Calm;Peaceful;Coping   Intervention Active  listening;Sustaining Presence/Ministry of presence   Outcome Coping;Peace

## 2024-04-20 NOTE — DISCHARGE SUMMARY
[facility]  Office: [phone]  Davis Go DO, Robibe Merino DO, Kilo Edmonds DO, Gujrit Bradford DO, Catalina Castaneda MD, Bonnie Russell MD, Aman Mccann MD, Janee Lin MD,  Torrey Dee MD, Raulito Wheeler MD, Gail Macias MD,  Kaylan Guido DO, Daisha Torres MD, Gabe Cantrell MD, Jonnathan Go DO, Geena Thakur MD,  Jerome Zarate DO, Shweta Stoddard MD, Karla Gonzalez MD, Joseline Knight MD, Nitesh Steward MD,  Feliciano Ashley MD, Reuben Álvarez MD, Senthil Mcdowell MD, Neyda Hudson MD, Partice Sanchez MD, Shar Metzger MD, Durga Hill DO, Vishal Lee DO, Ekta Valles MD,  Harsha Anderson MD, Shirley Waterhouse, CNP,  Cierra Stokes CNP, [place], CNP,  Mily Mclaughlin, DNP, Palak Arias, CNP, Shawna Cabrera, CNP, Yovana Handley CNP, Josie Sood, CNP, Maxine Jones, PA-C, Cathleen Hernandez PA-C, Elisha Gusman, CNP, Noni Quintero, CNP, Edward Conrad, CNP, Janine Krishna, CNP, Talia Paulino, CNP, Jazmin Correia, CNS, Yaneth Zavala, CNP, Neeru Miles CNP, Tracy Schwab, CNP         [facility]   IN-PATIENT SERVICE   [facility]    Discharge Summary     Patient ID: Jessica Avendano  :  1945   MRN: [id number]     ACCOUNT:  [id number]   Patient's PCP: Milton Randhawa APRN - CNP  Admit Date: 2024   Discharge Date: 2024   Length of Stay: 1  Code Status:  DNR-CC  Admitting Physician: No admitting provider for patient encounter.  Discharge Physician: Jerome Zarate DO     Active Discharge Diagnoses:     Hospital Problem Lists:  Principal Problem:    Acute respiratory failure (HCC)  Resolved Problems:    * No resolved hospital problems. *      Admission Condition:  Poor.     Discharged Condition: .     Hospital Stay:     Hospital Course:     This is a 78-year-old female who initially presented to the emergency department from skilled nursing facility with a chief complaint of altered mental status with  1 tablet by mouth nightly     sertraline 50 MG tablet  Commonly known as: ZOLOFT  TAKE 1 TABLET EVERY DAY     therapeutic multivitamin-minerals tablet     torsemide 20 MG tablet  Commonly known as: DEMADEX  Take 1 tablet by mouth daily     vitamin B-1 100 MG tablet  Commonly known as: THIAMINE     vitamin B-12 1000 MCG tablet  Commonly known as: CYANOCOBALAMIN     vitamin D 25 MCG (1000 UT) Caps           * This list has 2 medication(s) that are the same as other medications prescribed for you. Read the directions carefully, and ask your doctor or other care provider to review them with you.                  No discharge procedures on file.    Time Spent on discharge is  31 mins in patient examination, evaluation, counseling as well as medication reconciliation, prescriptions for required medications, discharge plan and follow up.    Electronically signed by   Jerome Zarate DO  4/20/2024  11:10 AM      Thank you Milton Vega, APRN - CNP for the opportunity to be involved in this patient's care.

## 2024-04-20 NOTE — PLAN OF CARE
Problem: Respiratory - Adult  Goal: Achieves optimal ventilation and oxygenation  Outcome: Progressing     Problem: Safety - Adult  Goal: Free from fall injury  Outcome: Progressing     Problem: Pain  Goal: Verbalizes/displays adequate comfort level or baseline comfort level  Outcome: Progressing     Problem: Skin/Tissue Integrity  Goal: Absence of new skin breakdown  Description: 1.  Monitor for areas of redness and/or skin breakdown  2.  Assess vascular access sites hourly  3.  Every 4-6 hours minimum:  Change oxygen saturation probe site  4.  Every 4-6 hours:  If on nasal continuous positive airway pressure, respiratory therapy assess nares and determine need for appliance change or resting period.  Outcome: Progressing

## 2024-05-01 NOTE — PROGRESS NOTES
Physician Progress Note      PATIENT:               CONRAD MCCORD  CSN #:                  [id number]  :                       1945  ADMIT DATE:       2024 12:47 PM  DISCH DATE:        2024 11:44 AM  RESPONDING  PROVIDER #:        Cathleen Hernandez          QUERY TEXT:    Pt admitted with CHF Exacerbation. Pt has a history of systolic CHF. Echo from   2023 shows EF of 45%, Pro BNP >70,000, CXR shows stable cardiomegaly and no   acute cardiopulmonary processes.  If possible, please document in progress   notes and discharge summary further specificity regarding the type and acuity   of CHF:    The medical record reflects the following:  Risk Factors: HTN  Clinical Indicators: history of systolic CHF. Echo from 2023 shows EF of   45%, Pro BNP >70,000, CXR shows stable cardiomegaly and no acute   cardiopulmonary processes  Treatment: Pt and family decide to pursue hospice and pt is comfort care and   passed away on .    Thank you for your time, Muna JARRETT, RN CDS. If you have questions, please   call [phone] (C-F [id number]).  Options provided:  -- Acute on Chronic Systolic CHF/HFrEF  -- Acute on Chronic Systolic and Diastolic CHF  -- Other - I will add my own diagnosis  -- Disagree - Not applicable / Not valid  -- Disagree - Clinically unable to determine / Unknown  -- Refer to Clinical Documentation Reviewer    PROVIDER RESPONSE TEXT:    This patient is in acute on chronic systolic CHF/HFrEF.    Query created by: Muna Ahumada on 2024 11:09 PM      Electronically signed by:  Cathleen Hernandez 2024 5:21 PM

## 2024-05-15 NOTE — PROGRESS NOTES
document in progress notes and discharge   summary if you are evaluating and /or treating any of the following:    The medical record reflects the following:  Risk Factors: age  Clinical Indicators: BMI of 15.95 and documentation of being underweight,   ill-appearing and many chronic conditions in the [facility] documents mottling   in lower extremities, evident dehydration, blood sugar was down to the 20's  Treatment: pt made comfort care, receiving fluids, dextrose, ativan and   morphine. pt expires 4/20 at 840a    ASPEN Criteria:    https://aspenjournals.onlinelibrary.espinal.com/doi/full/10.1177/545531364648062  5    Thank you for your time, Muna JARRETT, RN CDS. If you have questions, please   call [phone] (P-F [phone]).  Options provided:  -- Protein calorie malnutrition mild  -- Protein calorie malnutrition moderate  -- Protein calorie malnutrition severe  -- Other - I will add my own diagnosis  -- Disagree - Not applicable / Not valid  -- Disagree - Clinically unable to determine / Unknown  -- Refer to Clinical Documentation Reviewer    PROVIDER RESPONSE TEXT:    This patient has moderate protein calorie malnutrition.    Query created by: Muna Ahumada on 5/3/2024 11:49 PM      Electronically signed by:  Jerome Zarate DO 5/15/2024 3:53 PM

## 2025-06-24 NOTE — TELEPHONE ENCOUNTER
Patient is having dental work tomorrow and wanted to know if she should hold digoxin or any of her other medications. She states she hasn't been to the dentist in some time so she doesn't remember ever stopping meds before. Please advise. Current Outpatient Medications on File Prior to Visit   Medication Sig Dispense Refill    apixaban (ELIQUIS) 5 MG TABS tablet Take one tablet bid 14 tablet 0    sertraline (ZOLOFT) 50 MG tablet TAKE 1 TABLET EVERY DAY 90 tablet 1    carvedilol (COREG) 6.25 MG tablet Take 1 tablet by mouth 2 times daily (with meals) 180 tablet 2    torsemide (DEMADEX) 20 MG tablet Take 1 tablet by mouth daily 30 tablet 1    digoxin (LANOXIN) 125 MCG tablet Take 125 mcg by mouth Takes Monday thru Friday      lisinopril (ZESTRIL) 2.5 MG tablet Take 1 tablet by mouth daily 90 tablet 3    vitamin B-1 100 MG tablet Take 1 tablet by mouth daily 30 tablet 3    vitamin B-12 (CYANOCOBALAMIN) 1000 MCG tablet Take 1,000 mcg by mouth daily      Multiple Vitamins-Minerals (THERAPEUTIC MULTIVITAMIN-MINERALS) tablet Take 1 tablet by mouth daily      calcium carbonate (OSCAL) 500 MG TABS tablet Take 600 mg by mouth daily       Biotin (BIOTIN 5000) 5 MG CAPS Take 5,000 mcg by mouth daily      ascorbic acid (VITAMIN C) 500 MG tablet Take 500 mg by mouth daily        No current facility-administered medications on file prior to visit. Pt called to request lab results. Informed pt that a lab letter was mailed on 6/18/25. Pt has not received at address on letter, which she confirmed. Pt requesting a call back from nurse to discuss labs. Also re-sent My Chart sign-up link via text message.

## (undated) DEVICE — SUTURE VCRL SZ 2-0 L27IN ABSRB UD L22MM X-1 1/2 CIR REV CUT J459H

## (undated) DEVICE — STANDARD HYPODERMIC NEEDLE,POLYPROPYLENE HUB: Brand: MONOJECT

## (undated) DEVICE — FORCEPS BX L240CM WRK CHN 2.8MM STD CAP W/ NDL MIC MESH

## (undated) DEVICE — 4-PORT MANIFOLD: Brand: NEPTUNE 2

## (undated) DEVICE — 3M™ STERI-STRIP™ BLEND TONE SKIN CLOSURES, B1557, TAN, 1/2 IN X 4 IN (12MM X 100MM), 6 STRIPS/ENVELOPE: Brand: 3M™ STERI-STRIP™

## (undated) DEVICE — GAUZE,SPONGE,FLUFF,6"X6.75",STRL,5/TRAY: Brand: MEDLINE

## (undated) DEVICE — SUTURE MCRYL SZ 4-0 L18IN ABSRB UD L16MM PC-3 3/8 CIR PRIM Y845G

## (undated) DEVICE — 2108 SERIES SAGITTAL BLADE, NO OFFSET  (24.8 X 1.32 X 87.3MM)

## (undated) DEVICE — SVMMC HD AND NK PK

## (undated) DEVICE — CONTROL SYRINGE LUER-LOCK TIP: Brand: MONOJECT

## (undated) DEVICE — INTENDED FOR TISSUE SEPARATION, AND OTHER PROCEDURES THAT REQUIRE A SHARP SURGICAL BLADE TO PUNCTURE OR CUT.: Brand: BARD-PARKER ® CARBON RIB-BACK BLADES

## (undated) DEVICE — GLOVE SURG SZ 85 L12IN FNGR THK79MIL GRN LTX FREE

## (undated) DEVICE — SPONGE LAP W18XL18IN WHT COT 4 PLY FLD STRUNG RADPQ DISP ST

## (undated) DEVICE — MHPB HAND AND FOOT PACK: Brand: MEDLINE INDUSTRIES, INC.

## (undated) DEVICE — CONTAINER,SPECIMEN,4OZ,OR STRL: Brand: MEDLINE

## (undated) DEVICE — DRESSING PETRO W3XL8IN OIL EMUL N ADH GZ KNIT IMPREG CELOS

## (undated) DEVICE — BLANKET WRM W29.9XL79.1IN UP BODY FORC AIR MISTRAL-AIR

## (undated) DEVICE — SNARE ENDOSCP M L240CM LOOP W27MM SHTH DIA2.4MM OVL FLX

## (undated) DEVICE — SUTURE MCRYL SZ 4-0 L18IN ABSRB UD P-3 L13MM 3/8 CIR PRIM Y494G

## (undated) DEVICE — 3M™ IOBAN™ 2 ANTIMICROBIAL INCISE DRAPE 6651EZ: Brand: IOBAN™ 2

## (undated) DEVICE — ILLUMINATOR SURG YANKAUER MTL TIP STRL PHOTONSABER Y DISP

## (undated) DEVICE — HANDPIECE SET WITH HIGH FLOW TIP AND SUCTION TUBE: Brand: INTERPULSE

## (undated) DEVICE — GLOVE ORTHO 8   MSG9480

## (undated) DEVICE — BNDG,ELSTC,MATRIX,STRL,4"X5YD,LF,HOOK&LP: Brand: MEDLINE

## (undated) DEVICE — INTENDED FOR TISSUE SEPARATION, AND OTHER PROCEDURES THAT REQUIRE A SHARP SURGICAL BLADE TO PUNCTURE OR CUT.: Brand: BARD-PARKER ® STAINLESS STEEL BLADES

## (undated) DEVICE — KIT SEP W/ BLD DRAW TB SYR NDL TRNQT PD

## (undated) DEVICE — KIT AUTOTRNS APPL AERO 2 SET SYR 2 TIP FOR PLT SEP SYS GPS

## (undated) DEVICE — SOLUTION IRRIG 3000ML 0.9% SOD CHL USP UROMATIC PLAS CONT

## (undated) DEVICE — SUTURE ETHBND EXCEL SZ 1 L30IN NONABSORBABLE GRN L36MM CT-1 X425H

## (undated) DEVICE — SUTURE NONABSORBABLE MONOFILAMENT 3-0 PS-1 18 IN BLK ETHILON 1663H

## (undated) DEVICE — SOLUTION IV IRRIG POUR BRL 0.9% SODIUM CHL 2F7124

## (undated) DEVICE — Z DISCONTINUED BY MEDLINE USE 2711682 TRAY SKIN PREP DRY W/ PREM GLV

## (undated) DEVICE — DRESSING PETRO W3XL3IN OIL EMUL N ADH GZ KNIT IMPREG CELOS

## (undated) DEVICE — TOURNIQUET CUF BLD PRESSURE 4X18 IN 2 PRT SINGLE BLDR RED

## (undated) DEVICE — DRESSING HYDROCOLLOID BORDER 35X10 IN ALUM PRIMASEAL

## (undated) DEVICE — GLOVE ORANGE PI 8 1/2   MSG9085

## (undated) DEVICE — BANDAGE,ELASTIC,ESMARK,STERILE,4"X9',LF: Brand: MEDLINE

## (undated) DEVICE — GLOVE ORANGE PI 7   MSG9070

## (undated) DEVICE — Device

## (undated) DEVICE — STRIP,CLOSURE,WOUND,MEDI-STRIP,1/2X4: Brand: MEDLINE

## (undated) DEVICE — DUP USE 291175 PAD GROUNDING ADULT 10FT CORD

## (undated) DEVICE — SOLUTION IRRIG 1000ML STRL H2O USP PLAS POUR BTL

## (undated) DEVICE — SUTURE STRATAFIX SPRL MCRYL + SZ 2 0 L27IN ABSRB UD W NDL SXMP1B419

## (undated) DEVICE — CLOSURE SKIN FLX NONINVASIVE PRELOC TECHNOLOGY FOR 24IN

## (undated) DEVICE — 450 ML BOTTLE OF 0.05% CHLORHEXIDINE GLUCONATE IN 99.95% STERILE WATER FOR IRRIGATION, USP AND APPLICATOR.: Brand: IRRISEPT ANTIMICROBIAL WOUND LAVAGE

## (undated) DEVICE — SUTURE VIC + BR UD PS2 4-0 18IN VCP496G

## (undated) DEVICE — GOWN,AURORA,NONRNF,XL,30/CS: Brand: MEDLINE

## (undated) DEVICE — BANDAGE,GAUZE,BULKEE II,4.5"X4.1YD,STRL: Brand: MEDLINE

## (undated) DEVICE — ST CHARLES TOTAL HIP: Brand: MEDLINE INDUSTRIES, INC.

## (undated) DEVICE — BIT DRL L30MM DIA3.2MM DISP FOR G7 2 MOBILITY CONSTRUCT

## (undated) DEVICE — SOLUTION IRRIG 1000ML 0.9% SOD CHL USP POUR PLAS BTL

## (undated) DEVICE — SUTURE STRATAFIX SYMMETRIC PDS + SZ 1 L18IN ABSRB VLT L48MM SXPP1A400

## (undated) DEVICE — BANDAGE GZ W2XL75IN ST RAYON POLY CNFRM STRTCH LTWT